# Patient Record
Sex: MALE | Race: WHITE | NOT HISPANIC OR LATINO | Employment: OTHER | URBAN - METROPOLITAN AREA
[De-identification: names, ages, dates, MRNs, and addresses within clinical notes are randomized per-mention and may not be internally consistent; named-entity substitution may affect disease eponyms.]

---

## 2018-05-06 ENCOUNTER — APPOINTMENT (OUTPATIENT)
Dept: RADIOLOGY | Facility: CLINIC | Age: 58
End: 2018-05-06
Payer: OTHER MISCELLANEOUS

## 2018-05-06 ENCOUNTER — OFFICE VISIT (OUTPATIENT)
Dept: URGENT CARE | Facility: CLINIC | Age: 58
End: 2018-05-06
Payer: OTHER MISCELLANEOUS

## 2018-05-06 VITALS
DIASTOLIC BLOOD PRESSURE: 78 MMHG | OXYGEN SATURATION: 97 % | RESPIRATION RATE: 20 BRPM | HEART RATE: 74 BPM | SYSTOLIC BLOOD PRESSURE: 144 MMHG | TEMPERATURE: 99.4 F | WEIGHT: 315 LBS | BODY MASS INDEX: 36.45 KG/M2 | HEIGHT: 78 IN

## 2018-05-06 DIAGNOSIS — S09.90XA INJURY OF HEAD, INITIAL ENCOUNTER: ICD-10-CM

## 2018-05-06 DIAGNOSIS — S09.90XA INJURY OF HEAD, INITIAL ENCOUNTER: Primary | ICD-10-CM

## 2018-05-06 PROCEDURE — 72100 X-RAY EXAM L-S SPINE 2/3 VWS: CPT

## 2018-05-06 PROCEDURE — 72040 X-RAY EXAM NECK SPINE 2-3 VW: CPT

## 2021-03-16 ENCOUNTER — HOSPITAL ENCOUNTER (EMERGENCY)
Facility: HOSPITAL | Age: 61
Discharge: HOME | End: 2021-03-16
Attending: EMERGENCY MEDICINE
Payer: COMMERCIAL

## 2021-03-16 ENCOUNTER — APPOINTMENT (EMERGENCY)
Dept: CT IMAGING | Facility: HOSPITAL | Age: 61
End: 2021-03-16
Payer: COMMERCIAL

## 2021-03-16 VITALS
DIASTOLIC BLOOD PRESSURE: 87 MMHG | WEIGHT: 315 LBS | SYSTOLIC BLOOD PRESSURE: 148 MMHG | TEMPERATURE: 97.9 F | BODY MASS INDEX: 38.36 KG/M2 | RESPIRATION RATE: 18 BRPM | HEART RATE: 83 BPM | OXYGEN SATURATION: 98 % | HEIGHT: 76 IN

## 2021-03-16 DIAGNOSIS — S39.012A STRAIN OF LUMBAR REGION, INITIAL ENCOUNTER: Primary | ICD-10-CM

## 2021-03-16 LAB
ANION GAP SERPL CALCULATED.3IONS-SCNC: 8 MMOL/L (ref 4–13)
BACTERIA UR QL AUTO: NORMAL /HPF
BASOPHILS # BLD AUTO: 0.05 THOUSANDS/ΜL (ref 0–0.1)
BASOPHILS NFR BLD AUTO: 1 % (ref 0–1)
BILIRUB UR QL STRIP: NEGATIVE
BUN SERPL-MCNC: 14 MG/DL (ref 5–25)
CALCIUM SERPL-MCNC: 9 MG/DL (ref 8.3–10.1)
CHLORIDE SERPL-SCNC: 103 MMOL/L (ref 100–108)
CK SERPL-CCNC: 127 U/L (ref 39–308)
CLARITY UR: CLEAR
CO2 SERPL-SCNC: 27 MMOL/L (ref 21–32)
COLOR UR: YELLOW
CREAT SERPL-MCNC: 1.13 MG/DL (ref 0.6–1.3)
EOSINOPHIL # BLD AUTO: 0.18 THOUSAND/ΜL (ref 0–0.61)
EOSINOPHIL NFR BLD AUTO: 2 % (ref 0–6)
ERYTHROCYTE [DISTWIDTH] IN BLOOD BY AUTOMATED COUNT: 14.6 % (ref 11.6–15.1)
GFR SERPL CREATININE-BSD FRML MDRD: 71 ML/MIN/1.73SQ M
GLUCOSE SERPL-MCNC: 111 MG/DL (ref 65–140)
GLUCOSE UR STRIP-MCNC: ABNORMAL MG/DL
HCT VFR BLD AUTO: 41.3 % (ref 36.5–49.3)
HGB BLD-MCNC: 13.5 G/DL (ref 12–17)
HGB UR QL STRIP.AUTO: NEGATIVE
IMM GRANULOCYTES # BLD AUTO: 0.07 THOUSAND/UL (ref 0–0.2)
IMM GRANULOCYTES NFR BLD AUTO: 1 % (ref 0–2)
KETONES UR STRIP-MCNC: NEGATIVE MG/DL
LEUKOCYTE ESTERASE UR QL STRIP: ABNORMAL
LYMPHOCYTES # BLD AUTO: 2.38 THOUSANDS/ΜL (ref 0.6–4.47)
LYMPHOCYTES NFR BLD AUTO: 25 % (ref 14–44)
MCH RBC QN AUTO: 28.8 PG (ref 26.8–34.3)
MCHC RBC AUTO-ENTMCNC: 32.7 G/DL (ref 31.4–37.4)
MCV RBC AUTO: 88 FL (ref 82–98)
MONOCYTES # BLD AUTO: 0.87 THOUSAND/ΜL (ref 0.17–1.22)
MONOCYTES NFR BLD AUTO: 9 % (ref 4–12)
NEUTROPHILS # BLD AUTO: 5.97 THOUSANDS/ΜL (ref 1.85–7.62)
NEUTS SEG NFR BLD AUTO: 62 % (ref 43–75)
NITRITE UR QL STRIP: NEGATIVE
NON-SQ EPI CELLS URNS QL MICRO: NORMAL /HPF
NRBC BLD AUTO-RTO: 0 /100 WBCS
PH UR STRIP.AUTO: 6.5 [PH]
PLATELET # BLD AUTO: 201 THOUSANDS/UL (ref 149–390)
PMV BLD AUTO: 9.5 FL (ref 8.9–12.7)
POTASSIUM SERPL-SCNC: 4.2 MMOL/L (ref 3.5–5.3)
PROT UR STRIP-MCNC: NEGATIVE MG/DL
RBC # BLD AUTO: 4.68 MILLION/UL (ref 3.88–5.62)
RBC #/AREA URNS AUTO: NORMAL /HPF
SODIUM SERPL-SCNC: 138 MMOL/L (ref 136–145)
SP GR UR STRIP.AUTO: 1.01 (ref 1–1.03)
UROBILINOGEN UR QL STRIP.AUTO: 1 E.U./DL
WBC # BLD AUTO: 9.52 THOUSAND/UL (ref 4.31–10.16)
WBC #/AREA URNS AUTO: NORMAL /HPF

## 2021-03-16 PROCEDURE — 81001 URINALYSIS AUTO W/SCOPE: CPT | Performed by: EMERGENCY MEDICINE

## 2021-03-16 PROCEDURE — 82550 ASSAY OF CK (CPK): CPT | Performed by: EMERGENCY MEDICINE

## 2021-03-16 PROCEDURE — 99284 EMERGENCY DEPT VISIT MOD MDM: CPT | Performed by: EMERGENCY MEDICINE

## 2021-03-16 PROCEDURE — 74176 CT ABD & PELVIS W/O CONTRAST: CPT

## 2021-03-16 PROCEDURE — 36415 COLL VENOUS BLD VENIPUNCTURE: CPT | Performed by: EMERGENCY MEDICINE

## 2021-03-16 PROCEDURE — 96374 THER/PROPH/DIAG INJ IV PUSH: CPT

## 2021-03-16 PROCEDURE — 99284 EMERGENCY DEPT VISIT MOD MDM: CPT

## 2021-03-16 PROCEDURE — G1004 CDSM NDSC: HCPCS

## 2021-03-16 PROCEDURE — 85025 COMPLETE CBC W/AUTO DIFF WBC: CPT | Performed by: EMERGENCY MEDICINE

## 2021-03-16 PROCEDURE — 80048 BASIC METABOLIC PNL TOTAL CA: CPT | Performed by: EMERGENCY MEDICINE

## 2021-03-16 RX ORDER — KETOROLAC TROMETHAMINE 30 MG/ML
15 INJECTION, SOLUTION INTRAMUSCULAR; INTRAVENOUS ONCE
Status: COMPLETED | OUTPATIENT
Start: 2021-03-16 | End: 2021-03-16

## 2021-03-16 RX ORDER — CYCLOBENZAPRINE HCL 10 MG
10 TABLET ORAL ONCE
Status: COMPLETED | OUTPATIENT
Start: 2021-03-16 | End: 2021-03-16

## 2021-03-16 RX ORDER — METHOCARBAMOL 500 MG/1
500 TABLET, FILM COATED ORAL 2 TIMES DAILY
Qty: 20 TABLET | Refills: 0 | Status: SHIPPED | OUTPATIENT
Start: 2021-03-16 | End: 2021-03-29

## 2021-03-16 RX ADMIN — CYCLOBENZAPRINE HYDROCHLORIDE 10 MG: 10 TABLET, FILM COATED ORAL at 21:05

## 2021-03-16 RX ADMIN — KETOROLAC TROMETHAMINE 15 MG: 30 INJECTION, SOLUTION INTRAMUSCULAR; INTRAVENOUS at 20:33

## 2021-03-16 NOTE — ED PROVIDER NOTES
History  Chief Complaint   Patient presents with    Back Pain     pt states last month had a knee replacement, states since he started therapy has been developing back pain that is getting worse  denies changes in baseline in sensation of legs  Patient just had knee replacement done  Started physical therapy 1/2 weeks ago  Since then developed diffuse back and flank pain  No dysuria frequency  Has nausea but no vomiting  No dysuria  No loss of bladder or bowel functions  Worse with prolonged sitting  Percocet with some relief  No chronic steroids  No IV drug abuse  Is diabetic  History provided by:  Patient   used: No    Back Pain  Location:  Lumbar spine  Quality:  Aching  Pain severity:  Mild  Pain is:  Same all the time  Onset quality:  Gradual  Duration:  10 days  Timing:  Constant  Progression:  Unchanged  Chronicity:  New  Context: physical stress    Context: not falling and not MVA    Relieved by:  Nothing  Exacerbated by: prolonged sitting  Ineffective treatments:  None tried  Associated symptoms: no abdominal pain, no chest pain, no dysuria, no fever, no headaches, no numbness, no perianal numbness and no weakness    Risk factors: obesity        None       Past Medical History:   Diagnosis Date    Diabetes mellitus ([facility] [address] )     Hypertension     Obesity        Past Surgical History:   Procedure Laterality Date    JOINT REPLACEMENT         History reviewed  No pertinent family history  I have reviewed and agree with the history as documented  E-Cigarette/Vaping    E-Cigarette Use Never User      E-Cigarette/Vaping Substances     Social History     Tobacco Use    Smoking status: Never Smoker    Smokeless tobacco: Never Used   Substance Use Topics    Alcohol use: No    Drug use: No       Review of Systems   Constitutional: Negative for chills and fever  HENT: Negative for ear pain, hearing loss, sore throat, trouble swallowing and voice change      Eyes: Negative for pain and discharge  Respiratory: Negative for cough, shortness of breath and wheezing  Cardiovascular: Negative for chest pain and palpitations  Gastrointestinal: Negative for abdominal pain, blood in stool, constipation, diarrhea, nausea and vomiting  Genitourinary: Negative for dysuria, flank pain, frequency and hematuria  Musculoskeletal: Positive for back pain  Negative for joint swelling, neck pain and neck stiffness  Skin: Negative for rash and wound  Neurological: Negative for dizziness, seizures, syncope, facial asymmetry, weakness, numbness and headaches  Psychiatric/Behavioral: Negative for hallucinations, self-injury and suicidal ideas  All other systems reviewed and are negative  Physical Exam  Physical Exam  Vitals signs and nursing note reviewed  Constitutional:       General: He is not in acute distress  Appearance: He is well-developed  HENT:      Head: Normocephalic and atraumatic  Right Ear: External ear normal       Left Ear: External ear normal    Eyes:      Conjunctiva/sclera: Conjunctivae normal       Pupils: Pupils are equal, round, and reactive to light  Neck:      Musculoskeletal: Normal range of motion and neck supple  Cardiovascular:      Rate and Rhythm: Normal rate and regular rhythm  Heart sounds: Normal heart sounds  No murmur  Pulmonary:      Effort: Pulmonary effort is normal       Breath sounds: Normal breath sounds  No wheezing or rales  Abdominal:      General: Bowel sounds are normal  There is no distension  Palpations: Abdomen is soft  Tenderness: There is no abdominal tenderness  There is no guarding or rebound  Musculoskeletal: Normal range of motion  General: No deformity  Comments: Tender along the left paralumbar region  Skin:     General: Skin is warm and dry  Findings: No rash  Neurological:      General: No focal deficit present        Mental Status: He is alert and oriented to person, place, and time  Cranial Nerves: No cranial nerve deficit     Psychiatric:         Behavior: Behavior normal          Vital Signs  ED Triage Vitals [03/16/21 1937]   Temperature Pulse Respirations Blood Pressure SpO2   97 9 °F (36 6 °C) 83 18 148/87 98 %      Temp Source Heart Rate Source Patient Position - Orthostatic VS BP Location FiO2 (%)   Temporal Monitor Sitting Left arm --      Pain Score       6           Vitals:    03/16/21 1937   BP: 148/87   Pulse: 83   Patient Position - Orthostatic VS: Sitting         Visual Acuity      ED Medications  Medications   cyclobenzaprine (FLEXERIL) tablet 10 mg (has no administration in time range)   ketorolac (TORADOL) injection 15 mg (15 mg Intravenous Given 3/16/21 2033)       Diagnostic Studies  Results Reviewed     Procedure Component Value Units Date/Time    Urine Microscopic [[id number]]  (Normal) Collected: 03/16/21 2022    Lab Status: Final result Specimen: Urine, Clean Catch Updated: 03/16/21 2048     RBC, UA None Seen /hpf      WBC, UA None Seen /hpf      Epithelial Cells Occasional /hpf      Bacteria, UA None Seen /hpf     Basic metabolic panel [[id number]] Collected: 03/16/21 2009    Lab Status: Final result Specimen: Blood from Arm, Left Updated: 03/16/21 2033     Sodium 138 mmol/L      Potassium 4 2 mmol/L      Chloride 103 mmol/L      CO2 27 mmol/L      ANION GAP 8 mmol/L      BUN 14 mg/dL      Creatinine 1 13 mg/dL      Glucose 111 mg/dL      Calcium 9 0 mg/dL      eGFR 71 ml/min/1 73sq m     Narrative:      [place]ises guidelines for Chronic Kidney Disease (CKD):     Stage 1 with normal or high GFR (GFR > 90 mL/min/1 73 square meters)    Stage 2 Mild CKD (GFR = 60-89 mL/min/1 73 square meters)    Stage 3A Moderate CKD (GFR = 45-59 mL/min/1 73 square meters)    Stage 3B Moderate CKD (GFR = 30-44 mL/min/1 73 square meters)    Stage 4 Severe CKD (GFR = 15-29 mL/min/1 73 square meters)    Stage 5 End Stage CKD (GFR <15 mL/min/1 73 square meters)  Note: GFR calculation is accurate only with a steady state creatinine    CK Total with Reflex CKMB [[id number]]  (Normal) Collected: 03/16/21 2009    Lab Status: Final result Specimen: Blood from Arm, Left Updated: 03/16/21 2033     Total  U/L     UA w Reflex to Microscopic w Reflex to Culture [[id number]]  (Abnormal) Collected: 03/16/21 2022    Lab Status: Final result Specimen: Urine, Clean Catch Updated: 03/16/21 2028     Color, UA Yellow     Clarity, UA Clear     Specific Gravity, UA 1 015     pH, UA 6 5     Leukocytes, UA Elevated glucose may cause decreased leukocyte values  See urine microscopic for [facility] result/     Nitrite, UA Negative     Protein, UA Negative mg/dl      Glucose, UA >=1000 (1%) mg/dl      Ketones, UA Negative mg/dl      Urobilinogen, UA 1 0 E U /dl      Bilirubin, UA Negative     Blood, UA Negative    CBC and differential [[id number]] Collected: 03/16/21 2009    Lab Status: Final result Specimen: Blood from Arm, Left Updated: 03/16/21 2016     WBC 9 52 Thousand/uL      RBC 4 68 Million/uL      Hemoglobin 13 5 g/dL      Hematocrit 41 3 %      MCV 88 fL      MCH 28 8 pg      MCHC 32 7 g/dL      RDW 14 6 %      MPV 9 5 fL      Platelets 637 Thousands/uL      nRBC 0 /100 WBCs      Neutrophils Relative 62 %      Immat GRANS % 1 %      Lymphocytes Relative 25 %      Monocytes Relative 9 %      Eosinophils Relative 2 %      Basophils Relative 1 %      Neutrophils Absolute 5 97 Thousands/µL      Immature Grans Absolute 0 07 Thousand/uL      Lymphocytes Absolute 2 38 Thousands/µL      Monocytes Absolute 0 87 Thousand/µL      Eosinophils Absolute 0 18 Thousand/µL      Basophils Absolute 0 05 Thousands/µL                  CT recon only lumbar spine   Final Result by Dulce Maria Forbes DO (03/16 2056)      No fracture or traumatic subluxation              Workstation performed: [id number]         CT renal stone study abdomen pelvis wo contrast   Final Result by Dulce Maria Forbes DO (03/16 2053)      No evidence of hydronephrosis or urinary tract calculi             Workstation performed: [id number]                    Procedures  Procedures         ED Course                             SBIRT 20yo+      Most Recent Value   SBIRT (23 yo +)   In order to provide better care to our patients, we are screening all of our patients for alcohol and drug use  Would it be okay to ask you these screening questions? No Filed at: 03/16/2021 1939                    MDM    Disposition  Final diagnoses:   Strain of lumbar region, initial encounter     Time reflects when diagnosis was documented in both MDM as applicable and the Disposition within this note     Time User Action Codes Description Comment    3/16/2021  9:01 PM Diana Parthwilmar [place] Add [[id number]] Strain of lumbar region, initial encounter       ED Disposition     ED Disposition Condition Date/Time Comment    Discharge Stable Tue Mar 16, 2021  9:01 PM Wang Parra discharge to home/self care  Follow-up Information     Follow up With Specialties Details Why Contact Info    Christopher Blanco DO General Practice Call in 2 days  [address] [place] [address]      Wong Joaquin MD Sports Medicine Call in 2 days  [address] [place] [address]            Patient's Medications   Discharge Prescriptions    METHOCARBAMOL (ROBAXIN) 500 MG TABLET    Take 1 tablet (500 mg total) by mouth 2 (two) times a day       Start Date: 3/16/2021 End Date: --       Order Dose: 500 mg       Quantity: 20 tablet    Refills: 0     No discharge procedures on file      PDMP Review     None          ED Provider  Electronically Signed by           Adriano Knox MD  03/16/21 2102

## 2021-03-29 ENCOUNTER — CONSULT (OUTPATIENT)
Dept: PAIN MEDICINE | Facility: CLINIC | Age: 61
End: 2021-03-29
Payer: COMMERCIAL

## 2021-03-29 VITALS
TEMPERATURE: 97.9 F | DIASTOLIC BLOOD PRESSURE: 78 MMHG | HEIGHT: 76 IN | WEIGHT: 315 LBS | BODY MASS INDEX: 38.36 KG/M2 | SYSTOLIC BLOOD PRESSURE: 128 MMHG | HEART RATE: 70 BPM | RESPIRATION RATE: 20 BRPM

## 2021-03-29 DIAGNOSIS — M51.24 HERNIATION OF INTERVERTEBRAL DISC OF THORACIC REGION: ICD-10-CM

## 2021-03-29 DIAGNOSIS — E11.42 DIABETIC PERIPHERAL NEUROPATHY (HCC): Primary | ICD-10-CM

## 2021-03-29 PROCEDURE — 80305 DRUG TEST PRSMV DIR OPT OBS: CPT | Performed by: ANESTHESIOLOGY

## 2021-03-29 PROCEDURE — 99204 OFFICE O/P NEW MOD 45 MIN: CPT | Performed by: ANESTHESIOLOGY

## 2021-03-29 RX ORDER — LIDOCAINE HYDROCHLORIDE 10 MG/ML
5 INJECTION, SOLUTION EPIDURAL; INFILTRATION; INTRACAUDAL; PERINEURAL ONCE
Status: CANCELLED | OUTPATIENT
Start: 2021-03-29 | End: 2021-03-29

## 2021-03-29 RX ORDER — SEMAGLUTIDE 1.34 MG/ML
INJECTION, SOLUTION SUBCUTANEOUS
COMMUNITY
End: 2021-07-14

## 2021-03-29 RX ORDER — OXYCODONE HYDROCHLORIDE AND ACETAMINOPHEN 5; 325 MG/1; MG/1
1 TABLET ORAL EVERY 6 HOURS PRN
COMMUNITY
End: 2021-07-14

## 2021-03-29 RX ORDER — DILTIAZEM HYDROCHLORIDE 180 MG/1
180 CAPSULE, COATED, EXTENDED RELEASE ORAL
COMMUNITY
End: 2021-04-01

## 2021-03-29 RX ORDER — METHYLPREDNISOLONE ACETATE 80 MG/ML
80 INJECTION, SUSPENSION INTRA-ARTICULAR; INTRALESIONAL; INTRAMUSCULAR; PARENTERAL; SOFT TISSUE ONCE
Status: CANCELLED | OUTPATIENT
Start: 2021-03-29 | End: 2021-03-29

## 2021-03-29 RX ORDER — CLOMIPRAMINE HYDROCHLORIDE 50 MG/1
50 CAPSULE ORAL
COMMUNITY
End: 2021-04-01

## 2021-03-29 RX ORDER — VALSARTAN 80 MG/1
80 TABLET ORAL DAILY
COMMUNITY
Start: 2021-01-14

## 2021-03-29 RX ORDER — 0.9 % SODIUM CHLORIDE 0.9 %
3 VIAL (ML) INJECTION ONCE
Status: CANCELLED | OUTPATIENT
Start: 2021-03-29 | End: 2021-03-29

## 2021-03-29 RX ORDER — PAROXETINE HYDROCHLORIDE 40 MG/1
TABLET, FILM COATED ORAL
COMMUNITY
End: 2021-04-01 | Stop reason: SINTOL

## 2021-03-29 RX ORDER — SEMAGLUTIDE 1.34 MG/ML
INJECTION, SOLUTION SUBCUTANEOUS WEEKLY
COMMUNITY
End: 2021-07-14

## 2021-03-29 RX ORDER — GABAPENTIN 100 MG/1
CAPSULE ORAL
COMMUNITY
Start: 2020-11-16 | End: 2021-03-29

## 2021-03-29 RX ORDER — LORAZEPAM 1 MG/1
1 TABLET ORAL EVERY 6 HOURS PRN
COMMUNITY
End: 2021-10-05

## 2021-03-29 RX ORDER — HYDROXYZINE 50 MG/1
50 TABLET, FILM COATED ORAL
COMMUNITY
Start: 2020-11-16 | End: 2021-07-14

## 2021-03-29 RX ORDER — NADOLOL 80 MG/1
80 TABLET ORAL
COMMUNITY
End: 2021-04-01

## 2021-03-29 RX ORDER — PREGABALIN 75 MG/1
75 CAPSULE ORAL 3 TIMES DAILY
Qty: 90 CAPSULE | Refills: 0 | Status: SHIPPED | OUTPATIENT
Start: 2021-03-29 | End: 2021-04-19

## 2021-03-29 RX ORDER — EMPAGLIFLOZIN 25 MG/1
25 TABLET, FILM COATED ORAL DAILY
COMMUNITY
Start: 2021-01-14

## 2021-03-29 RX ORDER — SEMAGLUTIDE 1.34 MG/ML
0.5 INJECTION, SOLUTION SUBCUTANEOUS WEEKLY
COMMUNITY
Start: 2021-01-14

## 2021-03-29 NOTE — PATIENT INSTRUCTIONS
Pregabalin (By mouth)   Pregabalin (pre-GA-ba-hero)  Treats nerve and muscle pain, including fibromyalgia  Also treats partial-onset seizures  Brand Name(s): Lyrica, Lyrica CR   There may be other brand names for this medicine  When This Medicine Should Not Be Used: This medicine is not right for everyone  Do not use it if you had an allergic reaction to pregabalin  How to Use This Medicine:   Capsule, Liquid, Long Acting Tablet  · Take your medicine as directed  Your dose may need to be changed several times to find what works best for you  · Extended-release tablet: Swallow the extended-release tablet whole  Do not crush, break, or chew it  Take it after an evening meal   · Oral liquid: Measure the oral liquid medicine with a marked measuring spoon, oral syringe, or medicine cup  · This medicine should come with a Medication Guide  Ask your pharmacist for a copy if you do not have one  · Missed dose: Take a dose as soon as you remember  If it is almost time for your next dose, wait until then and take a regular dose  Do not take extra medicine to make up for a missed dose  If you miss a dose of the extended-release tablet after your evening meal, take it before bedtime after a snack  If you miss the dose before bedtime, take it after your morning meal  If you do not take the dose the following morning, then take the next dose at your regular time after your evening meal  Do not take 2 doses at the same time  · Store the medicine in a closed container at room temperature, away from heat, moisture, and direct light  Drugs and Foods to Avoid:   Ask your doctor or pharmacist before using any other medicine, including over-the-counter medicines, vitamins, and herbal products  · Some medicines can affect how pregabalin works  Tell your doctor if you are using any of the following:   ? ACE inhibitor (including benazepril, enalapril, lisinopril, quinapril, ramipril)  ?  Oral diabetes medicine (including metformin, pioglitazone, rosiglitazone)  · Do not drink alcohol while you are using this medicine  · Tell your doctor if you use anything else that makes you sleepy  Some examples are allergy medicine, narcotic pain medicine, and alcohol  Tell your doctor if you are also using oxycodone, lorazepam, or zolpidem  Warnings While Using This Medicine:   · Tell your doctor if you are pregnant or breastfeeding, or if you have kidney disease, heart failure, heart rhythm problems, lung or breathing problems, a bleeding disorder, diabetes, sores or skin problems, or a low blood platelet count  Tell your doctor if you have a history of angioedema (severe swelling), alcohol or drug abuse, depression, or other mood problems  · This medicine may cause the following problems:   ? Angioedema (severe swelling), which may be life-threatening  ? Changes in mood or behavior, including suicidal thoughts or behavior  ? Respiratory depression (serious breathing problem that can be life-threatening), when used with narcotic pain medicines  ? Peripheral edema (swelling of your hands, ankles, feet, or lower legs)  ? Increased risk for cancer and bleeding  ? Serious muscle problems  ? Heart rhythm changes  · This medicine may make you dizzy or drowsy  It may also cause blurry or double vision  Do not drive or do anything else that could be dangerous until you know how this medicine affects you  · Do not stop using this medicine suddenly  Your doctor will need to slowly decrease your dose before you stop it completely  · Your doctor will do lab tests at regular visits to check on the effects of this medicine  Keep all appointments  · Keep all medicine out of the reach of children  Never share your medicine with anyone    Possible Side Effects While Using This Medicine:   Call your doctor right away if you notice any of these side effects:  · Allergic reaction: Itching or hives, swelling in your face or hands, swelling or tingling in your mouth or throat, chest tightness, trouble breathing  · Blistering, peeling, red skin rash  · Blue lips, fingernails, or skin, trouble breathing, chest pain  · Blurry or double vision  · Fever, chills, cough, sore throat, body aches  · Muscle pain, tenderness, or weakness, general feeling of illness  · Rapid weight gain, swelling in your hands, ankles, or feet  · Severe dizziness or drowsiness  · Sudden mood changes, unusual moods or behavior, including extreme happiness or depression, thoughts or attempts of killing oneself  · Swelling in your throat, head, or neck  · Uneven heartbeat  · Unusual bleeding, bruising, or weakness  If you notice these less serious side effects, talk with your doctor:   · Confusion, trouble concentrating  · Constipation  · Dry mouth  If you notice other side effects that you think are caused by this medicine, tell your doctor  Call your doctor for medical advice about side effects  You may report side effects to FDA at 9-462-FDA-8542  © Copyright [address] Information is for End User's use only and may not be sold, redistributed or otherwise used for commercial purposes  The above information is an  only  It is not intended as medical advice for individual conditions or treatments  Talk to your doctor, nurse or pharmacist before following any medical regimen to see if it is safe and effective for you

## 2021-03-29 NOTE — PROGRESS NOTES
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  -     pregabalin (LYRICA) 75 mg capsule; Take 1 capsule (75 mg total) by mouth 3 (three) times a day  -     Case request operating room: BLOCK / INJECTION EPIDURAL STEROID thoracic T10-T11; Standing  -     Case request operating room: BLOCK / INJECTION EPIDURAL STEROID thoracic T10-T11  -     MRI thoracic spine without contrast; Future; Expected date: 03/29/2021    pain consistent with thoracic radicular symptoms secondary to T10-T11 disc herniation which from 2018 MRI thoracic spine does not contribute to significant cord compression  Radiating and radicular pain in T10 and T11 dermatomal distribution  Pain is along lower thoracic T10 and T11 dermatomes, particularly with palpation of lower thoracic ribs and posterolateral distribution  Interestingly his significant and advanced lumbar facet arthropathy which is not a  primary source of his pain at this time  He previously had epidural steroid injections in the past with Dr Yasmeen Estevez with noted relief  For radicular pain  Reasonable to reobtain imaging, and pursue multimodal pain therapy plan as noted below  Plan  -  T10-T11 LESI  -Lyrica 75 mg t i d  Ordered for patient; counseled regarding sedative effects of taking this medication and provided up titration calendar  Counseled not to take medication while driving or operating heavy machinery/using stairs  -discontinue  Robaxin and gabapentin   -continue other analgesics as prescribed previously well provider's  Counseled extensively as noted below regarding the risks of opioid medications in combination with Lyrica  -physical therapy for right-sided lumbar radiculopathy      There are risks associated with opioid medications, including dependence, addiction and tolerance   The patient understands and agrees to use these medications only as prescribed  Potential side effects of the medications include, but are not limited to, constipation, drowsiness, addiction, impaired judgment and risk of fatal overdose if not taken as prescribed  The patient was warned against driving while taking sedation medications  Sharing medications is a felony  At this point in time, the patient is showing no signs of addiction, abuse, diversion or suicidal ideation  A urine drug screen was collected at today's office visit as part of our medication management protocol  The point of care testing results were appropriate for what was being prescribed  The specimen will be sent for confirmatory testing  The drug screen is medically necessary because the patient is either dependent on opioid medication or is being considered for opioid medication therapy and the results could impact ongoing or future treatment  The drug screen is to evaluate for the presences or absence of prescribed, non-prescribed, and/or illicit drugs/substances  [place] Prescription Drug Monitoring Program report was reviewed and was appropriate     Complete risks and benefits including bleeding, infection, tissue reaction, nerve injury and allergic reaction were discussed  The approach was demonstrated using models and literature was provided  Verbal and written consent was obtained  My impressions and treatment recommendations were discussed in detail with the patient who verbalized understanding and had no further questions  Discharge instructions were provided  I personally saw and examined the patient and I agree with the above discussed plan of care      New Medications Ordered This Visit   Medications    diltiazem (dilTIAZem CD) 180 mg 24 hr capsule     Sig: Take 180 mg by mouth    ERGOCALCIFEROL PO     Sig: Take 50,000 Units by mouth    clomiPRAMINE (ANAFRANIL) 50 mg capsule     Sig: Take 50 mg by mouth    Empagliflozin (Jardiance) 25 MG TABS     Sig: Take 25 mg by mouth daily    hydrOXYzine HCL (ATARAX) 50 mg tablet     Sig: Take by mouth    LORazepam (ATIVAN) 1 mg tablet     Sig: Take 1 mg by mouth    nadolol (CORGARD) 80 MG tablet     Sig: Take 80 mg by mouth    oxyCODONE-acetaminophen (PERCOCET) 5-325 mg per tablet     Sig: Take 1 tablet by mouth every 6 (six) hours as needed    PARoxetine (PAXIL) 40 MG tablet     Sig: Take by mouth    Semaglutide,0 25 or 0 5MG/DOS, (Ozempic, 0 25 or 0 5 MG/DOSE,) 2 MG/1 5ML SOPN     Sig: Inject 0 5 mg under the skin    valsartan (DIOVAN) 80 mg tablet     Sig: Take by mouth    Semaglutide,0 25 or 0 5MG/DOS, (Ozempic, 0 25 or 0 5 MG/DOSE,) 2 MG/1 5ML SOPN     Sig: Inject under the skin    Semaglutide,0 25 or 0 5MG/DOS, (Ozempic, 0 25 or 0 5 MG/DOSE,) 2 MG/1 5ML SOPN     Sig: Inject under the skin once a week on    pregabalin (LYRICA) 75 mg capsule     Sig: Take 1 capsule (75 mg total) by mouth 3 (three) times a day     Dispense:  90 capsule     Refill:  0       History of Present Illness    Nael Simmons is a 62 y o  male with a past medical history of  Non-insulin controlled type 2 diabetes, obesity, chronic low back pain described primarily as arthritic in nature  He describes 8/10 low back pain that is worse in the mornings and worse at the end of the day  The pain is characterized by achy, nagging, indolent, crampy, stabbing pain in his axial low back  The patient describes that the pain is worse with standing for long periods of time on hard surfaces as well as with walking  The patient is a very active individual and feels as though this pain compromises his participation with independent activities of daily living  He ambulates with a walker  The pain can be debilitating at times and contribute to significant disability, compromising overall activity and independent activities of daily living  He has tried physical therapy with limited relief of symptoms    Medications  he is currently on include   Celebrex 200 mg per day, Percocet 5-325 mg Q 6 hours p r n  pain  He takes 200 mg of gabapentin per day and was recently started on Robaxin 500 mg t i d  for back spasms  He additionally has pain radiating across his ribs bilaterally in the T10 and T11 dermatomal distribution  He demonstrates good strength and denies any weakness numbness or paresthesias  The patient denies any bowel or bladder dysfunction as well  I have personally reviewed and/or updated the patient's past medical history, past surgical history, family history, social history, current medications, allergies, and vital signs today  Review of Systems   Constitutional: Positive for activity change  HENT: Negative  Eyes: Negative  Respiratory: Negative  Cardiovascular: Negative  Gastrointestinal: Negative  Endocrine: Negative  Genitourinary: Negative  Musculoskeletal: Positive for arthralgias, back pain, gait problem and myalgias  Skin: Negative  Allergic/Immunologic: Negative  Neurological: Positive for numbness  Negative for weakness  Hematological: Negative  Psychiatric/Behavioral: Negative  All other systems reviewed and are negative        Patient Active Problem List   Diagnosis    Herniation of intervertebral disc of thoracic region    Diabetic peripheral neuropathy (HCC)       Past Medical History:   Diagnosis Date    Diabetes mellitus ([facility] [place] [address] )     Hypertension     Obesity        Past Surgical History:   Procedure Laterality Date    JOINT REPLACEMENT         Family History   Problem Relation Age of Onset    Arthritis Mother     Hypertension Father        Social History     Occupational History    Not on file   Tobacco Use    Smoking status: Never Smoker    Smokeless tobacco: Never Used   Substance and Sexual Activity    Alcohol use: No    Drug use: No    Sexual activity: Not on file       Current Outpatient Medications on File Prior to Visit   Medication Sig    Empagliflozin (Jardiance) 25 MG TABS Take 25 mg by mouth daily    ERGOCALCIFEROL PO Take 50,000 Units by mouth    hydrOXYzine HCL (ATARAX) 50 mg tablet Take by mouth    LORazepam (ATIVAN) 1 mg tablet Take 1 mg by mouth    oxyCODONE-acetaminophen (PERCOCET) 5-325 mg per tablet Take 1 tablet by mouth every 6 (six) hours as needed    Semaglutide,0 25 or 0 5MG/DOS, (Ozempic, 0 25 or 0 5 MG/DOSE,) 2 MG/1 5ML SOPN Inject 0 5 mg under the skin    Semaglutide,0 25 or 0 5MG/DOS, (Ozempic, 0 25 or 0 5 MG/DOSE,) 2 MG/1 5ML SOPN Inject under the skin    Semaglutide,0 25 or 0 5MG/DOS, (Ozempic, 0 25 or 0 5 MG/DOSE,) 2 MG/1 5ML SOPN Inject under the skin once a week on    valsartan (DIOVAN) 80 mg tablet Take by mouth    [DISCONTINUED] gabapentin (NEURONTIN) 100 mg capsule     clomiPRAMINE (ANAFRANIL) 50 mg capsule Take 50 mg by mouth    diltiazem (dilTIAZem CD) 180 mg 24 hr capsule Take 180 mg by mouth    nadolol (CORGARD) 80 MG tablet Take 80 mg by mouth    PARoxetine (PAXIL) 40 MG tablet Take by mouth    [DISCONTINUED] methocarbamol (ROBAXIN) 500 mg tablet Take 1 tablet (500 mg total) by mouth 2 (two) times a day     No current facility-administered medications on file prior to visit  No Known Allergies      Physical Exam    /78   Pulse 70   Temp 97 9 °F (36 6 °C)   Resp 20   Ht 6' 4" (1 93 m)   Wt (!) 146 kg (322 lb)   BMI 39 20 kg/m²     Constitutional: normal, well developed, well nourished, alert, in no distress and non-toxic and no overt pain behavior  and obese  Eyes: anicteric  HEENT: grossly intact  Neck: supple, symmetric, trachea midline and no masses   Pulmonary:even and unlabored  Cardiovascular:No edema or pitting edema present  Skin:Normal without rashes or lesions and well hydrated  Psychiatric:Mood and affect appropriate  Neurologic:Cranial Nerves II-XII grossly intact Sensation grossly intact; no clonus negative diallo's  Reflexes 2+ and brisk   SLR negative bilaterally  Musculoskeletal: hunched gait  Unable to perform normal heel toe and tip toe walking  5/5 strength with active range of motion movements in all extremities bilaterally  mild pain with lumbar facet loading bilaterally and with lateral spine rotation  mild ttp over lumbar paraspinal muscles  Negative jahaira's test, negative gaenslen's negative SIJ loading bilaterally  Tenderness to palpation over inferior rib borders of T10-T11 posterolateral ribs bilaterally,  Eliciting radicular pain in aformentioned dermatomal distributions  Imaging    Impression: Hypertrophic facet arthrosis with fluid in bilateral facet joints at  L3-L4  Consider active facet inflammation or from degeneration or infection,  infection must be excluded clinically  There is no focal fluid collection  A  component of epidural lipomatosis in the lower lumbar spine  Disc osteophyte  complex resulting in at least moderate left foraminal narrowing at L5-S1 abuts  the exiting left L5 nerve root, correlate clinically for left L5 distribution  symptoms  [id number]   Result Narrative   History: Back pain, evaluate for cauda equina syndrome  Technique: MRI of the lumbar spine was performed with sagittal T1, sagittal T2,  sagittal STIR, axial T1 and T2-weighted images  Following contrast  administration, sagittal and axial T1-weighted images were obtained  18 5 cc  Gadavist injected intravenously  Comparison: None  Findings: For the purposes of this dictation, the lumbar vertebral bodies are  labeled from caudal to cranial direction  The first vertebra with lumbar  morphology is labeled as L5  There is poor signal-to-noise ratio on the study secondary to patient's body  habitus  There is abundant fat within the spinal canal which causes mild circumferential  impression on the thecal sac and its contents  The lumbar vertebrae are normal  in alignment  The vertebral body heights are maintained   There is no acute  compression deformity or spondylolisthesis  There is mild congestion of the epidural venous plexus on the contrast-enhanced  study  There is enhancement in the facet joints bilaterally at L3-L4  This is  indicative of facet inflammation either from degenerative disc disease or  infection  Infection must excluded clinically  There is no abnormal focal fluid  collection  There is asymmetric fatty atrophy of the left psoas group of  muscles  At L1-L2, there is facet arthrosis and a disc bulge without significant spinal  stenosis or foraminal narrowing  At L2-L3, there is bilateral facet arthrosis and disc bulge causing mild spinal  stenosis and mild right foraminal narrowing  At L3-L4, there is hypertrophic facet arthrosis with fluid in bilateral facet  joints  There is pathologic enhancement or demonstration of contrast  There is  mild enhancement in the soft tissues on the right facet joint  There is no focal  fluid collection  There is mild spinal stenosis and mild to moderate right  foraminal narrowing, mild left foraminal narrowing  At L4-L5, there is a disc bulge, facet arthrosis causing mild spinal stenosis  and mild to moderate left foraminal narrowing  At L5-S1, there is a disc bulge with a left subarticular and foraminal disc  osteophyte complex, hypertrophic facet arthrosis causing at least moderate right  foraminal narrowing  The disc osteophyte complex abuts the exiting left L5 nerve  root, correlate clinically for left L5 distribution symptoms  Impression:  1  Technically suboptimal study  2  Marked spondylosis  3  At T10-T11 there is spinal stenosis however there is no marlyn spinal cord  compression  Workstation:[id number]   Result Narrative   Exam - thoracic spine mri scan     History: Bilateral leg weakness  Technique: Using a surface coil sagittal and axial T1-weighted and T2-weighted  scans were obtained of the thoracic spine      Findings: Image detail is suboptimal secondary to patient's obesity  Thoracic spinal cord is normal in size and configuration and MRI signal  intensity  There are no intramedullary lesions  Vertebral bodies are in anatomic alignment  There are degenerative changes in endplates and facet joints throughout the  thoracic spine  There is central spinal stenosis at T10-T11  There is no marlyn spinal cord  compression however  Other Result Information   Interface, Rad Results In - 08/07/2018  7:34 PM EDT  Formatting of this note might be different from the original   Exam - thoracic spine mri scan     History: Bilateral leg weakness  Technique: Using a surface coil sagittal and axial T1-weighted and T2-weighted  scans were obtained of the thoracic spine  Findings: Image detail is suboptimal secondary to patient's obesity  Thoracic spinal cord is normal in size and configuration and MRI signal  intensity  There are no intramedullary lesions  Vertebral bodies are in anatomic alignment  There are degenerative changes in endplates and facet joints throughout the  thoracic spine  There is central spinal stenosis at T10-T11  There is no marlyn spinal cord  compression however  IMPRESSION:  Impression:  1  Technically suboptimal study  2  Marked spondylosis  3  At T10-T11 there is spinal stenosis however there is no marlyn spinal cord  compression

## 2021-03-29 NOTE — H&P (VIEW-ONLY)
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  -     pregabalin (LYRICA) 75 mg capsule; Take 1 capsule (75 mg total) by mouth 3 (three) times a day  -     Case request operating room: BLOCK / INJECTION EPIDURAL STEROID thoracic T10-T11; Standing  -     Case request operating room: BLOCK / INJECTION EPIDURAL STEROID thoracic T10-T11  -     MRI thoracic spine without contrast; Future; Expected date: 03/29/2021    pain consistent with thoracic radicular symptoms secondary to T10-T11 disc herniation which from 2018 MRI thoracic spine does not contribute to significant cord compression  Radiating and radicular pain in T10 and T11 dermatomal distribution  Pain is along lower thoracic T10 and T11 dermatomes, particularly with palpation of lower thoracic ribs and posterolateral distribution  Interestingly his significant and advanced lumbar facet arthropathy which is not a  primary source of his pain at this time  He previously had epidural steroid injections in the past with Dr Austin Stein with noted relief  For radicular pain  Reasonable to reobtain imaging, and pursue multimodal pain therapy plan as noted below  Plan  -  T10-T11 LESI  -Lyrica 75 mg t i d  Ordered for patient; counseled regarding sedative effects of taking this medication and provided up titration calendar  Counseled not to take medication while driving or operating heavy machinery/using stairs  -discontinue  Robaxin and gabapentin   -continue other analgesics as prescribed previously well provider's  Counseled extensively as noted below regarding the risks of opioid medications in combination with Lyrica  -physical therapy for right-sided lumbar radiculopathy      There are risks associated with opioid medications, including dependence, addiction and tolerance   The patient understands and agrees to use these medications only as prescribed  Potential side effects of the medications include, but are not limited to, constipation, drowsiness, addiction, impaired judgment and risk of fatal overdose if not taken as prescribed  The patient was warned against driving while taking sedation medications  Sharing medications is a felony  At this point in time, the patient is showing no signs of addiction, abuse, diversion or suicidal ideation  A urine drug screen was collected at today's office visit as part of our medication management protocol  The point of care testing results were appropriate for what was being prescribed  The specimen will be sent for confirmatory testing  The drug screen is medically necessary because the patient is either dependent on opioid medication or is being considered for opioid medication therapy and the results could impact ongoing or future treatment  The drug screen is to evaluate for the presences or absence of prescribed, non-prescribed, and/or illicit drugs/substances  [place] Prescription Drug Monitoring Program report was reviewed and was appropriate     Complete risks and benefits including bleeding, infection, tissue reaction, nerve injury and allergic reaction were discussed  The approach was demonstrated using models and literature was provided  Verbal and written consent was obtained  My impressions and treatment recommendations were discussed in detail with the patient who verbalized understanding and had no further questions  Discharge instructions were provided  I personally saw and examined the patient and I agree with the above discussed plan of care      New Medications Ordered This Visit   Medications    diltiazem (dilTIAZem CD) 180 mg 24 hr capsule     Sig: Take 180 mg by mouth    ERGOCALCIFEROL PO     Sig: Take 50,000 Units by mouth    clomiPRAMINE (ANAFRANIL) 50 mg capsule     Sig: Take 50 mg by mouth    Empagliflozin (Jardiance) 25 MG TABS     Sig: Take 25 mg by mouth daily    hydrOXYzine HCL (ATARAX) 50 mg tablet     Sig: Take by mouth    LORazepam (ATIVAN) 1 mg tablet     Sig: Take 1 mg by mouth    nadolol (CORGARD) 80 MG tablet     Sig: Take 80 mg by mouth    oxyCODONE-acetaminophen (PERCOCET) 5-325 mg per tablet     Sig: Take 1 tablet by mouth every 6 (six) hours as needed    PARoxetine (PAXIL) 40 MG tablet     Sig: Take by mouth    Semaglutide,0 25 or 0 5MG/DOS, (Ozempic, 0 25 or 0 5 MG/DOSE,) 2 MG/1 5ML SOPN     Sig: Inject 0 5 mg under the skin    valsartan (DIOVAN) 80 mg tablet     Sig: Take by mouth    Semaglutide,0 25 or 0 5MG/DOS, (Ozempic, 0 25 or 0 5 MG/DOSE,) 2 MG/1 5ML SOPN     Sig: Inject under the skin    Semaglutide,0 25 or 0 5MG/DOS, (Ozempic, 0 25 or 0 5 MG/DOSE,) 2 MG/1 5ML SOPN     Sig: Inject under the skin once a week on    pregabalin (LYRICA) 75 mg capsule     Sig: Take 1 capsule (75 mg total) by mouth 3 (three) times a day     Dispense:  90 capsule     Refill:  0       History of Present Illness    Jerilyn Dancer is a 62 y o  male with a past medical history of  Non-insulin controlled type 2 diabetes, obesity, chronic low back pain described primarily as arthritic in nature  He describes 8/10 low back pain that is worse in the mornings and worse at the end of the day  The pain is characterized by achy, nagging, indolent, crampy, stabbing pain in his axial low back  The patient describes that the pain is worse with standing for long periods of time on hard surfaces as well as with walking  The patient is a very active individual and feels as though this pain compromises his participation with independent activities of daily living  He ambulates with a walker  The pain can be debilitating at times and contribute to significant disability, compromising overall activity and independent activities of daily living  He has tried physical therapy with limited relief of symptoms    Medications  he is currently on include   Celebrex 200 mg per day, Percocet 5-325 mg Q 6 hours p r n  pain  He takes 200 mg of gabapentin per day and was recently started on Robaxin 500 mg t i d  for back spasms  He additionally has pain radiating across his ribs bilaterally in the T10 and T11 dermatomal distribution  He demonstrates good strength and denies any weakness numbness or paresthesias  The patient denies any bowel or bladder dysfunction as well  I have personally reviewed and/or updated the patient's past medical history, past surgical history, family history, social history, current medications, allergies, and vital signs today  Review of Systems   Constitutional: Positive for activity change  HENT: Negative  Eyes: Negative  Respiratory: Negative  Cardiovascular: Negative  Gastrointestinal: Negative  Endocrine: Negative  Genitourinary: Negative  Musculoskeletal: Positive for arthralgias, back pain, gait problem and myalgias  Skin: Negative  Allergic/Immunologic: Negative  Neurological: Positive for numbness  Negative for weakness  Hematological: Negative  Psychiatric/Behavioral: Negative  All other systems reviewed and are negative        Patient Active Problem List   Diagnosis    Herniation of intervertebral disc of thoracic region    Diabetic peripheral neuropathy (HCC)       Past Medical History:   Diagnosis Date    [facility] mellitus ([facility] [address] )     Hypertension     Obesity        Past Surgical History:   Procedure Laterality Date    JOINT REPLACEMENT         Family History   Problem Relation Age of Onset    Arthritis Mother     Hypertension Father        Social History     Occupational History    Not on file   Tobacco Use    Smoking status: Never Smoker    Smokeless tobacco: Never Used   Substance and Sexual Activity    Alcohol use: No    Drug use: No    Sexual activity: Not on file       Current Outpatient Medications on File Prior to Visit   Medication Sig    Empagliflozin (Jardiance) 25 MG TABS Take 25 mg by mouth daily    ERGOCALCIFEROL PO Take 50,000 Units by mouth    hydrOXYzine HCL (ATARAX) 50 mg tablet Take by mouth    LORazepam (ATIVAN) 1 mg tablet Take 1 mg by mouth    oxyCODONE-acetaminophen (PERCOCET) 5-325 mg per tablet Take 1 tablet by mouth every 6 (six) hours as needed    Semaglutide,0 25 or 0 5MG/DOS, (Ozempic, 0 25 or 0 5 MG/DOSE,) 2 MG/1 5ML SOPN Inject 0 5 mg under the skin    Semaglutide,0 25 or 0 5MG/DOS, (Ozempic, 0 25 or 0 5 MG/DOSE,) 2 MG/1 5ML SOPN Inject under the skin    Semaglutide,0 25 or 0 5MG/DOS, (Ozempic, 0 25 or 0 5 MG/DOSE,) 2 MG/1 5ML SOPN Inject under the skin once a week on    valsartan (DIOVAN) 80 mg tablet Take by mouth    [DISCONTINUED] gabapentin (NEURONTIN) 100 mg capsule     clomiPRAMINE (ANAFRANIL) 50 mg capsule Take 50 mg by mouth    diltiazem (dilTIAZem CD) 180 mg 24 hr capsule Take 180 mg by mouth    nadolol (CORGARD) 80 MG tablet Take 80 mg by mouth    PARoxetine (PAXIL) 40 MG tablet Take by mouth    [DISCONTINUED] methocarbamol (ROBAXIN) 500 mg tablet Take 1 tablet (500 mg total) by mouth 2 (two) times a day     No current facility-administered medications on file prior to visit  No Known Allergies      Physical Exam    /78   Pulse 70   Temp 97 9 °F (36 6 °C)   Resp 20   Ht 6' 4" (1 93 m)   Wt (!) 146 kg (322 lb)   BMI 39 20 kg/m²     Constitutional: normal, well developed, well nourished, alert, in no distress and non-toxic and no overt pain behavior  and obese  Eyes: anicteric  HEENT: grossly intact  Neck: supple, symmetric, trachea midline and no masses   Pulmonary:even and unlabored  Cardiovascular:No edema or pitting edema present  Skin:Normal without rashes or lesions and well hydrated  Psychiatric:Mood and affect appropriate  Neurologic:Cranial Nerves II-XII grossly intact Sensation grossly intact; no clonus negative diallo's  Reflexes 2+ and brisk   SLR negative bilaterally  Musculoskeletal: hunched gait  Unable to perform normal heel toe and tip toe walking  5/5 strength with active range of motion movements in all extremities bilaterally  mild pain with lumbar facet loading bilaterally and with lateral spine rotation  mild ttp over lumbar paraspinal muscles  Negative jahaira's test, negative gaenslen's negative SIJ loading bilaterally  Tenderness to palpation over inferior rib borders of T10-T11 posterolateral ribs bilaterally,  Eliciting radicular pain in aformentioned dermatomal distributions  Imaging    Impression: Hypertrophic facet arthrosis with fluid in bilateral facet joints at  L3-L4  Consider active facet inflammation or from degeneration or infection,  infection must be excluded clinically  There is no focal fluid collection  A  component of epidural lipomatosis in the lower lumbar spine  Disc osteophyte  complex resulting in at least moderate left foraminal narrowing at L5-S1 abuts  the exiting left L5 nerve root, correlate clinically for left L5 distribution  symptoms  [id number]   Result Narrative   History: Back pain, evaluate for cauda equina syndrome  Technique: MRI of the lumbar spine was performed with sagittal T1, sagittal T2,  sagittal STIR, axial T1 and T2-weighted images  Following contrast  administration, sagittal and axial T1-weighted images were obtained  18 5 cc  Gadavist injected intravenously  Comparison: None  Findings: For the purposes of this dictation, the lumbar vertebral bodies are  labeled from caudal to cranial direction  The first vertebra with lumbar  morphology is labeled as L5  There is poor signal-to-noise ratio on the study secondary to patient's body  habitus  There is abundant fat within the spinal canal which causes mild circumferential  impression on the thecal sac and its contents  The lumbar vertebrae are normal  in alignment  The vertebral body heights are maintained   There is no acute  compression deformity or spondylolisthesis  There is mild congestion of the epidural venous plexus on the contrast-enhanced  study  There is enhancement in the facet joints bilaterally at L3-L4  This is  indicative of facet inflammation either from degenerative disc disease or  infection  Infection must excluded clinically  There is no abnormal focal fluid  collection  There is asymmetric fatty atrophy of the left psoas group of  muscles  At L1-L2, there is facet arthrosis and a disc bulge without significant spinal  stenosis or foraminal narrowing  At L2-L3, there is bilateral facet arthrosis and disc bulge causing mild spinal  stenosis and mild right foraminal narrowing  At L3-L4, there is hypertrophic facet arthrosis with fluid in bilateral facet  joints  There is pathologic enhancement or demonstration of contrast  There is  mild enhancement in the soft tissues on the right facet joint  There is no focal  fluid collection  There is mild spinal stenosis and mild to moderate right  foraminal narrowing, mild left foraminal narrowing  At L4-L5, there is a disc bulge, facet arthrosis causing mild spinal stenosis  and mild to moderate left foraminal narrowing  At L5-S1, there is a disc bulge with a left subarticular and foraminal disc  osteophyte complex, hypertrophic facet arthrosis causing at least moderate right  foraminal narrowing  The disc osteophyte complex abuts the exiting left L5 nerve  root, correlate clinically for left L5 distribution symptoms  Impression:  1  Technically suboptimal study  2  Marked spondylosis  3  At T10-T11 there is spinal stenosis however there is no marlyn spinal cord  compression  Workstation:[id number]   Result Narrative   Exam - thoracic spine mri scan     History: Bilateral leg weakness  Technique: Using a surface coil sagittal and axial T1-weighted and T2-weighted  scans were obtained of the thoracic spine      Findings: Image detail is suboptimal secondary to patient's obesity  Thoracic spinal cord is normal in size and configuration and MRI signal  intensity  There are no intramedullary lesions  Vertebral bodies are in anatomic alignment  There are degenerative changes in endplates and facet joints throughout the  thoracic spine  There is central spinal stenosis at T10-T11  There is no marlyn spinal cord  compression however  Other Result Information   Interface, Rad Results In - 08/07/2018  7:34 PM EDT  Formatting of this note might be different from the original   Exam - thoracic spine mri scan     History: Bilateral leg weakness  Technique: Using a surface coil sagittal and axial T1-weighted and T2-weighted  scans were obtained of the thoracic spine  Findings: Image detail is suboptimal secondary to patient's obesity  Thoracic spinal cord is normal in size and configuration and MRI signal  intensity  There are no intramedullary lesions  Vertebral bodies are in anatomic alignment  There are degenerative changes in endplates and facet joints throughout the  thoracic spine  There is central spinal stenosis at T10-T11  There is no marlyn spinal cord  compression however  IMPRESSION:  Impression:  1  Technically suboptimal study  2  Marked spondylosis  3  At T10-T11 there is spinal stenosis however there is no marlyn spinal cord  compression

## 2021-03-31 ENCOUNTER — TELEPHONE (OUTPATIENT)
Dept: PAIN MEDICINE | Facility: CLINIC | Age: 61
End: 2021-03-31

## 2021-03-31 NOTE — TELEPHONE ENCOUNTER
Patient is scheduled for T10 T11 TESI on 4/6/21  Patient asia blood thinners  Instructions given verbally:  Nothing to eat or drink one hour prior to procedure  Wear comfortable clothing  Will require transportation  If patient becomes ill or is placed on antibiotics will call to inform  No vaccines 2 weeks pre and post op  Patient agrees to above

## 2021-04-01 ENCOUNTER — TELEPHONE (OUTPATIENT)
Dept: OBGYN CLINIC | Facility: CLINIC | Age: 61
End: 2021-04-01

## 2021-04-01 RX ORDER — ACETAMINOPHEN 500 MG
1000 TABLET ORAL EVERY 6 HOURS PRN
COMMUNITY

## 2021-04-01 RX ORDER — CELECOXIB 200 MG/1
200 CAPSULE ORAL DAILY
COMMUNITY
End: 2021-10-05

## 2021-04-01 RX ORDER — ATORVASTATIN CALCIUM 20 MG/1
20 TABLET, FILM COATED ORAL DAILY
COMMUNITY

## 2021-04-01 NOTE — TELEPHONE ENCOUNTER
----- Message from [facility] sent at 3/31/2021  9:46 AM EDT -----  Regarding: FW: Inj approval  Contact: [phone]  See message below  ----- Message -----  From: Roxanne Rosendo  Sent: 3/31/2021   9:29 AM EDT  To: Cristian Persaud MD, [facility]  Subject: Inj approval                                     Patient called and stated that his pharmacy called him and said that they still need info from us in order to approve his injection  He asked for someone to call him back once we rectify   Please advise

## 2021-04-01 NOTE — PRE-PROCEDURE INSTRUCTIONS
Pre-Surgery Instructions:   Medication Instructions    acetaminophen (TYLENOL) 500 mg tablet Patient was instructed by Physician and understands   atorvastatin (LIPITOR) 20 mg tablet Patient was instructed by Physician and understands   celecoxib (CeleBREX) 200 mg capsule Patient was instructed by Physician and understands   Empagliflozin (Jardiance) 25 MG TABS Patient was instructed by Physician and understands   ERGOCALCIFEROL PO Patient was instructed by Physician and understands   hydrOXYzine HCL (ATARAX) 50 mg tablet Patient was instructed by Physician and understands   LORazepam (ATIVAN) 1 mg tablet Patient was instructed by Physician and understands   oxyCODONE-acetaminophen (PERCOCET) 5-325 mg per tablet Patient was instructed by Physician and understands   Semaglutide,0 25 or 0 5MG/DOS, (Ozempic, 0 25 or 0 5 MG/DOSE,) 2 MG/1 5ML SOPN Patient was instructed by Physician and understands   valsartan (DIOVAN) 80 mg tablet Patient was instructed by Physician and understands

## 2021-04-02 ENCOUNTER — HOSPITAL ENCOUNTER (OUTPATIENT)
Dept: MRI IMAGING | Facility: HOSPITAL | Age: 61
Discharge: HOME | End: 2021-04-02
Attending: ANESTHESIOLOGY
Payer: COMMERCIAL

## 2021-04-02 ENCOUNTER — TELEPHONE (OUTPATIENT)
Dept: PAIN MEDICINE | Facility: CLINIC | Age: 61
End: 2021-04-02

## 2021-04-02 DIAGNOSIS — M51.24 HERNIATION OF INTERVERTEBRAL DISC OF THORACIC REGION: ICD-10-CM

## 2021-04-02 PROCEDURE — 72146 MRI CHEST SPINE W/O DYE: CPT

## 2021-04-02 PROCEDURE — G1004 CDSM NDSC: HCPCS

## 2021-04-02 NOTE — TELEPHONE ENCOUNTER
Nichole Womack   [phone]  DKD:[phone]    Prior Sharon Burnett is needed for pregabalin (LYRICA) 75 mg capsule   [facility] needs some questions answered before they can fill the script      Nichole Womack said that he will fax a form with all the questions on it to [phone]

## 2021-04-02 NOTE — TELEPHONE ENCOUNTER
+  Submitted form to  Cover my meds- they are informing me to call the back of patients insurance card     Awaiting  form from [employer]  Will fill out and fax back to [employer]

## 2021-04-05 NOTE — TELEPHONE ENCOUNTER
Please advise regarding titration schedule for Lyrica 75mg TID as prescribed at [facility] on 3/29  Thank you

## 2021-04-05 NOTE — TELEPHONE ENCOUNTER
Vu Cervantes  to Vikki Oates MD          4/2/21 10:53 PM  Timo Steele here  Dr Jessica Ricci gave me a dosage chart for pregabalin  I cant locate it  Can you send or tell me if there is any change other than 3x a day   Thanks

## 2021-04-06 ENCOUNTER — HOSPITAL ENCOUNTER (OUTPATIENT)
Facility: HOSPITAL | Age: 61
Setting detail: OUTPATIENT SURGERY
Discharge: HOME | End: 2021-04-06
Attending: ANESTHESIOLOGY | Admitting: ANESTHESIOLOGY
Payer: COMMERCIAL

## 2021-04-06 ENCOUNTER — APPOINTMENT (OUTPATIENT)
Dept: RADIOLOGY | Facility: HOSPITAL | Age: 61
End: 2021-04-06
Payer: COMMERCIAL

## 2021-04-06 VITALS
HEART RATE: 74 BPM | SYSTOLIC BLOOD PRESSURE: 141 MMHG | TEMPERATURE: 99.1 F | HEIGHT: 76 IN | OXYGEN SATURATION: 99 % | WEIGHT: 315 LBS | RESPIRATION RATE: 20 BRPM | BODY MASS INDEX: 38.36 KG/M2 | DIASTOLIC BLOOD PRESSURE: 81 MMHG

## 2021-04-06 LAB — GLUCOSE SERPL-MCNC: 107 MG/DL (ref 65–140)

## 2021-04-06 PROCEDURE — 62321 NJX INTERLAMINAR CRV/THRC: CPT | Performed by: ANESTHESIOLOGY

## 2021-04-06 PROCEDURE — 82948 REAGENT STRIP/BLOOD GLUCOSE: CPT

## 2021-04-06 RX ORDER — METHYLPREDNISOLONE ACETATE 80 MG/ML
INJECTION, SUSPENSION INTRA-ARTICULAR; INTRALESIONAL; INTRAMUSCULAR; SOFT TISSUE AS NEEDED
Status: DISCONTINUED | OUTPATIENT
Start: 2021-04-06 | End: 2021-04-06 | Stop reason: HOSPADM

## 2021-04-06 RX ORDER — ALPRAZOLAM 0.5 MG/1
0.5 TABLET ORAL ONCE
Status: COMPLETED | OUTPATIENT
Start: 2021-04-06 | End: 2021-04-06

## 2021-04-06 RX ORDER — SODIUM CHLORIDE 9 MG/ML
INJECTION INTRAVENOUS AS NEEDED
Status: DISCONTINUED | OUTPATIENT
Start: 2021-04-06 | End: 2021-04-06 | Stop reason: HOSPADM

## 2021-04-06 RX ORDER — LIDOCAINE HYDROCHLORIDE 10 MG/ML
INJECTION, SOLUTION EPIDURAL; INFILTRATION; INTRACAUDAL; PERINEURAL AS NEEDED
Status: DISCONTINUED | OUTPATIENT
Start: 2021-04-06 | End: 2021-04-06 | Stop reason: HOSPADM

## 2021-04-06 RX ADMIN — ALPRAZOLAM 0.5 MG: 0.5 TABLET ORAL at 07:17

## 2021-04-06 NOTE — INTERVAL H&P NOTE
H&P reviewed  After examining the patient I find no changes in the patients condition since the H&P had been written      Vitals:    04/06/21 0701   BP: 147/78   Pulse: 78   Resp: 18   Temp: 98 9 °F (37 2 °C)   SpO2: 98%

## 2021-04-06 NOTE — DISCHARGE INSTRUCTIONS
PLEASE SCHEDULE 2 WEEK FOLLOW UP BY CALLING THE [facility] [place]: [phone]      Epidural Steroid Injection   AMBULATORY CARE:   What you need to know about an epidural steroid injection (RIGO):  An RIGO is a procedure to inject steroid medicine into the epidural space  The epidural space is between your spinal cord and vertebrae  Steroids reduce inflammation and fluid buildup in your spine that may be causing pain  You may be given pain medicine along with the steroids  How to prepare for an RIGO:  Your healthcare provider will talk to you about how to prepare for your procedure  He or she will tell you what medicines to take or not take on the day of your procedure  You may need to stop taking blood thinners or other medicines several days before your procedure  You may need to adjust any diabetes medicine you take on the day of your procedure  Steroid medicine can increase your blood sugar level  Arrange for someone to drive you home when you are discharged  What will happen during an RIGO:   · You will be given medicine to numb the procedure area  You will be awake for the procedure, but you will not feel pain  You may also be given medicine to help you relax  Contrast liquid will be used to help your healthcare provider see the area better  Tell the healthcare provider if you have ever had an allergic reaction to contrast liquid  · Your healthcare provider may place the needle into your neck area, middle of your back, or tailbone area  He may inject the medicine next to the nerves that are causing your pain  He may instead inject the medicine into a larger area of the epidural space  This helps the medicine spread to more nerves  Your healthcare provider will use a fluoroscope to help guide the needle to the right place  A fluoroscope is a type of x-ray   After the procedure, a bandage will be placed over the injection site to prevent infection  What will happen after an RIGO:  You will have a bandage over the injection site to prevent infection  Your healthcare provider will tell you when you can bathe and any activity guidelines  You will be able to go home  Risks of an RIGO:  You may have temporary or permanent nerve damage or paralysis  You may have bleeding or develop a serious infection, such as meningitis (swelling of the brain coverings)  An abscess may also develop  An abscess is a pus-filled area under the skin  You may need surgery to fix the abscess  You may have a seizure, anxiety, or trouble sleeping  If you are a man, you may have temporary erectile dysfunction (not able to have an erection)  Call your local emergency number (911 in the [address] [facility]) if:   · You have a seizure  · You have trouble moving your legs  Seek care immediately if:   · Blood soaks through your bandage  · You have a fever or chills, severe back pain, and the procedure area is sensitive to the touch  · You cannot control when you urinate or have a bowel movement  Call your doctor if:   · You have weakness or numbness in your legs  · Your wound is red, swollen, or draining pus  · You have nausea or are vomiting  · Your face or neck is red and you feel warm  · You have more pain than you had before the procedure  · You have swelling in your hands or feet  · You have questions or concerns about your condition or care  Care for your wound as directed: You may remove the bandage before you go to bed the day of your procedure  You may take a shower, but do not take a bath for at least 24 hours  Self-care:   · Do not drive,  use machines, or do strenuous activity for 24 hours after your procedure or as directed  · Continue other treatments  as directed  Steroid injections alone will not control your pain  The injections are meant to be used with other treatments, such as physical therapy      Follow up with your doctor as directed:  Write down your questions so you remember to ask them during your visits  © Copyright [address] Information is for End User's use only and may not be sold, redistributed or otherwise used for commercial purposes  All illustrations and images included in CareNotes® are the copyrighted property of [employer]  or [employer]  The above information is an  only  It is not intended as medical advice for individual conditions or treatments  Talk to your doctor, nurse or pharmacist before following any medical regimen to see if it is safe and effective for you

## 2021-04-06 NOTE — PROCEDURES
Pre-procedure Diagnosis:       Lumbar Radiculopathy  Post-procedure Diagnosis:     Lumbar Radiculopathy  Procedure Title(s):                  1  [T11-T12] interlaminar epidural steroid injection                                                  2  Intraoperative fluoroscopy  Attending Surgeon:                 Rich Johnson MD  Anesthesia:                             Local      Indications: The patient is a  62y o  year-old male  with a diagnosis of Lumbar Radiculopathy  The patient's history and physical exam were reviewed  The risks, benefits and alternatives to the procedure were discussed, and all questions were answered to the patient's satisfaction  The patient agreed to proceed, and written informed consent was obtained  Procedure in Detail: The patient was brought into the procedure room and placed in the prone position on the fluoroscopy table  The area of the lumbar spine was prepped with chlorhexidine gluconate solution times one and draped in a sterile manner  The [T11-T12] interspace was identified and marked under AP fluoroscopy  The skin and subcutaneous tissues in the area were anesthetized with 1% lidocaine  A 18-gauge Tuohy epidural needle was directed toward the interspace under fluoroscopic guidance until the ligamentum flavum was engaged  From this point, a loss of resistance technique with saline was used to identify entrance of the needle into the epidural space  Once an appropriate loss was obtained, negative aspiration was confirmed, and 3ml Omnipaque 240 contrast solution was injected  An appropriate epidurogram was noted  Then, after negative aspiration, a solution consisting of 1-mL depo-medrol (80mg/mL) and 3-mL preservative-free saline was easily injected  The needle was removed with a 1% lidocaine flush  The patient's back was cleaned and a bandage was placed over the site of needle insertion       Disposition: The patient tolerated the procedure well, and there were no apparent complications  The patient was taken to the recovery area where written discharge instructions for the procedure were given        Estimated Blood Loss: None  Specimens Obtained: N/A

## 2021-04-06 NOTE — OP NOTE
OPERATIVE REPORT  PATIENT NAME: Dylan Mendez    :  1960  MRN: [id number]  Pt Location:  GI ROOM 01    SURGERY DATE: 2021    Surgeon(s) and Role:      Christopher Grossman MD - Primary    Preop Diagnosis:  Herniation of intervertebral disc of thoracic region [M51 24]    Post-Op Diagnosis Codes:     * Herniation of intervertebral disc of thoracic region [M51 24]    Procedure(s) (LRB):  T-11-T-12 INTERLAMINAR THORACIC EPIDURAL STEROID INJECTION (N/A)    Specimen(s):  * No specimens in log *    Estimated Blood Loss:   Minimal    Drains:  * No LDAs found *    Anesthesia Type:   Local    Operative Indications:  Herniation of intervertebral disc of thoracic region [M51 24]    Operative Findings:  T11-T12 epidurogram    Complications:   None    Procedure and Technique:    Please see detailed procedure note    I was present for the entire procedure    Patient Disposition:  PACU     SIGNATURE: Tab Lovell MD  DATE: 2021  TIME: 7:53 AM

## 2021-04-13 ENCOUNTER — TELEPHONE (OUTPATIENT)
Dept: PAIN MEDICINE | Facility: CLINIC | Age: 61
End: 2021-04-13

## 2021-04-19 ENCOUNTER — OFFICE VISIT (OUTPATIENT)
Dept: PAIN MEDICINE | Facility: CLINIC | Age: 61
End: 2021-04-19
Payer: COMMERCIAL

## 2021-04-19 VITALS
DIASTOLIC BLOOD PRESSURE: 84 MMHG | SYSTOLIC BLOOD PRESSURE: 142 MMHG | WEIGHT: 315 LBS | BODY MASS INDEX: 38.36 KG/M2 | HEART RATE: 89 BPM | RESPIRATION RATE: 20 BRPM | HEIGHT: 76 IN | TEMPERATURE: 98.1 F

## 2021-04-19 DIAGNOSIS — M79.89 PAIN AND SWELLING OF LEFT LOWER EXTREMITY: Primary | ICD-10-CM

## 2021-04-19 DIAGNOSIS — M79.605 PAIN AND SWELLING OF LEFT LOWER EXTREMITY: Primary | ICD-10-CM

## 2021-04-19 DIAGNOSIS — E11.42 DIABETIC PERIPHERAL NEUROPATHY (HCC): ICD-10-CM

## 2021-04-19 PROCEDURE — 99214 OFFICE O/P EST MOD 30 MIN: CPT | Performed by: ANESTHESIOLOGY

## 2021-04-19 PROCEDURE — 80305 DRUG TEST PRSMV DIR OPT OBS: CPT | Performed by: ANESTHESIOLOGY

## 2021-04-19 RX ORDER — HYDROCODONE BITARTRATE AND ACETAMINOPHEN 5; 325 MG/1; MG/1
1-2 TABLET ORAL EVERY 6 HOURS PRN
COMMUNITY
Start: 2021-02-17 | End: 2021-07-14

## 2021-04-19 RX ORDER — PAROXETINE HYDROCHLORIDE 20 MG/1
20 TABLET, FILM COATED ORAL DAILY
COMMUNITY
Start: 2021-03-07 | End: 2021-07-14

## 2021-04-19 RX ORDER — TRAMADOL HYDROCHLORIDE 50 MG/1
TABLET ORAL
COMMUNITY
Start: 2021-04-01 | End: 2021-07-14

## 2021-04-19 RX ORDER — TOPIRAMATE 50 MG/1
50 TABLET, FILM COATED ORAL 2 TIMES DAILY
Qty: 60 TABLET | Refills: 0 | Status: SHIPPED | OUTPATIENT
Start: 2021-04-19 | End: 2021-05-11

## 2021-04-19 RX ORDER — AMOXICILLIN 500 MG/1
CAPSULE ORAL
COMMUNITY
Start: 2021-03-30

## 2021-04-19 RX ORDER — CEFADROXIL 500 MG/1
CAPSULE ORAL
COMMUNITY
Start: 2021-02-17 | End: 2021-07-14

## 2021-04-19 NOTE — PATIENT INSTRUCTIONS
Topiramate (By mouth)   Topiramate (toe-PIR-a-mate)  Treats and prevents seizures  Also prevents migraine headaches  Brand Name(s): Qudexy XR, Topamax, Trokendi XR   There may be other brand names for this medicine  When This Medicine Should Not Be Used: This medicine is not right for everyone  Do not use it if you had an allergic reaction to topiramate, or if you are pregnant  How to Use This Medicine:   Capsule, Long Acting Capsule, Tablet  · Take your medicine as directed  Your dose may need to be changed several times to find what works best for you  · Capsule or extended-release capsule: Do not crush or chew the capsule  Swallow whole or open the capsule and sprinkle the contents into a small amount (1 teaspoon) of soft food (including applesauce)  Swallow the mixture right away without chewing  Do not save for later use  · Tablet: Swallow whole  Do not break, crush, or chew it  It has a very bitter taste  · Drink extra fluids so you will urinate more often and help prevent kidney problems  · This medicine should come with a Medication Guide  Ask your pharmacist for a copy if you do not have one  · Missed dose: Take a dose as soon as you remember  If it is almost time for your next dose, wait until then and take a regular dose  Do not take extra medicine to make up for a missed dose  If you miss a dose of Topamax® and it is within 6 hours until your next regular dose, skip the missed dose and take your next dose at the regular time  If you miss more than 1 dose of Topamax®, call your doctor for instructions  · Store the medicine in a closed container at room temperature, away from heat, moisture, and direct light  Drugs and Foods to Avoid:   Ask your doctor or pharmacist before using any other medicine, including over-the-counter medicines, vitamins, and herbal products  · Some medicines can affect how topiramate works   Tell your doctor if you are using any of the following:  ? Acetazolamide, amitriptyline, dichloralphenazone, dichlorphenamide, digoxin, lithium, zonisamide  ? Birth control pills  ? Oral diabetes medicine (including metformin, pioglitazone)  ? Other medicines to treat seizures (including carbamazepine, phenytoin, valproic acid)  · Do not drink alcohol with Qudexy XR or Topamax®  Do not drink alcohol for 6 hours before and 6 hours after you take the Trokendi XR capsule  · Tell your doctor if you use anything else that makes you sleepy  Some examples are allergy medicine, narcotic pain medicine, and alcohol  Warnings While Using This Medicine:   · It is not safe to take this medicine during pregnancy  It could harm an unborn baby  Tell your doctor right away if you become pregnant  · Tell your doctor if you are breastfeeding, or if you have kidney disease, liver disease, glaucoma, lung or breathing problems, osteoporosis, or a history of depression or mood disorders  Tell your doctor if you are on a ketogenic diet (high in fat and low in carbohydrates)  · This medicine may cause the following problems:  ? Eye pain or vision changes, including glaucoma  ? Changes in body temperature  ? Metabolic acidosis (too much acid in the blood)  ? Changes in mood or behavior, including thoughts of suicide  ? Serious skin reactions  ? Kidney stones  · This medicine may make you dizzy, drowsy, or tired  Do not drive or do anything else that could be dangerous until you know how this medicine affects you  · Do not stop using this medicine suddenly  Your doctor will need to slowly decrease your dose before you stop it completely  · Your doctor will do lab tests at regular visits to check on the effects of this medicine  Keep all appointments  · Keep all medicine out of the reach of children  Never share your medicine with anyone    Possible Side Effects While Using This Medicine:   Call your doctor right away if you notice any of these side effects:  · Allergic reaction: Itching or hives, swelling in your face or hands, swelling or tingling in your mouth or throat, chest tightness, trouble breathing  · Blistering, peeling, or red skin rash  · Bloody or cloudy urine, painful urination, sudden lower back or stomach pain  · Changes in vision, eye pain  · Confusion, problems with walking, clumsiness, dizziness, trouble talking, concentrating, or remembering  · Feeling agitated, depressed, nervous, or irritable, thoughts of hurting yourself or others, unusual mood or behavior  · Fever, decreased sweating  · Numbness, tingling, or burning pain in your hands, arms, legs, or feet  · Rapid, deep breathing, fast or uneven heartbeat  · Unusual drowsiness, tiredness, or weakness  If you notice these less serious side effects, talk with your doctor:   · Change in taste  · Diarrhea, nausea, vomiting  · Stuffy or runny nose  · Loss of appetite, weight loss  If you notice other side effects that you think are caused by this medicine, tell your doctor  Call your doctor for medical advice about side effects  You may report side effects to FDA at 2-247-FDA-4931  © Copyright [address] Information is for End User's use only and may not be sold, redistributed or otherwise used for commercial purposes  The above information is an  only  It is not intended as medical advice for individual conditions or treatments  Talk to your doctor, nurse or pharmacist before following any medical regimen to see if it is safe and effective for you

## 2021-04-19 NOTE — PROGRESS NOTES
Assessment  1  Pain and swelling of left lower extremity  -     VAS reflux lower limb venous duplex study with reflux assesment, unilateral; Future; Expected date: 04/19/2021    2  Diabetic peripheral neuropathy (HCC)  -     topiramate (TOPAMAX) 50 MG tablet; Take 1 tablet (50 mg total) by mouth 2 (two) times a day      60 70% relief of radicular pain with improved ability to participate with independent activities of daily living  New coin sized redness on mid anterior tibia accompanied by episodic pain  Previously reported the following symptomatology:    pain consistent with thoracic radicular symptoms secondary to T10-T11 disc herniation which from 2018 MRI thoracic spine does not contribute to significant cord compression  Radiating and radicular pain in T10 and T11 dermatomal distribution  Pain is along lower thoracic T10 and T11 dermatomes, particularly with palpation of lower thoracic ribs and posterolateral distribution  Interestingly his significant and advanced lumbar facet arthropathy which is not a  primary source of his pain at this time  He previously had epidural steroid injections in the past with Dr Hunter Lawton with noted relief  For radicular pain  Reasonable to reobtain imaging, and pursue multimodal pain therapy plan as noted below  Plan  - follow-up in 4 weeks  -Lyrica 75 mg t i d   Discontinued secondary to side effects  Prescribed Topamax 50 mg b i d ; counseled regarding sedative effects of taking this medication and provided up titration calendar  Counseled not to take medication while driving or operating heavy machinery/using stairs  - Left lower extremity Doppler to rule out DVT  -continue other analgesics as prescribed previously well provider's  Counseled extensively as noted below regarding the risks of opioid medications in combination with Lyrica    -physical therapy for  Lumbar facet arthropathy    There are risks associated with opioid medications, including dependence, addiction and tolerance  The patient understands and agrees to use these medications only as prescribed  Potential side effects of the medications include, but are not limited to, constipation, drowsiness, addiction, impaired judgment and risk of fatal overdose if not taken as prescribed  The patient was warned against driving while taking sedation medications  Sharing medications is a felony  At this point in time, the patient is showing no signs of addiction, abuse, diversion or suicidal ideation  A urine drug screen was collected at today's office visit as part of our medication management protocol  The point of care testing results were appropriate for what was being prescribed  The specimen will be sent for confirmatory testing  The drug screen is medically necessary because the patient is either dependent on opioid medication or is being considered for opioid medication therapy and the results could impact ongoing or future treatment  The drug screen is to evaluate for the presences or absence of prescribed, non-prescribed, and/or illicit drugs/substances  [facility] Prescription Drug Monitoring Program report was reviewed and was appropriate     Complete risks and benefits including bleeding, infection, tissue reaction, nerve injury and allergic reaction were discussed  The approach was demonstrated using models and literature was provided  Verbal and written consent was obtained  My impressions and treatment recommendations were discussed in detail with the patient who verbalized understanding and had no further questions  Discharge instructions were provided  I personally saw and examined the patient and I agree with the above discussed plan of care      New Medications Ordered This Visit   Medications    topiramate (TOPAMAX) 50 MG tablet     Sig: Take 1 tablet (50 mg total) by mouth 2 (two) times a day     Dispense:  60 tablet     Refill:  0       History of Present Illness    60 70% relief of radicular pain with improved ability to participate with independent activities of daily living  New coin sized redness on mid anterior tibia accompanied by episodic pain  Previously reported the following symptomatology:    Dorothy Figueroa is a 62 y o  male with a past medical history of  Non-insulin controlled type 2 diabetes, obesity, chronic low back pain described primarily as arthritic in nature  He describes 8/10 low back pain that is worse in the mornings and worse at the end of the day  The pain is characterized by achy, nagging, indolent, crampy, stabbing pain in his axial low back  The patient describes that the pain is worse with standing for long periods of time on hard surfaces as well as with walking  The patient is a very active individual and feels as though this pain compromises his participation with independent activities of daily living  He ambulates with a walker  The pain can be debilitating at times and contribute to significant disability, compromising overall activity and independent activities of daily living  He has tried physical therapy with limited relief of symptoms  Medications  he is currently on include   Celebrex 200 mg per day, Percocet 5-325 mg Q 6 hours p r n  pain  He takes 200 mg of gabapentin per day and was recently started on Robaxin 500 mg t i d  for back spasms  He additionally has pain radiating across his ribs bilaterally in the T10 and T11 dermatomal distribution  He demonstrates good strength and denies any weakness numbness or paresthesias  The patient denies any bowel or bladder dysfunction as well  I have personally reviewed and/or updated the patient's past medical history, past surgical history, family history, social history, current medications, allergies, and vital signs today  Review of Systems   Constitutional: Positive for activity change  HENT: Negative  Eyes: Negative  Respiratory: Negative  Cardiovascular: Negative  Gastrointestinal: Negative  Endocrine: Negative  Genitourinary: Negative  Musculoskeletal: Positive for arthralgias, back pain, gait problem and myalgias  Skin: Negative  Allergic/Immunologic: Negative  Neurological: Positive for numbness  Negative for weakness  Hematological: Negative  Psychiatric/Behavioral: Negative  All other systems reviewed and are negative  Patient Active Problem List   Diagnosis    Herniation of intervertebral disc of thoracic region    Diabetic peripheral neuropathy (HCC)       Past Medical History:   Diagnosis Date    Anxiety     Arthritis     Chronic pain disorder     mid back region    Colon polyp     CPAP (continuous positive airway pressure) dependence     Pt uses nightly    Diabetes mellitus ([address] )     Diverticulosis     Hyperlipidemia     Hypertension     Obesity     Sleep apnea     Spinal stenosis        Past Surgical History:   Procedure Laterality Date    ACHILLES TENDON REPAIR      Pt had a rupture in right and left 2 years apart    COLONOSCOPY      EPIDURAL BLOCK INJECTION      Pt had in lumbar region   EPIDURAL BLOCK INJECTION N/A 4/6/2021    Procedure: T-11-T-12 INTERLAMINAR THORACIC EPIDURAL STEROID INJECTION;  Surgeon: Chuyita Hewitt MD;  Location:  [facility];  Service: Pain Management     FOOT SURGERY Left     Left great toe- had a hammertoe   JOINT REPLACEMENT Left     Total hip    JOINT REPLACEMENT Right     Total hip      KNEE ARTHROSCOPY Bilateral     TOTAL KNEE ARTHROPLASTY Left        Family History   Problem Relation Age of Onset    Arthritis Mother     Hypertension Father        Social History     Occupational History    Not on file   Tobacco Use    Smoking status: Never Smoker    Smokeless tobacco: Current User     Types: Snuff   Substance and Sexual Activity    Alcohol use: Yes     Frequency: 2-4 times a month     Drinks per session: 1 or 2    Drug use: No    Sexual activity: Not on file     Comment: did not ask        Current Outpatient Medications on File Prior to Visit   Medication Sig    acetaminophen (TYLENOL) 500 mg tablet Take 1,000 mg by mouth every 6 (six) hours as needed for mild pain    atorvastatin (LIPITOR) 20 mg tablet Take 20 mg by mouth daily    celecoxib (CeleBREX) 200 mg capsule Take 200 mg by mouth daily    Empagliflozin (Jardiance) 25 MG TABS Take 25 mg by mouth daily    ERGOCALCIFEROL PO Take 50,000 Units by mouth 2 (two) times a week     hydrOXYzine HCL (ATARAX) 50 mg tablet Take 50 mg by mouth daily at bedtime Pt states he takes 100 mg at hs    LORazepam (ATIVAN) 1 mg tablet Take 1 mg by mouth every 6 (six) hours as needed     oxyCODONE-acetaminophen (PERCOCET) 5-325 mg per tablet Take 1 tablet by mouth every 6 (six) hours as needed    Semaglutide,0 25 or 0 5MG/DOS, (Ozempic, 0 25 or 0 5 MG/DOSE,) 2 MG/1 5ML SOPN Inject 0 5 mg under the skin once a week     Semaglutide,0 25 or 0 5MG/DOS, (Ozempic, 0 25 or 0 5 MG/DOSE,) 2 MG/1 5ML SOPN Inject under the skin    Semaglutide,0 25 or 0 5MG/DOS, (Ozempic, 0 25 or 0 5 MG/DOSE,) 2 MG/1 5ML SOPN Inject under the skin once a week on    valsartan (DIOVAN) 80 mg tablet Take 80 mg by mouth daily     [DISCONTINUED] pregabalin (LYRICA) 75 mg capsule Take 1 capsule (75 mg total) by mouth 3 (three) times a day (Patient taking differently: Take 75 mg by mouth 3 (three) times a day )     No current facility-administered medications on file prior to visit  Allergies   Allergen Reactions    Paxil [Paroxetine] GI Intolerance         Physical Exam    /84   Pulse 89   Temp 98 1 °F (36 7 °C)   Resp 20   Ht 6' 4" (1 93 m)   Wt (!) 146 kg (322 lb)   BMI 39 20 kg/m²     Constitutional: normal, well developed, well nourished, alert, in no distress and non-toxic and no overt pain behavior   and obese  Eyes: anicteric  HEENT: grossly intact  Neck: supple, symmetric, trachea midline and no masses   Pulmonary:even and unlabored  Cardiovascular:No edema or pitting edema present  Skin:Normal without rashes or lesions and well hydrated  Psychiatric:Mood and affect appropriate  Neurologic:Cranial Nerves II-XII grossly intact Sensation grossly intact; no clonus negative diallo's  Reflexes 2+ and brisk  SLR negative bilaterally  Musculoskeletal: hunched gait  Unable to perform normal heel toe and tip toe walking  5/5 strength with active range of motion movements in all extremities bilaterally  mild pain with lumbar facet loading bilaterally and with lateral spine rotation  mild ttp over lumbar paraspinal muscles  Negative jahaira's test, negative gaenslen's negative SIJ loading bilaterally  Tenderness to palpation over inferior rib borders of T10-T11 posterolateral ribs bilaterally,  Eliciting radicular pain in aformentioned dermatomal distributions  Imaging    Impression: Hypertrophic facet arthrosis with fluid in bilateral facet joints at  L3-L4  Consider active facet inflammation or from degeneration or infection,  infection must be excluded clinically  There is no focal fluid collection  A  component of epidural lipomatosis in the lower lumbar spine  Disc osteophyte  complex resulting in at least moderate left foraminal narrowing at L5-S1 abuts  the exiting left L5 nerve root, correlate clinically for left L5 distribution  symptoms  [facility]:[id number]   Result Narrative   History: Back pain, evaluate for cauda equina syndrome  Technique: MRI of the lumbar spine was performed with sagittal T1, sagittal T2,  sagittal STIR, axial T1 and T2-weighted images  Following contrast  administration, sagittal and axial T1-weighted images were obtained  18 5 cc  Gadavist injected intravenously  Comparison: None  Findings: For the purposes of this dictation, the lumbar vertebral bodies are  labeled from caudal to cranial direction  The first vertebra with lumbar  morphology is labeled as L5      There is poor signal-to-noise ratio on the study secondary to patient's body  habitus  There is abundant fat within the spinal canal which causes mild circumferential  impression on the thecal sac and its contents  The lumbar vertebrae are normal  in alignment  The vertebral body heights are maintained  There is no acute  compression deformity or spondylolisthesis  There is mild congestion of the epidural venous plexus on the contrast-enhanced  study  There is enhancement in the facet joints bilaterally at L3-L4  This is  indicative of facet inflammation either from degenerative disc disease or  infection  Infection must excluded clinically  There is no abnormal focal fluid  collection  There is asymmetric fatty atrophy of the left psoas group of  muscles  At L1-L2, there is facet arthrosis and a disc bulge without significant spinal  stenosis or foraminal narrowing  At L2-L3, there is bilateral facet arthrosis and disc bulge causing mild spinal  stenosis and mild right foraminal narrowing  At L3-L4, there is hypertrophic facet arthrosis with fluid in bilateral facet  joints  There is pathologic enhancement or demonstration of contrast  There is  mild enhancement in the soft tissues on the right facet joint  There is no focal  fluid collection  There is mild spinal stenosis and mild to moderate right  foraminal narrowing, mild left foraminal narrowing  At L4-L5, there is a disc bulge, facet arthrosis causing mild spinal stenosis  and mild to moderate left foraminal narrowing  At L5-S1, there is a disc bulge with a left subarticular and foraminal disc  osteophyte complex, hypertrophic facet arthrosis causing at least moderate right  foraminal narrowing  The disc osteophyte complex abuts the exiting left L5 nerve  root, correlate clinically for left L5 distribution symptoms  Impression:  1  Technically suboptimal study  2  Marked spondylosis    3  At T10-T11 there is spinal stenosis however there is no marlyn spinal cord  compression  Workstation:[id number]   Result Narrative   Exam - thoracic spine mri scan     History: Bilateral leg weakness  Technique: Using a surface coil sagittal and axial T1-weighted and T2-weighted  scans were obtained of the thoracic spine  Findings: Image detail is suboptimal secondary to patient's obesity  Thoracic spinal cord is normal in size and configuration and MRI signal  intensity  There are no intramedullary lesions  Vertebral bodies are in anatomic alignment  There are degenerative changes in endplates and facet joints throughout the  thoracic spine  There is central spinal stenosis at T10-T11  There is no marlyn spinal cord  compression however  Other Result Information   Interface, Rad Results In - 08/07/2018  7:34 PM EDT  Formatting of this note might be different from the original   Exam - thoracic spine mri scan     History: Bilateral leg weakness  Technique: Using a surface coil sagittal and axial T1-weighted and T2-weighted  scans were obtained of the thoracic spine  Findings: Image detail is suboptimal secondary to patient's obesity  Thoracic spinal cord is normal in size and configuration and MRI signal  intensity  There are no intramedullary lesions  Vertebral bodies are in anatomic alignment  There are degenerative changes in endplates and facet joints throughout the  thoracic spine  There is central spinal stenosis at T10-T11  There is no marlyn spinal cord  compression however  IMPRESSION:  Impression:  1  Technically suboptimal study  2  Marked spondylosis  3  At T10-T11 there is spinal stenosis however there is no marlyn spinal cord  compression

## 2021-04-23 ENCOUNTER — TELEPHONE (OUTPATIENT)
Dept: PAIN MEDICINE | Facility: CLINIC | Age: 61
End: 2021-04-23

## 2021-04-23 ENCOUNTER — HOSPITAL ENCOUNTER (OUTPATIENT)
Dept: NON INVASIVE DIAGNOSTICS | Facility: HOSPITAL | Age: 61
Discharge: HOME | End: 2021-04-23
Attending: ANESTHESIOLOGY
Payer: COMMERCIAL

## 2021-04-23 DIAGNOSIS — M79.89 PAIN AND SWELLING OF LEFT LOWER EXTREMITY: ICD-10-CM

## 2021-04-23 DIAGNOSIS — M79.605 PAIN AND SWELLING OF LEFT LOWER EXTREMITY: ICD-10-CM

## 2021-04-23 PROCEDURE — 93971 EXTREMITY STUDY: CPT

## 2021-04-23 PROCEDURE — 93971 EXTREMITY STUDY: CPT | Performed by: SURGERY

## 2021-04-23 NOTE — TELEPHONE ENCOUNTER
Spoke to patient informed him of results  Patient stated that he did his exercises  And stated that he is ok right now/ no pain

## 2021-04-26 ENCOUNTER — HOSPITAL ENCOUNTER (EMERGENCY)
Facility: HOSPITAL | Age: 61
Discharge: HOME | End: 2021-04-26
Attending: EMERGENCY MEDICINE | Admitting: EMERGENCY MEDICINE
Payer: COMMERCIAL

## 2021-04-26 ENCOUNTER — APPOINTMENT (EMERGENCY)
Dept: RADIOLOGY | Facility: HOSPITAL | Age: 61
End: 2021-04-26
Payer: COMMERCIAL

## 2021-04-26 VITALS
BODY MASS INDEX: 39.17 KG/M2 | HEART RATE: 73 BPM | HEIGHT: 75 IN | SYSTOLIC BLOOD PRESSURE: 157 MMHG | RESPIRATION RATE: 18 BRPM | TEMPERATURE: 96.9 F | DIASTOLIC BLOOD PRESSURE: 80 MMHG | OXYGEN SATURATION: 97 % | WEIGHT: 315 LBS

## 2021-04-26 DIAGNOSIS — M79.9 LESION OF SOFT TISSUE OF LOWER LEG AND ANKLE: Primary | ICD-10-CM

## 2021-04-26 DIAGNOSIS — M89.9 LESION OF BONE OF RIGHT LOWER LEG: ICD-10-CM

## 2021-04-26 PROCEDURE — 73590 X-RAY EXAM OF LOWER LEG: CPT

## 2021-04-26 PROCEDURE — 99284 EMERGENCY DEPT VISIT MOD MDM: CPT | Performed by: EMERGENCY MEDICINE

## 2021-04-26 PROCEDURE — 99283 EMERGENCY DEPT VISIT LOW MDM: CPT

## 2021-04-26 NOTE — ED PROVIDER NOTES
History  Chief Complaint   Patient presents with    Leg Swelling     Patient has a hard lump on right lower leg  Patient denies any pain or recent injury  Patient is a 62-year-old male presents the emergency department for evaluation of a lump that he noticed tonight on his anterior right shin while he was doing exercises no trauma or injury occurred to the shin he does however state that he did once fall on a ladder striking his anterior shins and breaking the rungs of the ladder did not sustain a fracture to the legs at the time this was years ago  Patient noticed the lump there and was concerned that it might be cancer and got himself very worked up and came to the ED  History provided by:  Patient and spouse      Prior to Admission Medications   Prescriptions Last Dose Informant Patient Reported? Taking?    ERGOCALCIFEROL PO   Yes No   Sig: Take 50,000 Units by mouth 2 (two) times a week    Empagliflozin (Jardiance) 25 MG TABS   Yes No   Sig: Take 25 mg by mouth daily   HYDROcodone-acetaminophen (NORCO) 5-325 mg per tablet   Yes No   Sig: Take 1-2 tablets by mouth every 6 (six) hours as needed   LORazepam (ATIVAN) 1 mg tablet   Yes No   Sig: Take 1 mg by mouth every 6 (six) hours as needed    PARoxetine (PAXIL) 20 mg tablet   Yes No   Sig: Take 20 mg by mouth daily   Semaglutide,0 25 or 0 5MG/DOS, (Ozempic, 0 25 or 0 5 MG/DOSE,) 2 MG/1 5ML SOPN   Yes No   Sig: Inject 0 5 mg under the skin once a week    Semaglutide,0 25 or 0 5MG/DOS, (Ozempic, 0 25 or 0 5 MG/DOSE,) 2 MG/1 5ML SOPN   Yes No   Sig: Inject under the skin   Semaglutide,0 25 or 0 5MG/DOS, (Ozempic, 0 25 or 0 5 MG/DOSE,) 2 MG/1 5ML SOPN   Yes No   Sig: Inject under the skin once a week on   acetaminophen (TYLENOL) 500 mg tablet   Yes No   Sig: Take 1,000 mg by mouth every 6 (six) hours as needed for mild pain   amoxicillin (AMOXIL) 500 mg capsule   Yes No   Sig: TAKE 4 CAPSULES BY MOUTH 1 HOUR PRIOR TO DENTAL PROCEDURES   atorvastatin (LIPITOR) 20 mg tablet   Yes No   Sig: Take 20 mg by mouth daily   cefadroxil (DURICEF) 500 mg capsule   Yes No   Sig: TAKE 1 CAPSULE BY MOUTH TWICE A DAY FOR 7 DAYS   celecoxib (CeleBREX) 200 mg capsule   Yes No   Sig: Take 200 mg by mouth daily   hydrOXYzine HCL (ATARAX) 50 mg tablet   Yes No   Sig: Take 50 mg by mouth daily at bedtime Pt states he takes 100 mg at hs   oxyCODONE-acetaminophen (PERCOCET) 5-325 mg per tablet   Yes No   Sig: Take 1 tablet by mouth every 6 (six) hours as needed   topiramate (TOPAMAX) 50 MG tablet   No No   Sig: Take 1 tablet (50 mg total) by mouth 2 (two) times a day   traMADol (ULTRAM) 50 mg tablet   Yes No   Sig: TAKE 1 TO 2 TABLETS EVERY 6 HOURS AS NEEDED   valsartan (DIOVAN) 80 mg tablet   Yes No   Sig: Take 80 mg by mouth daily       Facility-Administered Medications: None       Past Medical History:   Diagnosis Date    Anxiety     Arthritis     Chronic pain disorder     mid back region    Colon polyp     CPAP (continuous positive airway pressure) dependence     Pt uses nightly    Diabetes mellitus (HCC)     Diverticulosis     Hyperlipidemia     Hypertension     Obesity     Sleep apnea     Spinal stenosis        Past Surgical History:   Procedure Laterality Date    ACHILLES TENDON REPAIR      Pt had a rupture in right and left 2 years apart    COLONOSCOPY      EPIDURAL BLOCK INJECTION      Pt had in lumbar region   EPIDURAL BLOCK INJECTION N/A 4/6/2021    Procedure: T-11-T-12 INTERLAMINAR THORACIC EPIDURAL STEROID INJECTION;  Surgeon: Allyson Pickard MD;  Location[facility];  Service: Pain Management     FOOT SURGERY Left     Left great toe- had a hammertoe   JOINT REPLACEMENT Left     Total hip    JOINT REPLACEMENT Right     Total hip      KNEE ARTHROSCOPY Bilateral     TOTAL KNEE ARTHROPLASTY Left        Family History   Problem Relation Age of Onset    Arthritis Mother     Hypertension Father      I have reviewed and agree with the history as documented  E-Cigarette/Vaping    E-Cigarette Use Never User      E-Cigarette/Vaping Substances     Social History     Tobacco Use    Smoking status: Never Smoker    Smokeless tobacco: Current User     Types: Snuff   Substance Use Topics    Alcohol use: Yes     Frequency: 2-4 times a month     Drinks per session: 1 or 2    Drug use: No       Review of Systems   Constitutional: Negative for activity change, appetite change, chills, fatigue and fever  HENT: Negative for congestion, ear pain, rhinorrhea and sore throat  Eyes: Negative for discharge, redness and visual disturbance  Respiratory: Negative for cough, chest tightness, shortness of breath and wheezing  Cardiovascular: Negative for chest pain and palpitations  Gastrointestinal: Negative for abdominal pain, constipation, diarrhea, nausea and vomiting  Endocrine: Negative for polydipsia and polyuria  Genitourinary: Negative for difficulty urinating, dysuria, frequency, hematuria and urgency  Musculoskeletal: Negative for arthralgias and myalgias  Hard lump on anterior right shin   Skin: Negative for color change, pallor and rash  Neurological: Negative for dizziness, weakness, light-headedness, numbness and headaches  Hematological: Negative for adenopathy  Does not bruise/bleed easily  All other systems reviewed and are negative  Physical Exam  Physical Exam  Vitals signs and nursing note reviewed  Constitutional:       Appearance: He is well-developed  HENT:      Head: Normocephalic and atraumatic  Right Ear: External ear normal       Left Ear: External ear normal       Nose: Nose normal    Eyes:      Conjunctiva/sclera: Conjunctivae normal       Pupils: Pupils are equal, round, and reactive to light  Neck:      Musculoskeletal: Normal range of motion and neck supple  Cardiovascular:      Rate and Rhythm: Normal rate and regular rhythm  Heart sounds: Normal heart sounds     Pulmonary:      Effort: Pulmonary effort is normal  No respiratory distress  Breath sounds: Normal breath sounds  No wheezing or rales  Chest:      Chest wall: No tenderness  Abdominal:      General: Bowel sounds are normal  There is no distension  Palpations: Abdomen is soft  Tenderness: There is no abdominal tenderness  There is no guarding  Musculoskeletal: Normal range of motion  Legs:    Skin:     General: Skin is warm and dry  Neurological:      Mental Status: He is alert and oriented to person, place, and time  Cranial Nerves: No cranial nerve deficit  Sensory: No sensory deficit  Vital Signs  ED Triage Vitals [04/26/21 0033]   Temperature Pulse Respirations Blood Pressure SpO2   (!) 96 9 °F (36 1 °C) 75 20 (!) 178/86 97 %      Temp Source Heart Rate Source Patient Position - Orthostatic VS BP Location FiO2 (%)   Temporal Monitor Sitting Right arm --      Pain Score       --           Vitals:    04/26/21 0033 04/26/21 0053   BP: (!) 178/86 157/80   Pulse: 75 73   Patient Position - Orthostatic VS: Sitting Lying         Visual Acuity      ED Medications  Medications - No data to display    Diagnostic Studies  Results Reviewed     None                 XR tibia fibula 2 views RIGHT   ED Interpretation by Kierra Templeton DO (04/26 0056)   No acute fracture dislocation small linear calcific density in mid to distal tibia does correlate with the location of the patient's lump                 Procedures  Procedures         ED Course                             SBIRT 22yo+      Most Recent Value   SBIRT (22 yo +)   In order to provide better care to our patients, we are screening all of our patients for alcohol and drug use  Would it be okay to ask you these screening questions? Yes Filed at: 04/26/2021 0452   Initial Alcohol Screen: US AUDIT-C    1  How often do you have a drink containing alcohol? 1 Filed at: 04/26/2021 0052   2   How many drinks containing alcohol do you have on a typical day you are drinking? 1 Filed at: 04/26/2021 0052   3a  Male UNDER 65: How often do you have five or more drinks on one occasion? 0 Filed at: 04/26/2021 0052   3b  FEMALE Any Age, or MALE 65+: How often do you have 4 or more drinks on one occassion? 0 Filed at: 04/26/2021 0052   Audit-C Score  2 Filed at: 04/26/2021 6569   AGATHA: How many times in the past year have you    Used an illegal drug or used a prescription medication for non-medical reasons? Never Filed at: 04/26/2021 2848                    MDM  Number of Diagnoses or Management Options  Lesion of bone of right lower leg:   Lesion of soft tissue of lower leg and ankle:   Diagnosis management comments: Patient is clinically hemodynamically stable in the emergency department neurovascularly intact in the distal right lower extremity his primarily worked up and concerned that the lesion on his anterior right shin may be cancerous I advised the patient I am unable to rule out cancer or provide a definitive diagnosis of this lesion on an emergency basis  Obtained x-ray to evaluate for a calcium deposit on the bone given history of prior trauma and area  Advised follow-up with PCP and Orthopedics for further evaluation treatment return precautions and anticipatory guidance discussed           Amount and/or Complexity of Data Reviewed  Tests in the radiology section of CPT®: ordered and reviewed  Decide to obtain previous medical records or to obtain history from someone other than the patient: yes  Review and summarize past medical records: yes  Independent visualization of images, tracings, or specimens: yes    Risk of Complications, Morbidity, and/or Mortality  Presenting problems: low  Diagnostic procedures: low  Management options: low    Patient Progress  Patient progress: stable      Disposition  Final diagnoses:   Lesion of soft tissue of lower leg and ankle - Right   Lesion of bone of right lower leg     Time reflects when diagnosis was documented in both MDM as applicable and the Disposition within this note     Time User Action Codes Description Comment    4/26/2021 12:34 AM [place] Perone Add [M79 89] Lesion of soft tissue of lower leg and ankle     4/26/2021 12:34 AM [place] Perone Modify [M79 89] Lesion of soft tissue of lower leg and ankle Right    4/26/2021 12:37 AM [place] Perone Add [M89 9] Lesion of bone of right lower leg       ED Disposition     ED Disposition Condition Date/Time Comment    Discharge Stable Mon Apr 26, 2021 12:32 AM Todd Bone discharge to home/self care  Follow-up Information     Follow up With Specialties Details Why Contact Info    Ramona Levy DO General Practice Schedule an appointment as soon as possible for a visit in 3 days  [address] [facility] [address] [facility]      Alex Jewell MD  Schedule an appointment as soon as possible for a visit in 3 days  [address] [facility] [phone]            Patient's Medications   Discharge Prescriptions    No medications on file     No discharge procedures on file      PDMP Review     None          ED Provider  Electronically Signed by           Lois Haskins DO  04/26/21 9368

## 2021-05-11 DIAGNOSIS — E11.42 DIABETIC PERIPHERAL NEUROPATHY (HCC): ICD-10-CM

## 2021-05-11 RX ORDER — TOPIRAMATE 50 MG/1
TABLET, FILM COATED ORAL
Qty: 60 TABLET | Refills: 0 | Status: SHIPPED | OUTPATIENT
Start: 2021-05-11 | End: 2021-07-14

## 2021-07-02 ENCOUNTER — TELEPHONE (OUTPATIENT)
Dept: PAIN MEDICINE | Facility: CLINIC | Age: 61
End: 2021-07-02

## 2021-07-02 NOTE — TELEPHONE ENCOUNTER
Patients wife called, Bruno Ferrari is having increased  lower back pain  Is interested in an injection  Please advise     #[phone]

## 2021-07-07 ENCOUNTER — DOCTOR'S OFFICE (OUTPATIENT)
Dept: URBAN - NONMETROPOLITAN AREA CLINIC 1 | Facility: CLINIC | Age: 61
Setting detail: OPHTHALMOLOGY
End: 2021-07-07
Payer: COMMERCIAL

## 2021-07-07 DIAGNOSIS — H52.4: ICD-10-CM

## 2021-07-07 PROBLEM — H04.123 DRY EYE; BOTH EYES: Status: ACTIVE | Noted: 2021-07-07

## 2021-07-07 PROCEDURE — 92004 COMPRE OPH EXAM NEW PT 1/>: CPT | Performed by: OPTOMETRIST

## 2021-07-07 ASSESSMENT — AXIALLENGTH_DERIVED
OD_AL: 24.04
OS_AL: 23.1657
OD_AL: 24.09
OS_AL: 23.2127

## 2021-07-07 ASSESSMENT — TEAR BREAK UP TIME (TBUT): OD_TBUT: 5-6 SEC

## 2021-07-07 ASSESSMENT — REFRACTION_CURRENTRX
OD_SPHERE: -0.50
OD_CYLINDER: -0.75
OD_VPRISM_DIRECTION: BF
OD_OVR_VA: 20/
OS_SPHERE: -0.50
OD_AXIS: 160
OS_AXIS: 150
OS_VPRISM_DIRECTION: BF
OS_ADD: +1.25
OD_ADD: +1.25
OS_OVR_VA: 20/
OS_CYLINDER: -0.50

## 2021-07-07 ASSESSMENT — REFRACTION_MANIFEST
OD_VA1: 20/20-2
OD_SPHERE: -0.75
OS_VA2: 20/20-2
OS_SPHERE: -0.75
OD_VA2: 20/20-2
OD_ADD: +2.25
OS_CYLINDER: -0.75
OD_CYLINDER: -0.75
OS_ADD: +2.25
OD_AXIS: 005
OS_VA1: 20/20-2
OS_AXIS: 150

## 2021-07-07 ASSESSMENT — SUPERFICIAL PUNCTATE KERATITIS (SPK)
OS_SPK: 2+
OD_SPK: 2+

## 2021-07-07 ASSESSMENT — KERATOMETRY
OD_AXISANGLE_DEGREES: 175
OS_K2POWER_DIOPTERS: 46.75
OD_K2POWER_DIOPTERS: 43.87
OS_K1POWER_DIOPTERS: 45.00
OD_K1POWER_DIOPTERS: 43.12
OS_AXISANGLE_DEGREES: 165

## 2021-07-07 ASSESSMENT — REFRACTION_AUTOREFRACTION
OD_AXIS: 005
OS_SPHERE: -0.75
OD_CYLINDER: -0.50
OD_SPHERE: -1.00
OS_CYLINDER: -1.00
OS_AXIS: 149

## 2021-07-07 ASSESSMENT — VISUAL ACUITY
OD_BCVA: 20/30
OS_BCVA: 20/40-2

## 2021-07-07 ASSESSMENT — CONFRONTATIONAL VISUAL FIELD TEST (CVF)
OS_FINDINGS: FULL
OD_FINDINGS: FULL

## 2021-07-07 ASSESSMENT — TONOMETRY
OD_IOP_MMHG: 16
OS_IOP_MMHG: 16

## 2021-07-07 ASSESSMENT — SPHEQUIV_DERIVED
OS_SPHEQUIV: -1.125
OD_SPHEQUIV: -1.25
OS_SPHEQUIV: -1.25
OD_SPHEQUIV: -1.125

## 2021-07-14 ENCOUNTER — OFFICE VISIT (OUTPATIENT)
Dept: PAIN MEDICINE | Facility: CLINIC | Age: 61
End: 2021-07-14
Payer: COMMERCIAL

## 2021-07-14 VITALS
DIASTOLIC BLOOD PRESSURE: 76 MMHG | RESPIRATION RATE: 20 BRPM | TEMPERATURE: 98 F | BODY MASS INDEX: 39.17 KG/M2 | HEART RATE: 82 BPM | HEIGHT: 75 IN | WEIGHT: 315 LBS | SYSTOLIC BLOOD PRESSURE: 150 MMHG

## 2021-07-14 DIAGNOSIS — M54.16 LUMBAR RADICULOPATHY: Primary | ICD-10-CM

## 2021-07-14 DIAGNOSIS — E66.01 MORBID OBESITY (HCC): ICD-10-CM

## 2021-07-14 PROCEDURE — 80305 DRUG TEST PRSMV DIR OPT OBS: CPT | Performed by: ANESTHESIOLOGY

## 2021-07-14 PROCEDURE — 99214 OFFICE O/P EST MOD 30 MIN: CPT | Performed by: ANESTHESIOLOGY

## 2021-07-14 RX ORDER — 0.9 % SODIUM CHLORIDE 0.9 %
1 VIAL (ML) INJECTION ONCE
Status: CANCELLED | OUTPATIENT
Start: 2021-07-14 | End: 2021-07-14

## 2021-07-14 RX ORDER — CEPHALEXIN 500 MG/1
500 CAPSULE ORAL 3 TIMES DAILY
Status: ON HOLD | COMMUNITY
Start: 2021-07-08 | End: 2021-09-30 | Stop reason: SDUPTHER

## 2021-07-14 RX ORDER — TRAZODONE HYDROCHLORIDE 100 MG/1
50 TABLET ORAL
COMMUNITY
Start: 2021-06-24

## 2021-07-14 RX ORDER — LORAZEPAM 0.5 MG/1
0.5 TABLET ORAL DAILY PRN
COMMUNITY
Start: 2021-07-02

## 2021-07-14 RX ORDER — LIDOCAINE HYDROCHLORIDE 10 MG/ML
10 INJECTION, SOLUTION EPIDURAL; INFILTRATION; INTRACAUDAL; PERINEURAL ONCE
Status: CANCELLED | OUTPATIENT
Start: 2021-07-14 | End: 2021-07-14

## 2021-07-14 NOTE — H&P (VIEW-ONLY)
Assessment  1  Lumbar radiculopathy  -     Case request operating room: BLOCK / INJECTION EPIDURAL STEROID right S1 TFESI; Standing  -     Case request operating room: BLOCK / INJECTION EPIDURAL STEROID right S1 TFESI  -     MRI lumbar spine wo contrast; Future; Expected date: 07/14/2021    2  Morbid obesity ([address] )      Right-sided lumbar radicular pain in L5 and S1 dermatomal distribution  Moderate posterolateral disc herniation at L5-S1 in addition to spondyloarthropathy contributing to moderate transforaminal stenosis here  Reasonable to repeat MRI imaging as last 1 was done in 2018 to evaluate her new more S1 radicular symptoms  Interlaminar epidural steroid injection considerably help with symptomatology noted below  In the interim patient had right knee total arthroplasty which is considerably help with his right sided knee pain:    pain consistent with thoracic radicular symptoms secondary to T10-T11 disc herniation which from 2018 MRI thoracic spine does not contribute to significant cord compression  Radiating and radicular pain in T10 and T11 dermatomal distribution  Pain is along lower thoracic T10 and T11 dermatomes, particularly with palpation of lower thoracic ribs and posterolateral distribution  Interestingly his significant and advanced lumbar facet arthropathy which is not a  primary source of his pain at this time  He previously had epidural steroid injections in the past with Dr Alona Guaman with noted relief  For radicular pain  Reasonable to reobtain imaging, and pursue multimodal pain therapy plan as noted below  Plan  - MRI lumbar spine noncontrast  - Right L5 and S1 transforaminal epidural steroid injection; follow-up in 2 weeks after  -Lyrica 75 mg t i d   Discontinued secondary to side effects  Prescribed Topamax 50 mg b i d ; counseled regarding sedative effects of taking this medication and provided up titration calendar    Counseled not to take medication while driving or operating heavy machinery/using stairs  -continue other analgesics as prescribed previously well provider's  Counseled extensively as noted below regarding the risks of opioid medications in combination with Lyrica  -physical therapy for  Lumbar facet arthropathy, lumbar radiculopathy    There are risks associated with opioid medications, including dependence, addiction and tolerance  The patient understands and agrees to use these medications only as prescribed  Potential side effects of the medications include, but are not limited to, constipation, drowsiness, addiction, impaired judgment and risk of fatal overdose if not taken as prescribed  The patient was warned against driving while taking sedation medications  Sharing medications is a felony  At this point in time, the patient is showing no signs of addiction, abuse, diversion or suicidal ideation  A urine drug screen was collected at today's office visit as part of our medication management protocol  The point of care testing results were appropriate for what was being prescribed  The specimen will be sent for confirmatory testing  The drug screen is medically necessary because the patient is either dependent on opioid medication or is being considered for opioid medication therapy and the results could impact ongoing or future treatment  The drug screen is to evaluate for the presences or absence of prescribed, non-prescribed, and/or illicit drugs/substances  [place] Prescription Drug Monitoring Program report was reviewed and was appropriate     Complete risks and benefits including bleeding, infection, tissue reaction, nerve injury and allergic reaction were discussed  The approach was demonstrated using models and literature was provided  Verbal and written consent was obtained  My impressions and treatment recommendations were discussed in detail with the patient who verbalized understanding and had no further questions    Discharge instructions were provided  I personally saw and examined the patient and I agree with the above discussed plan of care  New Medications Ordered This Visit   Medications    cephalexin (KEFLEX) 500 mg capsule     Sig: Take 500 mg by mouth 3 (three) times a day    LORazepam (ATIVAN) 0 5 mg tablet     Sig: Take 0 5 mg by mouth daily as needed    traZODone (DESYREL) 100 mg tablet     Sig: TAKE 1 TABLET BY MOUTH EVERY DAY EVERY NIGHT AT BEDTIME       History of Present Illness    Right-sided lumbar radicular pain in L5 and S1 dermatomal distribution  Moderate posterolateral disc herniation at L5-S1 in addition to spondyloarthropathy contributing to moderate transforaminal stenosis here  Reasonable to repeat MRI imaging as last 1 was done in 2018 to evaluate her new more S1 radicular symptoms  Interlaminar epidural steroid injection considerably help with symptomatology noted belowIn the interim patient had right knee total arthroplasty which is considerably help with his right sided knee pain:    Pam Veliz is a 62 y o  male with a past medical history of  Non-insulin controlled type 2 diabetes, obesity, chronic low back pain described primarily as arthritic in nature  He describes 8/10 low back pain that is worse in the mornings and worse at the end of the day  The pain is characterized by achy, nagging, indolent, crampy, stabbing pain in his axial low back  The patient describes that the pain is worse with standing for long periods of time on hard surfaces as well as with walking  The patient is a very active individual and feels as though this pain compromises his participation with independent activities of daily living  He ambulates with a walker  The pain can be debilitating at times and contribute to significant disability, compromising overall activity and independent activities of daily living  He has tried physical therapy with limited relief of symptoms    Medications  he is currently on include   Celebrex 200 mg per day, Percocet 5-325 mg Q 6 hours p r n  pain  He takes 200 mg of gabapentin per day and was recently started on Robaxin 500 mg t i d  for back spasms  He additionally has pain radiating across his ribs bilaterally in the T10 and T11 dermatomal distribution  He demonstrates good strength and denies any weakness numbness or paresthesias  The patient denies any bowel or bladder dysfunction as well  I have personally reviewed and/or updated the patient's past medical history, past surgical history, family history, social history, current medications, allergies, and vital signs today  Review of Systems   Constitutional: Positive for activity change  HENT: Negative  Eyes: Negative  Respiratory: Negative  Cardiovascular: Negative  Gastrointestinal: Negative  Endocrine: Negative  Genitourinary: Negative  Musculoskeletal: Positive for arthralgias, back pain, gait problem and myalgias  Skin: Negative  Allergic/Immunologic: Negative  Neurological: Positive for numbness  Negative for weakness  Hematological: Negative  Psychiatric/Behavioral: Negative  All other systems reviewed and are negative  Patient Active Problem List   Diagnosis    Herniation of intervertebral disc of thoracic region    Diabetic peripheral neuropathy (HCC)    Morbid obesity ([address] )       Past Medical History:   Diagnosis Date    Anxiety     Arthritis     Chronic pain disorder     mid back region    Colon polyp     CPAP (continuous positive airway pressure) dependence     Pt uses nightly    Diabetes mellitus ([address] )     Diverticulosis     Hyperlipidemia     Hypertension     Obesity     Sleep apnea     Spinal stenosis        Past Surgical History:   Procedure Laterality Date    ACHILLES TENDON REPAIR      Pt had a rupture in right and left 2 years apart    COLONOSCOPY      EPIDURAL BLOCK INJECTION      Pt had in lumbar region      EPIDURAL BLOCK INJECTION N/A 4/6/2021    Procedure: T-11-T-12 INTERLAMINAR THORACIC EPIDURAL STEROID INJECTION;  Surgeon: Mary Ann Serrano MD;  Location: [facility];  Service: Pain Management     FOOT SURGERY Left     Left great toe- had a hammertoe   JOINT REPLACEMENT Left     Total hip    JOINT REPLACEMENT Right     Total hip   KNEE ARTHROSCOPY Bilateral     TOTAL KNEE ARTHROPLASTY Left        Family History   Problem Relation Age of Onset    Arthritis Mother     Hypertension Father        Social History     Occupational History    Not on file   Tobacco Use    Smoking status: Never Smoker    Smokeless tobacco: Current User     Types: Snuff   Vaping Use    Vaping Use: Never used   Substance and Sexual Activity    Alcohol use:  Yes    Drug use: No    Sexual activity: Not on file     Comment: did not ask        Current Outpatient Medications on File Prior to Visit   Medication Sig    acetaminophen (TYLENOL) 500 mg tablet Take 1,000 mg by mouth every 6 (six) hours as needed for mild pain    amoxicillin (AMOXIL) 500 mg capsule TAKE 4 CAPSULES BY MOUTH 1 HOUR PRIOR TO DENTAL PROCEDURES    atorvastatin (LIPITOR) 20 mg tablet Take 20 mg by mouth daily    celecoxib (CeleBREX) 200 mg capsule Take 200 mg by mouth daily    Empagliflozin (Jardiance) 25 MG TABS Take 25 mg by mouth daily    ERGOCALCIFEROL PO Take 50,000 Units by mouth 2 (two) times a week     famotidine (PEPCID) 20 mg tablet Take by mouth    LORazepam (ATIVAN) 1 mg tablet Take 1 mg by mouth every 6 (six) hours as needed     mupirocin (BACTROBAN) 2 % ointment     Semaglutide,0 25 or 0 5MG/DOS, (Ozempic, 0 25 or 0 5 MG/DOSE,) 2 MG/1 5ML SOPN Inject 0 5 mg under the skin once a week     sertraline (ZOLOFT) 50 mg tablet Take by mouth    valsartan (DIOVAN) 80 mg tablet Take 80 mg by mouth daily     [DISCONTINUED] aspirin (ECOTRIN LOW STRENGTH) 81 mg EC tablet Take 81 mg by mouth daily    [DISCONTINUED] cefadroxil (DURICEF) 500 mg capsule TAKE 1 CAPSULE BY MOUTH TWICE A DAY FOR 7 DAYS    [DISCONTINUED] clonazePAM (KlonoPIN) 0 5 mg tablet Take 0 5 mg by mouth 2 (two) times a day as needed    [DISCONTINUED] HYDROcodone-acetaminophen (NORCO) 5-325 mg per tablet Take 1-2 tablets by mouth every 6 (six) hours as needed    cephalexin (KEFLEX) 500 mg capsule Take 500 mg by mouth 3 (three) times a day    LORazepam (ATIVAN) 0 5 mg tablet Take 0 5 mg by mouth daily as needed    traZODone (DESYREL) 100 mg tablet TAKE 1 TABLET BY MOUTH EVERY DAY EVERY NIGHT AT BEDTIME    [DISCONTINUED] diltiazem (TIAZAC) 180 MG 24 hr capsule Take 180 mg by mouth    [DISCONTINUED] hydrOXYzine HCL (ATARAX) 50 mg tablet Take 50 mg by mouth daily at bedtime Pt states he takes 100 mg at hs    [DISCONTINUED] oxyCODONE-acetaminophen (PERCOCET) 5-325 mg per tablet Take 1 tablet by mouth every 6 (six) hours as needed    [DISCONTINUED] PARoxetine (PAXIL) 20 mg tablet Take 20 mg by mouth daily    [DISCONTINUED] Semaglutide,0 25 or 0 5MG/DOS, (Ozempic, 0 25 or 0 5 MG/DOSE,) 2 MG/1 5ML SOPN Inject under the skin    [DISCONTINUED] Semaglutide,0 25 or 0 5MG/DOS, (Ozempic, 0 25 or 0 5 MG/DOSE,) 2 MG/1 5ML SOPN Inject under the skin once a week on    [DISCONTINUED] topiramate (TOPAMAX) 50 MG tablet TAKE 1 TABLET BY MOUTH TWICE A DAY    [DISCONTINUED] traMADol (ULTRAM) 50 mg tablet TAKE 1 TO 2 TABLETS EVERY 6 HOURS AS NEEDED     No current facility-administered medications on file prior to visit  Allergies   Allergen Reactions    Paxil [Paroxetine] GI Intolerance         Physical Exam    /76   Pulse 82   Temp 98 °F (36 7 °C)   Resp 20   Ht 6' 3" (1 905 m)   Wt (!) 155 kg (341 lb 3 2 oz)   BMI 42 65 kg/m²     Constitutional: normal, well developed, well nourished, alert, in no distress and non-toxic and no overt pain behavior   and obese  Eyes: anicteric  HEENT: grossly intact  Neck: supple, symmetric, trachea midline and no masses   Pulmonary:even and unlabored  Cardiovascular:No edema or pitting edema present  Skin:Normal without rashes or lesions and well hydrated  Psychiatric:Mood and affect appropriate  Neurologic:Cranial Nerves II-XII grossly intact Sensation grossly intact; no clonus negative diallo's  Reflexes 2+ and brisk  SLR  Weakly positive right-sided we  Musculoskeletal: hunched gait  Unable to perform normal heel toe and tip toe walking  Slight weakness with right lower extremity strong plantar flexion; 5/5 strength with active range of motion movements in all other extremities bilaterally  mild pain with lumbar facet loading bilaterally and with lateral spine rotation  mild ttp over lumbar paraspinal muscles  Negative jahaira's test, negative gaenslen's negative SIJ loading bilaterally  Tenderness to palpation over inferior rib borders of T10-T11 posterolateral ribs bilaterally,  Eliciting radicular pain in aformentioned dermatomal distributions  Imaging    Impression: Hypertrophic facet arthrosis with fluid in bilateral facet joints at  L3-L4  Consider active facet inflammation or from degeneration or infection,  infection must be excluded clinically  There is no focal fluid collection  A  component of epidural lipomatosis in the lower lumbar spine  Disc osteophyte  complex resulting in at least moderate left foraminal narrowing at L5-S1 abuts  the exiting left L5 nerve root, correlate clinically for left L5 distribution  symptoms  [id number]   Result Narrative   History: Back pain, evaluate for cauda equina syndrome  Technique: MRI of the lumbar spine was performed with sagittal T1, sagittal T2,  sagittal STIR, axial T1 and T2-weighted images  Following contrast  administration, sagittal and axial T1-weighted images were obtained  18 5 cc  Gadavist injected intravenously  Comparison: None  Findings: For the purposes of this dictation, the lumbar vertebral bodies are  labeled from caudal to cranial direction   The first vertebra with lumbar  morphology is labeled as L5  There is poor signal-to-noise ratio on the study secondary to patient's body  habitus  There is abundant fat within the spinal canal which causes mild circumferential  impression on the thecal sac and its contents  The lumbar vertebrae are normal  in alignment  The vertebral body heights are maintained  There is no acute  compression deformity or spondylolisthesis  There is mild congestion of the epidural venous plexus on the contrast-enhanced  study  There is enhancement in the facet joints bilaterally at L3-L4  This is  indicative of facet inflammation either from degenerative disc disease or  infection  Infection must excluded clinically  There is no abnormal focal fluid  collection  There is asymmetric fatty atrophy of the left psoas group of  muscles  At L1-L2, there is facet arthrosis and a disc bulge without significant spinal  stenosis or foraminal narrowing  At L2-L3, there is bilateral facet arthrosis and disc bulge causing mild spinal  stenosis and mild right foraminal narrowing  At L3-L4, there is hypertrophic facet arthrosis with fluid in bilateral facet  joints  There is pathologic enhancement or demonstration of contrast  There is  mild enhancement in the soft tissues on the right facet joint  There is no focal  fluid collection  There is mild spinal stenosis and mild to moderate right  foraminal narrowing, mild left foraminal narrowing  At L4-L5, there is a disc bulge, facet arthrosis causing mild spinal stenosis  and mild to moderate left foraminal narrowing  At L5-S1, there is a disc bulge with a left subarticular and foraminal disc  osteophyte complex, hypertrophic facet arthrosis causing at least moderate right  foraminal narrowing  The disc osteophyte complex abuts the exiting left L5 nerve  root, correlate clinically for left L5 distribution symptoms  Impression:  1  Technically suboptimal study  2  Marked spondylosis    3  At T10-T11 there is spinal stenosis however there is no marlyn spinal cord  compression  Workstation:[id number]   Result Narrative   Exam - thoracic spine mri scan     History: Bilateral leg weakness  Technique: Using a surface coil sagittal and axial T1-weighted and T2-weighted  scans were obtained of the thoracic spine  Findings: Image detail is suboptimal secondary to patient's obesity  Thoracic spinal cord is normal in size and configuration and MRI signal  intensity  There are no intramedullary lesions  Vertebral bodies are in anatomic alignment  There are degenerative changes in endplates and facet joints throughout the  thoracic spine  There is central spinal stenosis at T10-T11  There is no marlyn spinal cord  compression however  Other Result Information   Interface, Rad Results In - 08/07/2018  7:34 PM EDT  Formatting of this note might be different from the original   Exam - thoracic spine mri scan     History: Bilateral leg weakness  Technique: Using a surface coil sagittal and axial T1-weighted and T2-weighted  scans were obtained of the thoracic spine  Findings: Image detail is suboptimal secondary to patient's obesity  Thoracic spinal cord is normal in size and configuration and MRI signal  intensity  There are no intramedullary lesions  Vertebral bodies are in anatomic alignment  There are degenerative changes in endplates and facet joints throughout the  thoracic spine  There is central spinal stenosis at T10-T11  There is no marlyn spinal cord  compression however  IMPRESSION:  Impression:  1  Technically suboptimal study  2  Marked spondylosis  3  At T10-T11 there is spinal stenosis however there is no marlyn spinal cord  compression

## 2021-07-14 NOTE — PATIENT INSTRUCTIONS
Epidural Steroid Injection   WHAT YOU NEED TO KNOW:   What do I need to know about an epidural steroid injection (RIGO)? An RIGO is a procedure to inject steroid medicine into the epidural space  The epidural space is between your spinal cord and vertebrae  Steroids reduce inflammation and fluid buildup in your spine that may be causing pain  You may be given pain medicine along with the steroids  How do I prepare for an RIGO? Your healthcare provider will talk to you about how to prepare for your procedure  He or she will tell you what medicines to take or not take on the day of your procedure  You may need to stop taking blood thinners or other medicines several days before your procedure  You may need to adjust any diabetes medicine you take on the day of your procedure  Steroid medicine can increase your blood sugar level  Arrange for someone to drive you home when you are discharged  What will happen during an RIGO? · You will be given medicine to numb the procedure area  You will be awake for the procedure, but you will not feel pain  You may also be given medicine to help you relax  Contrast liquid will be used to help your healthcare provider see the area better  Tell the healthcare provider if you have ever had an allergic reaction to contrast liquid  · Your healthcare provider may place the needle into your neck area, middle of your back, or tailbone area  He may inject the medicine next to the nerves that are causing your pain  He may instead inject the medicine into a larger area of the epidural space  This helps the medicine spread to more nerves  Your healthcare provider will use a fluoroscope to help guide the needle to the right place  A fluoroscope is a type of x-ray  After the procedure, a bandage will be placed over the injection site to prevent infection  What will happen after an RIGO? You will have a bandage over the injection site to prevent infection   Your healthcare provider will tell you when you can bathe and any activity guidelines  You will be able to go home  What are the risks of an RIGO? You may have temporary or permanent nerve damage or paralysis  You may have bleeding or develop a serious infection, such as meningitis (swelling of the brain coverings)  An abscess may also develop  An abscess is a pus-filled area under the skin  You may need surgery to fix the abscess  You may have a seizure, anxiety, or trouble sleeping  If you are a man, you may have temporary erectile dysfunction (not able to have an erection)  CARE AGREEMENT:   You have the right to help plan your care  Learn about your health condition and how it may be treated  Discuss treatment options with your healthcare providers to decide what care you want to receive  You always have the right to refuse treatment  The above information is an  only  It is not intended as medical advice for individual conditions or treatments  Talk to your doctor, nurse or pharmacist before following any medical regimen to see if it is safe and effective for you  © Copyright [address] Information is for End User's use only and may not be sold, redistributed or otherwise used for commercial purposes  All illustrations and images included in CareNotes® are the copyrighted property of [employer]  or [address] [place] [address]  Core Strengthening Exercises   WHAT YOU NEED TO KNOW:   Your core includes the muscles of your lower back, hip, pelvis, and abdomen  Core strengthening exercises help heal and strengthen these muscles  This helps prevent another injury, and keeps your pelvis, spine, and hips in the correct position  DISCHARGE INSTRUCTIONS:   Contact your healthcare provider if:   · You have sharp or worsening pain during exercise or at rest     · You have questions or concerns about your shoulder exercises      Safety tips:  Talk to your healthcare provider before you start an exercise program  A physical therapist can teach you how to do core strengthening exercises safely  · Do the exercises on a mat or firm surface  A firm surface will support your spine and prevent low back pain  Do not do these exercises on a bed  · Move slowly and smoothly  Avoid fast or jerky motions  · Stop if you feel pain  Core exercises should not be painful  Stop if you feel pain  · Breathe normally during core exercises  Do not hold your breath  This may cause an increase in blood pressure and prevent muscle strengthening  Your healthcare provider will tell you when to inhale and exhale during the exercise  · Begin all of your exercises with abdominal bracing  Abdominal bracing helps warm up your core muscles  You can also practice abdominal bracing throughout the day  Lie on your back with your knees bent and feet flat on the floor  Place your arms in a relaxed position beside your body  Tighten your abdominal muscles  Pull your belly button in and up toward your spine  Hold for 5 seconds  Relax your muscles  Repeat 10 times  Core strengthening exercises: Your healthcare provider will tell you how often to do these exercises  The provider will also tell you how many repetitions of each exercise you should do  Hold each exercise for 5 seconds or as directed  As you get stronger, increase your hold to 10 to 15 seconds  You can do some of these exercises on a stability ball, or with a weight  Ask your healthcare provider how to use a stability ball or weight for these exercises:  · Bridging:  Lie on your back with your knees bent and feet flat on the floor  Rest your arms at your side  Tighten your buttocks, and then lift your hips 1 inch off the floor  Hold for 5 seconds  When you can do this exercise without pain for 10 seconds, increase the distance you lift your hips  A good goal is to be able to lift your hips so that your shoulders, hips, and knees are in a straight line           · Dead bug:  Lie on your back with your knees bent and feet flat on the floor  Place your arms in a relaxed position beside your body  Begin with abdominal bracing  Next, raise one leg, keeping your knee bent  Hold for 5 seconds  Repeat with the other leg  When you can do this exercise without pain for 10 to 15 seconds, you may raise one straight leg and hold  Repeat with the other leg  · Quadruped:  Place your hands and knees on the floor  Keep your wrists directly below your shoulders and your knees directly below your hips  Pull your belly button in toward your spine  Do not flatten or arch your back  Tighten your abdominal muscles below your belly button  Hold for 5 seconds  When you can do this exercise without pain for 10 to 15 seconds, you may extend one arm and hold  Repeat on the other side  · Side bridge exercises:      ? Standing side bridge:  Stand next to a wall and extend one arm toward the wall  Place your palm flat on the wall with your fingers pointing upward  Begin with abdominal bracing  Next, without moving your feet, slowly bend your arm to 90 degrees  Hold for 5 seconds  Repeat on the other side  When you can do this exercise without pain for 10 to 15 seconds, you may do the bent leg side bridge on the floor  ? Bent leg side bridge:  Lie on one side with your legs, hips, and shoulders in a straight line  Prop yourself up onto your forearm so your elbow is directly below your shoulder  Bend your knees back to 90 degrees  Begin with abdominal bracing  Next, lift your hips and balance yourself on your forearm and knees  Hold for 5 seconds  Repeat on the other side  When you can do this exercise without pain for 10 to 15 seconds, you may do the straight leg side bridge on the floor  ? Straight leg side bridge:  Lie on one side with your legs, hips, and shoulders in a straight line  Prop yourself up onto your forearm so your elbow is directly below your shoulder  Begin with abdominal bracing  Lift your hips off the floor and balance yourself on your forearm and the outside of your flexed foot  Do not let your ankle bend sideways  Hold for 5 seconds  Repeat on the other side  When you can do this exercise without pain for 10 to 15 seconds, ask your healthcare provider for more advanced exercises  · Superman:  Lie on your stomach  Extend your arms forward on the floor  Tighten your abdominal muscles and lift your right hand and left leg off the floor  Hold this position  Slowly return to the starting position  Tighten your abdominal muscles and lift your left hand and right leg off the floor  Hold this position  Slowly return to the starting position  · Clam:  Lie on your side with your knees bent  Put your bottom arm under your head to keep your neck in line  Put your top hand on your hip to keep your pelvis from moving  Put your heels together, and keep them together during this exercise  Slowly raise your top knee toward the ceiling  Then lower your leg so your knees are together  Repeat this exercise 10 times  Then switch sides and do the exercise 10 times with the other leg  · Curl up:  Lie on your back with your knees bent and feet flat on the floor  Place your hands, palms down, underneath your lower back  Next, with your elbows on the floor, lift your shoulders and chest 2 to 3 inches off the floor  Keep your head in line with your shoulders  Hold this position  Slowly return to the starting position  · Straight leg raises:  Lie on your back with one leg straight  Bend the other knee and place your foot flat on the floor  Tighten your abdominal muscles  Keep your leg straight and slowly lift it straight up 6 to 12 inches off the floor  Hold this position  Lower your leg slowly  Do as many repetitions as directed on this side  Repeat with the other leg  · Plank:  Lie on your stomach  Bend your elbows and place your forearms flat on the floor   Lift your chest, stomach, and knees off the floor  Make sure your elbows are below your shoulders  Your body should be in a straight line  Do not let your hips or lower back sink to the ground  Squeeze your abdominal muscles together and hold for 15 seconds  To make this exercise harder, hold for 30 seconds or lift 1 leg at a time  · Bicycles:  Lie on your back  Bend both knees and bring them toward your chest  Your calves should be parallel to the floor  Place the palms of your hands on the back of your head  Straighten your right leg and keep it lifted 2 inches off the floor  Raise your head and shoulders off the floor and twist towards your left  Keep your head and shoulders lifted  Bend your right knee while you straighten your left leg  Keep your left leg 2 inches off the floor  Twist your head and chest towards the left leg  Continue to straighten 1 leg at a time and twist        Follow up with your healthcare provider as directed:  Write down your questions so you remember to ask them during your visits  © Copyright [address] Information is for End User's use only and may not be sold, redistributed or otherwise used for commercial purposes  All illustrations and images included in CareNotes® are the copyrighted property of [employer] , [employer]  or [facility] Flaquita Darby   The above information is an  only  It is not intended as medical advice for individual conditions or treatments  Talk to your doctor, nurse or pharmacist before following any medical regimen to see if it is safe and effective for you

## 2021-07-20 ENCOUNTER — HOSPITAL ENCOUNTER (OUTPATIENT)
Facility: HOSPITAL | Age: 61
Setting detail: OUTPATIENT SURGERY
Discharge: HOME | End: 2021-07-20
Attending: ANESTHESIOLOGY | Admitting: ANESTHESIOLOGY
Payer: COMMERCIAL

## 2021-07-20 ENCOUNTER — APPOINTMENT (OUTPATIENT)
Dept: RADIOLOGY | Facility: HOSPITAL | Age: 61
End: 2021-07-20
Payer: COMMERCIAL

## 2021-07-20 VITALS
WEIGHT: 315 LBS | DIASTOLIC BLOOD PRESSURE: 70 MMHG | OXYGEN SATURATION: 99 % | TEMPERATURE: 98.3 F | SYSTOLIC BLOOD PRESSURE: 128 MMHG | HEART RATE: 70 BPM | RESPIRATION RATE: 20 BRPM | HEIGHT: 76 IN | BODY MASS INDEX: 38.36 KG/M2

## 2021-07-20 LAB — GLUCOSE SERPL-MCNC: 85 MG/DL (ref 65–140)

## 2021-07-20 PROCEDURE — 64484 NJX AA&/STRD TFRM EPI L/S EA: CPT | Performed by: ANESTHESIOLOGY

## 2021-07-20 PROCEDURE — 64483 NJX AA&/STRD TFRM EPI L/S 1: CPT | Performed by: ANESTHESIOLOGY

## 2021-07-20 PROCEDURE — 76000 FLUOROSCOPY <1 HR PHYS/QHP: CPT

## 2021-07-20 PROCEDURE — 82948 REAGENT STRIP/BLOOD GLUCOSE: CPT

## 2021-07-20 RX ORDER — LIDOCAINE HYDROCHLORIDE 10 MG/ML
10 INJECTION, SOLUTION EPIDURAL; INFILTRATION; INTRACAUDAL; PERINEURAL ONCE
Status: COMPLETED | OUTPATIENT
Start: 2021-07-20 | End: 2021-07-20

## 2021-07-20 RX ORDER — ALPRAZOLAM 0.5 MG/1
0.5 TABLET ORAL ONCE
Status: COMPLETED | OUTPATIENT
Start: 2021-07-20 | End: 2021-07-20

## 2021-07-20 RX ORDER — 0.9 % SODIUM CHLORIDE 0.9 %
1 VIAL (ML) INJECTION ONCE
Status: COMPLETED | OUTPATIENT
Start: 2021-07-20 | End: 2021-07-20

## 2021-07-20 RX ADMIN — ALPRAZOLAM 0.5 MG: 0.5 TABLET ORAL at 09:11

## 2021-07-20 NOTE — INTERVAL H&P NOTE
H&P reviewed  After examining the patient I find no changes in the patients condition since the H&P had been written      Vitals:    07/20/21 0915   BP: 147/83   Pulse: 68   Resp: 18   Temp: 97 8 °F (36 6 °C)   SpO2: 99%

## 2021-07-20 NOTE — DISCHARGE INSTRUCTIONS
PLEASE SCHEDULE 2 WEEK FOLLOW UP BY CALLING THE [facility] AT [place]: [phone]      Epidural Steroid Injection   AMBULATORY CARE:   What you need to know about an epidural steroid injection (RIGO):  An RIGO is a procedure to inject steroid medicine into the epidural space  The epidural space is between your spinal cord and vertebrae  Steroids reduce inflammation and fluid buildup in your spine that may be causing pain  You may be given pain medicine along with the steroids  How to prepare for an RIGO:  Your healthcare provider will talk to you about how to prepare for your procedure  He or she will tell you what medicines to take or not take on the day of your procedure  You may need to stop taking blood thinners or other medicines several days before your procedure  You may need to adjust any diabetes medicine you take on the day of your procedure  Steroid medicine can increase your blood sugar level  Arrange for someone to drive you home when you are discharged  What will happen during an RIGO:   · You will be given medicine to numb the procedure area  You will be awake for the procedure, but you will not feel pain  You may also be given medicine to help you relax  Contrast liquid will be used to help your healthcare provider see the area better  Tell the healthcare provider if you have ever had an allergic reaction to contrast liquid  · Your healthcare provider may place the needle into your neck area, middle of your back, or tailbone area  He may inject the medicine next to the nerves that are causing your pain  He may instead inject the medicine into a larger area of the epidural space  This helps the medicine spread to more nerves  Your healthcare provider will use a fluoroscope to help guide the needle to the right place  A fluoroscope is a type of x-ray   After the procedure, a bandage will be placed over the injection site to prevent infection  What will happen after an RIGO:  You will have a bandage over the injection site to prevent infection  Your healthcare provider will tell you when you can bathe and any activity guidelines  You will be able to go home  Risks of an RIGO:  You may have temporary or permanent nerve damage or paralysis  You may have bleeding or develop a serious infection, such as meningitis (swelling of the brain coverings)  An abscess may also develop  An abscess is a pus-filled area under the skin  You may need surgery to fix the abscess  You may have a seizure, anxiety, or trouble sleeping  If you are a man, you may have temporary erectile dysfunction (not able to have an erection)  Call your local emergency number (911 in the [address] [facility] [address]) if:   · You have a seizure  · You have trouble moving your legs  Seek care immediately if:   · Blood soaks through your bandage  · You have a fever or chills, severe back pain, and the procedure area is sensitive to the touch  · You cannot control when you urinate or have a bowel movement  Call your doctor if:   · You have weakness or numbness in your legs  · Your wound is red, swollen, or draining pus  · You have nausea or are vomiting  · Your face or neck is red and you feel warm  · You have more pain than you had before the procedure  · You have swelling in your hands or feet  · You have questions or concerns about your condition or care  Care for your wound as directed: You may remove the bandage before you go to bed the day of your procedure  You may take a shower, but do not take a bath for at least 24 hours  Self-care:   · Do not drive,  use machines, or do strenuous activity for 24 hours after your procedure or as directed  · Continue other treatments  as directed  Steroid injections alone will not control your pain  The injections are meant to be used with other treatments, such as physical therapy      Follow up with your doctor as directed:  Write down your questions so you remember to ask them during your visits  © Copyright [employer] 2021 Information is for End User's use only and may not be sold, redistributed or otherwise used for commercial purposes  All illustrations and images included in CareNotes® are the copyrighted property of [employer]  or [employer]  The above information is an  only  It is not intended as medical advice for individual conditions or treatments  Talk to your doctor, nurse or pharmacist before following any medical regimen to see if it is safe and effective for you

## 2021-07-20 NOTE — OP NOTE
OPERATIVE REPORT  PATIENT NAME: Sherman Lam    :  1960  MRN: [id number]  Pt Location:  GI ROOM 01    SURGERY DATE: 2021    Surgeon(s) and Role:      Ata Buckley MD - Primary    Preop Diagnosis:  Lumbar radiculopathy [M54 16]    Post-Op Diagnosis Codes:     * Lumbar radiculopathy [M54 16]    Procedure(s) (LRB):  L5 and S1 TRANSFORAMINAL EPIDURAL STEROID INJECTION (Right)    Specimen(s):  * No specimens in log *    Estimated Blood Loss:   Minimal    Drains:  * No LDAs found *    Anesthesia Type:   Local    Operative Indications:  Lumbar radiculopathy [M54 16]    Operative Findings:  Right L5 and S1 nerve roots identified under fluoroscopic guidance with contrast    Complications:   None    Procedure and Technique:  Please see detailed procedure note     I was present for the entire procedure    Patient Disposition:  PACU     SIGNATURE: Radha Hameed MD  DATE: 2021  TIME: 9:45 AM

## 2021-07-26 ENCOUNTER — HOSPITAL ENCOUNTER (OUTPATIENT)
Dept: MRI IMAGING | Facility: HOSPITAL | Age: 61
Discharge: HOME | End: 2021-07-26
Attending: ANESTHESIOLOGY
Payer: COMMERCIAL

## 2021-07-26 ENCOUNTER — APPOINTMENT (OUTPATIENT)
Dept: MRI IMAGING | Facility: HOSPITAL | Age: 61
End: 2021-07-26
Attending: ANESTHESIOLOGY
Payer: COMMERCIAL

## 2021-07-26 DIAGNOSIS — M54.16 LUMBAR RADICULOPATHY: ICD-10-CM

## 2021-07-26 PROCEDURE — 72148 MRI LUMBAR SPINE W/O DYE: CPT

## 2021-07-26 PROCEDURE — G1004 CDSM NDSC: HCPCS

## 2021-07-27 ENCOUNTER — TELEPHONE (OUTPATIENT)
Dept: PAIN MEDICINE | Facility: CLINIC | Age: 61
End: 2021-07-27

## 2021-07-30 ENCOUNTER — HOSPITAL ENCOUNTER (EMERGENCY)
Facility: HOSPITAL | Age: 61
Discharge: HOME | End: 2021-07-30
Attending: EMERGENCY MEDICINE | Admitting: EMERGENCY MEDICINE
Payer: COMMERCIAL

## 2021-07-30 ENCOUNTER — APPOINTMENT (EMERGENCY)
Dept: CT IMAGING | Facility: HOSPITAL | Age: 61
End: 2021-07-30
Payer: COMMERCIAL

## 2021-07-30 VITALS
HEART RATE: 67 BPM | WEIGHT: 315 LBS | RESPIRATION RATE: 21 BRPM | BODY MASS INDEX: 39.15 KG/M2 | DIASTOLIC BLOOD PRESSURE: 64 MMHG | SYSTOLIC BLOOD PRESSURE: 131 MMHG | OXYGEN SATURATION: 96 % | TEMPERATURE: 97.5 F

## 2021-07-30 DIAGNOSIS — R10.30 LOWER ABDOMINAL PAIN: Primary | ICD-10-CM

## 2021-07-30 LAB
ALBUMIN SERPL BCP-MCNC: 3.8 G/DL (ref 3.5–5)
ALP SERPL-CCNC: 142 U/L (ref 46–116)
ALT SERPL W P-5'-P-CCNC: 41 U/L (ref 12–78)
ANION GAP SERPL CALCULATED.3IONS-SCNC: 10 MMOL/L (ref 4–13)
AST SERPL W P-5'-P-CCNC: 15 U/L (ref 5–45)
ATRIAL RATE: 64 BPM
BACTERIA UR QL AUTO: NORMAL /HPF
BASOPHILS # BLD AUTO: 0.05 THOUSANDS/ΜL (ref 0–0.1)
BASOPHILS NFR BLD AUTO: 0 % (ref 0–1)
BILIRUB SERPL-MCNC: 0.72 MG/DL (ref 0.2–1)
BILIRUB UR QL STRIP: NEGATIVE
BUN SERPL-MCNC: 18 MG/DL (ref 5–25)
CALCIUM SERPL-MCNC: 8.9 MG/DL (ref 8.3–10.1)
CHLORIDE SERPL-SCNC: 106 MMOL/L (ref 100–108)
CLARITY UR: CLEAR
CO2 SERPL-SCNC: 22 MMOL/L (ref 21–32)
COLOR UR: YELLOW
CREAT SERPL-MCNC: 1.08 MG/DL (ref 0.6–1.3)
EOSINOPHIL # BLD AUTO: 0.07 THOUSAND/ΜL (ref 0–0.61)
EOSINOPHIL NFR BLD AUTO: 1 % (ref 0–6)
ERYTHROCYTE [DISTWIDTH] IN BLOOD BY AUTOMATED COUNT: 15.3 % (ref 11.6–15.1)
GFR SERPL CREATININE-BSD FRML MDRD: 75 ML/MIN/1.73SQ M
GLUCOSE SERPL-MCNC: 104 MG/DL (ref 65–140)
GLUCOSE UR STRIP-MCNC: ABNORMAL MG/DL
HCT VFR BLD AUTO: 45.4 % (ref 36.5–49.3)
HGB BLD-MCNC: 15.2 G/DL (ref 12–17)
HGB UR QL STRIP.AUTO: NEGATIVE
IMM GRANULOCYTES # BLD AUTO: 0.12 THOUSAND/UL (ref 0–0.2)
IMM GRANULOCYTES NFR BLD AUTO: 1 % (ref 0–2)
KETONES UR STRIP-MCNC: NEGATIVE MG/DL
LACTATE SERPL-SCNC: 1.2 MMOL/L (ref 0.5–2)
LEUKOCYTE ESTERASE UR QL STRIP: ABNORMAL
LIPASE SERPL-CCNC: 171 U/L (ref 73–393)
LYMPHOCYTES # BLD AUTO: 2.06 THOUSANDS/ΜL (ref 0.6–4.47)
LYMPHOCYTES NFR BLD AUTO: 18 % (ref 14–44)
MCH RBC QN AUTO: 29.9 PG (ref 26.8–34.3)
MCHC RBC AUTO-ENTMCNC: 33.5 G/DL (ref 31.4–37.4)
MCV RBC AUTO: 89 FL (ref 82–98)
MONOCYTES # BLD AUTO: 0.87 THOUSAND/ΜL (ref 0.17–1.22)
MONOCYTES NFR BLD AUTO: 8 % (ref 4–12)
NEUTROPHILS # BLD AUTO: 8.2 THOUSANDS/ΜL (ref 1.85–7.62)
NEUTS SEG NFR BLD AUTO: 72 % (ref 43–75)
NITRITE UR QL STRIP: NEGATIVE
NON-SQ EPI CELLS URNS QL MICRO: NORMAL /HPF
NRBC BLD AUTO-RTO: 0 /100 WBCS
P AXIS: 36 DEGREES
PH UR STRIP.AUTO: 5.5 [PH]
PLATELET # BLD AUTO: 173 THOUSANDS/UL (ref 149–390)
PMV BLD AUTO: 10.2 FL (ref 8.9–12.7)
POTASSIUM SERPL-SCNC: 4.1 MMOL/L (ref 3.5–5.3)
PR INTERVAL: 164 MS
PROT SERPL-MCNC: 7.9 G/DL (ref 6.4–8.2)
PROT UR STRIP-MCNC: NEGATIVE MG/DL
QRS AXIS: -59 DEGREES
QRSD INTERVAL: 122 MS
QT INTERVAL: 418 MS
QTC INTERVAL: 431 MS
RBC # BLD AUTO: 5.09 MILLION/UL (ref 3.88–5.62)
RBC #/AREA URNS AUTO: NORMAL /HPF
SODIUM SERPL-SCNC: 138 MMOL/L (ref 136–145)
SP GR UR STRIP.AUTO: 1.02 (ref 1–1.03)
T WAVE AXIS: 36 DEGREES
TROPONIN I SERPL-MCNC: <0.02 NG/ML
UROBILINOGEN UR QL STRIP.AUTO: 0.2 E.U./DL
VENTRICULAR RATE: 64 BPM
WBC # BLD AUTO: 11.37 THOUSAND/UL (ref 4.31–10.16)
WBC #/AREA URNS AUTO: NORMAL /HPF

## 2021-07-30 PROCEDURE — 81001 URINALYSIS AUTO W/SCOPE: CPT | Performed by: EMERGENCY MEDICINE

## 2021-07-30 PROCEDURE — 85025 COMPLETE CBC W/AUTO DIFF WBC: CPT | Performed by: EMERGENCY MEDICINE

## 2021-07-30 PROCEDURE — 84484 ASSAY OF TROPONIN QUANT: CPT | Performed by: EMERGENCY MEDICINE

## 2021-07-30 PROCEDURE — 74177 CT ABD & PELVIS W/CONTRAST: CPT

## 2021-07-30 PROCEDURE — 99284 EMERGENCY DEPT VISIT MOD MDM: CPT

## 2021-07-30 PROCEDURE — 96360 HYDRATION IV INFUSION INIT: CPT

## 2021-07-30 PROCEDURE — 93005 ELECTROCARDIOGRAM TRACING: CPT

## 2021-07-30 PROCEDURE — 83605 ASSAY OF LACTIC ACID: CPT | Performed by: EMERGENCY MEDICINE

## 2021-07-30 PROCEDURE — 36415 COLL VENOUS BLD VENIPUNCTURE: CPT | Performed by: EMERGENCY MEDICINE

## 2021-07-30 PROCEDURE — 99285 EMERGENCY DEPT VISIT HI MDM: CPT | Performed by: EMERGENCY MEDICINE

## 2021-07-30 PROCEDURE — 83690 ASSAY OF LIPASE: CPT | Performed by: EMERGENCY MEDICINE

## 2021-07-30 PROCEDURE — 80053 COMPREHEN METABOLIC PANEL: CPT | Performed by: EMERGENCY MEDICINE

## 2021-07-30 RX ORDER — DICYCLOMINE HCL 20 MG
20 TABLET ORAL ONCE
Status: COMPLETED | OUTPATIENT
Start: 2021-07-30 | End: 2021-07-30

## 2021-07-30 RX ORDER — DICYCLOMINE HCL 20 MG
20 TABLET ORAL 2 TIMES DAILY
Qty: 20 TABLET | Refills: 0 | Status: SHIPPED | OUTPATIENT
Start: 2021-07-30 | End: 2021-10-05

## 2021-07-30 RX ADMIN — IOHEXOL 100 ML: 350 INJECTION, SOLUTION INTRAVENOUS at 10:38

## 2021-07-30 RX ADMIN — DICYCLOMINE HYDROCHLORIDE 20 MG: 20 TABLET ORAL at 10:08

## 2021-07-30 RX ADMIN — SODIUM CHLORIDE 1000 ML: 0.9 INJECTION, SOLUTION INTRAVENOUS at 10:06

## 2021-07-30 NOTE — ED PROVIDER NOTES
History  Chief Complaint   Patient presents with    Abdominal Pain     pt states abdominal cramping with loose yellow stools  pt states recently had change in zoloft dosage and worried about side effects  Zoloft was increased approximately 9 days ago  Right after that he had 3 loose stools  For the past 6 days he has been having loose stools  It is formed but not diarrhea  But later in color  Now yellow  Complains of lower abdominal pain  Has a slight cough  No fevers or chills  Appetite has been normal   No blood in the stools  Is lactose intolerant and has had some dairy recently  History provided by:  Patient   used: No    Abdominal Pain  Pain location: Lower abdominal and right upper quadrant  Pain quality: aching and cramping    Pain radiates to:  Does not radiate  Pain severity:  Mild  Onset quality:  Gradual  Duration:  2 days  Timing:  Intermittent  Progression:  Waxing and waning  Chronicity:  New  Context: not awakening from sleep, not previous surgeries, not recent illness, not sick contacts and not suspicious food intake    Relieved by:  Nothing  Worsened by:  Nothing  Ineffective treatments:  None tried  Associated symptoms: cough    Associated symptoms: no anorexia, no belching, no chest pain, no chills, no constipation, no diarrhea, no dysuria, no fatigue, no fever, no hematuria, no nausea, no shortness of breath, no sore throat and no vomiting        Prior to Admission Medications   Prescriptions Last Dose Informant Patient Reported? Taking?    ERGOCALCIFEROL PO   Yes Yes   Sig: Take 50,000 Units by mouth 2 (two) times a week    Empagliflozin (Jardiance) 25 MG TABS   Yes Yes   Sig: Take 25 mg by mouth daily   LORazepam (ATIVAN) 0 5 mg tablet   Yes Yes   Sig: Take 0 5 mg by mouth daily as needed   LORazepam (ATIVAN) 1 mg tablet   Yes Yes   Sig: Take 1 mg by mouth every 6 (six) hours as needed    Semaglutide,0 25 or 0 5MG/DOS, (Ozempic, 0 25 or 0 5 MG/DOSE,) 2 MG/1 5ML SOPN   Yes Yes   Sig: Inject 0 5 mg under the skin once a week    acetaminophen (TYLENOL) 500 mg tablet   Yes Yes   Sig: Take 1,000 mg by mouth every 6 (six) hours as needed for mild pain   amoxicillin (AMOXIL) 500 mg capsule   Yes Yes   Sig: TAKE 4 CAPSULES BY MOUTH 1 HOUR PRIOR TO DENTAL PROCEDURES   atorvastatin (LIPITOR) 20 mg tablet   Yes Yes   Sig: Take 20 mg by mouth daily   celecoxib (CeleBREX) 200 mg capsule   Yes Yes   Sig: Take 200 mg by mouth daily   cephalexin (KEFLEX) 500 mg capsule   Yes Yes   Sig: Take 500 mg by mouth 3 (three) times a day   famotidine (PEPCID) 20 mg tablet   Yes Yes   Sig: Take by mouth   mupirocin (BACTROBAN) 2 % ointment   Yes Yes   sertraline (ZOLOFT) 50 mg tablet   Yes Yes   Sig: Take 100 mg by mouth    traZODone (DESYREL) 100 mg tablet   Yes Yes   Si mg    valsartan (DIOVAN) 80 mg tablet   Yes Yes   Sig: Take 80 mg by mouth daily       Facility-Administered Medications: None       Past Medical History:   Diagnosis Date    Anxiety     Arthritis     Chronic pain disorder     mid back region    Colon polyp     CPAP (continuous positive airway pressure) dependence     Pt uses nightly    Diabetes mellitus ([address] )     Diverticulosis     History of recent hospitalization 2021    Pt was admitted to [facility] after syncopal episode  Pt saw cardiology- all tests negative  Pt had nl EEG  pt states is was a medication interaction   Hyperlipidemia     Hypertension     Obesity     Sleep apnea     Spinal stenosis        Past Surgical History:   Procedure Laterality Date    ACHILLES TENDON REPAIR      Pt had a rupture in right and left 2 years apart    COLONOSCOPY      EPIDURAL BLOCK INJECTION      Pt had in lumbar region      EPIDURAL BLOCK INJECTION N/A 2021    Procedure: T-11-T-12 INTERLAMINAR THORACIC EPIDURAL STEROID INJECTION;  Surgeon: Jesi Bello MD;  Location: [facility];  Service: Pain Management     EPIDURAL BLOCK INJECTION Right 2021 Procedure: L5 and S1 TRANSFORAMINAL EPIDURAL STEROID INJECTION;  Surgeon: Van Barnett MD;  Location: [facility];  Service: Pain Management     FOOT SURGERY Left     Left great toe- had a hammertoe   JOINT REPLACEMENT Left     Total hip    JOINT REPLACEMENT Right     Total hip   KNEE ARTHROSCOPY Bilateral     TOTAL KNEE ARTHROPLASTY Left        Family History   Problem Relation Age of Onset    Arthritis Mother     Hypertension Father      I have reviewed and agree with the history as documented  E-Cigarette/Vaping    E-Cigarette Use Never User      E-Cigarette/Vaping Substances     Social History     Tobacco Use    Smoking status: Never Smoker    Smokeless tobacco: Former User     Types: Snuff   Vaping Use    Vaping Use: Never used   Substance Use Topics    Alcohol use: Yes    Drug use: No       Review of Systems   Constitutional: Negative for chills, fatigue and fever  HENT: Negative for ear pain, hearing loss, sore throat, trouble swallowing and voice change  Eyes: Negative for pain and discharge  Respiratory: Positive for cough  Negative for shortness of breath and wheezing  Cardiovascular: Negative for chest pain and palpitations  Gastrointestinal: Positive for abdominal pain  Negative for anorexia, blood in stool, constipation, diarrhea, nausea and vomiting  Genitourinary: Negative for dysuria, flank pain, frequency and hematuria  Musculoskeletal: Negative for joint swelling, neck pain and neck stiffness  Skin: Negative for rash and wound  Neurological: Negative for dizziness, seizures, syncope, facial asymmetry and headaches  Psychiatric/Behavioral: Negative for hallucinations, self-injury and suicidal ideas  All other systems reviewed and are negative  Physical Exam  Physical Exam  Vitals and nursing note reviewed  Constitutional:       General: He is not in acute distress  Appearance: He is well-developed     HENT:      Head: Normocephalic and atraumatic  Right Ear: External ear normal       Left Ear: External ear normal    Eyes:      General: No scleral icterus  Right eye: No discharge  Left eye: No discharge  Extraocular Movements: Extraocular movements intact  Conjunctiva/sclera: Conjunctivae normal    Cardiovascular:      Rate and Rhythm: Normal rate and regular rhythm  Heart sounds: Normal heart sounds  No murmur heard  Pulmonary:      Effort: Pulmonary effort is normal       Breath sounds: Normal breath sounds  No wheezing or rales  Abdominal:      General: Bowel sounds are normal  There is no distension  Palpations: Abdomen is soft  Tenderness: There is no abdominal tenderness  There is no guarding or rebound  Musculoskeletal:         General: No deformity  Normal range of motion  Cervical back: Normal range of motion and neck supple  Skin:     General: Skin is warm and dry  Findings: No rash  Neurological:      General: No focal deficit present  Mental Status: He is alert and oriented to person, place, and time  Cranial Nerves: No cranial nerve deficit  Psychiatric:         Mood and Affect: Mood normal          Behavior: Behavior normal          Thought Content:  Thought content normal          Judgment: Judgment normal          Vital Signs  ED Triage Vitals [07/30/21 0952]   Temperature Pulse Respirations Blood Pressure SpO2   97 5 °F (36 4 °C) 71 20 165/81 98 %      Temp Source Heart Rate Source Patient Position - Orthostatic VS BP Location FiO2 (%)   Temporal Monitor Lying Right arm --      Pain Score       3           Vitals:    07/30/21 0952 07/30/21 1000 07/30/21 1030 07/30/21 1100   BP: 165/81 134/67 132/61 131/64   Pulse: 71 64 76 67   Patient Position - Orthostatic VS: Lying            Visual Acuity      ED Medications  Medications   sodium chloride 0 9 % bolus 1,000 mL (0 mL Intravenous Stopped 7/30/21 1106)   dicyclomine (BENTYL) tablet 20 mg (20 mg Oral Given 7/30/21 1008)   iohexol (OMNIPAQUE) 350 MG/ML injection (SINGLE-DOSE) 100 mL (100 mL Intravenous Given 7/30/21 1038)       Diagnostic Studies  Results Reviewed     Procedure Component Value Units Date/Time    Urine Microscopic [[id number]]  (Normal) Collected: 07/30/21 1022    Lab Status: Final result Specimen: Urine, Clean Catch Updated: 07/30/21 1041     RBC, UA 0-1 /hpf      WBC, UA 0-1 /hpf      Epithelial Cells Occasional /hpf      Bacteria, UA None Seen /hpf     Troponin I [[id number]]  (Normal) Collected: 07/30/21 0959    Lab Status: Final result Specimen: Blood from Arm, Left Updated: 07/30/21 1038     Troponin I <0 02 ng/mL     Lactic acid [[id number]]  (Normal) Collected: 07/30/21 0959    Lab Status: Final result Specimen: Blood from Arm, Left Updated: 07/30/21 1032     LACTIC ACID 1 2 mmol/L     Narrative:      Result may be elevated if tourniquet was used during collection  UA w Reflex to Microscopic w Reflex to Culture [[id number]]  (Abnormal) Collected: 07/30/21 1022    Lab Status: Final result Specimen: Urine, Clean Catch Updated: 07/30/21 1027     Color, UA Yellow     Clarity, UA Clear     Specific Gravity, UA 1 025     pH, UA 5 5     Leukocytes, UA Elevated glucose may cause decreased leukocyte values   See urine microscopic for [facility] result/     Nitrite, UA Negative     Protein, UA Negative mg/dl      Glucose, UA >=1000 (1%) mg/dl      Ketones, UA Negative mg/dl      Urobilinogen, UA 0 2 E U /dl      Bilirubin, UA Negative     Blood, UA Negative    Lipase [[id number]]  (Normal) Collected: 07/30/21 0959    Lab Status: Final result Specimen: Blood from Arm, Left Updated: 07/30/21 1026     Lipase 171 u/L     Comprehensive metabolic panel [[id number]]  (Abnormal) Collected: 07/30/21 0959    Lab Status: Final result Specimen: Blood from Arm, Left Updated: 07/30/21 1026     Sodium 138 mmol/L      Potassium 4 1 mmol/L      Chloride 106 mmol/L      CO2 22 mmol/L      ANION GAP 10 mmol/L      BUN 18 mg/dL Creatinine 1 08 mg/dL      Glucose 104 mg/dL      Calcium 8 9 mg/dL      AST 15 U/L      ALT 41 U/L      Alkaline Phosphatase 142 U/L      Total Protein 7 9 g/dL      Albumin 3 8 g/dL      Total Bilirubin 0 72 mg/dL      eGFR 75 ml/min/1 73sq m     Narrative:      National Kid[employer] Disease Foundation guidelines for Chronic Kidney Disease (CKD):     Stage 1 with normal or high GFR (GFR > 90 mL/min/1 73 square meters)    Stage 2 Mild CKD (GFR = 60-89 mL/min/1 73 square meters)    Stage 3A Moderate CKD (GFR = 45-59 mL/min/1 73 square meters)    Stage 3B Moderate CKD (GFR = 30-44 mL/min/1 73 square meters)    Stage 4 Severe CKD (GFR = 15-29 mL/min/1 73 square meters)    Stage 5 End Stage CKD (GFR <15 mL/min/1 73 square meters)  Note: GFR calculation is accurate only with a steady state creatinine    CBC and differential [[id number]]  (Abnormal) Collected: 07/30/21 0959    Lab Status: Final result Specimen: Blood from Arm, Left Updated: 07/30/21 1005     WBC 11 37 Thousand/uL      RBC 5 09 Million/uL      Hemoglobin 15 2 g/dL      Hematocrit 45 4 %      MCV 89 fL      MCH 29 9 pg      MCHC 33 5 g/dL      RDW 15 3 %      MPV 10 2 fL      Platelets 776 Thousands/uL      nRBC 0 /100 WBCs      Neutrophils Relative 72 %      Immat GRANS % 1 %      Lymphocytes Relative 18 %      Monocytes Relative 8 %      Eosinophils Relative 1 %      Basophils Relative 0 %      Neutrophils Absolute 8 20 Thousands/µL      Immature Grans Absolute 0 12 Thousand/uL      Lymphocytes Absolute 2 06 Thousands/µL      Monocytes Absolute 0 87 Thousand/µL      Eosinophils Absolute 0 07 Thousand/µL      Basophils Absolute 0 05 Thousands/µL                  CT abdomen pelvis with contrast   Final Result by Libra Mirza MD (07/30 1055)      No acute findings in the abdomen or pelvis              Workstation performed: [id number]                    Procedures  ECG 12 Lead Documentation Only    Date/Time: 7/30/2021 9:56 AM  Performed by: Liu Collins Donell Ace MD  Authorized by: Altagracia Mcknight MD     ECG reviewed by me, the ED Provider: yes    Patient location:  ED  Previous ECG:     Previous ECG:  Unavailable  Rate:     ECG rate:  70  Rhythm:     Rhythm: sinus rhythm    Ectopy:     Ectopy: none    QRS:     QRS axis:  Normal             ED Course                             SBIRT 20yo+      Most Recent Value   SBIRT (22 yo +)   In order to provide better care to our patients, we are screening all of our patients for alcohol and drug use  Would it be okay to ask you these screening questions? No Filed at: 07/30/2021 1016                    [facility]  Number of Diagnoses or Management Options  Lower abdominal pain  Diagnosis management comments: Lab work is unremarkable  CT scan is unremarkable  Symptoms could be secondary to the Zoloft increased or could be viral in origin  Patient got relief with Bentyl  Will prescribe Bentyl as an outpatient  Patient is to follow-up with his family doctor  Amount and/or Complexity of Data Reviewed  Clinical lab tests: reviewed  Review and summarize past medical records: yes        Disposition  Final diagnoses:   Lower abdominal pain     Time reflects when diagnosis was documented in both MDM as applicable and the Disposition within this note     Time User Action Codes Description Comment    7/30/2021 11:01 AM Freeda Brain Add [K52 9] Colitis     7/30/2021 11:02 AM Freeda Brain Add [E86 0] Dehydration     7/30/2021 11:06 AM Freeda Brain Modify [E86 0] Dehydration     7/30/2021 11:06 AM Visperas, Evelia Essex Remove [K52 9] Colitis     7/30/2021 11:06 AM Visperas, Evelia Essex Remove [E86 0] Dehydration     7/30/2021 11:06 AM Visperas, Evelia Essex Add [R10 30] Lower abdominal pain       ED Disposition     ED Disposition Condition Date/Time Comment    Discharge Stable Fri Jul 30, 2021 11:01 AM Sherman Done discharge to home/self care              Follow-up Information     Follow up With Specialties Details Why Contact 100 Javon Ramirez MD Family Medicine   [address]  [address] [facility] [id number]  [phone]            Patient's Medications   Discharge Prescriptions    DICYCLOMINE (BENTYL) 20 MG TABLET    Take 1 tablet (20 mg total) by mouth 2 (two) times a day       Start Date: 7/30/2021 End Date: --       Order Dose: 20 mg       Quantity: 20 tablet    Refills: 0     No discharge procedures on file      PDMP Review     None          ED Provider  Electronically Signed by           Tali Ramirez MD  07/30/21 Psychiatric Marino Rivera MD  07/30/21 2833

## 2021-08-06 ENCOUNTER — OFFICE VISIT (OUTPATIENT)
Dept: PAIN MEDICINE | Facility: CLINIC | Age: 61
End: 2021-08-06
Payer: COMMERCIAL

## 2021-08-06 VITALS
HEART RATE: 75 BPM | BODY MASS INDEX: 38.36 KG/M2 | TEMPERATURE: 98 F | SYSTOLIC BLOOD PRESSURE: 120 MMHG | DIASTOLIC BLOOD PRESSURE: 80 MMHG | WEIGHT: 315 LBS | HEIGHT: 76 IN

## 2021-08-06 DIAGNOSIS — E11.42 DIABETIC PERIPHERAL NEUROPATHY (HCC): ICD-10-CM

## 2021-08-06 DIAGNOSIS — M54.16 LUMBAR RADICULOPATHY: ICD-10-CM

## 2021-08-06 DIAGNOSIS — Z96.89 SPINAL CORD STIMULATOR STATUS: Primary | ICD-10-CM

## 2021-08-06 DIAGNOSIS — M51.24 HERNIATION OF INTERVERTEBRAL DISC OF THORACIC REGION: ICD-10-CM

## 2021-08-06 DIAGNOSIS — F32.A MILD DEPRESSION: ICD-10-CM

## 2021-08-06 PROBLEM — G95.20 CERVICAL SPINAL CORD COMPRESSION (HCC): Status: ACTIVE | Noted: 2021-08-06

## 2021-08-06 PROCEDURE — 99214 OFFICE O/P EST MOD 30 MIN: CPT | Performed by: ANESTHESIOLOGY

## 2021-08-06 NOTE — PROGRESS NOTES
Assessment  1  Spinal cord stimulator status  -     Ambulatory referral to Psychology; Future    2  Mild depression ([address] )    3  Herniation of intervertebral disc of thoracic region    4  Lumbar radiculopathy    5  Diabetic peripheral neuropathy (HCC)      Right-sided lumbar radicular pain in L5 and S1 dermatomal distribution  80% relief after recent right L5 and S1 transforaminal epidural steroid injection  Moderate posterolateral disc herniation at L5-S1 in addition to spondyloarthropathy contributing to moderate transforaminal stenosis here  Otherwise recent MRI shows no significant central canal stenosis or transforaminal nerve root compression  The patient had right knee total arthroplasty which has considerably help with his right sided knee pain; however lumbar radiculopathy in addition to debilitating diabetic peripheral neuropathy  contributes to significant difficulties with daily function and overall quality of life  Risks, benefits and alternatives to spinal cord stimulator trial thoroughly discussed with patient  Handouts provided and all questions answered patient's satisfaction  MRI previously ordered which shows mild central canal stenosis at T10-T11 in addition to moderate facet arthropathy this level  Otherwise MRI thoracic spine within normal limits  pain consistent with thoracic radicular symptoms secondary to T10-T11 disc herniation which from 2018 MRI thoracic spine does not contribute to significant cord compression  Radiating and radicular pain in T10 and T11 dermatomal distribution  Pain is along lower thoracic T10 and T11 dermatomes, particularly with palpation of lower thoracic ribs and posterolateral distribution  Interestingly his significant and advanced lumbar facet arthropathy which is not a  primary source of his pain at this time  He previously had epidural steroid injections in the past with Dr Elisa York with noted relief  For radicular pain  Reasonable to reobtain imaging, and pursue multimodal pain therapy plan as noted below  Plan  - [facility]  - ambulatory referral to Psychology for SCS trial candidacy  -oswestry disability index, promis inventory is as well as informed consent for spinal cord stimulator trial completed today   -Lyrica 75 mg t i d   Discontinued secondary to side effects  Prescribed Topamax 50 mg b i d ; counseled regarding sedative effects of taking this medication and provided up titration calendar  Counseled not to take medication while driving or operating heavy machinery/using stairs  -continue other analgesics as prescribed previously well provider's  Counseled extensively as noted below regarding the risks of opioid medications in combination with Lyrica  -physical therapy for  Lumbar facet arthropathy, lumbar radiculopathy    There are risks associated with opioid medications, including dependence, addiction and tolerance  The patient understands and agrees to use these medications only as prescribed  Potential side effects of the medications include, but are not limited to, constipation, drowsiness, addiction, impaired judgment and risk of fatal overdose if not taken as prescribed  The patient was warned against driving while taking sedation medications  Sharing medications is a felony  At this point in time, the patient is showing no signs of addiction, abuse, diversion or suicidal ideation  A urine drug screen was collected at today's office visit as part of our medication management protocol  The point of care testing results were appropriate for what was being prescribed  The specimen will be sent for confirmatory testing  The drug screen is medically necessary because the patient is either dependent on opioid medication or is being considered for opioid medication therapy and the results could impact ongoing or future treatment   The drug screen is to evaluate for the presences or absence of prescribed, non-prescribed, and/or illicit drugs/substances  [place] Prescription Drug Monitoring Program report was reviewed and was appropriate     Complete risks and benefits including bleeding, infection, tissue reaction, nerve injury and allergic reaction were discussed  The approach was demonstrated using models and literature was provided  Verbal and written consent was obtained  My impressions and treatment recommendations were discussed in detail with the patient who verbalized understanding and had no further questions  Discharge instructions were provided  I personally saw and examined the patient and I agree with the above discussed plan of care  No orders of the defined types were placed in this encounter  History of Present Illness    Right-sided lumbar radicular pain in L5 and S1 dermatomal distribution  80% relief after recent right L5 and S1 transforaminal epidural steroid injection  Moderate posterolateral disc herniation at L5-S1 in addition to spondyloarthropathy contributing to moderate transforaminal stenosis here  Otherwise recent MRI shows no significant central canal stenosis or transforaminal nerve root compression  The patient had right knee total arthroplasty which has considerably help with his right sided knee pain; however lumbar radiculopathy in addition to debilitating diabetic peripheral neuropathy  contributes to significant difficulties with daily function and overall quality of life  Leighann Marshall is a 62 y o  male with a past medical history of  Non-insulin controlled type 2 diabetes, obesity, chronic low back pain described primarily as arthritic in nature  He describes 8/10 low back pain that is worse in the mornings and worse at the end of the day  The pain is characterized by achy, nagging, indolent, crampy, stabbing pain in his axial low back    The patient describes that the pain is worse with standing for long periods of time on hard surfaces as well as with walking  The patient is a very active individual and feels as though this pain compromises his participation with independent activities of daily living  He ambulates with a walker  The pain can be debilitating at times and contribute to significant disability, compromising overall activity and independent activities of daily living  He has tried physical therapy with limited relief of symptoms  Medications  he is currently on include   Celebrex 200 mg per day, Percocet 5-325 mg Q 6 hours p r n  pain  He takes 200 mg of gabapentin per day and was recently started on Robaxin 500 mg t i d  for back spasms  He additionally has pain radiating across his ribs bilaterally in the T10 and T11 dermatomal distribution  He demonstrates good strength and denies any weakness numbness or paresthesias  The patient denies any bowel or bladder dysfunction as well  I have personally reviewed and/or updated the patient's past medical history, past surgical history, family history, social history, current medications, allergies, and vital signs today  Review of Systems   Constitutional: Positive for activity change  HENT: Negative  Eyes: Negative  Respiratory: Negative  Cardiovascular: Negative  Gastrointestinal: Negative  Endocrine: Negative  Genitourinary: Negative  Musculoskeletal: Positive for arthralgias, back pain, gait problem and myalgias  Skin: Negative  Allergic/Immunologic: Negative  Neurological: Positive for numbness  Negative for weakness  Hematological: Negative  Psychiatric/Behavioral: Negative  All other systems reviewed and are negative        Patient Active Problem List   Diagnosis    Herniation of intervertebral disc of thoracic region    Diabetic peripheral neuropathy (HCC)    Morbid obesity ([address] )    Cervical spinal cord compression (HCC)    Mild depression ([facility] )       Past Medical History:   Diagnosis Date    Anxiety     Arthritis     Chronic pain disorder     mid back region    Colon polyp     CPAP (continuous positive airway pressure) dependence     Pt uses nightly    Diabetes mellitus ([address] )     Diverticulosis     History of recent hospitalization 06/16/2021    Pt was [facility] after syncopal episode  Pt saw cardiology- all tests negative  Pt had nl EEG  pt states is was a medication interaction   Hyperlipidemia     Hypertension     Obesity     Sleep apnea     Spinal stenosis        Past Surgical History:   Procedure Laterality Date    ACHILLES TENDON REPAIR      Pt had a rupture in right and left 2 years apart    COLONOSCOPY      EPIDURAL BLOCK INJECTION      Pt had in lumbar region   EPIDURAL BLOCK INJECTION N/A 4/6/2021    Procedure: T-11-T-12 INTERLAMINAR THORACIC EPIDURAL STEROID INJECTION;  Surgeon: Victorina Blue MD;  [facility]: [facility];  Service: Pain Management     EPIDURAL BLOCK INJECTION Right 7/20/2021    Procedure: L5 and S1 TRANSFORAMINAL EPIDURAL STEROID INJECTION;  Surgeon: Victorina Blue MD;  [facility]: [facility];  Service: Pain Management     FOOT SURGERY Left     Left great toe- had a hammertoe   JOINT REPLACEMENT Left     Total hip    JOINT REPLACEMENT Right     Total hip   KNEE ARTHROSCOPY Bilateral     TOTAL KNEE ARTHROPLASTY Left        Family History   Problem Relation Age of Onset    Arthritis Mother     Hypertension Father        Social History     Occupational History    Not on file   Tobacco Use    Smoking status: Never Smoker    Smokeless tobacco: Former User     Types: Snuff   Vaping Use    Vaping Use: Never used   Substance and Sexual Activity    Alcohol use:  Yes    Drug use: No    Sexual activity: Not on file     Comment: did not ask        Current Outpatient Medications on File Prior to Visit   Medication Sig    acetaminophen (TYLENOL) 500 mg tablet Take 1,000 mg by mouth every 6 (six) hours as needed for mild pain    amoxicillin (AMOXIL) 500 mg capsule TAKE 4 CAPSULES BY MOUTH 1 HOUR PRIOR TO DENTAL PROCEDURES    atorvastatin (LIPITOR) 20 mg tablet Take 20 mg by mouth daily    celecoxib (CeleBREX) 200 mg capsule Take 200 mg by mouth daily    cephalexin (KEFLEX) 500 mg capsule Take 500 mg by mouth 3 (three) times a day    dicyclomine (BENTYL) 20 mg tablet Take 1 tablet (20 mg total) by mouth 2 (two) times a day    Empagliflozin (Jardiance) 25 MG TABS Take 25 mg by mouth daily    ERGOCALCIFEROL PO Take 50,000 Units by mouth 2 (two) times a week     famotidine (PEPCID) 20 mg tablet Take by mouth    LORazepam (ATIVAN) 0 5 mg tablet Take 0 5 mg by mouth daily as needed    LORazepam (ATIVAN) 1 mg tablet Take 1 mg by mouth every 6 (six) hours as needed     mupirocin (BACTROBAN) 2 % ointment     Semaglutide,0 25 or 0 5MG/DOS, (Ozempic, 0 25 or 0 5 MG/DOSE,) 2 MG/1 5ML SOPN Inject 0 5 mg under the skin once a week     sertraline (ZOLOFT) 50 mg tablet Take 100 mg by mouth     traZODone (DESYREL) 100 mg tablet 25 mg     valsartan (DIOVAN) 80 mg tablet Take 80 mg by mouth daily      No current facility-administered medications on file prior to visit  Allergies   Allergen Reactions    Paxil [Paroxetine] GI Intolerance         Physical Exam    /80   Pulse 75   Temp 98 °F (36 7 °C) (Temporal)   Ht 6' 4" (1 93 m)   Wt (!) 150 kg (330 lb)   BMI 40 17 kg/m²     Constitutional: normal, well developed, well nourished, alert, in no distress and non-toxic and no overt pain behavior  and obese  Eyes: anicteric  HEENT: grossly intact  Neck: supple, symmetric, trachea midline and no masses   Pulmonary:even and unlabored  Cardiovascular:No edema or pitting edema present  Skin:Normal without rashes or lesions and well hydrated  Psychiatric:Mood and affect appropriate  Neurologic:Cranial Nerves II-XII grossly intact Sensation grossly intact; no clonus negative diallo's  Reflexes 2+ and brisk  SLR  Weakly positive right-sided we  Musculoskeletal: hunched gait  Unable to perform normal heel toe and tip toe walking  Slight weakness with right lower extremity strong plantar flexion; 5/5 strength with active range of motion movements in all other extremities bilaterally  mild pain with lumbar facet loading bilaterally and with lateral spine rotation  mild ttp over lumbar paraspinal muscles  Negative jahaira's test, negative gaenslen's negative SIJ loading bilaterally  Tenderness to palpation over inferior rib borders of T10-T11 posterolateral ribs bilaterally,  Eliciting radicular pain in aformentioned dermatomal distributions  Imaging    Result Narrative   Exam - thoracic spine mri scan     History: Bilateral leg weakness  Technique: Using a surface coil sagittal and axial T1-weighted and T2-weighted  scans were obtained of the thoracic spine  Findings: Image detail is suboptimal secondary to patient's obesity  Thoracic spinal cord is normal in size and configuration and MRI signal  intensity  There are no intramedullary lesions  Vertebral bodies are in anatomic alignment  There are degenerative changes in endplates and facet joints throughout the  thoracic spine  There is central spinal stenosis at T10-T11  There is no marlyn spinal cord  compression however  Other Result Information   Interface, Rad Results In - 08/07/2018  7:34 PM EDT  Formatting of this note might be different from the original   Exam - thoracic spine mri scan     History: Bilateral leg weakness  Technique: Using a surface coil sagittal and axial T1-weighted and T2-weighted  scans were obtained of the thoracic spine  Findings: Image detail is suboptimal secondary to patient's obesity  Thoracic spinal cord is normal in size and configuration and MRI signal  intensity  There are no intramedullary lesions  Vertebral bodies are in anatomic alignment  There are degenerative changes in endplates and facet joints throughout the  thoracic spine      There is central spinal stenosis at T10-T11  There is no marlyn spinal cord  compression however  IMPRESSION:  Impression:  1  Technically suboptimal study  2  Marked spondylosis  3  At T10-T11 there is spinal stenosis however there is no marlyn spinal cord  compression  MRI LUMBAR SPINE WITHOUT CONTRAST     INDICATION: M54 16: Radiculopathy, lumbar region      COMPARISON:  5/6/2018; 3/16/2021     TECHNIQUE:  Sagittal T1, sagittal T2, sagittal inversion recovery, axial T1 and axial T2, coronal T2     IMAGE QUALITY:  Diagnostic     FINDINGS:     VERTEBRAL BODIES:  There are 5 lumbar type vertebral bodies  Mild to moderate levoscoliosis mid lumbar spine  Trace grade 1 anterolisthesis of L3 on L4  Minimal grade 1 retrolisthesis of L4 on L5  Scattered degenerative endplate changes  No focally   suspicious marrow lesions  No bone marrow edema or compression abnormality      SACRUM:  Scattered degenerative endplate changes  No focally suspicious marrow lesions  No bone marrow edema or compression abnormality      DISTAL CORD AND CONUS:  Normal size and signal within the distal cord and conus  The conus medullaris terminates at the L2 level      PARASPINAL SOFT TISSUES:  Fatty atrophy of the psoas muscles noted, similar to the prior CT possibly from disuse  Marked atrophy of the left iliac is muscle as well      LOWER THORACIC DISC SPACES:  T11-T12: There is a small central disc herniation, protrusion type  Minimal central canal narrowing  Neural foramina patent bilaterally      T12-L1: There is no focal disk herniation, central canal or neural foraminal narrowing      LUMBAR DISC SPACES:     L1-L2:  There is bilateral facet hypertrophy  There is a left neural foraminal disc protrusion  Mild left neural foraminal narrowing  Central canal patent  Minimal right neural foraminal narrowing      L2-L3:  There is bilateral facet hypertrophy  There is a right neural foraminal disc protrusion    Moderate to severe right neural foraminal narrowing  Mild central canal and left neural foraminal narrowing      L3-L4:  There is bilateral facet hypertrophy  There is a right neural foraminal disc protrusion  Moderate right neural foraminal narrowing  Mild central canal and left neural foraminal narrowing      L4-L5:  There is loss of disc height and signal   There is a disc osteophyte complex with a superimposed left neural foraminal disc protrusion  There is moderate to severe left neural foraminal narrowing  Mild central canal narrowing  Moderate right   neural foraminal narrowing     L5-S1:  There is bilateral facet hypertrophy  There is a left neural foraminal disc protrusion  Severe left neural foraminal narrowing  Mild central canal and right neural foraminal narrowing      IMPRESSION:        1  Advanced multilevel spondylosis    2   Moderate to severe fatty atrophy of the bilateral psoas muscles and iliac is muscles left greater than right, possibly from disuse or denervation atrophy

## 2021-08-06 NOTE — PATIENT INSTRUCTIONS
Spinal Cord Stimulator Placement   WHAT YOU NEED TO KNOW:   What do I need to know about spinal cord stimulator placement? A spinal cord stimulator (SCS) is a device used to control pain after other treatments have not worked  The SCS delivers a small amount of electrical current to your spinal cord to block pain  SCS placement surgery is done in 2 stages  In the first stage, a temporary SCS is placed and left in for about a week  In the second stage, a permanent SCS is placed if the temporary device reduced your pain  You will get a remote control to turn the pulse generator on and off and adjust the pulses  How do I prepare for surgery? · Your surgeon will tell you how to prepare  He or she may tell you not to eat or drink anything after midnight on the day of surgery  Arrange to have someone drive you home when you are discharged from the hospital     · Tell your surgeon about all medicines you currently take  Be sure to include any medicine that may cause you to bleed more, such as aspirin or blood thinners  Your surgeon will tell you if you need to stop any of your medicine for the surgery, and when to stop  He or she will tell you which medicines to take or not take on the day of surgery  · Tell your surgeon if you have a blood disorder or had a bleeding problem  · You may need blood or urine tests to check for infection and make sure your body is okay for surgery  You may also need an MRI to check your spine before your healthcare provider places the SCS  Ask your surgeon for more information about these and other tests you may need  What will happen during surgery? · Temporary lead placement:      ? You may get general anesthesia to keep you asleep during surgery  You may get local anesthesia to numb the surgery area  Local anesthesia allows you to stay awake during the surgery so you can tell your surgeon when your pain decreases   This helps him or her make sure the SCS is in the best spot     ? Your surgeon will place electrical leads through a small incision or a needle inserted into your back  He or she may need to remove a small piece of bone to insert the leads along your spine  He or she will use an x-ray to make sure the leads are in the correct place  ? The incision will be covered with bandages  The leads will be connected to wires and attached to a pulse generator placed outside your body  · Permanent lead placement:      ? Your surgeon will remove the temporary lead and replace it with a new, permanent lead through an incision or needle in your back  ? He or she will make another incision in your abdomen or buttocks  Then he or she will make a small pocket under your skin and place the SCS  Wires will be attached to the SCS  The wires will be tunneled under your skin  Your surgeon will connect the wires to the leads placed in your spine  ? The incisions will be closed with stitches and covered with a bandage  What should I expect after surgery? You will be taken to a room to rest until you are fully awake  Healthcare providers will monitor you closely for any problems  Do not get out of bed until your healthcare provider says it is okay  · SCS settings  on the remote control can be changed to help relieve your pain  Your healthcare provider will show you how to adjust the settings  · Medicines  may be given for surgery pain or to prevent a bacterial infection  · Ice packs  may be used to decrease the pain from the incisions  What are the risks of spinal cord stimulator placement? The spinal cord stimulator may not work correctly and you may need to have it replaced  You may develop a headache, or your pain may get worse  Surgery may cause you to bleed or to leak spinal fluid  After surgery, you may get an infection at the incision site  You may also get a serious infection near where the leads are placed   This may cause paralysis or become life-threatening  CARE AGREEMENT:   You have the right to help plan your care  Learn about your health condition and how it may be treated  Discuss treatment options with your healthcare providers to decide what care you want to receive  You always have the right to refuse treatment  The above information is an  only  It is not intended as medical advice for individual conditions or treatments  Talk to your doctor, nurse or pharmacist before following any medical regimen to see if it is safe and effective for you  © Copyright [employer] 2021 Information is for End User's use only and may not be sold, redistributed or otherwise used for commercial purposes   All illustrations and images included in CareNotes® are the copyrighted property of [employer]  or [address] [place] [address]

## 2021-08-10 ENCOUNTER — TELEPHONE (OUTPATIENT)
Dept: RADIOLOGY | Facility: CLINIC | Age: 61
End: 2021-08-10

## 2021-08-10 NOTE — TELEPHONE ENCOUNTER
Pt to be [facility] trial w Dr José Manuel Islas  Spoke to pt, his MRI is completed and he is calling psych doctors today for the eval, pt aware to call me back if he has difficulty  Emailed [employer] to contact pt for education

## 2021-08-17 ENCOUNTER — PATIENT MESSAGE (OUTPATIENT)
Dept: PAIN MEDICINE | Facility: CLINIC | Age: 61
End: 2021-08-17

## 2021-08-20 ENCOUNTER — TELEPHONE (OUTPATIENT)
Dept: PAIN MEDICINE | Facility: CLINIC | Age: 61
End: 2021-08-20

## 2021-08-20 NOTE — TELEPHONE ENCOUNTER
Regarding: FW: Non-Urgent Medical Question  Contact: [phone]  ANNA-  ----- Message -----  From: Frank Taylor  Sent: 8/17/2021   1:41 PM EDT  To: [facility]  Subject: RE: Non-Urgent Medical Question                  Will get them to fax results   Thanks

## 2021-08-25 ENCOUNTER — PATIENT MESSAGE (OUTPATIENT)
Dept: PAIN MEDICINE | Facility: CLINIC | Age: 61
End: 2021-08-25

## 2021-09-07 ENCOUNTER — PATIENT MESSAGE (OUTPATIENT)
Dept: PAIN MEDICINE | Facility: CLINIC | Age: 61
End: 2021-09-07

## 2021-09-08 NOTE — TELEPHONE ENCOUNTER
Called [facility] for them to please fax report to us, when report is received, will place on John acuna

## 2021-09-14 ENCOUNTER — TELEPHONE (OUTPATIENT)
Dept: PAIN MEDICINE | Facility: CLINIC | Age: 61
End: 2021-09-14

## 2021-09-14 NOTE — TELEPHONE ENCOUNTER
Patient   [phone]  Dr Jamee Harvey    Please reach back out to patient, he would like to know if the Dr Klein Hartman his psych eval?

## 2021-09-16 ENCOUNTER — TRANSCRIBE ORDERS (OUTPATIENT)
Dept: PAIN MEDICINE | Facility: CLINIC | Age: 61
End: 2021-09-16

## 2021-09-16 NOTE — TELEPHONE ENCOUNTER
Psych eval received, scanned into chart- please review and advise if OK to move forward w SCS trial

## 2021-09-16 NOTE — TELEPHONE ENCOUNTER
Pt sent a message yesterday via [employer] with the below info:  "Dr Kody Hendrix office-   [address] [address]  Phone  [phone]  I believe her name was Sherri Fisher"    Called and [facility] for their office to request the report be sent directly to my fax

## 2021-09-22 NOTE — TELEPHONE ENCOUNTER
Patient is requesting to speak with Stim Coordinator  Please reach out to him at # [phone]      Call dropped

## 2021-09-23 ENCOUNTER — TELEPHONE (OUTPATIENT)
Dept: PAIN MEDICINE | Facility: CLINIC | Age: 61
End: 2021-09-23

## 2021-09-23 NOTE — TELEPHONE ENCOUNTER
----- Message from [address] sent at 9/23/2021  9:46 AM EDT -----  Regarding: Non-Urgent Medical Question  Contact: [phone]  No one has called about date for spinal stimulator trial yet  Is there a problem?

## 2021-09-23 NOTE — TELEPHONE ENCOUNTER
Spoke to pt, advised that we are waiting on the approval and once I receive it I'll call him to schedule/ send him for lab work and covid test

## 2021-09-24 ENCOUNTER — PREP FOR PROCEDURE (OUTPATIENT)
Dept: PAIN MEDICINE | Facility: CLINIC | Age: 61
End: 2021-09-24

## 2021-09-24 DIAGNOSIS — M51.24 DISPLACEMENT OF THORACIC INTERVERTEBRAL DISC WITHOUT MYELOPATHY: ICD-10-CM

## 2021-09-24 DIAGNOSIS — M54.16 LUMBAR RADICULOPATHY: ICD-10-CM

## 2021-09-24 DIAGNOSIS — E13.42 DIABETIC POLYNEUROPATHY ASSOCIATED WITH OTHER SPECIFIED DIABETES MELLITUS (HCC): ICD-10-CM

## 2021-09-24 DIAGNOSIS — Z79.899 ENCOUNTER FOR LONG-TERM (CURRENT) USE OF OTHER MEDICATIONS: ICD-10-CM

## 2021-09-24 DIAGNOSIS — Z96.89 SPINAL CORD STIMULATOR STATUS: Primary | ICD-10-CM

## 2021-09-24 DIAGNOSIS — Z01.818 PRE-OP TESTING: Primary | ICD-10-CM

## 2021-09-24 DIAGNOSIS — Z79.01 CHRONIC ANTICOAGULATION: ICD-10-CM

## 2021-09-24 DIAGNOSIS — Z96.89 SPINAL CORD STIMULATOR STATUS: ICD-10-CM

## 2021-09-24 NOTE — TELEPHONE ENCOUNTER
Procedure was approved  Trial: Thurs 09/30/21 at [address], pt aware the OR will call the day before with arrival time  Lead removal: Wed 10/06/21 at 10:45am with Dr See [place]     Pt was instructed to go for lab work and covid test to [facility] or [address] [place] [address] in [facility] today or tomorrow to ensure we receive the results in time for the procedure  Orders placed  Pt will hold ibuprofen starting on Wed 09/29/21 and for duration of trial     Went over pre procedure instructions, NPO 1 hr prior, if sick or on abx needs to call to rs, wear loose, comf clothing- no buttons/zippers, needs   Pt verbalized understanding  Pt should practice more strict social distancing, masking, handwashing for 2-3 weeks following procedure  Reviewed check in process with pt- will enter building no earlier than arrival time given, agreed to wear mask for duration of appt,  will wait in car  Email sent to [phone] [facility] and Dr See sheppard appt info

## 2021-09-27 ENCOUNTER — APPOINTMENT (OUTPATIENT)
Dept: PREADMISSION TESTING | Facility: HOSPITAL | Age: 61
End: 2021-09-27
Payer: COMMERCIAL

## 2021-09-27 ENCOUNTER — APPOINTMENT (OUTPATIENT)
Dept: LAB | Facility: HOSPITAL | Age: 61
End: 2021-09-27
Attending: ANESTHESIOLOGY
Payer: COMMERCIAL

## 2021-09-27 DIAGNOSIS — Z01.818 PRE-OP TESTING: ICD-10-CM

## 2021-09-27 DIAGNOSIS — E13.42 DIABETIC POLYNEUROPATHY ASSOCIATED WITH OTHER SPECIFIED DIABETES MELLITUS (HCC): ICD-10-CM

## 2021-09-27 DIAGNOSIS — Z96.89 SPINAL CORD STIMULATOR STATUS: ICD-10-CM

## 2021-09-27 DIAGNOSIS — M54.16 LUMBAR RADICULOPATHY: ICD-10-CM

## 2021-09-27 DIAGNOSIS — Z79.01 CHRONIC ANTICOAGULATION: ICD-10-CM

## 2021-09-27 DIAGNOSIS — Z79.899 ENCOUNTER FOR LONG-TERM (CURRENT) USE OF OTHER MEDICATIONS: ICD-10-CM

## 2021-09-27 DIAGNOSIS — M51.24 DISPLACEMENT OF THORACIC INTERVERTEBRAL DISC WITHOUT MYELOPATHY: ICD-10-CM

## 2021-09-27 LAB
ALBUMIN SERPL BCP-MCNC: 3.5 G/DL (ref 3.5–5)
ALP SERPL-CCNC: 118 U/L (ref 46–116)
ALT SERPL W P-5'-P-CCNC: 30 U/L (ref 12–78)
ANION GAP SERPL CALCULATED.3IONS-SCNC: 10 MMOL/L (ref 4–13)
APTT PPP: 29 SECONDS (ref 23–37)
AST SERPL W P-5'-P-CCNC: 20 U/L (ref 5–45)
BILIRUB SERPL-MCNC: 0.42 MG/DL (ref 0.2–1)
BUN SERPL-MCNC: 14 MG/DL (ref 5–25)
CALCIUM SERPL-MCNC: 8.9 MG/DL (ref 8.3–10.1)
CHLORIDE SERPL-SCNC: 105 MMOL/L (ref 100–108)
CO2 SERPL-SCNC: 26 MMOL/L (ref 21–32)
CREAT SERPL-MCNC: 1 MG/DL (ref 0.6–1.3)
ERYTHROCYTE [DISTWIDTH] IN BLOOD BY AUTOMATED COUNT: 15.4 % (ref 11.6–15.1)
EST. AVERAGE GLUCOSE BLD GHB EST-MCNC: 108 MG/DL
GFR SERPL CREATININE-BSD FRML MDRD: 82 ML/MIN/1.73SQ M
GLUCOSE P FAST SERPL-MCNC: 98 MG/DL (ref 65–99)
HBA1C MFR BLD: 5.4 %
HCT VFR BLD AUTO: 44.3 % (ref 36.5–49.3)
HGB BLD-MCNC: 14.6 G/DL (ref 12–17)
INR PPP: 1 (ref 0.84–1.19)
MCH RBC QN AUTO: 29.6 PG (ref 26.8–34.3)
MCHC RBC AUTO-ENTMCNC: 33 G/DL (ref 31.4–37.4)
MCV RBC AUTO: 90 FL (ref 82–98)
PLATELET # BLD AUTO: 159 THOUSANDS/UL (ref 149–390)
PMV BLD AUTO: 9.7 FL (ref 8.9–12.7)
POTASSIUM SERPL-SCNC: 3.9 MMOL/L (ref 3.5–5.3)
PROT SERPL-MCNC: 7.2 G/DL (ref 6.4–8.2)
PROTHROMBIN TIME: 13.1 SECONDS (ref 11.6–14.5)
RBC # BLD AUTO: 4.93 MILLION/UL (ref 3.88–5.62)
SARS-COV-2 RNA RESP QL NAA+PROBE: NEGATIVE
SODIUM SERPL-SCNC: 141 MMOL/L (ref 136–145)
WBC # BLD AUTO: 8.43 THOUSAND/UL (ref 4.31–10.16)

## 2021-09-27 PROCEDURE — U0003 INFECTIOUS AGENT DETECTION BY NUCLEIC ACID (DNA OR RNA); SEVERE ACUTE RESPIRATORY SYNDROME CORONAVIRUS 2 (SARS-COV-2) (CORONAVIRUS DISEASE [COVID-19]), AMPLIFIED PROBE TECHNIQUE, MAKING USE OF HIGH THROUGHPUT TECHNOLOGIES AS DESCRIBED BY CMS-2020-01-R: HCPCS

## 2021-09-27 PROCEDURE — 36415 COLL VENOUS BLD VENIPUNCTURE: CPT

## 2021-09-27 PROCEDURE — 83036 HEMOGLOBIN GLYCOSYLATED A1C: CPT

## 2021-09-27 PROCEDURE — 85027 COMPLETE CBC AUTOMATED: CPT

## 2021-09-27 PROCEDURE — 80053 COMPREHEN METABOLIC PANEL: CPT

## 2021-09-27 PROCEDURE — 85610 PROTHROMBIN TIME: CPT

## 2021-09-27 PROCEDURE — U0005 INFEC AGEN DETEC AMPLI PROBE: HCPCS

## 2021-09-27 PROCEDURE — 85730 THROMBOPLASTIN TIME PARTIAL: CPT

## 2021-09-28 NOTE — TELEPHONE ENCOUNTER
Pt aware and appreciative of call  Pt also asked me to remove his home # and only list his cell phone for contact, updated demographics

## 2021-09-29 ENCOUNTER — ANESTHESIA EVENT (OUTPATIENT)
Dept: GASTROENTEROLOGY | Facility: HOSPITAL | Age: 61
End: 2021-09-29
Payer: COMMERCIAL

## 2021-09-30 ENCOUNTER — HOSPITAL ENCOUNTER (OUTPATIENT)
Facility: HOSPITAL | Age: 61
Setting detail: OUTPATIENT SURGERY
Discharge: HOME | End: 2021-09-30
Attending: ANESTHESIOLOGY | Admitting: ANESTHESIOLOGY
Payer: COMMERCIAL

## 2021-09-30 ENCOUNTER — ANESTHESIA (OUTPATIENT)
Dept: GASTROENTEROLOGY | Facility: HOSPITAL | Age: 61
End: 2021-09-30
Payer: COMMERCIAL

## 2021-09-30 ENCOUNTER — APPOINTMENT (OUTPATIENT)
Dept: RADIOLOGY | Facility: HOSPITAL | Age: 61
End: 2021-09-30
Payer: COMMERCIAL

## 2021-09-30 ENCOUNTER — TELEPHONE (OUTPATIENT)
Dept: RADIOLOGY | Facility: CLINIC | Age: 61
End: 2021-09-30

## 2021-09-30 VITALS
SYSTOLIC BLOOD PRESSURE: 118 MMHG | DIASTOLIC BLOOD PRESSURE: 69 MMHG | HEART RATE: 60 BPM | RESPIRATION RATE: 18 BRPM | TEMPERATURE: 97.9 F | OXYGEN SATURATION: 97 %

## 2021-09-30 DIAGNOSIS — Z96.89 SPINAL CORD STIMULATOR STATUS: Primary | ICD-10-CM

## 2021-09-30 PROCEDURE — 82948 REAGENT STRIP/BLOOD GLUCOSE: CPT

## 2021-09-30 PROCEDURE — 63650 IMPLANT NEUROELECTRODES: CPT | Performed by: ANESTHESIOLOGY

## 2021-09-30 PROCEDURE — 95972 ALYS CPLX SP/PN NPGT W/PRGRM: CPT | Performed by: ANESTHESIOLOGY

## 2021-09-30 PROCEDURE — C1897 LEAD, NEUROSTIM TEST KIT: HCPCS | Performed by: ANESTHESIOLOGY

## 2021-09-30 DEVICE — TRIAL LEAD KIT, 50CM WITH 5MM SPACING
Type: IMPLANTABLE DEVICE | Site: EPIDURAL SPACE | Status: FUNCTIONAL
Brand: NEVRO®

## 2021-09-30 RX ORDER — BUPIVACAINE HYDROCHLORIDE 2.5 MG/ML
INJECTION, SOLUTION EPIDURAL; INFILTRATION; INTRACAUDAL AS NEEDED
Status: DISCONTINUED | OUTPATIENT
Start: 2021-09-30 | End: 2021-09-30 | Stop reason: HOSPADM

## 2021-09-30 RX ORDER — ONDANSETRON 2 MG/ML
4 INJECTION INTRAMUSCULAR; INTRAVENOUS ONCE AS NEEDED
Status: DISCONTINUED | OUTPATIENT
Start: 2021-09-30 | End: 2021-09-30 | Stop reason: HOSPADM

## 2021-09-30 RX ORDER — LIDOCAINE HYDROCHLORIDE 10 MG/ML
0.5 INJECTION, SOLUTION EPIDURAL; INFILTRATION; INTRACAUDAL; PERINEURAL ONCE AS NEEDED
Status: DISCONTINUED | OUTPATIENT
Start: 2021-09-30 | End: 2021-09-30 | Stop reason: HOSPADM

## 2021-09-30 RX ORDER — LABETALOL 20 MG/4 ML (5 MG/ML) INTRAVENOUS SYRINGE
5
Status: DISCONTINUED | OUTPATIENT
Start: 2021-09-30 | End: 2021-09-30 | Stop reason: HOSPADM

## 2021-09-30 RX ORDER — FENTANYL CITRATE/PF 50 MCG/ML
25 SYRINGE (ML) INJECTION
Status: DISCONTINUED | OUTPATIENT
Start: 2021-09-30 | End: 2021-09-30 | Stop reason: HOSPADM

## 2021-09-30 RX ORDER — MEPERIDINE HYDROCHLORIDE 25 MG/ML
12.5 INJECTION INTRAMUSCULAR; INTRAVENOUS; SUBCUTANEOUS ONCE
Status: DISCONTINUED | OUTPATIENT
Start: 2021-09-30 | End: 2021-09-30 | Stop reason: HOSPADM

## 2021-09-30 RX ORDER — SODIUM CHLORIDE, SODIUM LACTATE, POTASSIUM CHLORIDE, CALCIUM CHLORIDE 600; 310; 30; 20 MG/100ML; MG/100ML; MG/100ML; MG/100ML
125 INJECTION, SOLUTION INTRAVENOUS CONTINUOUS
Status: DISCONTINUED | OUTPATIENT
Start: 2021-09-30 | End: 2021-09-30 | Stop reason: HOSPADM

## 2021-09-30 RX ORDER — FENTANYL CITRATE 50 UG/ML
INJECTION, SOLUTION INTRAMUSCULAR; INTRAVENOUS AS NEEDED
Status: DISCONTINUED | OUTPATIENT
Start: 2021-09-30 | End: 2021-09-30

## 2021-09-30 RX ORDER — HYDROMORPHONE HCL/PF 1 MG/ML
0.5 SYRINGE (ML) INJECTION
Status: DISCONTINUED | OUTPATIENT
Start: 2021-09-30 | End: 2021-09-30 | Stop reason: HOSPADM

## 2021-09-30 RX ORDER — PROPOFOL 10 MG/ML
INJECTION, EMULSION INTRAVENOUS AS NEEDED
Status: DISCONTINUED | OUTPATIENT
Start: 2021-09-30 | End: 2021-09-30

## 2021-09-30 RX ORDER — SODIUM CHLORIDE 9 MG/ML
INJECTION INTRAVENOUS AS NEEDED
Status: DISCONTINUED | OUTPATIENT
Start: 2021-09-30 | End: 2021-09-30 | Stop reason: HOSPADM

## 2021-09-30 RX ORDER — SODIUM CHLORIDE, SODIUM LACTATE, POTASSIUM CHLORIDE, CALCIUM CHLORIDE 600; 310; 30; 20 MG/100ML; MG/100ML; MG/100ML; MG/100ML
100 INJECTION, SOLUTION INTRAVENOUS CONTINUOUS
Status: DISCONTINUED | OUTPATIENT
Start: 2021-09-30 | End: 2021-09-30 | Stop reason: HOSPADM

## 2021-09-30 RX ORDER — LIDOCAINE HYDROCHLORIDE 10 MG/ML
INJECTION, SOLUTION EPIDURAL; INFILTRATION; INTRACAUDAL; PERINEURAL AS NEEDED
Status: DISCONTINUED | OUTPATIENT
Start: 2021-09-30 | End: 2021-09-30 | Stop reason: HOSPADM

## 2021-09-30 RX ORDER — MIDAZOLAM HYDROCHLORIDE 2 MG/2ML
INJECTION, SOLUTION INTRAMUSCULAR; INTRAVENOUS AS NEEDED
Status: DISCONTINUED | OUTPATIENT
Start: 2021-09-30 | End: 2021-09-30

## 2021-09-30 RX ORDER — DEXMEDETOMIDINE HYDROCHLORIDE 100 UG/ML
INJECTION, SOLUTION INTRAVENOUS AS NEEDED
Status: DISCONTINUED | OUTPATIENT
Start: 2021-09-30 | End: 2021-09-30

## 2021-09-30 RX ORDER — ALBUTEROL SULFATE 2.5 MG/3ML
2.5 SOLUTION RESPIRATORY (INHALATION) ONCE AS NEEDED
Status: DISCONTINUED | OUTPATIENT
Start: 2021-09-30 | End: 2021-09-30 | Stop reason: HOSPADM

## 2021-09-30 RX ORDER — PROMETHAZINE HYDROCHLORIDE 25 MG/ML
12.5 INJECTION, SOLUTION INTRAMUSCULAR; INTRAVENOUS ONCE AS NEEDED
Status: DISCONTINUED | OUTPATIENT
Start: 2021-09-30 | End: 2021-09-30 | Stop reason: HOSPADM

## 2021-09-30 RX ORDER — CEPHALEXIN 500 MG/1
500 CAPSULE ORAL EVERY 6 HOURS SCHEDULED
Qty: 20 CAPSULE | Refills: 0 | Status: SHIPPED | OUTPATIENT
Start: 2021-09-30 | End: 2021-10-05

## 2021-09-30 RX ORDER — CEFAZOLIN SODIUM 2 G/50ML
2000 SOLUTION INTRAVENOUS ONCE
Status: COMPLETED | OUTPATIENT
Start: 2021-09-30 | End: 2021-09-30

## 2021-09-30 RX ORDER — CEPHALEXIN 500 MG/1
500 CAPSULE ORAL 4 TIMES DAILY
Qty: 20 CAPSULE | Refills: 0 | Status: SHIPPED | OUTPATIENT
Start: 2021-09-30 | End: 2021-10-05

## 2021-09-30 RX ADMIN — CEFAZOLIN SODIUM 2000 MG: 2 SOLUTION INTRAVENOUS at 09:54

## 2021-09-30 RX ADMIN — FENTANYL CITRATE 25 MCG: 0.05 INJECTION, SOLUTION INTRAMUSCULAR; INTRAVENOUS at 09:52

## 2021-09-30 RX ADMIN — DEXMEDETOMIDINE HCL 12 MCG: 100 INJECTION INTRAVENOUS at 09:59

## 2021-09-30 RX ADMIN — PROPOFOL 20 MG: 10 INJECTION, EMULSION INTRAVENOUS at 10:01

## 2021-09-30 RX ADMIN — FENTANYL CITRATE 25 MCG: 0.05 INJECTION, SOLUTION INTRAMUSCULAR; INTRAVENOUS at 10:03

## 2021-09-30 RX ADMIN — FENTANYL CITRATE 25 MCG: 0.05 INJECTION, SOLUTION INTRAMUSCULAR; INTRAVENOUS at 09:59

## 2021-09-30 RX ADMIN — DEXMEDETOMIDINE HCL 12 MCG: 100 INJECTION INTRAVENOUS at 09:57

## 2021-09-30 RX ADMIN — PROPOFOL 10 MG: 10 INJECTION, EMULSION INTRAVENOUS at 10:03

## 2021-09-30 RX ADMIN — MIDAZOLAM HYDROCHLORIDE 2 MG: 1 INJECTION, SOLUTION INTRAMUSCULAR; INTRAVENOUS at 09:48

## 2021-09-30 RX ADMIN — SODIUM CHLORIDE, SODIUM LACTATE, POTASSIUM CHLORIDE, AND CALCIUM CHLORIDE: .6; .31; .03; .02 INJECTION, SOLUTION INTRAVENOUS at 09:48

## 2021-09-30 RX ADMIN — FENTANYL CITRATE 25 MCG: 0.05 INJECTION, SOLUTION INTRAMUSCULAR; INTRAVENOUS at 09:55

## 2021-09-30 RX ADMIN — DEXMEDETOMIDINE HCL 16 MCG: 100 INJECTION INTRAVENOUS at 09:52

## 2021-09-30 NOTE — ANESTHESIA PREPROCEDURE EVALUATION
Procedure:  NEVRO SCS TRIAL (Bilateral Spine Thoracic)    Relevant Problems   NEURO/PSYCH   (+) Cervical spinal cord compression (HCC)   (+) Diabetic peripheral neuropathy (HCC)   (+) Mild depression (HCC)        Physical Exam    Airway    Mallampati score: II  TM Distance: >3 FB  Neck ROM: full     Dental   No notable dental hx     Cardiovascular      Pulmonary      Other Findings        Anesthesia Plan  ASA Score- 2     Anesthesia Type- IV sedation with anesthesia with ASA Monitors  Additional Monitors:   Airway Plan:     Comment: I have seen the patient and reviewed the history  Patient to receive IV sedation with full ASA monitors  Risks discussed with the patient, consent signed          Plan Factors-Exercise tolerance (METS): >4 METS  Chart reviewed  Existing labs reviewed  Patient summary reviewed  Induction- intravenous  Postoperative Plan-     Informed Consent- Anesthetic plan and risks discussed with patient  I personally reviewed this patient with the CRNA  Discussed and agreed on the Anesthesia Plan with the CRNA  Timmy Garduno

## 2021-10-01 LAB — GLUCOSE SERPL-MCNC: 86 MG/DL (ref 65–140)

## 2021-10-01 NOTE — TELEPHONE ENCOUNTER
FYI-    S/w pt  Pt states " I feel a lot better today, I was having some pain at the site last night but Tylenol helped " Per pt his dressing is intact and has no further drainage since he spoke with [facility] yesterday  Pt is taking his temp daily, is afebrile and is taking his abx  Pt is expecting a call from his rep shortly

## 2021-10-06 ENCOUNTER — OFFICE VISIT (OUTPATIENT)
Dept: PAIN MEDICINE | Facility: CLINIC | Age: 61
End: 2021-10-06
Payer: COMMERCIAL

## 2021-10-06 ENCOUNTER — HOSPITAL ENCOUNTER (EMERGENCY)
Facility: HOSPITAL | Age: 61
Discharge: HOME | End: 2021-10-06
Attending: EMERGENCY MEDICINE | Admitting: EMERGENCY MEDICINE
Payer: COMMERCIAL

## 2021-10-06 VITALS
DIASTOLIC BLOOD PRESSURE: 62 MMHG | OXYGEN SATURATION: 96 % | SYSTOLIC BLOOD PRESSURE: 112 MMHG | HEART RATE: 65 BPM | BODY MASS INDEX: 38.36 KG/M2 | HEIGHT: 76 IN | RESPIRATION RATE: 21 BRPM | WEIGHT: 315 LBS

## 2021-10-06 VITALS — TEMPERATURE: 97.8 F | BODY MASS INDEX: 41.99 KG/M2 | WEIGHT: 315 LBS

## 2021-10-06 DIAGNOSIS — Z96.89 SPINAL CORD STIMULATOR STATUS: Primary | ICD-10-CM

## 2021-10-06 DIAGNOSIS — R55 VASOVAGAL SYNCOPE: Primary | ICD-10-CM

## 2021-10-06 LAB
ALBUMIN SERPL BCP-MCNC: 3.6 G/DL (ref 3.5–5)
ALP SERPL-CCNC: 119 U/L (ref 46–116)
ALT SERPL W P-5'-P-CCNC: 26 U/L (ref 12–78)
ANION GAP SERPL CALCULATED.3IONS-SCNC: 9 MMOL/L (ref 4–13)
AST SERPL W P-5'-P-CCNC: 24 U/L (ref 5–45)
ATRIAL RATE: 63 BPM
BASOPHILS # BLD AUTO: 0.05 THOUSANDS/ΜL (ref 0–0.1)
BASOPHILS NFR BLD AUTO: 1 % (ref 0–1)
BILIRUB SERPL-MCNC: 0.53 MG/DL (ref 0.2–1)
BUN SERPL-MCNC: 14 MG/DL (ref 5–25)
CALCIUM SERPL-MCNC: 8.6 MG/DL (ref 8.3–10.1)
CHLORIDE SERPL-SCNC: 103 MMOL/L (ref 100–108)
CO2 SERPL-SCNC: 24 MMOL/L (ref 21–32)
CREAT SERPL-MCNC: 0.96 MG/DL (ref 0.6–1.3)
EOSINOPHIL # BLD AUTO: 0.12 THOUSAND/ΜL (ref 0–0.61)
EOSINOPHIL NFR BLD AUTO: 1 % (ref 0–6)
ERYTHROCYTE [DISTWIDTH] IN BLOOD BY AUTOMATED COUNT: 14.9 % (ref 11.6–15.1)
GFR SERPL CREATININE-BSD FRML MDRD: 86 ML/MIN/1.73SQ M
GLUCOSE SERPL-MCNC: 127 MG/DL (ref 65–140)
HCT VFR BLD AUTO: 41.4 % (ref 36.5–49.3)
HGB BLD-MCNC: 14.2 G/DL (ref 12–17)
IMM GRANULOCYTES # BLD AUTO: 0.06 THOUSAND/UL (ref 0–0.2)
IMM GRANULOCYTES NFR BLD AUTO: 1 % (ref 0–2)
LYMPHOCYTES # BLD AUTO: 1.5 THOUSANDS/ΜL (ref 0.6–4.47)
LYMPHOCYTES NFR BLD AUTO: 17 % (ref 14–44)
MAGNESIUM SERPL-MCNC: 2.2 MG/DL (ref 1.6–2.6)
MCH RBC QN AUTO: 30.3 PG (ref 26.8–34.3)
MCHC RBC AUTO-ENTMCNC: 34.3 G/DL (ref 31.4–37.4)
MCV RBC AUTO: 88 FL (ref 82–98)
MONOCYTES # BLD AUTO: 0.79 THOUSAND/ΜL (ref 0.17–1.22)
MONOCYTES NFR BLD AUTO: 9 % (ref 4–12)
NEUTROPHILS # BLD AUTO: 6.14 THOUSANDS/ΜL (ref 1.85–7.62)
NEUTS SEG NFR BLD AUTO: 71 % (ref 43–75)
NRBC BLD AUTO-RTO: 0 /100 WBCS
P AXIS: 25 DEGREES
PLATELET # BLD AUTO: 151 THOUSANDS/UL (ref 149–390)
PMV BLD AUTO: 9.9 FL (ref 8.9–12.7)
POTASSIUM SERPL-SCNC: 3.8 MMOL/L (ref 3.5–5.3)
PR INTERVAL: 178 MS
PROT SERPL-MCNC: 7.3 G/DL (ref 6.4–8.2)
QRS AXIS: -46 DEGREES
QRSD INTERVAL: 120 MS
QT INTERVAL: 438 MS
QTC INTERVAL: 448 MS
RBC # BLD AUTO: 4.69 MILLION/UL (ref 3.88–5.62)
SODIUM SERPL-SCNC: 136 MMOL/L (ref 136–145)
T WAVE AXIS: 39 DEGREES
TROPONIN I SERPL-MCNC: <0.02 NG/ML
VENTRICULAR RATE: 63 BPM
WBC # BLD AUTO: 8.66 THOUSAND/UL (ref 4.31–10.16)

## 2021-10-06 PROCEDURE — 99285 EMERGENCY DEPT VISIT HI MDM: CPT | Performed by: PHYSICIAN ASSISTANT

## 2021-10-06 PROCEDURE — 99284 EMERGENCY DEPT VISIT MOD MDM: CPT

## 2021-10-06 PROCEDURE — 36415 COLL VENOUS BLD VENIPUNCTURE: CPT | Performed by: PHYSICIAN ASSISTANT

## 2021-10-06 PROCEDURE — 84484 ASSAY OF TROPONIN QUANT: CPT | Performed by: PHYSICIAN ASSISTANT

## 2021-10-06 PROCEDURE — 80053 COMPREHEN METABOLIC PANEL: CPT | Performed by: PHYSICIAN ASSISTANT

## 2021-10-06 PROCEDURE — 83735 ASSAY OF MAGNESIUM: CPT | Performed by: PHYSICIAN ASSISTANT

## 2021-10-06 PROCEDURE — 80305 DRUG TEST PRSMV DIR OPT OBS: CPT | Performed by: ANESTHESIOLOGY

## 2021-10-06 PROCEDURE — 93005 ELECTROCARDIOGRAM TRACING: CPT

## 2021-10-06 PROCEDURE — 85025 COMPLETE CBC W/AUTO DIFF WBC: CPT | Performed by: PHYSICIAN ASSISTANT

## 2021-10-06 PROCEDURE — 99214 OFFICE O/P EST MOD 30 MIN: CPT | Performed by: ANESTHESIOLOGY

## 2021-10-12 ENCOUNTER — CONSULT (OUTPATIENT)
Dept: NEUROSURGERY | Facility: CLINIC | Age: 61
End: 2021-10-12
Payer: COMMERCIAL

## 2021-10-12 VITALS
BODY MASS INDEX: 38.36 KG/M2 | WEIGHT: 315 LBS | HEIGHT: 76 IN | DIASTOLIC BLOOD PRESSURE: 85 MMHG | SYSTOLIC BLOOD PRESSURE: 142 MMHG

## 2021-10-12 DIAGNOSIS — Z96.89 SPINAL CORD STIMULATOR STATUS: ICD-10-CM

## 2021-10-12 DIAGNOSIS — M54.50 LOWER BACK PAIN: Primary | ICD-10-CM

## 2021-10-12 DIAGNOSIS — G95.20 CERVICAL SPINAL CORD COMPRESSION (HCC): ICD-10-CM

## 2021-10-12 PROCEDURE — 99203 OFFICE O/P NEW LOW 30 MIN: CPT | Performed by: STUDENT IN AN ORGANIZED HEALTH CARE EDUCATION/TRAINING PROGRAM

## 2021-10-12 RX ORDER — SODIUM CHLORIDE 9 MG/ML
125 INJECTION, SOLUTION INTRAVENOUS CONTINUOUS
Status: CANCELLED | OUTPATIENT
Start: 2021-10-26

## 2021-10-19 LAB
APPEARANCE UR: CLEAR
BACTERIA UR QL AUTO: ABNORMAL /HPF
BILIRUB UR QL STRIP: NEGATIVE
COLOR UR: YELLOW
GLUCOSE UR QL STRIP: ABNORMAL
HGB UR QL STRIP: NEGATIVE
HYALINE CASTS #/AREA URNS LPF: ABNORMAL /LPF
KETONES UR QL STRIP: NEGATIVE
LEUKOCYTE ESTERASE UR QL STRIP: NEGATIVE
NITRITE UR QL STRIP: NEGATIVE
PH UR STRIP: ABNORMAL [PH] (ref 5–8)
PROT UR QL STRIP: NEGATIVE
RBC #/AREA URNS HPF: ABNORMAL /HPF
SP GR UR STRIP: 1.03 (ref 1–1.03)
SQUAMOUS #/AREA URNS HPF: ABNORMAL /HPF
WBC #/AREA URNS HPF: ABNORMAL /HPF

## 2021-10-25 ENCOUNTER — DOCUMENTATION (OUTPATIENT)
Dept: NEUROSURGERY | Facility: CLINIC | Age: 61
End: 2021-10-25

## 2021-10-25 ENCOUNTER — ANESTHESIA EVENT (OUTPATIENT)
Dept: PERIOP | Facility: HOSPITAL | Age: 61
End: 2021-10-25
Payer: COMMERCIAL

## 2021-10-26 ENCOUNTER — ANESTHESIA (OUTPATIENT)
Dept: PERIOP | Facility: HOSPITAL | Age: 61
End: 2021-10-26
Payer: COMMERCIAL

## 2021-10-26 ENCOUNTER — HOSPITAL ENCOUNTER (OUTPATIENT)
Facility: HOSPITAL | Age: 61
Setting detail: OUTPATIENT SURGERY
Discharge: HOME | End: 2021-10-26
Attending: STUDENT IN AN ORGANIZED HEALTH CARE EDUCATION/TRAINING PROGRAM | Admitting: STUDENT IN AN ORGANIZED HEALTH CARE EDUCATION/TRAINING PROGRAM
Payer: COMMERCIAL

## 2021-10-26 ENCOUNTER — APPOINTMENT (OUTPATIENT)
Dept: RADIOLOGY | Facility: HOSPITAL | Age: 61
End: 2021-10-26
Payer: COMMERCIAL

## 2021-10-26 VITALS
SYSTOLIC BLOOD PRESSURE: 127 MMHG | TEMPERATURE: 97.7 F | BODY MASS INDEX: 38.36 KG/M2 | DIASTOLIC BLOOD PRESSURE: 72 MMHG | OXYGEN SATURATION: 94 % | HEART RATE: 68 BPM | HEIGHT: 76 IN | WEIGHT: 315 LBS | RESPIRATION RATE: 16 BRPM

## 2021-10-26 DIAGNOSIS — G95.20 CERVICAL SPINAL CORD COMPRESSION (HCC): ICD-10-CM

## 2021-10-26 DIAGNOSIS — M51.24 HERNIATION OF INTERVERTEBRAL DISC OF THORACIC REGION: Primary | ICD-10-CM

## 2021-10-26 LAB
GLUCOSE SERPL-MCNC: 112 MG/DL (ref 65–140)
GLUCOSE SERPL-MCNC: 97 MG/DL (ref 65–140)

## 2021-10-26 PROCEDURE — 82948 REAGENT STRIP/BLOOD GLUCOSE: CPT

## 2021-10-26 PROCEDURE — 72070 X-RAY EXAM THORAC SPINE 2VWS: CPT

## 2021-10-26 PROCEDURE — 63655 IMPLANT NEUROELECTRODES: CPT | Performed by: PHYSICIAN ASSISTANT

## 2021-10-26 PROCEDURE — C1713 ANCHOR/SCREW BN/BN,TIS/BN: HCPCS | Performed by: STUDENT IN AN ORGANIZED HEALTH CARE EDUCATION/TRAINING PROGRAM

## 2021-10-26 PROCEDURE — 99024 POSTOP FOLLOW-UP VISIT: CPT | Performed by: STUDENT IN AN ORGANIZED HEALTH CARE EDUCATION/TRAINING PROGRAM

## 2021-10-26 PROCEDURE — C1778 LEAD, NEUROSTIMULATOR: HCPCS | Performed by: STUDENT IN AN ORGANIZED HEALTH CARE EDUCATION/TRAINING PROGRAM

## 2021-10-26 PROCEDURE — C1822 GEN, NEURO, HF, RECHG BAT: HCPCS | Performed by: STUDENT IN AN ORGANIZED HEALTH CARE EDUCATION/TRAINING PROGRAM

## 2021-10-26 PROCEDURE — 63685 INS/RPLC SPI NPG/RCVR POCKET: CPT | Performed by: STUDENT IN AN ORGANIZED HEALTH CARE EDUCATION/TRAINING PROGRAM

## 2021-10-26 PROCEDURE — 95972 ALYS CPLX SP/PN NPGT W/PRGRM: CPT | Performed by: PHYSICIAN ASSISTANT

## 2021-10-26 PROCEDURE — 63685 INS/RPLC SPI NPG/RCVR POCKET: CPT | Performed by: PHYSICIAN ASSISTANT

## 2021-10-26 PROCEDURE — 95972 ALYS CPLX SP/PN NPGT W/PRGRM: CPT | Performed by: STUDENT IN AN ORGANIZED HEALTH CARE EDUCATION/TRAINING PROGRAM

## 2021-10-26 PROCEDURE — 63655 IMPLANT NEUROELECTRODES: CPT | Performed by: STUDENT IN AN ORGANIZED HEALTH CARE EDUCATION/TRAINING PROGRAM

## 2021-10-26 PROCEDURE — C1787 PATIENT PROGR, NEUROSTIM: HCPCS | Performed by: STUDENT IN AN ORGANIZED HEALTH CARE EDUCATION/TRAINING PROGRAM

## 2021-10-26 DEVICE — IMPLANTABLE DEVICE
Type: IMPLANTABLE DEVICE | Status: FUNCTIONAL
Brand: "1.5MM" SYSTEM

## 2021-10-26 DEVICE — SURGICAL LEAD KIT, 70CM
Type: IMPLANTABLE DEVICE | Status: FUNCTIONAL
Brand: NEVRO®

## 2021-10-26 DEVICE — SENZA OMNIA IPG KIT
Type: IMPLANTABLE DEVICE | Status: FUNCTIONAL
Brand: OMNIA

## 2021-10-26 DEVICE — IMPLANTABLE DEVICE
Type: IMPLANTABLE DEVICE | Status: FUNCTIONAL
Brand: 1.5MM SYSTEM

## 2021-10-26 RX ORDER — FENTANYL CITRATE/PF 50 MCG/ML
25 SYRINGE (ML) INJECTION
Status: DISCONTINUED | OUTPATIENT
Start: 2021-10-26 | End: 2021-10-26 | Stop reason: HOSPADM

## 2021-10-26 RX ORDER — GLYCOPYRROLATE 0.2 MG/ML
INJECTION INTRAMUSCULAR; INTRAVENOUS AS NEEDED
Status: DISCONTINUED | OUTPATIENT
Start: 2021-10-26 | End: 2021-10-26

## 2021-10-26 RX ORDER — ONDANSETRON 2 MG/ML
4 INJECTION INTRAMUSCULAR; INTRAVENOUS ONCE AS NEEDED
Status: DISCONTINUED | OUTPATIENT
Start: 2021-10-26 | End: 2021-10-26 | Stop reason: HOSPADM

## 2021-10-26 RX ORDER — LIDOCAINE HYDROCHLORIDE AND EPINEPHRINE 10; 10 MG/ML; UG/ML
INJECTION, SOLUTION INFILTRATION; PERINEURAL AS NEEDED
Status: DISCONTINUED | OUTPATIENT
Start: 2021-10-26 | End: 2021-10-26 | Stop reason: HOSPADM

## 2021-10-26 RX ORDER — FENTANYL CITRATE 50 UG/ML
INJECTION, SOLUTION INTRAMUSCULAR; INTRAVENOUS AS NEEDED
Status: DISCONTINUED | OUTPATIENT
Start: 2021-10-26 | End: 2021-10-26

## 2021-10-26 RX ORDER — MAGNESIUM HYDROXIDE 1200 MG/15ML
LIQUID ORAL AS NEEDED
Status: DISCONTINUED | OUTPATIENT
Start: 2021-10-26 | End: 2021-10-26 | Stop reason: HOSPADM

## 2021-10-26 RX ORDER — CEFAZOLIN SODIUM 2 G/50ML
2000 SOLUTION INTRAVENOUS ONCE
Status: COMPLETED | OUTPATIENT
Start: 2021-10-26 | End: 2021-10-26

## 2021-10-26 RX ORDER — ROCURONIUM BROMIDE 10 MG/ML
INJECTION, SOLUTION INTRAVENOUS AS NEEDED
Status: DISCONTINUED | OUTPATIENT
Start: 2021-10-26 | End: 2021-10-26

## 2021-10-26 RX ORDER — SODIUM CHLORIDE 9 MG/ML
125 INJECTION, SOLUTION INTRAVENOUS CONTINUOUS
Status: DISCONTINUED | OUTPATIENT
Start: 2021-10-26 | End: 2021-10-26 | Stop reason: HOSPADM

## 2021-10-26 RX ORDER — ONDANSETRON 2 MG/ML
INJECTION INTRAMUSCULAR; INTRAVENOUS AS NEEDED
Status: DISCONTINUED | OUTPATIENT
Start: 2021-10-26 | End: 2021-10-26

## 2021-10-26 RX ORDER — CHLORHEXIDINE GLUCONATE 0.12 MG/ML
15 RINSE ORAL ONCE
Status: COMPLETED | OUTPATIENT
Start: 2021-10-26 | End: 2021-10-26

## 2021-10-26 RX ORDER — OXYCODONE HYDROCHLORIDE 5 MG/1
5 TABLET ORAL EVERY 6 HOURS PRN
Qty: 10 TABLET | Refills: 0 | Status: SHIPPED | OUTPATIENT
Start: 2021-10-26 | End: 2021-11-05

## 2021-10-26 RX ORDER — PROPOFOL 10 MG/ML
INJECTION, EMULSION INTRAVENOUS CONTINUOUS PRN
Status: DISCONTINUED | OUTPATIENT
Start: 2021-10-26 | End: 2021-10-26

## 2021-10-26 RX ORDER — CEFAZOLIN SODIUM 1 G/50ML
1000 SOLUTION INTRAVENOUS ONCE
Status: DISCONTINUED | OUTPATIENT
Start: 2021-10-26 | End: 2021-10-26 | Stop reason: HOSPADM

## 2021-10-26 RX ORDER — OXYCODONE HYDROCHLORIDE 5 MG/1
5 TABLET ORAL EVERY 4 HOURS PRN
Status: COMPLETED | OUTPATIENT
Start: 2021-10-26 | End: 2021-10-26

## 2021-10-26 RX ORDER — PROPOFOL 10 MG/ML
INJECTION, EMULSION INTRAVENOUS AS NEEDED
Status: DISCONTINUED | OUTPATIENT
Start: 2021-10-26 | End: 2021-10-26

## 2021-10-26 RX ORDER — MIDAZOLAM HYDROCHLORIDE 2 MG/2ML
INJECTION, SOLUTION INTRAMUSCULAR; INTRAVENOUS AS NEEDED
Status: DISCONTINUED | OUTPATIENT
Start: 2021-10-26 | End: 2021-10-26

## 2021-10-26 RX ORDER — DEXAMETHASONE SODIUM PHOSPHATE 10 MG/ML
INJECTION, SOLUTION INTRAMUSCULAR; INTRAVENOUS AS NEEDED
Status: DISCONTINUED | OUTPATIENT
Start: 2021-10-26 | End: 2021-10-26

## 2021-10-26 RX ORDER — SUCCINYLCHOLINE/SOD CL,ISO/PF 100 MG/5ML
SYRINGE (ML) INTRAVENOUS AS NEEDED
Status: DISCONTINUED | OUTPATIENT
Start: 2021-10-26 | End: 2021-10-26

## 2021-10-26 RX ORDER — BUPIVACAINE HYDROCHLORIDE 2.5 MG/ML
INJECTION, SOLUTION EPIDURAL; INFILTRATION; INTRACAUDAL AS NEEDED
Status: DISCONTINUED | OUTPATIENT
Start: 2021-10-26 | End: 2021-10-26 | Stop reason: HOSPADM

## 2021-10-26 RX ORDER — NEOSTIGMINE METHYLSULFATE 1 MG/ML
INJECTION INTRAVENOUS AS NEEDED
Status: DISCONTINUED | OUTPATIENT
Start: 2021-10-26 | End: 2021-10-26

## 2021-10-26 RX ADMIN — NEOSTIGMINE METHYLSULFATE 3 MG: 1 INJECTION INTRAVENOUS at 14:05

## 2021-10-26 RX ADMIN — PROPOFOL 130 MCG/KG/MIN: 10 INJECTION, EMULSION INTRAVENOUS at 11:32

## 2021-10-26 RX ADMIN — Medication 140 MG: at 11:26

## 2021-10-26 RX ADMIN — CEFAZOLIN SODIUM 3000 MG: 2 SOLUTION INTRAVENOUS at 11:30

## 2021-10-26 RX ADMIN — ROCURONIUM BROMIDE 10 MG: 10 INJECTION, SOLUTION INTRAVENOUS at 11:26

## 2021-10-26 RX ADMIN — FENTANYL CITRATE 50 MCG: 50 INJECTION, SOLUTION INTRAMUSCULAR; INTRAVENOUS at 12:22

## 2021-10-26 RX ADMIN — GLYCOPYRROLATE 0.4 MG: 0.2 INJECTION, SOLUTION INTRAMUSCULAR; INTRAVENOUS at 14:05

## 2021-10-26 RX ADMIN — SODIUM CHLORIDE: 0.9 INJECTION, SOLUTION INTRAVENOUS at 14:15

## 2021-10-26 RX ADMIN — OXYCODONE HYDROCHLORIDE 5 MG: 5 TABLET ORAL at 15:16

## 2021-10-26 RX ADMIN — ONDANSETRON 4 MG: 2 INJECTION INTRAMUSCULAR; INTRAVENOUS at 11:26

## 2021-10-26 RX ADMIN — PROPOFOL 300 MG: 10 INJECTION, EMULSION INTRAVENOUS at 11:26

## 2021-10-26 RX ADMIN — PROPOFOL 50 MG: 10 INJECTION, EMULSION INTRAVENOUS at 11:33

## 2021-10-26 RX ADMIN — DEXAMETHASONE SODIUM PHOSPHATE 4 MG: 10 INJECTION, SOLUTION INTRAMUSCULAR; INTRAVENOUS at 11:26

## 2021-10-26 RX ADMIN — CHLORHEXIDINE GLUCONATE 15 ML: 1.2 SOLUTION ORAL at 06:25

## 2021-10-26 RX ADMIN — PHENYLEPHRINE HYDROCHLORIDE 30 MCG/MIN: 10 INJECTION INTRAVENOUS at 11:48

## 2021-10-26 RX ADMIN — ROCURONIUM BROMIDE 20 MG: 10 INJECTION, SOLUTION INTRAVENOUS at 12:29

## 2021-10-26 RX ADMIN — SODIUM CHLORIDE: 0.9 INJECTION, SOLUTION INTRAVENOUS at 11:15

## 2021-10-26 RX ADMIN — MIDAZOLAM 2 MG: 1 INJECTION INTRAMUSCULAR; INTRAVENOUS at 11:23

## 2021-10-26 RX ADMIN — FENTANYL CITRATE 50 MCG: 50 INJECTION, SOLUTION INTRAMUSCULAR; INTRAVENOUS at 11:26

## 2021-10-27 ENCOUNTER — TELEPHONE (OUTPATIENT)
Dept: NEUROSURGERY | Facility: CLINIC | Age: 61
End: 2021-10-27

## 2021-10-30 ENCOUNTER — TELEPHONE (OUTPATIENT)
Dept: OTHER | Facility: HOSPITAL | Age: 61
End: 2021-10-30

## 2021-11-02 ENCOUNTER — TELEPHONE (OUTPATIENT)
Dept: PAIN MEDICINE | Facility: CLINIC | Age: 61
End: 2021-11-02

## 2021-11-02 DIAGNOSIS — L72.3 SEBACEOUS CYST: Primary | ICD-10-CM

## 2021-11-09 ENCOUNTER — CLINICAL SUPPORT (OUTPATIENT)
Dept: NEUROSURGERY | Facility: CLINIC | Age: 61
End: 2021-11-09

## 2021-11-09 VITALS — SYSTOLIC BLOOD PRESSURE: 160 MMHG | TEMPERATURE: 97.5 F | DIASTOLIC BLOOD PRESSURE: 70 MMHG

## 2021-11-09 DIAGNOSIS — Z98.890 POST-OPERATIVE STATE: ICD-10-CM

## 2021-11-09 DIAGNOSIS — Z96.89 S/P INSERTION OF SPINAL CORD STIMULATOR: Primary | ICD-10-CM

## 2021-11-09 PROCEDURE — 99024 POSTOP FOLLOW-UP VISIT: CPT

## 2021-12-06 ENCOUNTER — HOSPITAL ENCOUNTER (OUTPATIENT)
Dept: RADIOLOGY | Facility: HOSPITAL | Age: 61
Discharge: HOME | End: 2021-12-06
Payer: COMMERCIAL

## 2021-12-06 DIAGNOSIS — Z98.890 POST-OPERATIVE STATE: ICD-10-CM

## 2021-12-06 DIAGNOSIS — Z96.89 S/P INSERTION OF SPINAL CORD STIMULATOR: ICD-10-CM

## 2021-12-06 PROCEDURE — 72070 X-RAY EXAM THORAC SPINE 2VWS: CPT

## 2021-12-09 ENCOUNTER — OFFICE VISIT (OUTPATIENT)
Dept: NEUROSURGERY | Facility: CLINIC | Age: 61
End: 2021-12-09

## 2021-12-09 VITALS
BODY MASS INDEX: 38.36 KG/M2 | RESPIRATION RATE: 18 BRPM | HEIGHT: 76 IN | DIASTOLIC BLOOD PRESSURE: 88 MMHG | HEART RATE: 70 BPM | WEIGHT: 315 LBS | SYSTOLIC BLOOD PRESSURE: 150 MMHG | TEMPERATURE: 97.2 F

## 2021-12-09 DIAGNOSIS — Z09 FOLLOW-UP EXAMINATION, FOLLOWING OTHER SURGERY: Primary | ICD-10-CM

## 2021-12-09 PROCEDURE — 99024 POSTOP FOLLOW-UP VISIT: CPT | Performed by: STUDENT IN AN ORGANIZED HEALTH CARE EDUCATION/TRAINING PROGRAM

## 2022-06-27 ENCOUNTER — OFFICE VISIT (OUTPATIENT)
Dept: PAIN MEDICINE | Facility: CLINIC | Age: 62
End: 2022-06-27
Payer: COMMERCIAL

## 2022-06-27 VITALS
HEIGHT: 76 IN | OXYGEN SATURATION: 97 % | HEART RATE: 76 BPM | BODY MASS INDEX: 38.36 KG/M2 | DIASTOLIC BLOOD PRESSURE: 82 MMHG | WEIGHT: 315 LBS | SYSTOLIC BLOOD PRESSURE: 120 MMHG

## 2022-06-27 DIAGNOSIS — G95.20 CERVICAL SPINAL CORD COMPRESSION (HCC): ICD-10-CM

## 2022-06-27 DIAGNOSIS — E66.01 MORBID OBESITY (HCC): ICD-10-CM

## 2022-06-27 DIAGNOSIS — F32.0 MAJOR DEPRESSIVE DISORDER, SINGLE EPISODE, MILD (HCC): ICD-10-CM

## 2022-06-27 DIAGNOSIS — M47.814 THORACIC SPONDYLOSIS: ICD-10-CM

## 2022-06-27 DIAGNOSIS — M48.062 NEUROGENIC CLAUDICATION DUE TO LUMBAR SPINAL STENOSIS: Primary | ICD-10-CM

## 2022-06-27 PROCEDURE — 80305 DRUG TEST PRSMV DIR OPT OBS: CPT | Performed by: ANESTHESIOLOGY

## 2022-06-27 PROCEDURE — 99214 OFFICE O/P EST MOD 30 MIN: CPT | Performed by: ANESTHESIOLOGY

## 2022-06-27 RX ORDER — LIDOCAINE HYDROCHLORIDE 10 MG/ML
10 INJECTION, SOLUTION EPIDURAL; INFILTRATION; INTRACAUDAL; PERINEURAL ONCE
Status: CANCELLED | OUTPATIENT
Start: 2022-06-27 | End: 2022-06-27

## 2022-06-27 RX ORDER — BUPIVACAINE HCL/PF 2.5 MG/ML
10 VIAL (ML) INJECTION ONCE
Status: CANCELLED | OUTPATIENT
Start: 2022-06-27 | End: 2022-06-27

## 2022-06-27 RX ORDER — METHYLPREDNISOLONE ACETATE 80 MG/ML
80 INJECTION, SUSPENSION INTRA-ARTICULAR; INTRALESIONAL; INTRAMUSCULAR; PARENTERAL; SOFT TISSUE ONCE
Status: CANCELLED | OUTPATIENT
Start: 2022-06-27 | End: 2022-06-27

## 2022-06-27 NOTE — PATIENT INSTRUCTIONS
Core Strengthening Exercises   WHAT YOU NEED TO KNOW:   Your core includes the muscles of your lower back, hip, pelvis, and abdomen  Core strengthening exercises help heal and strengthen these muscles  This helps prevent another injury, and keeps your pelvis, spine, and hips in the correct position  DISCHARGE INSTRUCTIONS:   Contact your healthcare provider if:   You have sharp or worsening pain during exercise or at rest     You have questions or concerns about your shoulder exercises  Safety tips:  Talk to your healthcare provider before you start an exercise program  A physical therapist can teach you how to do core strengthening exercises safely  Do the exercises on a mat or firm surface  A firm surface will support your spine and prevent low back pain  Do not do these exercises on a bed  Move slowly and smoothly  Avoid fast or jerky motions  Stop if you feel pain  Core exercises should not be painful  Stop if you feel pain  Breathe normally during core exercises  Do not hold your breath  This may cause an increase in blood pressure and prevent muscle strengthening  Your healthcare provider will tell you when to inhale and exhale during the exercise  Begin all of your exercises with abdominal bracing  Abdominal bracing helps warm up your core muscles  You can also practice abdominal bracing throughout the day  Lie on your back with your knees bent and feet flat on the floor  Place your arms in a relaxed position beside your body  Tighten your abdominal muscles  Pull your belly button in and up toward your spine  Hold for 5 seconds  Relax your muscles  Repeat 10 times  Core strengthening exercises: Your healthcare provider will tell you how often to do these exercises  The provider will also tell you how many repetitions of each exercise you should do  Hold each exercise for 5 seconds or as directed  As you get stronger, increase your hold to 10 to 15 seconds   You can do some of these exercises on a stability ball, or with a weight  Ask your healthcare provider how to use a stability ball or weight for these exercises:  Bridging:  Lie on your back with your knees bent and feet flat on the floor  Rest your arms at your side  Tighten your buttocks, and then lift your hips 1 inch off the floor  Hold for 5 seconds  When you can do this exercise without pain for 10 seconds, increase the distance you lift your hips  A good goal is to be able to lift your hips so that your shoulders, hips, and knees are in a straight line  Dead bug:  Lie on your back with your knees bent and feet flat on the floor  Place your arms in a relaxed position beside your body  Begin with abdominal bracing  Next, raise one leg, keeping your knee bent  Hold for 5 seconds  Repeat with the other leg  When you can do this exercise without pain for 10 to 15 seconds, you may raise one straight leg and hold  Repeat with the other leg  Quadruped:  Place your hands and knees on the floor  Keep your wrists directly below your shoulders and your knees directly below your hips  Pull your belly button in toward your spine  Do not flatten or arch your back  Tighten your abdominal muscles below your belly button  Hold for 5 seconds  When you can do this exercise without pain for 10 to 15 seconds, you may extend one arm and hold  Repeat on the other side  Side bridge exercises:      Standing side bridge:  Stand next to a wall and extend one arm toward the wall  Place your palm flat on the wall with your fingers pointing upward  Begin with abdominal bracing  Next, without moving your feet, slowly bend your arm to 90 degrees  Hold for 5 seconds  Repeat on the other side  When you can do this exercise without pain for 10 to 15 seconds, you may do the bent leg side bridge on the floor  Bent leg side bridge:  Lie on one side with your legs, hips, and shoulders in a straight line   Prop yourself up onto your forearm so your elbow is directly below your shoulder  Bend your knees back to 90 degrees  Begin with abdominal bracing  Next, lift your hips and balance yourself on your forearm and knees  Hold for 5 seconds  Repeat on the other side  When you can do this exercise without pain for 10 to 15 seconds, you may do the straight leg side bridge on the floor  Straight leg side bridge:  Lie on one side with your legs, hips, and shoulders in a straight line  Prop yourself up onto your forearm so your elbow is directly below your shoulder  Begin with abdominal bracing  Lift your hips off the floor and balance yourself on your forearm and the outside of your flexed foot  Do not let your ankle bend sideways  Hold for 5 seconds  Repeat on the other side  When you can do this exercise without pain for 10 to 15 seconds, ask your healthcare provider for more advanced exercises  Superman:  Lie on your stomach  Extend your arms forward on the floor  Tighten your abdominal muscles and lift your right hand and left leg off the floor  Hold this position  Slowly return to the starting position  Tighten your abdominal muscles and lift your left hand and right leg off the floor  Hold this position  Slowly return to the starting position  Clam:  Lie on your side with your knees bent  Put your bottom arm under your head to keep your neck in line  Put your top hand on your hip to keep your pelvis from moving  Put your heels together, and keep them together during this exercise  Slowly raise your top knee toward the ceiling  Then lower your leg so your knees are together  Repeat this exercise 10 times  Then switch sides and do the exercise 10 times with the other leg  Curl up:  Lie on your back with your knees bent and feet flat on the floor  Place your hands, palms down, underneath your lower back  Next, with your elbows on the floor, lift your shoulders and chest 2 to 3 inches off the floor   Keep your head in line with your shoulders  Hold this position  Slowly return to the starting position  Straight leg raises:  Lie on your back with one leg straight  Bend the other knee and place your foot flat on the floor  Tighten your abdominal muscles  Keep your leg straight and slowly lift it straight up 6 to 12 inches off the floor  Hold this position  Lower your leg slowly  Do as many repetitions as directed on this side  Repeat with the other leg  Plank:  Lie on your stomach  Bend your elbows and place your forearms flat on the floor  Lift your chest, stomach, and knees off the floor  Make sure your elbows are below your shoulders  Your body should be in a straight line  Do not let your hips or lower back sink to the ground  Squeeze your abdominal muscles together and hold for 15 seconds  To make this exercise harder, hold for 30 seconds or lift 1 leg at a time  Bicycles:  Lie on your back  Bend both knees and bring them toward your chest  Your calves should be parallel to the floor  Place the palms of your hands on the back of your head  Straighten your right leg and keep it lifted 2 inches off the floor  Raise your head and shoulders off the floor and twist towards your left  Keep your head and shoulders lifted  Bend your right knee while you straighten your left leg  Keep your left leg 2 inches off the floor  Twist your head and chest towards the left leg  Continue to straighten 1 leg at a time and twist        Follow up with your doctor as directed:  Write down your questions so you remember to ask them during your visits  © Copyright [employer] 2022 Information is for End User's use only and may not be sold, redistributed or otherwise used for commercial purposes  All illustrations and images included in CareNotes® are the copyrighted property of [employer]  or [facility] [place]   The above information is an  only   It is not intended as medical advice for individual conditions or treatments  Talk to your doctor, nurse or pharmacist before following any medical regimen to see if it is safe and effective for you

## 2022-06-27 NOTE — PROGRESS NOTES
Assessment  1  Neurogenic claudication due to lumbar spinal stenosis  -     Ambulatory referral to Neurosurgery; Future    2  Major depressive disorder, single episode, mild (HCC)    3  Cervical spinal cord compression (HCC)    4  Morbid obesity ([address] )    5  Thoracic spondylosis  -     Case request operating room: BLOCK MEDIAL BRANCH T9, T10, T11 or alternate level; Standing  -     Case request operating room: BLOCK MEDIAL BRANCH T9, T10, T11 or alternate level    Greater than 75% relief of leg pain from both radiculopathy and diabetic peripheral neuropathy as well as low back pain with improved ability to participate with IADLs after [place] SCS trial (dual lead)  Currently noting right L2 and L3 dermatomal distribution of weakness likely related to  right neural foraminal disc protrusion at L2-L3 resulting in moderate to severe right neural foraminal narrowing  At L3-L4 there is bilateral facet hypertrophy  There is a right neural foraminal disc protrusion  Moderate right neural foraminal narrowing  Given weakness and plateued improvement with PT will refer to [address] [place] [address] for consideration of possible decompression  Noting mid back pain in paraspinal muscles; +ttp over thoracic paraspinal muscles  Risks, benefits and alternatives to medial branch nerve blocks discussed, handouts provided  Previously reported the following symptomatology:     Right-sided lumbar radicular pain in L5 and S1 dermatomal distribution  80% relief after recent right L5 and S1 transforaminal epidural steroid injection  Moderate posterolateral disc herniation at L5-S1 in addition to spondyloarthropathy contributing to moderate transforaminal stenosis here  Otherwise recent MRI shows no significant central canal stenosis or transforaminal nerve root compression   The patient had right knee total arthroplasty which has considerably help with his right sided knee pain; however lumbar radiculopathy in addition to debilitating diabetic peripheral neuropathy  contributes to significant difficulties with daily function and overall quality of life  Risks, benefits and alternatives to spinal cord stimulator trial thoroughly discussed with patient  Handouts provided and all questions answered patient's satisfaction  MRI previously ordered which shows mild central canal stenosis at T10-T11 in addition to moderate facet arthropathy this level  Otherwise MRI thoracic spine within normal limits  pain consistent with thoracic radicular symptoms secondary to T10-T11 disc herniation which from 2018 MRI thoracic spine does not contribute to significant cord compression  Radiating and radicular pain in T10 and T11 dermatomal distribution  Pain is along lower thoracic T10 and T11 dermatomes, particularly with palpation of lower thoracic ribs and posterolateral distribution  Interestingly his significant and advanced lumbar facet arthropathy which is not a  primary source of his pain at this time  He previously had epidural steroid injections in the past with Dr Lisandro Velazquez with noted relief  For radicular pain  Reasonable to reobtain imaging, and pursue multimodal pain therapy plan as noted below  Plan  - Will refer to [address] [place] [address] for possible decompression given right L2 and L3 radiculopathy from transforaminal stenosis  - Bilateral T9, T10, T11 medial branch blocks #1 (or alternate level)  -Lyrica 75 mg t i d   Discontinued secondary to side effects  counseled regarding sedative effects of taking this medication and provided up titration calendar  Counseled not to take medication while driving or operating heavy machinery/using stairs  -continue other analgesics as prescribed previously well provider's  Counseled extensively as noted below regarding the risks of opioid medications in combination with Lyrica    -physical therapy for  Lumbar facet arthropathy, lumbar radiculopathy, mid back pain    There are risks associated with opioid medications, including dependence, addiction and tolerance  The patient understands and agrees to use these medications only as prescribed  Potential side effects of the medications include, but are not limited to, constipation, drowsiness, addiction, impaired judgment and risk of fatal overdose if not taken as prescribed  The patient was warned against driving while taking sedation medications  Sharing medications is a felony  At this point in time, the patient is showing no signs of addiction, abuse, diversion or suicidal ideation  A urine drug screen was collected at today's office visit as part of our medication management protocol  The point of care testing results were appropriate for what was being prescribed  The specimen will be sent for confirmatory testing  The drug screen is medically necessary because the patient is either dependent on opioid medication or is being considered for opioid medication therapy and the results could impact ongoing or future treatment  The drug screen is to evaluate for the presences or absence of prescribed, non-prescribed, and/or illicit drugs/substances  [place] Prescription Drug Monitoring Program report was reviewed and was appropriate     Complete risks and benefits including bleeding, infection, tissue reaction, nerve injury and allergic reaction were discussed  The approach was demonstrated using models and literature was provided  Verbal and written consent was obtained  My impressions and treatment recommendations were discussed in detail with the patient who verbalized understanding and had no further questions  Discharge instructions were provided  I personally saw and examined the patient and I agree with the above discussed plan of care  No orders of the defined types were placed in this encounter        History of Present Illness    Greater than 75% relief of leg pain from both radiculopathy and diabetic peripheral neuropathy as well as low back pain with improved ability to participate with IADLs after Nevro SCS trial (dual lead)  Continues to describe right hip flexor weakness without any pain  Additionally notes mid back pain described by arthritic features  Previously reported the following symptomatology:     Right-sided lumbar radicular pain in L5 and S1 dermatomal distribution  80% relief after recent right L5 and S1 transforaminal epidural steroid injection  Moderate posterolateral disc herniation at L5-S1 in addition to spondyloarthropathy contributing to moderate transforaminal stenosis here  Otherwise recent MRI shows no significant central canal stenosis or transforaminal nerve root compression  The patient had right knee total arthroplasty which has considerably help with his right sided knee pain; however lumbar radiculopathy in addition to debilitating diabetic peripheral neuropathy  contributes to significant difficulties with daily function and overall quality of life  Robert Ricks is a 62 y o  male with a past medical history of  Non-insulin controlled type 2 diabetes, obesity, chronic low back pain described primarily as arthritic in nature  He describes 8/10 low back pain that is worse in the mornings and worse at the end of the day  The pain is characterized by achy, nagging, indolent, crampy, stabbing pain in his axial low back  The patient describes that the pain is worse with standing for long periods of time on hard surfaces as well as with walking  The patient is a very active individual and feels as though this pain compromises his participation with independent activities of daily living  He ambulates with a walker  The pain can be debilitating at times and contribute to significant disability, compromising overall activity and independent activities of daily living  He has tried physical therapy with limited relief of symptoms  Medications  he is currently on include   Celebrex 200 mg per day, Percocet 5-325 mg Q 6 hours p r n  pain    He takes 200 mg of gabapentin per day and was recently started on Robaxin 500 mg t i d  for back spasms  He additionally has pain radiating across his ribs bilaterally in the T10 and T11 dermatomal distribution  He demonstrates good strength and denies any weakness numbness or paresthesias  The patient denies any bowel or bladder dysfunction as well  I have personally reviewed and/or updated the patient's past medical history, past surgical history, family history, social history, current medications, allergies, and vital signs today  Review of Systems   Constitutional: Positive for activity change  HENT: Negative  Eyes: Negative  Respiratory: Negative  Cardiovascular: Negative  Gastrointestinal: Negative  Endocrine: Negative  Genitourinary: Negative  Musculoskeletal: Positive for arthralgias, back pain, gait problem and myalgias  Skin: Negative  Allergic/Immunologic: Negative  Neurological: Positive for numbness  Negative for weakness  Hematological: Negative  Psychiatric/Behavioral: Negative  All other systems reviewed and are negative  Patient Active Problem List   Diagnosis    Herniation of intervertebral disc of thoracic region    Diabetic peripheral neuropathy (HCC)    Morbid obesity ([address] )    Cervical spinal cord compression (HCC)    Mild depression       Past Medical History:   Diagnosis Date    Anxiety     Arthritis     Chronic pain disorder     mid back region    Colon polyp     COVID-19     CPAP (continuous positive airway pressure) dependence     Pt uses nightly    Diabetes mellitus ([address] )     Diverticulosis     History of recent hospitalization 06/16/2021    Pt was [facility] after syncopal episode  Pt saw cardiology- all tests negative  Pt had nl EEG  pt states is was a medication interaction      Hyperlipidemia     Hypertension     Obesity     Sleep apnea     Spinal stenosis        Past Surgical History:   Procedure Laterality Date  ACHILLES TENDON REPAIR      Pt had a rupture in right and left 2 years apart    COLONOSCOPY      EPIDURAL BLOCK INJECTION      Pt had in lumbar region   EPIDURAL BLOCK INJECTION N/A 4/6/2021    Procedure: T-11-T-12 INTERLAMINAR THORACIC EPIDURAL STEROID INJECTION;  Surgeon: Dusty Barron MD;  [facility];  Service: Pain Management     EPIDURAL BLOCK INJECTION Right 7/20/2021    Procedure: L5 and S1 TRANSFORAMINAL EPIDURAL STEROID INJECTION;  Surgeon: Dusty Barron MD;  [facility];  Service: Pain Management     FOOT SURGERY Left     Left great toe- had a hammertoe   JOINT REPLACEMENT Left     Total hip, knee    JOINT REPLACEMENT Right     Total hip, knee    KNEE ARTHROSCOPY Bilateral     CT PERCUT IMPLNT NEUROELECT,EPIDURAL Bilateral 9/30/2021    Procedure: [place] SCS TRIAL;  Surgeon: Dusty Barron MD;  Location: [facility];  Service: Pain Management     CT SURG IMPLNT Stephanie Mohans Left 10/26/2021    Procedure: Thoracic laminectomies for placement of spinal cord stimulator and internal pulse generator, use of neuromonitoring, left sided pulse generator;  Surgeon: Mitchell Steele MD;  Location:  MAIN OR;  Service: Neurosurgery    TOTAL KNEE ARTHROPLASTY Left        Family History   Problem Relation Age of Onset    Arthritis Mother     Hypertension Father        Social History     Occupational History    Not on file   Tobacco Use    Smoking status: Never Smoker    Smokeless tobacco: Current User     Types: Snuff    Tobacco comment: still using snuff   Vaping Use    Vaping Use: Never used   Substance and Sexual Activity    Alcohol use:  Yes     Alcohol/week: 2 0 standard drinks     Types: 2 Cans of beer per week     Comment: social, weekends    Drug use: No    Sexual activity: Yes       Current Outpatient Medications on File Prior to Visit   Medication Sig    acetaminophen (TYLENOL) 500 mg tablet Take 1,000 mg by mouth every 6 (six) hours as needed for mild pain    amoxicillin (AMOXIL) 500 mg capsule TAKE 4 CAPSULES BY MOUTH 1 HOUR PRIOR TO DENTAL PROCEDURES    atorvastatin (LIPITOR) 20 mg tablet Take 20 mg by mouth daily    Empagliflozin (Jardiance) 25 MG TABS Take 25 mg by mouth daily    ERGOCALCIFEROL PO Take 50,000 Units by mouth 2 (two) times a week     famotidine (PEPCID) 20 mg tablet Take 20 mg by mouth as needed     LORazepam (ATIVAN) 0 5 mg tablet Take 0 5 mg by mouth daily as needed    mupirocin (BACTROBAN) 2 % ointment      Semaglutide,0 25 or 0 5MG/DOS, (Ozempic, 0 25 or 0 5 MG/DOSE,) 2 MG/1 5ML SOPN Inject 0 5 mg under the skin once a week     sertraline (ZOLOFT) 50 mg tablet Take 75 mg by mouth     valsartan (DIOVAN) 80 mg tablet Take 80 mg by mouth daily     traZODone (DESYREL) 100 mg tablet 50 mg daily at bedtime   (Patient not taking: Reported on 6/27/2022)     No current facility-administered medications on file prior to visit  Allergies   Allergen Reactions    Paxil [Paroxetine] GI Intolerance         Physical Exam    /82 (BP Location: Left arm, Patient Position: Sitting, Cuff Size: Large)   Pulse 76   Ht 6' 4" (1 93 m)   Wt (!) 156 kg (345 lb)   SpO2 97%   BMI 41 99 kg/m²     Constitutional: normal, well developed, well nourished, alert, in no distress and non-toxic and no overt pain behavior  and obese  Eyes: anicteric  HEENT: grossly intact  Neck: supple, symmetric, trachea midline and no masses   Pulmonary:even and unlabored  Cardiovascular:No edema or pitting edema present  Skin:Normal without rashes or lesions and well hydrated  Psychiatric:Mood and affect appropriate  Neurologic:Cranial Nerves II-XII grossly intact Sensation grossly intact; no clonus negative diallo's  Reflexes 2+ and brisk  SLR  Weakly positive right-sided we  Musculoskeletal: hunched gait  Unable to perform normal heel toe and tip toe walking   Slight weakness with right lower extremity strong plantar flexion; 5/5 strength with active range of motion movements in all other extremities bilaterally  mild pain with thoracic and lumbar facet loading bilaterally and with lateral spine rotation  mild ttp over thoracic and lumbar paraspinal muscles  Negative jahaira's test, negative gaenslen's negative SIJ loading bilaterally  Tenderness to palpation over inferior rib borders of T10-T11 posterolateral ribs bilaterally,  Eliciting radicular pain in aformentioned dermatomal distributions  Imaging    Result Narrative   Exam - thoracic spine mri scan     History: Bilateral leg weakness  Technique: Using a surface coil sagittal and axial T1-weighted and T2-weighted  scans were obtained of the thoracic spine  Findings: Image detail is suboptimal secondary to patient's obesity  Thoracic spinal cord is normal in size and configuration and MRI signal  intensity  There are no intramedullary lesions  Vertebral bodies are in anatomic alignment  There are degenerative changes in endplates and facet joints throughout the  thoracic spine  There is central spinal stenosis at T10-T11  There is no marlyn spinal cord  compression however  Other Result Information   Interface, Rad Results In - 08/07/2018  7:34 PM EDT  Formatting of this note might be different from the original   Exam - thoracic spine mri scan     History: Bilateral leg weakness  Technique: Using a surface coil sagittal and axial T1-weighted and T2-weighted  scans were obtained of the thoracic spine  Findings: Image detail is suboptimal secondary to patient's obesity  Thoracic spinal cord is normal in size and configuration and MRI signal  intensity  There are no intramedullary lesions  Vertebral bodies are in anatomic alignment  There are degenerative changes in endplates and facet joints throughout the  thoracic spine  There is central spinal stenosis at T10-T11  There is no marlyn spinal cord  compression however  IMPRESSION:  Impression:  1   Technically suboptimal study   2  Marked spondylosis  3  At T10-T11 there is spinal stenosis however there is no marlyn spinal cord  compression  MRI LUMBAR SPINE WITHOUT CONTRAST     INDICATION: [id number]: Radiculopathy, lumbar region      COMPARISON:  5/6/2018; 3/16/2021     TECHNIQUE:  Sagittal T1, sagittal T2, sagittal inversion recovery, axial T1 and axial T2, coronal T2     IMAGE QUALITY:  Diagnostic     FINDINGS:     VERTEBRAL BODIES:  There are 5 lumbar type vertebral bodies  Mild to moderate levoscoliosis mid lumbar spine  Trace grade 1 anterolisthesis of L3 on L4  Minimal grade 1 retrolisthesis of L4 on L5  Scattered degenerative endplate changes  No focally   suspicious marrow lesions  No bone marrow edema or compression abnormality      SACRUM:  Scattered degenerative endplate changes  No focally suspicious marrow lesions  No bone marrow edema or compression abnormality      DISTAL CORD AND CONUS:  Normal size and signal within the distal cord and conus  The conus medullaris terminates at the L2 level      PARASPINAL SOFT TISSUES:  Fatty atrophy of the psoas muscles noted, similar to the prior CT possibly from disuse  Marked atrophy of the left iliac is muscle as well      LOWER THORACIC DISC SPACES:  T11-T12: There is a small central disc herniation, protrusion type  Minimal central canal narrowing  Neural foramina patent bilaterally      T12-L1: There is no focal disk herniation, central canal or neural foraminal narrowing      LUMBAR DISC SPACES:     L1-L2:  There is bilateral facet hypertrophy  There is a left neural foraminal disc protrusion  Mild left neural foraminal narrowing  Central canal patent  Minimal right neural foraminal narrowing      L2-L3:  There is bilateral facet hypertrophy  There is a right neural foraminal disc protrusion  Moderate to severe right neural foraminal narrowing  Mild central canal and left neural foraminal narrowing      L3-L4:  There is bilateral facet hypertrophy  There is a right neural foraminal disc protrusion  Moderate right neural foraminal narrowing  Mild central canal and left neural foraminal narrowing      L4-L5:  There is loss of disc height and signal   There is a disc osteophyte complex with a superimposed left neural foraminal disc protrusion  There is moderate to severe left neural foraminal narrowing  Mild central canal narrowing  Moderate right   neural foraminal narrowing     L5-S1:  There is bilateral facet hypertrophy  There is a left neural foraminal disc protrusion  Severe left neural foraminal narrowing  Mild central canal and right neural foraminal narrowing      IMPRESSION:        1  Advanced multilevel spondylosis    2   Moderate to severe fatty atrophy of the bilateral psoas muscles and iliac is muscles left greater than right, possibly from disuse or denervation atrophy

## 2022-06-27 NOTE — H&P (VIEW-ONLY)
Assessment  1  Neurogenic claudication due to lumbar spinal stenosis  -     Ambulatory referral to Neurosurgery; Future    2  Major depressive disorder, single episode, mild (HCC)    3  Cervical spinal cord compression (HCC)    4  Morbid obesity ([address] )    5  Thoracic spondylosis  -     Case request operating room: BLOCK MEDIAL BRANCH T9, T10, T11 or alternate level; Standing  -     Case request operating room: BLOCK MEDIAL BRANCH T9, T10, T11 or alternate level    Greater than 75% relief of leg pain from both radiculopathy and diabetic peripheral neuropathy as well as low back pain with improved ability to participate with IADLs after Nevro SCS trial (dual lead)  Currently noting right L2 and L3 dermatomal distribution of weakness likely related to  right neural foraminal disc protrusion at L2-L3 resulting in moderate to severe right neural foraminal narrowing  At L3-L4 there is bilateral facet hypertrophy  There is a right neural foraminal disc protrusion  Moderate right neural foraminal narrowing  Given weakness and plateued improvement with PT will refer to [address] [place] [address] for consideration of possible decompression  Noting mid back pain in paraspinal muscles; +ttp over thoracic paraspinal muscles  Risks, benefits and alternatives to medial branch nerve blocks discussed, handouts provided  Previously reported the following symptomatology:     Right-sided lumbar radicular pain in L5 and S1 dermatomal distribution  80% relief after recent right L5 and S1 transforaminal epidural steroid injection  Moderate posterolateral disc herniation at L5-S1 in addition to spondyloarthropathy contributing to moderate transforaminal stenosis here  Otherwise recent MRI shows no significant central canal stenosis or transforaminal nerve root compression   The patient had right knee total arthroplasty which has considerably help with his right sided knee pain; however lumbar radiculopathy in addition to debilitating diabetic peripheral neuropathy  contributes to significant difficulties with daily function and overall quality of life  Risks, benefits and alternatives to spinal cord stimulator trial thoroughly discussed with patient  Handouts provided and all questions answered patient's satisfaction  MRI previously ordered which shows mild central canal stenosis at T10-T11 in addition to moderate facet arthropathy this level  Otherwise MRI thoracic spine within normal limits  pain consistent with thoracic radicular symptoms secondary to T10-T11 disc herniation which from 2018 MRI thoracic spine does not contribute to significant cord compression  Radiating and radicular pain in T10 and T11 dermatomal distribution  Pain is along lower thoracic T10 and T11 dermatomes, particularly with palpation of lower thoracic ribs and posterolateral distribution  Interestingly his significant and advanced lumbar facet arthropathy which is not a  primary source of his pain at this time  He previously had epidural steroid injections in the past with Dr Estelle Rodriguez with noted relief  For radicular pain  Reasonable to reobtain imaging, and pursue multimodal pain therapy plan as noted below  Plan  - Will refer to [address] [place] [address] for possible decompression given right L2 and L3 radiculopathy from transforaminal stenosis  - Bilateral T9, T10, T11 medial branch blocks #1 (or alternate level)  -Lyrica 75 mg t i d   Discontinued secondary to side effects  counseled regarding sedative effects of taking this medication and provided up titration calendar  Counseled not to take medication while driving or operating heavy machinery/using stairs  -continue other analgesics as prescribed previously well provider's  Counseled extensively as noted below regarding the risks of opioid medications in combination with Lyrica    -physical therapy for  Lumbar facet arthropathy, lumbar radiculopathy, mid back pain    There are risks associated with opioid medications, including dependence, addiction and tolerance  The patient understands and agrees to use these medications only as prescribed  Potential side effects of the medications include, but are not limited to, constipation, drowsiness, addiction, impaired judgment and risk of fatal overdose if not taken as prescribed  The patient was warned against driving while taking sedation medications  Sharing medications is a felony  At this point in time, the patient is showing no signs of addiction, abuse, diversion or suicidal ideation  A urine drug screen was collected at today's office visit as part of our medication management protocol  The point of care testing results were appropriate for what was being prescribed  The specimen will be sent for confirmatory testing  The drug screen is medically necessary because the patient is either dependent on opioid medication or is being considered for opioid medication therapy and the results could impact ongoing or future treatment  The drug screen is to evaluate for the presences or absence of prescribed, non-prescribed, and/or illicit drugs/substances  [place] Prescription Drug Monitoring Program report was reviewed and was appropriate     Complete risks and benefits including bleeding, infection, tissue reaction, nerve injury and allergic reaction were discussed  The approach was demonstrated using models and literature was provided  Verbal and written consent was obtained  My impressions and treatment recommendations were discussed in detail with the patient who verbalized understanding and had no further questions  Discharge instructions were provided  I personally saw and examined the patient and I agree with the above discussed plan of care  No orders of the defined types were placed in this encounter        History of Present Illness    Greater than 75% relief of leg pain from both radiculopathy and diabetic peripheral neuropathy as well as low back pain with improved ability to participate with IADLs after Nevro SCS trial (dual lead)  Continues to describe right hip flexor weakness without any pain  Additionally notes mid back pain described by arthritic features  Previously reported the following symptomatology:     Right-sided lumbar radicular pain in L5 and S1 dermatomal distribution  80% relief after recent right L5 and S1 transforaminal epidural steroid injection  Moderate posterolateral disc herniation at L5-S1 in addition to spondyloarthropathy contributing to moderate transforaminal stenosis here  Otherwise recent MRI shows no significant central canal stenosis or transforaminal nerve root compression  The patient had right knee total arthroplasty which has considerably help with his right sided knee pain; however lumbar radiculopathy in addition to debilitating diabetic peripheral neuropathy  contributes to significant difficulties with daily function and overall quality of life  Shantel Richmond is a 62 y o  male with a past medical history of  Non-insulin controlled type 2 diabetes, obesity, chronic low back pain described primarily as arthritic in nature  He describes 8/10 low back pain that is worse in the mornings and worse at the end of the day  The pain is characterized by achy, nagging, indolent, crampy, stabbing pain in his axial low back  The patient describes that the pain is worse with standing for long periods of time on hard surfaces as well as with walking  The patient is a very active individual and feels as though this pain compromises his participation with independent activities of daily living  He ambulates with a walker  The pain can be debilitating at times and contribute to significant disability, compromising overall activity and independent activities of daily living  He has tried physical therapy with limited relief of symptoms  Medications  he is currently on include   Celebrex 200 mg per day, Percocet 5-325 mg Q 6 hours p r n  pain    He takes 200 mg of gabapentin per day and was recently started on Robaxin 500 mg t i d  for back spasms  He additionally has pain radiating across his ribs bilaterally in the T10 and T11 dermatomal distribution  He demonstrates good strength and denies any weakness numbness or paresthesias  The patient denies any bowel or bladder dysfunction as well  I have personally reviewed and/or updated the patient's past medical history, past surgical history, family history, social history, current medications, allergies, and vital signs today  Review of Systems   Constitutional: Positive for activity change  HENT: Negative  Eyes: Negative  Respiratory: Negative  Cardiovascular: Negative  Gastrointestinal: Negative  Endocrine: Negative  Genitourinary: Negative  Musculoskeletal: Positive for arthralgias, back pain, gait problem and myalgias  Skin: Negative  Allergic/Immunologic: Negative  Neurological: Positive for numbness  Negative for weakness  Hematological: Negative  Psychiatric/Behavioral: Negative  All other systems reviewed and are negative  Patient Active Problem List   Diagnosis    Herniation of intervertebral disc of thoracic region    Diabetic peripheral neuropathy (HCC)    Morbid obesity ([address] )    Cervical spinal cord compression (HCC)    Mild depression       Past Medical History:   Diagnosis Date    Anxiety     Arthritis     Chronic pain disorder     mid back region    Colon polyp     COVID-19     CPAP (continuous positive airway pressure) dependence     Pt uses nightly    Diabetes mellitus ([address] )     Diverticulosis     History of recent hospitalization 06/16/2021    Pt was admitted to [facility] after syncopal episode  Pt saw cardiology- all tests negative  Pt had nl EEG  pt states is was a medication interaction      Hyperlipidemia     Hypertension     Obesity     Sleep apnea     Spinal stenosis        Past Surgical History:   Procedure Laterality Date  ACHILLES TENDON REPAIR      Pt had a rupture in right and left 2 years apart    COLONOSCOPY      EPIDURAL BLOCK INJECTION      Pt had in lumbar region   EPIDURAL BLOCK INJECTION N/A 4/6/2021    Procedure: T-11-T-12 INTERLAMINAR THORACIC EPIDURAL STEROID INJECTION;  Surgeon: Venice Lawrence MD;  [facility];  Service: Pain Management     EPIDURAL BLOCK INJECTION Right 7/20/2021    Procedure: L5 and S1 TRANSFORAMINAL EPIDURAL STEROID INJECTION;  Surgeon: Venice Lawrence MD;  [facility];  Service: Pain Management     FOOT SURGERY Left     Left great toe- had a hammertoe   JOINT REPLACEMENT Left     Total hip, knee    JOINT REPLACEMENT Right     Total hip, knee    KNEE ARTHROSCOPY Bilateral     AR PERCUT IMPLNT NEUROELECT,EPIDURAL Bilateral 9/30/2021    Procedure: Fara Brooks SCS TRIAL;  Surgeon: Venice Lawrence MD;  [facility];  Service: Pain Management     AR SURG IMPLNT Pricilla Shear Left 10/26/2021    Procedure: Thoracic laminectomies for placement of spinal cord stimulator and internal pulse generator, use of neuromonitoring, left sided pulse generator;  Surgeon: Guero Samaniego MD;  Location:  MAIN OR;  Service: Neurosurgery    TOTAL KNEE ARTHROPLASTY Left        Family History   Problem Relation Age of Onset    Arthritis Mother     Hypertension Father        Social History     Occupational History    Not on file   Tobacco Use    Smoking status: Never Smoker    Smokeless tobacco: Current User     Types: Snuff    Tobacco comment: still using snuff   Vaping Use    Vaping Use: Never used   Substance and Sexual Activity    Alcohol use:  Yes     Alcohol/week: 2 0 standard drinks     Types: 2 Cans of beer per week     Comment: social, weekends    Drug use: No    Sexual activity: Yes       Current Outpatient Medications on File Prior to Visit   Medication Sig    acetaminophen (TYLENOL) 500 mg tablet Take 1,000 mg by mouth every 6 (six) hours as needed for mild pain    amoxicillin (AMOXIL) 500 mg capsule TAKE 4 CAPSULES BY MOUTH 1 HOUR PRIOR TO DENTAL PROCEDURES    atorvastatin (LIPITOR) 20 mg tablet Take 20 mg by mouth daily    Empagliflozin (Jardiance) 25 MG TABS Take 25 mg by mouth daily    ERGOCALCIFEROL PO Take 50,000 Units by mouth 2 (two) times a week     famotidine (PEPCID) 20 mg tablet Take 20 mg by mouth as needed     LORazepam (ATIVAN) 0 5 mg tablet Take 0 5 mg by mouth daily as needed    mupirocin (BACTROBAN) 2 % ointment      Semaglutide,0 25 or 0 5MG/DOS, (Ozempic, 0 25 or 0 5 MG/DOSE,) 2 MG/1 5ML SOPN Inject 0 5 mg under the skin once a week     sertraline (ZOLOFT) 50 mg tablet Take 75 mg by mouth     valsartan (DIOVAN) 80 mg tablet Take 80 mg by mouth daily     traZODone (DESYREL) 100 mg tablet 50 mg daily at bedtime   (Patient not taking: Reported on 6/27/2022)     No current facility-administered medications on file prior to visit  Allergies   Allergen Reactions    Paxil [Paroxetine] GI Intolerance         Physical Exam    /82 (BP Location: Left arm, Patient Position: Sitting, Cuff Size: Large)   Pulse 76   Ht 6' 4" (1 93 m)   Wt (!) 156 kg (345 lb)   SpO2 97%   BMI 41 99 kg/m²     Constitutional: normal, well developed, well nourished, alert, in no distress and non-toxic and no overt pain behavior  and obese  Eyes: anicteric  HEENT: grossly intact  Neck: supple, symmetric, trachea midline and no masses   Pulmonary:even and unlabored  Cardiovascular:No edema or pitting edema present  Skin:Normal without rashes or lesions and well hydrated  Psychiatric:Mood and affect appropriate  Neurologic:Cranial Nerves II-XII grossly intact Sensation grossly intact; no clonus negative diallo's  Reflexes 2+ and brisk  SLR  Weakly positive right-sided we  Musculoskeletal: hunched gait  Unable to perform normal heel toe and tip toe walking   Slight weakness with right lower extremity strong plantar flexion; 5/5 strength with active range of motion movements in all other extremities bilaterally  mild pain with thoracic and lumbar facet loading bilaterally and with lateral spine rotation  mild ttp over thoracic and lumbar paraspinal muscles  Negative jahaira's test, negative gaenslen's negative SIJ loading bilaterally  Tenderness to palpation over inferior rib borders of T10-T11 posterolateral ribs bilaterally,  Eliciting radicular pain in aformentioned dermatomal distributions  Imaging    Result Narrative   Exam - thoracic spine mri scan     History: Bilateral leg weakness  Technique: Using a surface coil sagittal and axial T1-weighted and T2-weighted  scans were obtained of the thoracic spine  Findings: Image detail is suboptimal secondary to patient's obesity  Thoracic spinal cord is normal in size and configuration and MRI signal  intensity  There are no intramedullary lesions  Vertebral bodies are in anatomic alignment  There are degenerative changes in endplates and facet joints throughout the  thoracic spine  There is central spinal stenosis at T10-T11  There is no marlyn spinal cord  compression however  Other Result Information   Interface, Rad Results In - 08/07/2018  7:34 PM EDT  Formatting of this note might be different from the original   Exam - thoracic spine mri scan     History: Bilateral leg weakness  Technique: Using a surface coil sagittal and axial T1-weighted and T2-weighted  scans were obtained of the thoracic spine  Findings: Image detail is suboptimal secondary to patient's obesity  Thoracic spinal cord is normal in size and configuration and MRI signal  intensity  There are no intramedullary lesions  Vertebral bodies are in anatomic alignment  There are degenerative changes in endplates and facet joints throughout the  thoracic spine  There is central spinal stenosis at T10-T11  There is no marlyn spinal cord  compression however  IMPRESSION:  Impression:  1   Technically suboptimal study   2  Marked spondylosis  3  At T10-T11 there is spinal stenosis however there is no marlyn spinal cord  compression  MRI LUMBAR SPINE WITHOUT CONTRAST     INDICATION: [id number]: Radiculopathy, lumbar region      COMPARISON:  5/6/2018; 3/16/2021     TECHNIQUE:  Sagittal T1, sagittal T2, sagittal inversion recovery, axial T1 and axial T2, coronal T2     IMAGE QUALITY:  Diagnostic     FINDINGS:     VERTEBRAL BODIES:  There are 5 lumbar type vertebral bodies  Mild to moderate levoscoliosis mid lumbar spine  Trace grade 1 anterolisthesis of L3 on L4  Minimal grade 1 retrolisthesis of L4 on L5  Scattered degenerative endplate changes  No focally   suspicious marrow lesions  No bone marrow edema or compression abnormality      SACRUM:  Scattered degenerative endplate changes  No focally suspicious marrow lesions  No bone marrow edema or compression abnormality      DISTAL CORD AND CONUS:  Normal size and signal within the distal cord and conus  The conus medullaris terminates at the L2 level      PARASPINAL SOFT TISSUES:  Fatty atrophy of the psoas muscles noted, similar to the prior CT possibly from disuse  Marked atrophy of the left iliac is muscle as well      LOWER THORACIC DISC SPACES:  T11-T12: There is a small central disc herniation, protrusion type  Minimal central canal narrowing  Neural foramina patent bilaterally      T12-L1: There is no focal disk herniation, central canal or neural foraminal narrowing      LUMBAR DISC SPACES:     L1-L2:  There is bilateral facet hypertrophy  There is a left neural foraminal disc protrusion  Mild left neural foraminal narrowing  Central canal patent  Minimal right neural foraminal narrowing      L2-L3:  There is bilateral facet hypertrophy  There is a right neural foraminal disc protrusion  Moderate to severe right neural foraminal narrowing  Mild central canal and left neural foraminal narrowing      L3-L4:  There is bilateral facet hypertrophy  There is a right neural foraminal disc protrusion  Moderate right neural foraminal narrowing  Mild central canal and left neural foraminal narrowing      L4-L5:  There is loss of disc height and signal   There is a disc osteophyte complex with a superimposed left neural foraminal disc protrusion  There is moderate to severe left neural foraminal narrowing  Mild central canal narrowing  Moderate right   neural foraminal narrowing     L5-S1:  There is bilateral facet hypertrophy  There is a left neural foraminal disc protrusion  Severe left neural foraminal narrowing  Mild central canal and right neural foraminal narrowing      IMPRESSION:        1  Advanced multilevel spondylosis    2   Moderate to severe fatty atrophy of the bilateral psoas muscles and iliac is muscles left greater than right, possibly from disuse or denervation atrophy

## 2022-07-14 ENCOUNTER — APPOINTMENT (OUTPATIENT)
Dept: RADIOLOGY | Facility: HOSPITAL | Age: 62
End: 2022-07-14
Payer: COMMERCIAL

## 2022-07-14 ENCOUNTER — HOSPITAL ENCOUNTER (OUTPATIENT)
Facility: HOSPITAL | Age: 62
Setting detail: OUTPATIENT SURGERY
Discharge: HOME | End: 2022-07-14
Attending: ANESTHESIOLOGY | Admitting: ANESTHESIOLOGY
Payer: COMMERCIAL

## 2022-07-14 VITALS
SYSTOLIC BLOOD PRESSURE: 147 MMHG | TEMPERATURE: 98.2 F | BODY MASS INDEX: 38.36 KG/M2 | OXYGEN SATURATION: 97 % | HEIGHT: 76 IN | WEIGHT: 315 LBS | HEART RATE: 67 BPM | DIASTOLIC BLOOD PRESSURE: 92 MMHG | RESPIRATION RATE: 18 BRPM

## 2022-07-14 LAB — GLUCOSE SERPL-MCNC: 95 MG/DL (ref 65–140)

## 2022-07-14 PROCEDURE — 82948 REAGENT STRIP/BLOOD GLUCOSE: CPT

## 2022-07-14 PROCEDURE — 64490 INJ PARAVERT F JNT C/T 1 LEV: CPT | Performed by: ANESTHESIOLOGY

## 2022-07-14 PROCEDURE — 64491 INJ PARAVERT F JNT C/T 2 LEV: CPT | Performed by: ANESTHESIOLOGY

## 2022-07-14 RX ORDER — BUPIVACAINE HCL/PF 2.5 MG/ML
10 VIAL (ML) INJECTION ONCE
Status: COMPLETED | OUTPATIENT
Start: 2022-07-14 | End: 2022-07-14

## 2022-07-14 RX ORDER — LIDOCAINE HYDROCHLORIDE 10 MG/ML
10 INJECTION, SOLUTION EPIDURAL; INFILTRATION; INTRACAUDAL; PERINEURAL ONCE
Status: COMPLETED | OUTPATIENT
Start: 2022-07-14 | End: 2022-07-14

## 2022-07-14 RX ORDER — ALPRAZOLAM 0.5 MG/1
0.5 TABLET ORAL ONCE
Status: COMPLETED | OUTPATIENT
Start: 2022-07-14 | End: 2022-07-14

## 2022-07-14 RX ORDER — METHYLPREDNISOLONE ACETATE 80 MG/ML
80 INJECTION, SUSPENSION INTRA-ARTICULAR; INTRALESIONAL; INTRAMUSCULAR; PARENTERAL; SOFT TISSUE ONCE
Status: COMPLETED | OUTPATIENT
Start: 2022-07-14 | End: 2022-07-14

## 2022-07-14 RX ADMIN — ALPRAZOLAM 0.5 MG: 0.5 TABLET ORAL at 09:33

## 2022-07-14 NOTE — PROCEDURES
Pre-procedure Diagnosis: Thoracic and Lumbar Spondylosis  Post-procedure Diagnosis: Thoracic and Lumbar Spondylosis  Procedure Title(s):  [BILATERAL T12, L1, L2] medial branch nerve blocks [(DIAGNOSTIC)]  Attending Surgeon:   Zena Persaud MD  Anesthesia:   Local     Indications: The patient is a 62y o  year-old male with a diagnosis of Thoracic and Lumbar Spondylosis  The patient's history and physical exam were reviewed  The risks, benefits and alternatives to the procedure were discussed, and all questions were answered to the patient's satisfaction  The patient agreed to proceed, and written informed consent was obtained  Procedure in Detail: The patient was brought into the procedure room and placed in the prone position on the fluoroscopy table  The area of the lumbar spine was prepped with chloraprep and then draped in a sterile manner  AP fluoroscopy was used to identify the [T12-L2] levels on the [LEFT] side  The C-arm was obliqued to visualize the junction of the superior articulate process and transverse process  The sacral ala was identified and marked  The skin in these identified areas was anesthetized with 1% lidocaine  A 22-gauge, 3½-inch spinal needle was advanced toward each of these points under fluoroscopic guidance  Once bone was contacted, negative aspiration was confirmed and [1-mL] of a [6mL]mixture of [5mL] [0 25% bupivicaine] and 1mL of 80mg/mL Depomedrol was injected at each level  (The same procedure was performed on the RIGHT side )    After the procedure was completed, the patient's back was cleaned and bandages were placed at the needle insertion sites  Disposition: The patient tolerated the procedure well, and there were no apparent complications  The patient was taken to the recovery area where written discharge instructions for the procedure were given  The patient was given a pain diary to determine if the patient's pain improves following the injection   Once the diary is returned we will consider next appropriate course of treatment      Postoperative pain relief [WAS] significant    Estimated Blood Loss: None  Specimens Obtained: N/A

## 2022-07-14 NOTE — INTERVAL H&P NOTE
H&P reviewed  After examining the patient discussed elevated BP and all cause mortality, risk of stroke, MI with steroid in epidural injection administration; will work with pcp to lower in coming weeks        Vitals:    07/14/22 0926   BP: (!) 178/98   Pulse: 71   Resp: 18   Temp: 98 1 °F (36 7 °C)   SpO2: 97%

## 2022-07-14 NOTE — OP NOTE
OPERATIVE REPORT  PATIENT NAME: Haresh Moss    :  1960  MRN: [id number]  Pt Location:  GI ROOM 01    SURGERY DATE: 2022    Surgeon(s) and Role: Bari Wright MD - Primary    Preop Diagnosis:  Thoracic spondylosis [K23 709]  Thoracic and Lumbar Spondylosis  Post-Op Diagnosis Codes: * Thoracic spondylosis [id number]  Thoracic and Lumbar Spondylosis  Procedure(s) (LRB):  BLOCK MEDIAL BRANCH T12, L1, L2 (Bilateral)    Specimen(s):  * No specimens in log *    Estimated Blood Loss:   Minimal    Drains:  * No LDAs found *    Anesthesia Type:   Local    Operative Indications:  Thoracic spondylosis [Q42 151]  Thoracic and Lumbar Spondylosis  Operative Findings:  Bilateral T12, L1, L2 medial branch nerve regions identified under fluoroscopic guidance         Complications:   None    Procedure and Technique:  Please see detailed procedure note     I was present for the entire procedure    Patient Disposition:  PACU       SIGNATURE: Fiona Kinsey MD  DATE: 2022  TIME: 10:03 AM

## 2022-07-14 NOTE — DISCHARGE INSTRUCTIONS
YOUR 2 WEEK FOLLOW UP HAS BEEN SCHEDULED; IF YOU WISH TO CHANGE THE FOLLOW UP, PLEASE CALL THE [facility] [place]: [phone]      MEDIAL BRANCH BLOCK DISCHARGE INSTRUCTIONS      ACTIVITY  Please do activities that will bring the normal pain that we are rating  For example, if vacuuming or walking increases the painm do that  Issac twill give the most accurate response to the diary  You may shower, but no tub baths today, or applied heat  CARE OF THE INJECTION SITE  This area may be numb for several hours after the injection  Notify the [facility] if you have any of the following: redness, drainage, swelling or fever above 100°F     SPECIAL INSTRUCTIONS  Please return the [facility] diary to our office by mail, fax, or drop it off  MEDICATIONS  Please do not take any break through or short acting pain medications for 8 hours after the block  Continue to take all routine medications  Our office may have instructed you to hold some medications  You may resume _______________________________________________  If you have any problems specifically related to your procedure, please call our office at [phone]  Problems not related to your procedure should be directed at your primary care physician

## 2022-08-08 ENCOUNTER — OFFICE VISIT (OUTPATIENT)
Dept: PAIN MEDICINE | Facility: CLINIC | Age: 62
End: 2022-08-08
Payer: COMMERCIAL

## 2022-08-08 VITALS
WEIGHT: 315 LBS | BODY MASS INDEX: 38.36 KG/M2 | SYSTOLIC BLOOD PRESSURE: 124 MMHG | HEIGHT: 76 IN | TEMPERATURE: 97.4 F | HEART RATE: 105 BPM | DIASTOLIC BLOOD PRESSURE: 78 MMHG

## 2022-08-08 DIAGNOSIS — M47.816 LUMBAR SPONDYLOSIS: ICD-10-CM

## 2022-08-08 DIAGNOSIS — M47.814 THORACIC SPONDYLOSIS: Primary | ICD-10-CM

## 2022-08-08 PROCEDURE — 99214 OFFICE O/P EST MOD 30 MIN: CPT | Performed by: ANESTHESIOLOGY

## 2022-08-08 PROCEDURE — 80305 DRUG TEST PRSMV DIR OPT OBS: CPT | Performed by: ANESTHESIOLOGY

## 2022-08-08 RX ORDER — BUPIVACAINE HCL/PF 2.5 MG/ML
10 VIAL (ML) INJECTION ONCE
Status: CANCELLED | OUTPATIENT
Start: 2022-08-08 | End: 2022-08-08

## 2022-08-08 RX ORDER — LIDOCAINE HYDROCHLORIDE 10 MG/ML
10 INJECTION, SOLUTION EPIDURAL; INFILTRATION; INTRACAUDAL; PERINEURAL ONCE
Status: CANCELLED | OUTPATIENT
Start: 2022-08-08 | End: 2022-08-08

## 2022-08-08 RX ORDER — METHYLPREDNISOLONE ACETATE 80 MG/ML
80 INJECTION, SUSPENSION INTRA-ARTICULAR; INTRALESIONAL; INTRAMUSCULAR; PARENTERAL; SOFT TISSUE ONCE
Status: CANCELLED | OUTPATIENT
Start: 2022-08-08 | End: 2022-08-08

## 2022-08-08 NOTE — PROGRESS NOTES
Assessment  1  Thoracic spondylosis - Bilateral  -     Case request operating room: BLOCK MEDIAL BRANCH T12, L1, L2 #2; Standing  -     Case request operating room: BLOCK MEDIAL BRANCH T12, L1, L2 #2    2  Lumbar spondylosis - Bilateral  -     Case request operating room: BLOCK MEDIAL BRANCH T12, L1, L2 #2; Standing  -     Case request operating room: BLOCK MEDIAL BRANCH T12, L1, L2 #2    Greater than 75% relief of leg pain from both radiculopathy and diabetic peripheral neuropathy as well as low back pain with improved ability to participate with IADLs after Nevro SCS trial (dual lead)  Currently noting right L2 and L3 dermatomal distribution of weakness likely related to  right neural foraminal disc protrusion at L2-L3 resulting in moderate to severe right neural foraminal narrowing  At L3-L4 there is bilateral facet hypertrophy  There is a right neural foraminal disc protrusion  Moderate right neural foraminal narrowing  Given weakness and plateued improvement with PT will refer to [address] [place] [address] for consideration of possible decompression  Noting mid back pain in paraspinal muscles; +ttp over thoracic paraspinal muscles  Greater than 90% relief of pain with improved ability to participate with IADLs after bilateral T12, L1, L2 medial branch blocks #1  Risks, benefits and alternatives to repeat medial branch nerve blocks/subsequent RFA discussed, handouts provided  Previously reported the following symptomatology:     Right-sided lumbar radicular pain in L5 and S1 dermatomal distribution  80% relief after recent right L5 and S1 transforaminal epidural steroid injection  Moderate posterolateral disc herniation at L5-S1 in addition to spondyloarthropathy contributing to moderate transforaminal stenosis here  Otherwise recent MRI shows no significant central canal stenosis or transforaminal nerve root compression   The patient had right knee total arthroplasty which has considerably help with his right sided knee pain; however lumbar radiculopathy in addition to debilitating diabetic peripheral neuropathy  contributes to significant difficulties with daily function and overall quality of life  Risks, benefits and alternatives to spinal cord stimulator trial thoroughly discussed with patient  Handouts provided and all questions answered patient's satisfaction  MRI previously ordered which shows mild central canal stenosis at T10-T11 in addition to moderate facet arthropathy this level  Otherwise MRI thoracic spine within normal limits  pain consistent with thoracic radicular symptoms secondary to T10-T11 disc herniation which from 2018 MRI thoracic spine does not contribute to significant cord compression  Radiating and radicular pain in T10 and T11 dermatomal distribution  Pain is along lower thoracic T10 and T11 dermatomes, particularly with palpation of lower thoracic ribs and posterolateral distribution  Interestingly his significant and advanced lumbar facet arthropathy which is not a  primary source of his pain at this time  He previously had epidural steroid injections in the past with Dr Adriana Kendall with noted relief  For radicular pain  Reasonable to reobtain imaging, and pursue multimodal pain therapy plan as noted below  Plan  - Will refer to [address] [place] [address] for possible decompression given right L2 and L3 radiculopathy from transforaminal stenosis  - Bilateral T12, L1, L2 medial branch blocks #1 (or alternate level)  -Lyrica 75 mg t i d   discontinued secondary to side effects  counseled regarding sedative effects of taking this medication and provided up titration calendar  Counseled not to take medication while driving or operating heavy machinery/using stairs  -continue other analgesics as prescribed previously well provider's  Counseled extensively as noted below regarding the risks of opioid medications in combination with Lyrica    -physical therapy for  Lumbar facet arthropathy, lumbar radiculopathy, mid back pain    There are risks associated with opioid medications, including dependence, addiction and tolerance  The patient understands and agrees to use these medications only as prescribed  Potential side effects of the medications include, but are not limited to, constipation, drowsiness, addiction, impaired judgment and risk of fatal overdose if not taken as prescribed  The patient was warned against driving while taking sedation medications  Sharing medications is a felony  At this point in time, the patient is showing no signs of addiction, abuse, diversion or suicidal ideation  A urine drug screen was collected at today's office visit as part of our medication management protocol  The point of care testing results were appropriate for what was being prescribed  The specimen will be sent for confirmatory testing  The drug screen is medically necessary because the patient is either dependent on opioid medication or is being considered for opioid medication therapy and the results could impact ongoing or future treatment  The drug screen is to evaluate for the presences or absence of prescribed, non-prescribed, and/or illicit drugs/substances  [place] Prescription Drug Monitoring Program report was reviewed and was appropriate     Complete risks and benefits including bleeding, infection, tissue reaction, nerve injury and allergic reaction were discussed  The approach was demonstrated using models and literature was provided  Verbal and written consent was obtained  My impressions and treatment recommendations were discussed in detail with the patient who verbalized understanding and had no further questions  Discharge instructions were provided  I personally saw and examined the patient and I agree with the above discussed plan of care  No orders of the defined types were placed in this encounter        History of Present Illness    Greater than 75% relief of leg pain from both radiculopathy and diabetic peripheral neuropathy as well as low back pain with improved ability to participate with IADLs after Nevro SCS trial (dual lead)  Continues to describe right hip flexor weakness without any pain  Additionally notes mid back pain described by arthritic features  Greater than 90% relief of pain with improved ability to participate with IADLs after bilateral T12, L1, L2 medial branch blocks #1  Previously reported the following symptomatology:     Right-sided lumbar radicular pain in L5 and S1 dermatomal distribution  80% relief after recent right L5 and S1 transforaminal epidural steroid injection  Moderate posterolateral disc herniation at L5-S1 in addition to spondyloarthropathy contributing to moderate transforaminal stenosis here  Otherwise recent MRI shows no significant central canal stenosis or transforaminal nerve root compression  The patient had right knee total arthroplasty which has considerably help with his right sided knee pain; however lumbar radiculopathy in addition to debilitating diabetic peripheral neuropathy  contributes to significant difficulties with daily function and overall quality of life  Todd De La O is a 62 y o  male with a past medical history of  Non-insulin controlled type 2 diabetes, obesity, chronic low back pain described primarily as arthritic in nature  He describes 8/10 low back pain that is worse in the mornings and worse at the end of the day  The pain is characterized by achy, nagging, indolent, crampy, stabbing pain in his axial low back  The patient describes that the pain is worse with standing for long periods of time on hard surfaces as well as with walking  The patient is a very active individual and feels as though this pain compromises his participation with independent activities of daily living  He ambulates with a walker   The pain can be debilitating at times and contribute to significant disability, compromising overall activity and independent activities of daily living  He has tried physical therapy with limited relief of symptoms  Medications  he is currently on include   Celebrex 200 mg per day, Percocet 5-325 mg Q 6 hours p r n  pain  He takes 200 mg of gabapentin per day and was recently started on Robaxin 500 mg t i d  for back spasms  He additionally has pain radiating across his ribs bilaterally in the T10 and T11 dermatomal distribution  He demonstrates good strength and denies any weakness numbness or paresthesias  The patient denies any bowel or bladder dysfunction as well  I have personally reviewed and/or updated the patient's past medical history, past surgical history, family history, social history, current medications, allergies, and vital signs today  Review of Systems   Constitutional: Positive for activity change  HENT: Negative  Eyes: Negative  Respiratory: Negative  Cardiovascular: Negative  Gastrointestinal: Negative  Endocrine: Negative  Genitourinary: Negative  Musculoskeletal: Positive for arthralgias, back pain, gait problem and myalgias  Skin: Negative  Allergic/Immunologic: Negative  Neurological: Positive for numbness  Negative for weakness  Hematological: Negative  Psychiatric/Behavioral: Negative  All other systems reviewed and are negative        Patient Active Problem List   Diagnosis    Herniation of intervertebral disc of thoracic region    Diabetic peripheral neuropathy (HCC)    Morbid obesity ([address] )    Cervical spinal cord compression (HCC)    Mild depression    Major depressive disorder, single episode, mild ([address] )    Thoracic spondylosis    Lumbar spondylosis       Past Medical History:   Diagnosis Date    Anxiety     Arthritis     Chronic pain disorder     mid back region    Colon polyp     COVID-19     CPAP (continuous positive airway pressure) dependence     Pt uses nightly    Diabetes mellitus ([address] )     Diverticulosis     History of recent hospitalization 06/16/2021    Pt was admitted to [facility] after syncopal episode  Pt saw cardiology- all tests negative  Pt had nl EEG  pt states is was a medication interaction   Hyperlipidemia     Hypertension     Obesity     Sleep apnea     Spinal stenosis        Past Surgical History:   Procedure Laterality Date    ACHILLES TENDON REPAIR      Pt had a rupture in right and left 2 years apart    COLONOSCOPY      EPIDURAL BLOCK INJECTION      Pt had in lumbar region   EPIDURAL BLOCK INJECTION N/A 4/6/2021    Procedure: T-11-T-12 INTERLAMINAR THORACIC EPIDURAL STEROID INJECTION;  Surgeon: Svetlana Ngo MD;  [facility];  Service: Pain Management     EPIDURAL BLOCK INJECTION Right 7/20/2021    Procedure: L5 and S1 TRANSFORAMINAL EPIDURAL STEROID INJECTION;  Surgeon: Svetlana Ngo MD;  [facility];  Service: Pain Management     FL GUIDED NEEDLE PLAC BX/ASP/INJ  7/14/2022    FOOT SURGERY Left     Left great toe- had a hammertoe      JOINT REPLACEMENT Left     Total hip, knee    JOINT REPLACEMENT Right     Total hip, knee    KNEE ARTHROSCOPY Bilateral     NERVE BLOCK Bilateral 7/14/2022    Procedure: BLOCK MEDIAL BRANCH T12, L1, L2;  Surgeon: Svetlana Ngo MD;  [facility];  Service: Pain Management     WI PERCUT IMPLNT Varun Means Bilateral 9/30/2021    Procedure: Phuong Purl SCS TRIAL;  Surgeon: Svetlana Ngo MD;  [facility]: [facility];  Service: Pain Management     WI SURG IMPLNT Varun Means Left 10/26/2021    Procedure: Thoracic laminectomies for placement of spinal cord stimulator and internal pulse generator, use of neuromonitoring, left sided pulse generator;  Surgeon: Ariana Santana MD;  Location:  MAIN OR;  Service: Neurosurgery    TOTAL KNEE ARTHROPLASTY Left        Family History   Problem Relation Age of Onset    Arthritis Mother     Hypertension Father        Social History     Occupational History    Not on file   Tobacco Use    Smoking status: Never Smoker    Smokeless tobacco: Current User     Types: Snuff    Tobacco comment: still using snuff   Vaping Use    Vaping Use: Never used   Substance and Sexual Activity    Alcohol use: Yes     Alcohol/week: 2 0 standard drinks     Types: 2 Cans of beer per week     Comment: social, weekends    Drug use: No    Sexual activity: Yes       Current Outpatient Medications on File Prior to Visit   Medication Sig    acetaminophen (TYLENOL) 500 mg tablet Take 1,000 mg by mouth every 6 (six) hours as needed for mild pain    amoxicillin (AMOXIL) 500 mg capsule TAKE 4 CAPSULES BY MOUTH 1 HOUR PRIOR TO DENTAL PROCEDURES    atorvastatin (LIPITOR) 20 mg tablet Take 20 mg by mouth daily    Empagliflozin (Jardiance) 25 MG TABS Take 25 mg by mouth daily    ERGOCALCIFEROL PO Take 50,000 Units by mouth 2 (two) times a week     LORazepam (ATIVAN) 0 5 mg tablet Take 0 5 mg by mouth daily as needed    mupirocin (BACTROBAN) 2 % ointment      Semaglutide,0 25 or 0 5MG/DOS, (Ozempic, 0 25 or 0 5 MG/DOSE,) 2 MG/1 5ML SOPN Inject 0 5 mg under the skin once a week     sertraline (ZOLOFT) 50 mg tablet Take 75 mg by mouth     valsartan (DIOVAN) 80 mg tablet Take 80 mg by mouth daily     famotidine (PEPCID) 20 mg tablet Take 20 mg by mouth as needed     traZODone (DESYREL) 100 mg tablet 50 mg daily at bedtime   (Patient not taking: Reported on 6/27/2022)     No current facility-administered medications on file prior to visit  Allergies   Allergen Reactions    Paxil [Paroxetine] GI Intolerance         Physical Exam    /78   Pulse 105   Temp (!) 97 4 °F (36 3 °C) (Temporal)   Ht 6' 4" (1 93 m)   Wt (!) 156 kg (345 lb)   BMI 41 99 kg/m²     Constitutional: normal, well developed, well nourished, alert, in no distress and non-toxic and no overt pain behavior   and obese  Eyes: anicteric  HEENT: grossly intact  Neck: supple, symmetric, trachea midline and no masses   Pulmonary:even and unlabored  Cardiovascular:No edema or pitting edema present  Skin:Normal without rashes or lesions and well hydrated  Psychiatric:Mood and affect appropriate  Neurologic:Cranial Nerves II-XII grossly intact Sensation grossly intact; no clonus negative diallo's  Reflexes 2+ and brisk  SLR  Weakly positive right-sided we  Musculoskeletal: hunched gait  Unable to perform normal heel toe and tip toe walking  Slight weakness with right lower extremity strong plantar flexion; 5/5 strength with active range of motion movements in all other extremities bilaterally  mild pain with thoracic and lumbar facet loading bilaterally and with lateral spine rotation  mild ttp over thoracic and lumbar paraspinal muscles  Negative jahaira's test, negative gaenslen's negative SIJ loading bilaterally  Tenderness to palpation over inferior rib borders of T10-T11 posterolateral ribs bilaterally,  Eliciting radicular pain in aformentioned dermatomal distributions  Imaging    Result Narrative   Exam - thoracic spine mri scan     History: Bilateral leg weakness  Technique: Using a surface coil sagittal and axial T1-weighted and T2-weighted  scans were obtained of the thoracic spine  Findings: Image detail is suboptimal secondary to patient's obesity  Thoracic spinal cord is normal in size and configuration and MRI signal  intensity  There are no intramedullary lesions  Vertebral bodies are in anatomic alignment  There are degenerative changes in endplates and facet joints throughout the  thoracic spine  There is central spinal stenosis at T10-T11  There is no marlyn spinal cord  compression however  Other Result Information   Interface, Rad Results In - 08/07/2018  7:34 PM EDT  Formatting of this note might be different from the original   Exam - thoracic spine mri scan     History: Bilateral leg weakness      Technique: Using a surface coil sagittal and axial T1-weighted and T2-weighted  scans were obtained of the thoracic spine  Findings: Image detail is suboptimal secondary to patient's obesity  Thoracic spinal cord is normal in size and configuration and MRI signal  intensity  There are no intramedullary lesions  Vertebral bodies are in anatomic alignment  There are degenerative changes in endplates and facet joints throughout the  thoracic spine  There is central spinal stenosis at T10-T11  There is no marlyn spinal cord  compression however  IMPRESSION:  Impression:  1  Technically suboptimal study  2  Marked spondylosis  3  At T10-T11 there is spinal stenosis however there is no marlyn spinal cord  compression  MRI LUMBAR SPINE WITHOUT CONTRAST     INDICATION: [id number]: Radiculopathy, lumbar region      COMPARISON:  5/6/2018; 3/16/2021     TECHNIQUE:  Sagittal T1, sagittal T2, sagittal inversion recovery, axial T1 and axial T2, coronal T2     IMAGE QUALITY:  Diagnostic     FINDINGS:     VERTEBRAL BODIES:  There are 5 lumbar type vertebral bodies  Mild to moderate levoscoliosis mid lumbar spine  Trace grade 1 anterolisthesis of L3 on L4  Minimal grade 1 retrolisthesis of L4 on L5  Scattered degenerative endplate changes  No focally   suspicious marrow lesions  No bone marrow edema or compression abnormality      SACRUM:  Scattered degenerative endplate changes  No focally suspicious marrow lesions  No bone marrow edema or compression abnormality      DISTAL CORD AND CONUS:  Normal size and signal within the distal cord and conus  The conus medullaris terminates at the L2 level      PARASPINAL SOFT TISSUES:  Fatty atrophy of the psoas muscles noted, similar to the prior CT possibly from disuse  Marked atrophy of the left iliac is muscle as well      LOWER THORACIC DISC SPACES:  T11-T12: There is a small central disc herniation, protrusion type  Minimal central canal narrowing    Neural foramina patent bilaterally      T12-L1: There is no focal disk herniation, central canal or neural foraminal narrowing      LUMBAR DISC SPACES:     L1-L2:  There is bilateral facet hypertrophy  There is a left neural foraminal disc protrusion  Mild left neural foraminal narrowing  Central canal patent  Minimal right neural foraminal narrowing      L2-L3:  There is bilateral facet hypertrophy  There is a right neural foraminal disc protrusion  Moderate to severe right neural foraminal narrowing  Mild central canal and left neural foraminal narrowing      L3-L4:  There is bilateral facet hypertrophy  There is a right neural foraminal disc protrusion  Moderate right neural foraminal narrowing  Mild central canal and left neural foraminal narrowing      L4-L5:  There is loss of disc height and signal   There is a disc osteophyte complex with a superimposed left neural foraminal disc protrusion  There is moderate to severe left neural foraminal narrowing  Mild central canal narrowing  Moderate right   neural foraminal narrowing     L5-S1:  There is bilateral facet hypertrophy  There is a left neural foraminal disc protrusion  Severe left neural foraminal narrowing  Mild central canal and right neural foraminal narrowing      IMPRESSION:        1  Advanced multilevel spondylosis    2   Moderate to severe fatty atrophy of the bilateral psoas muscles and iliac is muscles left greater than right, possibly from disuse or denervation atrophy

## 2022-08-08 NOTE — H&P (VIEW-ONLY)
Assessment  1  Thoracic spondylosis - Bilateral  -     Case request operating room: BLOCK MEDIAL BRANCH T12, L1, L2 #2; Standing  -     Case request operating room: BLOCK MEDIAL BRANCH T12, L1, L2 #2    2  Lumbar spondylosis - Bilateral  -     Case request operating room: BLOCK MEDIAL BRANCH T12, L1, L2 #2; Standing  -     Case request operating room: BLOCK MEDIAL BRANCH T12, L1, L2 #2    Greater than 75% relief of leg pain from both radiculopathy and diabetic peripheral neuropathy as well as low back pain with improved ability to participate with IADLs after Nevro SCS trial (dual lead)  Currently noting right L2 and L3 dermatomal distribution of weakness likely related to  right neural foraminal disc protrusion at L2-L3 resulting in moderate to severe right neural foraminal narrowing  At L3-L4 there is bilateral facet hypertrophy  There is a right neural foraminal disc protrusion  Moderate right neural foraminal narrowing  Given weakness and plateued improvement with PT will refer to [address] [place] [address] for consideration of possible decompression  Noting mid back pain in paraspinal muscles; +ttp over thoracic paraspinal muscles  Greater than 90% relief of pain with improved ability to participate with IADLs after bilateral T12, L1, L2 medial branch blocks #1  Risks, benefits and alternatives to repeat medial branch nerve blocks/subsequent RFA discussed, handouts provided  Previously reported the following symptomatology:     Right-sided lumbar radicular pain in L5 and S1 dermatomal distribution  80% relief after recent right L5 and S1 transforaminal epidural steroid injection  Moderate posterolateral disc herniation at L5-S1 in addition to spondyloarthropathy contributing to moderate transforaminal stenosis here  Otherwise recent MRI shows no significant central canal stenosis or transforaminal nerve root compression   The patient had right knee total arthroplasty which has considerably help with his right sided knee pain; however lumbar radiculopathy in addition to debilitating diabetic peripheral neuropathy  contributes to significant difficulties with daily function and overall quality of life  Risks, benefits and alternatives to spinal cord stimulator trial thoroughly discussed with patient  Handouts provided and all questions answered patient's satisfaction  MRI previously ordered which shows mild central canal stenosis at T10-T11 in addition to moderate facet arthropathy this level  Otherwise MRI thoracic spine within normal limits  pain consistent with thoracic radicular symptoms secondary to T10-T11 disc herniation which from 2018 MRI thoracic spine does not contribute to significant cord compression  Radiating and radicular pain in T10 and T11 dermatomal distribution  Pain is along lower thoracic T10 and T11 dermatomes, particularly with palpation of lower thoracic ribs and posterolateral distribution  Interestingly his significant and advanced lumbar facet arthropathy which is not a  primary source of his pain at this time  He previously had epidural steroid injections in the past with Dr Cheri Mackenzie with noted relief  For radicular pain  Reasonable to reobtain imaging, and pursue multimodal pain therapy plan as noted below  Plan  - Will refer to [address] [place] [address] for possible decompression given right L2 and L3 radiculopathy from transforaminal stenosis  - Bilateral T12, L1, L2 medial branch blocks #1 (or alternate level)  -Lyrica 75 mg t i d   discontinued secondary to side effects  counseled regarding sedative effects of taking this medication and provided up titration calendar  Counseled not to take medication while driving or operating heavy machinery/using stairs  -continue other analgesics as prescribed previously well provider's  Counseled extensively as noted below regarding the risks of opioid medications in combination with Lyrica    -physical therapy for  Lumbar facet arthropathy, lumbar radiculopathy, mid back pain    There are risks associated with opioid medications, including dependence, addiction and tolerance  The patient understands and agrees to use these medications only as prescribed  Potential side effects of the medications include, but are not limited to, constipation, drowsiness, addiction, impaired judgment and risk of fatal overdose if not taken as prescribed  The patient was warned against driving while taking sedation medications  Sharing medications is a felony  At this point in time, the patient is showing no signs of addiction, abuse, diversion or suicidal ideation  A urine drug screen was collected at today's office visit as part of our medication management protocol  The point of care testing results were appropriate for what was being prescribed  The specimen will be sent for confirmatory testing  The drug screen is medically necessary because the patient is either dependent on opioid medication or is being considered for opioid medication therapy and the results could impact ongoing or future treatment  The drug screen is to evaluate for the presences or absence of prescribed, non-prescribed, and/or illicit drugs/substances  [place] Prescription Drug Monitoring Program report was reviewed and was appropriate     Complete risks and benefits including bleeding, infection, tissue reaction, nerve injury and allergic reaction were discussed  The approach was demonstrated using models and literature was provided  Verbal and written consent was obtained  My impressions and treatment recommendations were discussed in detail with the patient who verbalized understanding and had no further questions  Discharge instructions were provided  I personally saw and examined the patient and I agree with the above discussed plan of care  No orders of the defined types were placed in this encounter        History of Present Illness    Greater than 75% relief of leg pain from both radiculopathy and diabetic peripheral neuropathy as well as low back pain with improved ability to participate with IADLs after Nevro SCS trial (dual lead)  Continues to describe right hip flexor weakness without any pain  Additionally notes mid back pain described by arthritic features  Greater than 90% relief of pain with improved ability to participate with IADLs after bilateral T12, L1, L2 medial branch blocks #1  Previously reported the following symptomatology:     Right-sided lumbar radicular pain in L5 and S1 dermatomal distribution  80% relief after recent right L5 and S1 transforaminal epidural steroid injection  Moderate posterolateral disc herniation at L5-S1 in addition to spondyloarthropathy contributing to moderate transforaminal stenosis here  Otherwise recent MRI shows no significant central canal stenosis or transforaminal nerve root compression  The patient had right knee total arthroplasty which has considerably help with his right sided knee pain; however lumbar radiculopathy in addition to debilitating diabetic peripheral neuropathy  contributes to significant difficulties with daily function and overall quality of life  Beverly Agudelo is a 62 y o  male with a past medical history of  Non-insulin controlled type 2 diabetes, obesity, chronic low back pain described primarily as arthritic in nature  He describes 8/10 low back pain that is worse in the mornings and worse at the end of the day  The pain is characterized by achy, nagging, indolent, crampy, stabbing pain in his axial low back  The patient describes that the pain is worse with standing for long periods of time on hard surfaces as well as with walking  The patient is a very active individual and feels as though this pain compromises his participation with independent activities of daily living  He ambulates with a walker   The pain can be debilitating at times and contribute to significant disability, compromising overall activity and independent activities of daily living  He has tried physical therapy with limited relief of symptoms  Medications  he is currently on include   Celebrex 200 mg per day, Percocet 5-325 mg Q 6 hours p r n  pain  He takes 200 mg of gabapentin per day and was recently started on Robaxin 500 mg t i d  for back spasms  He additionally has pain radiating across his ribs bilaterally in the T10 and T11 dermatomal distribution  He demonstrates good strength and denies any weakness numbness or paresthesias  The patient denies any bowel or bladder dysfunction as well  I have personally reviewed and/or updated the patient's past medical history, past surgical history, family history, social history, current medications, allergies, and vital signs today  Review of Systems   Constitutional: Positive for activity change  HENT: Negative  Eyes: Negative  Respiratory: Negative  Cardiovascular: Negative  Gastrointestinal: Negative  Endocrine: Negative  Genitourinary: Negative  Musculoskeletal: Positive for arthralgias, back pain, gait problem and myalgias  Skin: Negative  Allergic/Immunologic: Negative  Neurological: Positive for numbness  Negative for weakness  Hematological: Negative  Psychiatric/Behavioral: Negative  All other systems reviewed and are negative        Patient Active Problem List   Diagnosis    Herniation of intervertebral disc of thoracic region    Diabetic peripheral neuropathy (HCC)    Morbid obesity ([address] )    Cervical spinal cord compression (HCC)    Mild depression    Major depressive disorder, single episode, mild ([address] )    Thoracic spondylosis    Lumbar spondylosis       Past Medical History:   Diagnosis Date    Anxiety     Arthritis     Chronic pain disorder     mid back region    Colon polyp     COVID-19     CPAP (continuous positive airway pressure) dependence     Pt uses nightly    Diabetes mellitus ([address] )     Diverticulosis     History of recent hospitalization 06/16/2021    Pt was admitted to [facility] after syncopal episode  Pt saw cardiology- all tests negative  Pt had nl EEG  pt states is was a medication interaction   Hyperlipidemia     Hypertension     Obesity     Sleep apnea     Spinal stenosis        Past Surgical History:   Procedure Laterality Date    ACHILLES TENDON REPAIR      Pt had a rupture in right and left 2 years apart    COLONOSCOPY      EPIDURAL BLOCK INJECTION      Pt had in lumbar region   EPIDURAL BLOCK INJECTION N/A 4/6/2021    Procedure: T-11-T-12 INTERLAMINAR THORACIC EPIDURAL STEROID INJECTION;  Surgeon: Cristian Persaud MD;  [facility];  Service: Pain Management     EPIDURAL BLOCK INJECTION Right 7/20/2021    Procedure: L5 and S1 TRANSFORAMINAL EPIDURAL STEROID INJECTION;  Surgeon: Cristian Persaud MD;  [facility];  Service: Pain Management     FL GUIDED NEEDLE PLAC BX/ASP/INJ  7/14/2022    FOOT SURGERY Left     Left great toe- had a hammertoe      JOINT REPLACEMENT Left     Total hip, knee    JOINT REPLACEMENT Right     Total hip, knee    KNEE ARTHROSCOPY Bilateral     NERVE BLOCK Bilateral 7/14/2022    Procedure: BLOCK MEDIAL BRANCH T12, L1, L2;  Surgeon: Cristian Persaud MD;  [facility];  Service: Pain Management     MO PERCUT IMPLNT [address] Bilateral 9/30/2021    Procedure: Catalino Filler SCS TRIAL;  Surgeon: Cristian Persaud MD;  [facility];  Service: Pain Management     [place] SURG IMPLNT [address] Left 10/26/2021    Procedure: Thoracic laminectomies for placement of spinal cord stimulator and internal pulse generator, use of neuromonitoring, left sided pulse generator;  Surgeon: Lucho Cavanaugh MD;  Location:  MAIN OR;  Service: Neurosurgery    TOTAL KNEE ARTHROPLASTY Left        Family History   Problem Relation Age of Onset    Arthritis Mother     Hypertension Father        Social History     Occupational History    Not on file   Tobacco Use    Smoking status: Never Smoker    Smokeless tobacco: Current User     Types: Snuff    Tobacco comment: still using snuff   Vaping Use    Vaping Use: Never used   Substance and Sexual Activity    Alcohol use: Yes     Alcohol/week: 2 0 standard drinks     Types: 2 Cans of beer per week     Comment: social, weekends    Drug use: No    Sexual activity: Yes       Current Outpatient Medications on File Prior to Visit   Medication Sig    acetaminophen (TYLENOL) 500 mg tablet Take 1,000 mg by mouth every 6 (six) hours as needed for mild pain    amoxicillin (AMOXIL) 500 mg capsule TAKE 4 CAPSULES BY MOUTH 1 HOUR PRIOR TO DENTAL PROCEDURES    atorvastatin (LIPITOR) 20 mg tablet Take 20 mg by mouth daily    Empagliflozin (Jardiance) 25 MG TABS Take 25 mg by mouth daily    ERGOCALCIFEROL PO Take 50,000 Units by mouth 2 (two) times a week     LORazepam (ATIVAN) 0 5 mg tablet Take 0 5 mg by mouth daily as needed    mupirocin (BACTROBAN) 2 % ointment      Semaglutide,0 25 or 0 5MG/DOS, (Ozempic, 0 25 or 0 5 MG/DOSE,) 2 MG/1 5ML SOPN Inject 0 5 mg under the skin once a week     sertraline (ZOLOFT) 50 mg tablet Take 75 mg by mouth     valsartan (DIOVAN) 80 mg tablet Take 80 mg by mouth daily     famotidine (PEPCID) 20 mg tablet Take 20 mg by mouth as needed     traZODone (DESYREL) 100 mg tablet 50 mg daily at bedtime   (Patient not taking: Reported on 6/27/2022)     No current facility-administered medications on file prior to visit  Allergies   Allergen Reactions    Paxil [Paroxetine] GI Intolerance         Physical Exam    /78   Pulse 105   Temp (!) 97 4 °F (36 3 °C) (Temporal)   Ht 6' 4" (1 93 m)   Wt (!) 156 kg (345 lb)   BMI 41 99 kg/m²     Constitutional: normal, well developed, well nourished, alert, in no distress and non-toxic and no overt pain behavior   and obese  Eyes: anicteric  HEENT: grossly intact  Neck: supple, symmetric, trachea midline and no masses   Pulmonary:even and unlabored  Cardiovascular:No edema or pitting edema present  Skin:Normal without rashes or lesions and well hydrated  Psychiatric:Mood and affect appropriate  Neurologic:Cranial Nerves II-XII grossly intact Sensation grossly intact; no clonus negative diallo's  Reflexes 2+ and brisk  SLR  Weakly positive right-sided we  Musculoskeletal: hunched gait  Unable to perform normal heel toe and tip toe walking  Slight weakness with right lower extremity strong plantar flexion; 5/5 strength with active range of motion movements in all other extremities bilaterally  mild pain with thoracic and lumbar facet loading bilaterally and with lateral spine rotation  mild ttp over thoracic and lumbar paraspinal muscles  Negative jahaira's test, negative gaenslen's negative SIJ loading bilaterally  Tenderness to palpation over inferior rib borders of T10-T11 posterolateral ribs bilaterally,  Eliciting radicular pain in aformentioned dermatomal distributions  Imaging    Result Narrative   Exam - thoracic spine mri scan     History: Bilateral leg weakness  Technique: Using a surface coil sagittal and axial T1-weighted and T2-weighted  scans were obtained of the thoracic spine  Findings: Image detail is suboptimal secondary to patient's obesity  Thoracic spinal cord is normal in size and configuration and MRI signal  intensity  There are no intramedullary lesions  Vertebral bodies are in anatomic alignment  There are degenerative changes in endplates and facet joints throughout the  thoracic spine  There is central spinal stenosis at T10-T11  There is no marlyn spinal cord  compression however  Other Result Information   Interface, Rad Results In - 08/07/2018  7:34 PM EDT  Formatting of this note might be different from the original   Exam - thoracic spine mri scan     History: Bilateral leg weakness      Technique: Using a surface coil sagittal and axial T1-weighted and T2-weighted  scans were obtained of the thoracic spine  Findings: Image detail is suboptimal secondary to patient's obesity  Thoracic spinal cord is normal in size and configuration and MRI signal  intensity  There are no intramedullary lesions  Vertebral bodies are in anatomic alignment  There are degenerative changes in endplates and facet joints throughout the  thoracic spine  There is central spinal stenosis at T10-T11  There is no marlyn spinal cord  compression however  IMPRESSION:  Impression:  1  Technically suboptimal study  2  Marked spondylosis  3  At T10-T11 there is spinal stenosis however there is no marlyn spinal cord  compression  MRI LUMBAR SPINE WITHOUT CONTRAST     INDICATION: [id number]: Radiculopathy, lumbar region      COMPARISON:  5/6/2018; 3/16/2021     TECHNIQUE:  Sagittal T1, sagittal T2, sagittal inversion recovery, axial T1 and axial T2, coronal T2     IMAGE QUALITY:  Diagnostic     FINDINGS:     VERTEBRAL BODIES:  There are 5 lumbar type vertebral bodies  Mild to moderate levoscoliosis mid lumbar spine  Trace grade 1 anterolisthesis of L3 on L4  Minimal grade 1 retrolisthesis of L4 on L5  Scattered degenerative endplate changes  No focally   suspicious marrow lesions  No bone marrow edema or compression abnormality      SACRUM:  Scattered degenerative endplate changes  No focally suspicious marrow lesions  No bone marrow edema or compression abnormality      DISTAL CORD AND CONUS:  Normal size and signal within the distal cord and conus  The conus medullaris terminates at the L2 level      PARASPINAL SOFT TISSUES:  Fatty atrophy of the psoas muscles noted, similar to the prior CT possibly from disuse  Marked atrophy of the left iliac is muscle as well      LOWER THORACIC DISC SPACES:  T11-T12: There is a small central disc herniation, protrusion type  Minimal central canal narrowing    Neural foramina patent bilaterally      T12-L1: There is no focal disk herniation, central canal or neural foraminal narrowing      LUMBAR DISC SPACES:     L1-L2:  There is bilateral facet hypertrophy  There is a left neural foraminal disc protrusion  Mild left neural foraminal narrowing  Central canal patent  Minimal right neural foraminal narrowing      L2-L3:  There is bilateral facet hypertrophy  There is a right neural foraminal disc protrusion  Moderate to severe right neural foraminal narrowing  Mild central canal and left neural foraminal narrowing      L3-L4:  There is bilateral facet hypertrophy  There is a right neural foraminal disc protrusion  Moderate right neural foraminal narrowing  Mild central canal and left neural foraminal narrowing      L4-L5:  There is loss of disc height and signal   There is a disc osteophyte complex with a superimposed left neural foraminal disc protrusion  There is moderate to severe left neural foraminal narrowing  Mild central canal narrowing  Moderate right   neural foraminal narrowing     L5-S1:  There is bilateral facet hypertrophy  There is a left neural foraminal disc protrusion  Severe left neural foraminal narrowing  Mild central canal and right neural foraminal narrowing      IMPRESSION:        1  Advanced multilevel spondylosis    2   Moderate to severe fatty atrophy of the bilateral psoas muscles and iliac is muscles left greater than right, possibly from disuse or denervation atrophy

## 2022-08-08 NOTE — PATIENT INSTRUCTIONS
Core Strengthening Exercises   WHAT YOU NEED TO KNOW:   Your core includes the muscles of your lower back, hip, pelvis, and abdomen  Core strengthening exercises help heal and strengthen these muscles  This helps prevent another injury, and keeps your pelvis, spine, and hips in the correct position  DISCHARGE INSTRUCTIONS:   Contact your healthcare provider if:   You have sharp or worsening pain during exercise or at rest     You have questions or concerns about your shoulder exercises  Safety tips:  Talk to your healthcare provider before you start an exercise program  A physical therapist can teach you how to do core strengthening exercises safely  Do the exercises on a mat or firm surface  A firm surface will support your spine and prevent low back pain  Do not do these exercises on a bed  Move slowly and smoothly  Avoid fast or jerky motions  Stop if you feel pain  Core exercises should not be painful  Stop if you feel pain  Breathe normally during core exercises  Do not hold your breath  This may cause an increase in blood pressure and prevent muscle strengthening  Your healthcare provider will tell you when to inhale and exhale during the exercise  Begin all of your exercises with abdominal bracing  Abdominal bracing helps warm up your core muscles  You can also practice abdominal bracing throughout the day  Lie on your back with your knees bent and feet flat on the floor  Place your arms in a relaxed position beside your body  Tighten your abdominal muscles  Pull your belly button in and up toward your spine  Hold for 5 seconds  Relax your muscles  Repeat 10 times  Core strengthening exercises: Your healthcare provider will tell you how often to do these exercises  The provider will also tell you how many repetitions of each exercise you should do  Hold each exercise for 5 seconds or as directed  As you get stronger, increase your hold to 10 to 15 seconds   You can do some of these exercises on a stability ball, or with a weight  Ask your healthcare provider how to use a stability ball or weight for these exercises:  Bridging:  Lie on your back with your knees bent and feet flat on the floor  Rest your arms at your side  Tighten your buttocks, and then lift your hips 1 inch off the floor  Hold for 5 seconds  When you can do this exercise without pain for 10 seconds, increase the distance you lift your hips  A good goal is to be able to lift your hips so that your shoulders, hips, and knees are in a straight line  Dead bug:  Lie on your back with your knees bent and feet flat on the floor  Place your arms in a relaxed position beside your body  Begin with abdominal bracing  Next, raise one leg, keeping your knee bent  Hold for 5 seconds  Repeat with the other leg  When you can do this exercise without pain for 10 to 15 seconds, you may raise one straight leg and hold  Repeat with the other leg  Quadruped:  Place your hands and knees on the floor  Keep your wrists directly below your shoulders and your knees directly below your hips  Pull your belly button in toward your spine  Do not flatten or arch your back  Tighten your abdominal muscles below your belly button  Hold for 5 seconds  When you can do this exercise without pain for 10 to 15 seconds, you may extend one arm and hold  Repeat on the other side  Side bridge exercises:      Standing side bridge:  Stand next to a wall and extend one arm toward the wall  Place your palm flat on the wall with your fingers pointing upward  Begin with abdominal bracing  Next, without moving your feet, slowly bend your arm to 90 degrees  Hold for 5 seconds  Repeat on the other side  When you can do this exercise without pain for 10 to 15 seconds, you may do the bent leg side bridge on the floor  Bent leg side bridge:  Lie on one side with your legs, hips, and shoulders in a straight line   Prop yourself up onto your forearm so your elbow is directly below your shoulder  Bend your knees back to 90 degrees  Begin with abdominal bracing  Next, lift your hips and balance yourself on your forearm and knees  Hold for 5 seconds  Repeat on the other side  When you can do this exercise without pain for 10 to 15 seconds, you may do the straight leg side bridge on the floor  Straight leg side bridge:  Lie on one side with your legs, hips, and shoulders in a straight line  Prop yourself up onto your forearm so your elbow is directly below your shoulder  Begin with abdominal bracing  Lift your hips off the floor and balance yourself on your forearm and the outside of your flexed foot  Do not let your ankle bend sideways  Hold for 5 seconds  Repeat on the other side  When you can do this exercise without pain for 10 to 15 seconds, ask your healthcare provider for more advanced exercises  Superman:  Lie on your stomach  Extend your arms forward on the floor  Tighten your abdominal muscles and lift your right hand and left leg off the floor  Hold this position  Slowly return to the starting position  Tighten your abdominal muscles and lift your left hand and right leg off the floor  Hold this position  Slowly return to the starting position  Clam:  Lie on your side with your knees bent  Put your bottom arm under your head to keep your neck in line  Put your top hand on your hip to keep your pelvis from moving  Put your heels together, and keep them together during this exercise  Slowly raise your top knee toward the ceiling  Then lower your leg so your knees are together  Repeat this exercise 10 times  Then switch sides and do the exercise 10 times with the other leg  Curl up:  Lie on your back with your knees bent and feet flat on the floor  Place your hands, palms down, underneath your lower back  Next, with your elbows on the floor, lift your shoulders and chest 2 to 3 inches off the floor   Keep your head in line with your shoulders  Hold this position  Slowly return to the starting position  Straight leg raises:  Lie on your back with one leg straight  Bend the other knee and place your foot flat on the floor  Tighten your abdominal muscles  Keep your leg straight and slowly lift it straight up 6 to 12 inches off the floor  Hold this position  Lower your leg slowly  Do as many repetitions as directed on this side  Repeat with the other leg  Plank:  Lie on your stomach  Bend your elbows and place your forearms flat on the floor  Lift your chest, stomach, and knees off the floor  Make sure your elbows are below your shoulders  Your body should be in a straight line  Do not let your hips or lower back sink to the ground  Squeeze your abdominal muscles together and hold for 15 seconds  To make this exercise harder, hold for 30 seconds or lift 1 leg at a time  Bicycles:  Lie on your back  Bend both knees and bring them toward your chest  Your calves should be parallel to the floor  Place the palms of your hands on the back of your head  Straighten your right leg and keep it lifted 2 inches off the floor  Raise your head and shoulders off the floor and twist towards your left  Keep your head and shoulders lifted  Bend your right knee while you straighten your left leg  Keep your left leg 2 inches off the floor  Twist your head and chest towards the left leg  Continue to straighten 1 leg at a time and twist        Follow up with your doctor as directed:  Write down your questions so you remember to ask them during your visits  © Copyright [employer] 2022 Information is for End User's use only and may not be sold, redistributed or otherwise used for commercial purposes  All illustrations and images included in CareNotes® are the copyrighted property of [facility]  or [facility] Flaquita Darby   The above information is an  only   It is not intended as medical advice for individual conditions or treatments  Talk to your doctor, nurse or pharmacist before following any medical regimen to see if it is safe and effective for you

## 2022-08-15 ENCOUNTER — TELEPHONE (OUTPATIENT)
Dept: PAIN MEDICINE | Facility: MEDICAL CENTER | Age: 62
End: 2022-08-15

## 2022-08-15 NOTE — TELEPHONE ENCOUNTER
Pt called stating that he is on antibiotics to prevent cellulitis   Pt has 2 days left of it     Doxycycline 100mg  BID 7 day supply pt start 8/11

## 2022-08-15 NOTE — TELEPHONE ENCOUNTER
S/w pt  Pt states he is prone to cellulitis and is started on abx early to prevent it happening  Pt presently has a scab on his lower calf that is still pink around the edges, no swelling, no drainage  Pt reports that it has improved since abx started 8/11  Pt finishes abx 8/17 and has MBB scheduled 8/18  Pt advised will notify VS and [facility]

## 2022-08-18 ENCOUNTER — HOSPITAL ENCOUNTER (OUTPATIENT)
Facility: HOSPITAL | Age: 62
Setting detail: OUTPATIENT SURGERY
Discharge: HOME | End: 2022-08-18
Attending: ANESTHESIOLOGY | Admitting: ANESTHESIOLOGY
Payer: COMMERCIAL

## 2022-08-18 ENCOUNTER — APPOINTMENT (OUTPATIENT)
Dept: RADIOLOGY | Facility: HOSPITAL | Age: 62
End: 2022-08-18
Payer: COMMERCIAL

## 2022-08-18 VITALS
HEIGHT: 76 IN | RESPIRATION RATE: 18 BRPM | TEMPERATURE: 98.4 F | WEIGHT: 315 LBS | DIASTOLIC BLOOD PRESSURE: 92 MMHG | OXYGEN SATURATION: 94 % | HEART RATE: 68 BPM | SYSTOLIC BLOOD PRESSURE: 149 MMHG | BODY MASS INDEX: 38.36 KG/M2

## 2022-08-18 LAB — GLUCOSE SERPL-MCNC: 90 MG/DL (ref 65–140)

## 2022-08-18 PROCEDURE — 64493 INJ PARAVERT F JNT L/S 1 LEV: CPT | Performed by: ANESTHESIOLOGY

## 2022-08-18 PROCEDURE — 64490 INJ PARAVERT F JNT C/T 1 LEV: CPT | Performed by: ANESTHESIOLOGY

## 2022-08-18 PROCEDURE — 82948 REAGENT STRIP/BLOOD GLUCOSE: CPT

## 2022-08-18 RX ORDER — ALPRAZOLAM 0.5 MG/1
0.5 TABLET ORAL ONCE
Status: COMPLETED | OUTPATIENT
Start: 2022-08-18 | End: 2022-08-18

## 2022-08-18 RX ORDER — LIDOCAINE HYDROCHLORIDE 10 MG/ML
INJECTION, SOLUTION EPIDURAL; INFILTRATION; INTRACAUDAL; PERINEURAL AS NEEDED
Status: DISCONTINUED | OUTPATIENT
Start: 2022-08-18 | End: 2022-08-18 | Stop reason: HOSPADM

## 2022-08-18 RX ORDER — LIDOCAINE HYDROCHLORIDE 10 MG/ML
10 INJECTION, SOLUTION EPIDURAL; INFILTRATION; INTRACAUDAL; PERINEURAL ONCE
Status: DISCONTINUED | OUTPATIENT
Start: 2022-08-18 | End: 2022-08-18 | Stop reason: HOSPADM

## 2022-08-18 RX ORDER — METHYLPREDNISOLONE ACETATE 80 MG/ML
INJECTION, SUSPENSION INTRA-ARTICULAR; INTRALESIONAL; INTRAMUSCULAR; SOFT TISSUE AS NEEDED
Status: DISCONTINUED | OUTPATIENT
Start: 2022-08-18 | End: 2022-08-18 | Stop reason: HOSPADM

## 2022-08-18 RX ORDER — METHYLPREDNISOLONE ACETATE 80 MG/ML
80 INJECTION, SUSPENSION INTRA-ARTICULAR; INTRALESIONAL; INTRAMUSCULAR; PARENTERAL; SOFT TISSUE ONCE
Status: DISCONTINUED | OUTPATIENT
Start: 2022-08-18 | End: 2022-08-18 | Stop reason: HOSPADM

## 2022-08-18 RX ORDER — BUPIVACAINE HCL/PF 2.5 MG/ML
10 VIAL (ML) INJECTION ONCE
Status: DISCONTINUED | OUTPATIENT
Start: 2022-08-18 | End: 2022-08-18 | Stop reason: HOSPADM

## 2022-08-18 RX ORDER — BUPIVACAINE HYDROCHLORIDE 2.5 MG/ML
INJECTION, SOLUTION EPIDURAL; INFILTRATION; INTRACAUDAL AS NEEDED
Status: DISCONTINUED | OUTPATIENT
Start: 2022-08-18 | End: 2022-08-18 | Stop reason: HOSPADM

## 2022-08-18 RX ADMIN — ALPRAZOLAM 0.5 MG: 0.5 TABLET ORAL at 10:31

## 2022-08-18 NOTE — INTERVAL H&P NOTE
H&P reviewed  After examining the patient I find no changes in the patients condition since the H&P had been written      Vitals:    08/18/22 1023   BP: 159/94   Pulse: 64   Resp: 18   Temp: 98 5 °F (36 9 °C)   SpO2: 97%

## 2022-08-18 NOTE — DISCHARGE INSTRUCTIONS
YOUR 2 WEEK FOLLOW UP HAS BEEN SCHEDULED; IF YOU WISH TO CHANGE THE FOLLOW UP, PLEASE CALL THE [facility] AT [place]: [phone]    MEDIAL BRANCH BLOCK DISCHARGE INSTRUCTIONS  ACTIVITY  Please do activities that will bring the normal pain that we are rating  For example, if vacuuming or walking increases the painm do that  Issac twill give the most accurate response to the diary  You may shower, but no tub baths today, or applied heat  CARE OF THE INJECTION SITE  This area may be numb for several hours after the injection  Notify the [facility] if you have any of the following: redness, drainage, swelling or fever above 100°F     SPECIAL INSTRUCTIONS  Please return the MBB diary to our office by mail, fax, or drop it off  MEDICATIONS  Please do not take any break through or short acting pain medications for 8 hours after the block  Continue to take all routine medications  Our office may have instructed you to hold some medications  You may resume _______________________________________________  If you have any problems specifically related to your procedure, please call our office at [phone]  Problems not related to your procedure should be directed at your primary care physician  Lumbar Radiofrequency Ablation   WHAT YOU NEED TO KNOW:   What do I need to know about lumbar radiofrequency ablation? Lumbar radiofrequency ablation (RFA) is a procedure used to treat facet joint pain in your lower back  Facet joints are found at the back of each vertebra  A needle electrode is used to send electrical currents to the nerves in your facet joint  The electrical currents create heat that damages the nerve so it cannot send pain signals  How do I prepare for lumbar RFA? Your healthcare provider will talk to you about how to prepare for this procedure  He may tell you not to eat or drink anything after midnight on the day of your procedure   He will tell you what medicines to take or not take on the day of your procedure  What will happen during lumbar RFA? You will lie on your stomach  You will be given local anesthesia to numb the area of your back where the needle electrode will be inserted  You may be given a sedative to help keep you relaxed  You may still feel pressure or pushing during the procedure, but you should not feel any pain  Your healthcare provider will use fluoroscopy (a type of x-ray) to guide the needle electrode to the nerves near your facet joint  Your healthcare provider may touch the affected nerve to make sure the needle electrode is in the right place  You will feel tingling or pressure when he does this  He will then apply local anesthesia to the nerve to numb it  This will prevent you from feeling pain when he applies heat to the nerve  Your healthcare provider will then apply heat to the nerve using the needle electrode  He may need to apply heat to more than one nerve  He will remove the needle electrode and apply a bandage over the area  What are the risks of lumbar RFA? You may have pain, numbness, tingling, or burning in the area where the lumbar RFA was done  These normally go away within 6 weeks  The needle electrode may injure your spinal nerves  This may cause permanent leg weakness or nerve pain  CARE AGREEMENT:   You have the right to help plan your care  Learn about your health condition and how it may be treated  Discuss treatment options with your healthcare providers to decide what care you want to receive  You always have the right to refuse treatment  The above information is an  only  It is not intended as medical advice for individual conditions or treatments  Talk to your doctor, nurse or pharmacist before following any medical regimen to see if it is safe and effective for you    © Copyright [address] 2022 Information is for End User's use only and may not be sold, redistributed or otherwise used for commercial purposes   All illustrations and images included in CareNotes® are the copyrighted property of [employer] , [employer]  or [facility] Flaquita Cameron

## 2022-09-01 RX ORDER — DOXYCYCLINE HYCLATE 100 MG
100 TABLET ORAL 2 TIMES DAILY
COMMUNITY
Start: 2022-08-11 | End: 2022-09-02

## 2022-09-01 RX ORDER — DESLORATADINE 5 MG/1
TABLET ORAL
COMMUNITY
Start: 2022-07-11

## 2022-09-02 ENCOUNTER — OFFICE VISIT (OUTPATIENT)
Dept: PAIN MEDICINE | Facility: CLINIC | Age: 62
End: 2022-09-02
Payer: COMMERCIAL

## 2022-09-02 VITALS
SYSTOLIC BLOOD PRESSURE: 134 MMHG | DIASTOLIC BLOOD PRESSURE: 72 MMHG | HEART RATE: 93 BPM | RESPIRATION RATE: 20 BRPM | WEIGHT: 315 LBS | HEIGHT: 76 IN | BODY MASS INDEX: 38.36 KG/M2 | TEMPERATURE: 98.2 F

## 2022-09-02 DIAGNOSIS — M47.816 LUMBAR SPONDYLOSIS: ICD-10-CM

## 2022-09-02 DIAGNOSIS — M47.814 THORACIC SPONDYLOSIS: Primary | ICD-10-CM

## 2022-09-02 PROCEDURE — 99214 OFFICE O/P EST MOD 30 MIN: CPT | Performed by: ANESTHESIOLOGY

## 2022-09-02 PROCEDURE — 80305 DRUG TEST PRSMV DIR OPT OBS: CPT | Performed by: ANESTHESIOLOGY

## 2022-09-02 RX ORDER — BUPIVACAINE HCL/PF 2.5 MG/ML
5 VIAL (ML) INJECTION ONCE
Status: CANCELLED | OUTPATIENT
Start: 2022-09-02 | End: 2022-09-02

## 2022-09-02 RX ORDER — LIDOCAINE HYDROCHLORIDE 10 MG/ML
10 INJECTION, SOLUTION EPIDURAL; INFILTRATION; INTRACAUDAL; PERINEURAL ONCE
Status: CANCELLED | OUTPATIENT
Start: 2022-09-02 | End: 2022-09-02

## 2022-09-02 RX ORDER — METHYLPREDNISOLONE ACETATE 80 MG/ML
80 INJECTION, SUSPENSION INTRA-ARTICULAR; INTRALESIONAL; INTRAMUSCULAR; PARENTERAL; SOFT TISSUE ONCE
Status: CANCELLED | OUTPATIENT
Start: 2022-09-02 | End: 2022-09-02

## 2022-09-02 NOTE — H&P (VIEW-ONLY)
Assessment  1  Thoracic spondylosis    2  Lumbar spondylosis    Greater than 75% relief of leg pain from both radiculopathy and diabetic peripheral neuropathy as well as low back pain with improved ability to participate with IADLs after Nevro SCS trial (dual lead)  Currently noting right L2 and L3 dermatomal distribution of weakness likely related to  right neural foraminal disc protrusion at L2-L3 resulting in moderate to severe right neural foraminal narrowing  At L3-L4 there is bilateral facet hypertrophy  There is a right neural foraminal disc protrusion  Moderate right neural foraminal narrowing  Given weakness and plateued improvement with PT will refer to [address] [place] [address] for consideration of possible decompression  Noting mid back pain in paraspinal muscles; +ttp over thoracic paraspinal muscles  Greater than 90% relief of pain with improved ability to participate with IADLs after bilateral T12, L1, L2 medial branch blocks #1 and #2  Risks, benefits and alternatives to repeat medial branch nerve blocks/subsequent RFA discussed, handouts provided  Previously reported the following symptomatology:     Right-sided lumbar radicular pain in L5 and S1 dermatomal distribution  80% relief after recent right L5 and S1 transforaminal epidural steroid injection  Moderate posterolateral disc herniation at L5-S1 in addition to spondyloarthropathy contributing to moderate transforaminal stenosis here  Otherwise recent MRI shows no significant central canal stenosis or transforaminal nerve root compression  The patient had right knee total arthroplasty which has considerably help with his right sided knee pain; however lumbar radiculopathy in addition to debilitating diabetic peripheral neuropathy  contributes to significant difficulties with daily function and overall quality of life  Risks, benefits and alternatives to spinal cord stimulator trial thoroughly discussed with patient    Handouts provided and all questions answered patient's satisfaction  MRI previously ordered which shows mild central canal stenosis at T10-T11 in addition to moderate facet arthropathy this level  Otherwise MRI thoracic spine within normal limits  pain consistent with thoracic radicular symptoms secondary to T10-T11 disc herniation which from 2018 MRI thoracic spine does not contribute to significant cord compression  Radiating and radicular pain in T10 and T11 dermatomal distribution  Pain is along lower thoracic T10 and T11 dermatomes, particularly with palpation of lower thoracic ribs and posterolateral distribution  Interestingly his significant and advanced lumbar facet arthropathy which is not a  primary source of his pain at this time  He previously had epidural steroid injections in the past with Dr Laurence Lee with noted relief  For radicular pain  Reasonable to reobtain imaging, and pursue multimodal pain therapy plan as noted below  Plan  - Will refer to [address] [place] [address] for possible decompression given right L2 and L3 radiculopathy from transforaminal stenosis  - Bilateral T12, L1, L2 medial branch nerve radiofrequency ablation  -Lyrica 75 mg t i d   discontinued secondary to side effects  counseled regarding sedative effects of taking this medication and provided up titration calendar  Counseled not to take medication while driving or operating heavy machinery/using stairs  -continue other analgesics as prescribed previously well provider's  Counseled extensively as noted below regarding the risks of opioid medications in combination with Lyrica  -physical therapy for  Lumbar facet arthropathy, lumbar radiculopathy, mid back pain    There are risks associated with opioid medications, including dependence, addiction and tolerance  The patient understands and agrees to use these medications only as prescribed   Potential side effects of the medications include, but are not limited to, constipation, drowsiness, addiction, impaired judgment and risk of fatal overdose if not taken as prescribed  The patient was warned against driving while taking sedation medications  Sharing medications is a felony  At this point in time, the patient is showing no signs of addiction, abuse, diversion or suicidal ideation  A urine drug screen was collected at today's office visit as part of our medication management protocol  The point of care testing results were appropriate for what was being prescribed  The specimen will be sent for confirmatory testing  The drug screen is medically necessary because the patient is either dependent on opioid medication or is being considered for opioid medication therapy and the results could impact ongoing or future treatment  The drug screen is to evaluate for the presences or absence of prescribed, non-prescribed, and/or illicit drugs/substances  [place] Prescription Drug Monitoring Program report was reviewed and was appropriate     Complete risks and benefits including bleeding, infection, tissue reaction, nerve injury and allergic reaction were discussed  The approach was demonstrated using models and literature was provided  Verbal and written consent was obtained  My impressions and treatment recommendations were discussed in detail with the patient who verbalized understanding and had no further questions  Discharge instructions were provided  I personally saw and examined the patient and I agree with the above discussed plan of care  New Medications Ordered This Visit   Medications    desloratadine (CLARINEX) 5 MG tablet     Sig: TAKE 1 TABLET BY MOUTH EVERY DAY FOR ALLERGY SYMPTOMS       History of Present Illness    Greater than 75% relief of leg pain from both radiculopathy and diabetic peripheral neuropathy as well as low back pain with improved ability to participate with IADLs after Nevro SCS trial (dual lead)  Continues to describe right hip flexor weakness without any pain   Additionally notes mid back pain described by arthritic features  Greater than 90% relief of pain with improved ability to participate with IADLs after bilateral T12, L1, L2 medial branch blocks #1 and #2  Previously reported the following symptomatology:     Right-sided lumbar radicular pain in L5 and S1 dermatomal distribution  80% relief after recent right L5 and S1 transforaminal epidural steroid injection  Moderate posterolateral disc herniation at L5-S1 in addition to spondyloarthropathy contributing to moderate transforaminal stenosis here  Otherwise recent MRI shows no significant central canal stenosis or transforaminal nerve root compression  The patient had right knee total arthroplasty which has considerably help with his right sided knee pain; however lumbar radiculopathy in addition to debilitating diabetic peripheral neuropathy  contributes to significant difficulties with daily function and overall quality of life  Haresh Moss is a 62 y o  male with a past medical history of  Non-insulin controlled type 2 diabetes, obesity, chronic low back pain described primarily as arthritic in nature  He describes 8/10 low back pain that is worse in the mornings and worse at the end of the day  The pain is characterized by achy, nagging, indolent, crampy, stabbing pain in his axial low back  The patient describes that the pain is worse with standing for long periods of time on hard surfaces as well as with walking  The patient is a very active individual and feels as though this pain compromises his participation with independent activities of daily living  He ambulates with a walker  The pain can be debilitating at times and contribute to significant disability, compromising overall activity and independent activities of daily living  He has tried physical therapy with limited relief of symptoms  Medications  he is currently on include   Celebrex 200 mg per day, Percocet 5-325 mg Q 6 hours p r n  pain    He takes 200 mg of gabapentin per day and was recently started on Robaxin 500 mg t i d  for back spasms  He additionally has pain radiating across his ribs bilaterally in the T10 and T11 dermatomal distribution  He demonstrates good strength and denies any weakness numbness or paresthesias  The patient denies any bowel or bladder dysfunction as well  I have personally reviewed and/or updated the patient's past medical history, past surgical history, family history, social history, current medications, allergies, and vital signs today  Review of Systems   Constitutional: Positive for activity change  HENT: Negative  Eyes: Negative  Respiratory: Negative  Cardiovascular: Negative  Gastrointestinal: Negative  Endocrine: Negative  Genitourinary: Negative  Musculoskeletal: Positive for arthralgias, back pain, gait problem and myalgias  Skin: Negative  Allergic/Immunologic: Negative  Neurological: Positive for numbness  Negative for weakness  Hematological: Negative  Psychiatric/Behavioral: Negative  All other systems reviewed and are negative  Patient Active Problem List   Diagnosis    Herniation of intervertebral disc of thoracic region    Diabetic peripheral neuropathy (HCC)    Morbid obesity ([address] )    Cervical spinal cord compression (HCC)    Mild depression    Major depressive disorder, single episode, mild ([address] )    Thoracic spondylosis    Lumbar spondylosis       Past Medical History:   Diagnosis Date    Anxiety     Arthritis     Chronic pain disorder     mid back region    Colon polyp     COVID-19     CPAP (continuous positive airway pressure) dependence     Pt uses nightly    Diabetes mellitus ([address] )     Diverticulosis     History of recent hospitalization 06/16/2021    Pt was [facility] after syncopal episode  Pt saw cardiology- all tests negative  Pt had nl EEG  pt states is was a medication interaction      Hyperlipidemia     Hypertension     Obesity     Sleep apnea     Spinal stenosis        Past Surgical History:   Procedure Laterality Date    ACHILLES TENDON REPAIR      Pt had a rupture in right and left 2 years apart    COLONOSCOPY      EPIDURAL BLOCK INJECTION      Pt had in lumbar region   EPIDURAL BLOCK INJECTION N/A 4/6/2021    Procedure: T-11-T-12 INTERLAMINAR THORACIC EPIDURAL STEROID INJECTION;  Surgeon: Antonieta Oneal MD;  [facility];  Service: Pain Management     EPIDURAL BLOCK INJECTION Right 7/20/2021    Procedure: L5 and S1 TRANSFORAMINAL EPIDURAL STEROID INJECTION;  Surgeon: Antonieta Oneal MD;  [facility];  Service: Pain Management     FL GUIDED NEEDLE PLAC BX/ASP/INJ  7/14/2022    FL GUIDED NEEDLE PLAC BX/ASP/INJ  8/18/2022    FOOT SURGERY Left     Left great toe- had a hammertoe      JOINT REPLACEMENT Left     Total hip, knee    JOINT REPLACEMENT Right     Total hip, knee    KNEE ARTHROSCOPY Bilateral     NERVE BLOCK Bilateral 7/14/2022    Procedure: BLOCK MEDIAL BRANCH T12, L1, L2;  Surgeon: Antonieta Oneal MD;  [facility];  Service: Pain Management     NERVE BLOCK Bilateral 8/18/2022    Procedure: BLOCK MEDIAL BRANCH T12, L1, L2 #2;  Surgeon: Antonieta Oneal MD;  [facility];  Service: Pain Management     MT PERCUT IMPLNT [address] Bilateral 9/30/2021    Procedure: Rod Barrera SCS TRIAL;  Surgeon: Antonieta Oneal MD;  [facility];  Service: Pain Management     MT SURG IMPLNT [address] Left 10/26/2021    Procedure: Thoracic laminectomies for placement of spinal cord stimulator and internal pulse generator, use of neuromonitoring, left sided pulse generator;  Surgeon: Federico Gage MD;  Location:  MAIN OR;  Service: Neurosurgery    TOTAL KNEE ARTHROPLASTY Left        Family History   Problem Relation Age of Onset    Arthritis Mother     Hypertension Father        Social History     Occupational History    Not on file   Tobacco Use    Smoking status: Never Smoker    Smokeless tobacco: Current User     Types: Snuff    Tobacco comment: still using snuff   Vaping Use    Vaping Use: Never used   Substance and Sexual Activity    Alcohol use: Yes     Alcohol/week: 2 0 standard drinks     Types: 2 Cans of beer per week     Comment: social, weekends    Drug use: No    Sexual activity: Yes       Current Outpatient Medications on File Prior to Visit   Medication Sig    acetaminophen (TYLENOL) 500 mg tablet Take 1,000 mg by mouth every 6 (six) hours as needed for mild pain    atorvastatin (LIPITOR) 20 mg tablet Take 20 mg by mouth daily    desloratadine (CLARINEX) 5 MG tablet TAKE 1 TABLET BY MOUTH EVERY DAY FOR ALLERGY SYMPTOMS    Empagliflozin (Jardiance) 25 MG TABS Take 25 mg by mouth daily    ERGOCALCIFEROL PO Take 50,000 Units by mouth 2 (two) times a week     famotidine (PEPCID) 20 mg tablet Take 20 mg by mouth as needed     LORazepam (ATIVAN) 0 5 mg tablet Take 0 5 mg by mouth daily as needed    mupirocin (BACTROBAN) 2 % ointment      Semaglutide,0 25 or 0 5MG/DOS, (Ozempic, 0 25 or 0 5 MG/DOSE,) 2 MG/1 5ML SOPN Inject 0 5 mg under the skin once a week     sertraline (ZOLOFT) 50 mg tablet Take 75 mg by mouth     traZODone (DESYREL) 100 mg tablet 50 mg daily at bedtime   (Patient not taking: Reported on 6/27/2022)    valsartan (DIOVAN) 80 mg tablet Take 80 mg by mouth daily     [DISCONTINUED] amoxicillin (AMOXIL) 500 mg capsule TAKE 4 CAPSULES BY MOUTH 1 HOUR PRIOR TO DENTAL PROCEDURES    [DISCONTINUED] doxycycline hyclate (VIBRA-TABS) 100 mg tablet Take 100 mg by mouth 2 (two) times a day     No current facility-administered medications on file prior to visit         Allergies   Allergen Reactions    Paxil [Paroxetine] GI Intolerance         Physical Exam    /72   Pulse 93   Temp 98 2 °F (36 8 °C)   Resp 20   Ht 6' 4" (1 93 m)   Wt (!) 161 kg (355 lb 6 4 oz)   BMI 43 26 kg/m²     Constitutional: normal, well developed, well nourished, alert, in no distress and non-toxic and no overt pain behavior  and obese  Eyes: anicteric  HEENT: grossly intact  Neck: supple, symmetric, trachea midline and no masses   Pulmonary:even and unlabored  Cardiovascular:No edema or pitting edema present  Skin:Normal without rashes or lesions and well hydrated  Psychiatric:Mood and affect appropriate  Neurologic:Cranial Nerves II-XII grossly intact Sensation grossly intact; no clonus negative diallo's  Reflexes 2+ and brisk  SLR  Weakly positive right-sided we  Musculoskeletal: hunched gait  Unable to perform normal heel toe and tip toe walking  Slight weakness with right lower extremity strong plantar flexion; 5/5 strength with active range of motion movements in all other extremities bilaterally  mild pain with thoracic and lumbar facet loading bilaterally and with lateral spine rotation  mild ttp over thoracic and lumbar paraspinal muscles  Negative jahaira's test, negative gaenslen's negative SIJ loading bilaterally  Tenderness to palpation over inferior rib borders of T10-T11 posterolateral ribs bilaterally,  Eliciting radicular pain in aformentioned dermatomal distributions  Imaging    Result Narrative   Exam - thoracic spine mri scan     History: Bilateral leg weakness  Technique: Using a surface coil sagittal and axial T1-weighted and T2-weighted  scans were obtained of the thoracic spine  Findings: Image detail is suboptimal secondary to patient's obesity  Thoracic spinal cord is normal in size and configuration and MRI signal  intensity  There are no intramedullary lesions  Vertebral bodies are in anatomic alignment  There are degenerative changes in endplates and facet joints throughout the  thoracic spine  There is central spinal stenosis at T10-T11  There is no marlyn spinal cord  compression however     Other Result Information   Interface, Rad Results In - 08/07/2018  7:34 PM EDT  Formatting of this note might be different from the original   Exam - thoracic spine mri scan     History: Bilateral leg weakness  Technique: Using a surface coil sagittal and axial T1-weighted and T2-weighted  scans were obtained of the thoracic spine  Findings: Image detail is suboptimal secondary to patient's obesity  Thoracic spinal cord is normal in size and configuration and MRI signal  intensity  There are no intramedullary lesions  Vertebral bodies are in anatomic alignment  There are degenerative changes in endplates and facet joints throughout the  thoracic spine  There is central spinal stenosis at T10-T11  There is no marlyn spinal cord  compression however  IMPRESSION:  Impression:  1  Technically suboptimal study  2  Marked spondylosis  3  At T10-T11 there is spinal stenosis however there is no marlyn spinal cord  compression  MRI LUMBAR SPINE WITHOUT CONTRAST     INDICATION: [id number]: Radiculopathy, lumbar region      COMPARISON:  5/6/2018; 3/16/2021     TECHNIQUE:  Sagittal T1, sagittal T2, sagittal inversion recovery, axial T1 and axial T2, coronal T2     IMAGE QUALITY:  Diagnostic     FINDINGS:     VERTEBRAL BODIES:  There are 5 lumbar type vertebral bodies  Mild to moderate levoscoliosis mid lumbar spine  Trace grade 1 anterolisthesis of L3 on L4  Minimal grade 1 retrolisthesis of L4 on L5  Scattered degenerative endplate changes  No focally   suspicious marrow lesions  No bone marrow edema or compression abnormality      SACRUM:  Scattered degenerative endplate changes  No focally suspicious marrow lesions  No bone marrow edema or compression abnormality      DISTAL CORD AND CONUS:  Normal size and signal within the distal cord and conus  The conus medullaris terminates at the L2 level      PARASPINAL SOFT TISSUES:  Fatty atrophy of the psoas muscles noted, similar to the prior CT possibly from disuse    Marked atrophy of the left iliac is muscle as well      LOWER THORACIC DISC SPACES:  T11-T12: There is a small central disc herniation, protrusion type  Minimal central canal narrowing  Neural foramina patent bilaterally      T12-L1: There is no focal disk herniation, central canal or neural foraminal narrowing      LUMBAR DISC SPACES:     L1-L2:  There is bilateral facet hypertrophy  There is a left neural foraminal disc protrusion  Mild left neural foraminal narrowing  Central canal patent  Minimal right neural foraminal narrowing      L2-L3:  There is bilateral facet hypertrophy  There is a right neural foraminal disc protrusion  Moderate to severe right neural foraminal narrowing  Mild central canal and left neural foraminal narrowing      L3-L4:  There is bilateral facet hypertrophy  There is a right neural foraminal disc protrusion  Moderate right neural foraminal narrowing  Mild central canal and left neural foraminal narrowing      L4-L5:  There is loss of disc height and signal   There is a disc osteophyte complex with a superimposed left neural foraminal disc protrusion  There is moderate to severe left neural foraminal narrowing  Mild central canal narrowing  Moderate right   neural foraminal narrowing     L5-S1:  There is bilateral facet hypertrophy  There is a left neural foraminal disc protrusion  Severe left neural foraminal narrowing  Mild central canal and right neural foraminal narrowing      IMPRESSION:        1  Advanced multilevel spondylosis    2   Moderate to severe fatty atrophy of the bilateral psoas muscles and iliac is muscles left greater than right, possibly from disuse or denervation atrophy

## 2022-09-02 NOTE — PATIENT INSTRUCTIONS
Core Strengthening Exercises   WHAT YOU NEED TO KNOW:   Your core includes the muscles of your lower back, hip, pelvis, and abdomen  Core strengthening exercises help heal and strengthen these muscles  This helps prevent another injury, and keeps your pelvis, spine, and hips in the correct position  DISCHARGE INSTRUCTIONS:   Contact your healthcare provider if:   You have sharp or worsening pain during exercise or at rest     You have questions or concerns about your shoulder exercises  Safety tips:  Talk to your healthcare provider before you start an exercise program  A physical therapist can teach you how to do core strengthening exercises safely  Do the exercises on a mat or firm surface  A firm surface will support your spine and prevent low back pain  Do not do these exercises on a bed  Move slowly and smoothly  Avoid fast or jerky motions  Stop if you feel pain  Core exercises should not be painful  Stop if you feel pain  Breathe normally during core exercises  Do not hold your breath  This may cause an increase in blood pressure and prevent muscle strengthening  Your healthcare provider will tell you when to inhale and exhale during the exercise  Begin all of your exercises with abdominal bracing  Abdominal bracing helps warm up your core muscles  You can also practice abdominal bracing throughout the day  Lie on your back with your knees bent and feet flat on the floor  Place your arms in a relaxed position beside your body  Tighten your abdominal muscles  Pull your belly button in and up toward your spine  Hold for 5 seconds  Relax your muscles  Repeat 10 times  Core strengthening exercises: Your healthcare provider will tell you how often to do these exercises  The provider will also tell you how many repetitions of each exercise you should do  Hold each exercise for 5 seconds or as directed  As you get stronger, increase your hold to 10 to 15 seconds   You can do some of these exercises on a stability ball, or with a weight  Ask your healthcare provider how to use a stability ball or weight for these exercises:  Bridging:  Lie on your back with your knees bent and feet flat on the floor  Rest your arms at your side  Tighten your buttocks, and then lift your hips 1 inch off the floor  Hold for 5 seconds  When you can do this exercise without pain for 10 seconds, increase the distance you lift your hips  A good goal is to be able to lift your hips so that your shoulders, hips, and knees are in a straight line  Dead bug:  Lie on your back with your knees bent and feet flat on the floor  Place your arms in a relaxed position beside your body  Begin with abdominal bracing  Next, raise one leg, keeping your knee bent  Hold for 5 seconds  Repeat with the other leg  When you can do this exercise without pain for 10 to 15 seconds, you may raise one straight leg and hold  Repeat with the other leg  Quadruped:  Place your hands and knees on the floor  Keep your wrists directly below your shoulders and your knees directly below your hips  Pull your belly button in toward your spine  Do not flatten or arch your back  Tighten your abdominal muscles below your belly button  Hold for 5 seconds  When you can do this exercise without pain for 10 to 15 seconds, you may extend one arm and hold  Repeat on the other side  Side bridge exercises:      Standing side bridge:  Stand next to a wall and extend one arm toward the wall  Place your palm flat on the wall with your fingers pointing upward  Begin with abdominal bracing  Next, without moving your feet, slowly bend your arm to 90 degrees  Hold for 5 seconds  Repeat on the other side  When you can do this exercise without pain for 10 to 15 seconds, you may do the bent leg side bridge on the floor  Bent leg side bridge:  Lie on one side with your legs, hips, and shoulders in a straight line   Prop yourself up onto your forearm so your elbow is directly below your shoulder  Bend your knees back to 90 degrees  Begin with abdominal bracing  Next, lift your hips and balance yourself on your forearm and knees  Hold for 5 seconds  Repeat on the other side  When you can do this exercise without pain for 10 to 15 seconds, you may do the straight leg side bridge on the floor  Straight leg side bridge:  Lie on one side with your legs, hips, and shoulders in a straight line  Prop yourself up onto your forearm so your elbow is directly below your shoulder  Begin with abdominal bracing  Lift your hips off the floor and balance yourself on your forearm and the outside of your flexed foot  Do not let your ankle bend sideways  Hold for 5 seconds  Repeat on the other side  When you can do this exercise without pain for 10 to 15 seconds, ask your healthcare provider for more advanced exercises  Superman:  Lie on your stomach  Extend your arms forward on the floor  Tighten your abdominal muscles and lift your right hand and left leg off the floor  Hold this position  Slowly return to the starting position  Tighten your abdominal muscles and lift your left hand and right leg off the floor  Hold this position  Slowly return to the starting position  Clam:  Lie on your side with your knees bent  Put your bottom arm under your head to keep your neck in line  Put your top hand on your hip to keep your pelvis from moving  Put your heels together, and keep them together during this exercise  Slowly raise your top knee toward the ceiling  Then lower your leg so your knees are together  Repeat this exercise 10 times  Then switch sides and do the exercise 10 times with the other leg  Curl up:  Lie on your back with your knees bent and feet flat on the floor  Place your hands, palms down, underneath your lower back  Next, with your elbows on the floor, lift your shoulders and chest 2 to 3 inches off the floor   Keep your head in line with your shoulders  Hold this position  Slowly return to the starting position  Straight leg raises:  Lie on your back with one leg straight  Bend the other knee and place your foot flat on the floor  Tighten your abdominal muscles  Keep your leg straight and slowly lift it straight up 6 to 12 inches off the floor  Hold this position  Lower your leg slowly  Do as many repetitions as directed on this side  Repeat with the other leg  Plank:  Lie on your stomach  Bend your elbows and place your forearms flat on the floor  Lift your chest, stomach, and knees off the floor  Make sure your elbows are below your shoulders  Your body should be in a straight line  Do not let your hips or lower back sink to the ground  Squeeze your abdominal muscles together and hold for 15 seconds  To make this exercise harder, hold for 30 seconds or lift 1 leg at a time  Bicycles:  Lie on your back  Bend both knees and bring them toward your chest  Your calves should be parallel to the floor  Place the palms of your hands on the back of your head  Straighten your right leg and keep it lifted 2 inches off the floor  Raise your head and shoulders off the floor and twist towards your left  Keep your head and shoulders lifted  Bend your right knee while you straighten your left leg  Keep your left leg 2 inches off the floor  Twist your head and chest towards the left leg  Continue to straighten 1 leg at a time and twist        Follow up with your doctor as directed:  Write down your questions so you remember to ask them during your visits  © Copyright [employer] 2022 Information is for End User's use only and may not be sold, redistributed or otherwise used for commercial purposes  All illustrations and images included in CareNotes® are the copyrighted property of [employer]  or [facility] [place] Flaquita Darby   The above information is an  only   It is not intended as medical advice for individual conditions or treatments  Talk to your doctor, nurse or pharmacist before following any medical regimen to see if it is safe and effective for you

## 2022-09-02 NOTE — PROGRESS NOTES
Assessment  1  Thoracic spondylosis    2  Lumbar spondylosis    Greater than 75% relief of leg pain from both radiculopathy and diabetic peripheral neuropathy as well as low back pain with improved ability to participate with IADLs after Nevro SCS trial (dual lead)  Currently noting right L2 and L3 dermatomal distribution of weakness likely related to  right neural foraminal disc protrusion at L2-L3 resulting in moderate to severe right neural foraminal narrowing  At L3-L4 there is bilateral facet hypertrophy  There is a right neural foraminal disc protrusion  Moderate right neural foraminal narrowing  Given weakness and plateued improvement with PT will refer to [address] [place] [address] for consideration of possible decompression  Noting mid back pain in paraspinal muscles; +ttp over thoracic paraspinal muscles  Greater than 90% relief of pain with improved ability to participate with IADLs after bilateral T12, L1, L2 medial branch blocks #1 and #2  Risks, benefits and alternatives to repeat medial branch nerve blocks/subsequent RFA discussed, handouts provided  Previously reported the following symptomatology:     Right-sided lumbar radicular pain in L5 and S1 dermatomal distribution  80% relief after recent right L5 and S1 transforaminal epidural steroid injection  Moderate posterolateral disc herniation at L5-S1 in addition to spondyloarthropathy contributing to moderate transforaminal stenosis here  Otherwise recent MRI shows no significant central canal stenosis or transforaminal nerve root compression  The patient had right knee total arthroplasty which has considerably help with his right sided knee pain; however lumbar radiculopathy in addition to debilitating diabetic peripheral neuropathy  contributes to significant difficulties with daily function and overall quality of life  Risks, benefits and alternatives to spinal cord stimulator trial thoroughly discussed with patient    Handouts provided and all questions answered patient's satisfaction  MRI previously ordered which shows mild central canal stenosis at T10-T11 in addition to moderate facet arthropathy this level  Otherwise MRI thoracic spine within normal limits  pain consistent with thoracic radicular symptoms secondary to T10-T11 disc herniation which from 2018 MRI thoracic spine does not contribute to significant cord compression  Radiating and radicular pain in T10 and T11 dermatomal distribution  Pain is along lower thoracic T10 and T11 dermatomes, particularly with palpation of lower thoracic ribs and posterolateral distribution  Interestingly his significant and advanced lumbar facet arthropathy which is not a  primary source of his pain at this time  He previously had epidural steroid injections in the past with Dr Constance Álvarez with noted relief  For radicular pain  Reasonable to reobtain imaging, and pursue multimodal pain therapy plan as noted below  Plan  - Will refer to [address] [place] [address] for possible decompression given right L2 and L3 radiculopathy from transforaminal stenosis  - Bilateral T12, L1, L2 medial branch nerve radiofrequency ablation  -Lyrica 75 mg t i d   discontinued secondary to side effects  counseled regarding sedative effects of taking this medication and provided up titration calendar  Counseled not to take medication while driving or operating heavy machinery/using stairs  -continue other analgesics as prescribed previously well provider's  Counseled extensively as noted below regarding the risks of opioid medications in combination with Lyrica  -physical therapy for  Lumbar facet arthropathy, lumbar radiculopathy, mid back pain    There are risks associated with opioid medications, including dependence, addiction and tolerance  The patient understands and agrees to use these medications only as prescribed   Potential side effects of the medications include, but are not limited to, constipation, drowsiness, addiction, impaired judgment and risk of fatal overdose if not taken as prescribed  The patient was warned against driving while taking sedation medications  Sharing medications is a felony  At this point in time, the patient is showing no signs of addiction, abuse, diversion or suicidal ideation  A urine drug screen was collected at today's office visit as part of our medication management protocol  The point of care testing results were appropriate for what was being prescribed  The specimen will be sent for confirmatory testing  The drug screen is medically necessary because the patient is either dependent on opioid medication or is being considered for opioid medication therapy and the results could impact ongoing or future treatment  The drug screen is to evaluate for the presences or absence of prescribed, non-prescribed, and/or illicit drugs/substances  [place] Prescription Drug Monitoring Program report was reviewed and was appropriate     Complete risks and benefits including bleeding, infection, tissue reaction, nerve injury and allergic reaction were discussed  The approach was demonstrated using models and literature was provided  Verbal and written consent was obtained  My impressions and treatment recommendations were discussed in detail with the patient who verbalized understanding and had no further questions  Discharge instructions were provided  I personally saw and examined the patient and I agree with the above discussed plan of care  New Medications Ordered This Visit   Medications    desloratadine (CLARINEX) 5 MG tablet     Sig: TAKE 1 TABLET BY MOUTH EVERY DAY FOR ALLERGY SYMPTOMS       History of Present Illness    Greater than 75% relief of leg pain from both radiculopathy and diabetic peripheral neuropathy as well as low back pain with improved ability to participate with IADLs after Nevro SCS trial (dual lead)  Continues to describe right hip flexor weakness without any pain   Additionally notes mid back pain described by arthritic features  Greater than 90% relief of pain with improved ability to participate with IADLs after bilateral T12, L1, L2 medial branch blocks #1 and #2  Previously reported the following symptomatology:     Right-sided lumbar radicular pain in L5 and S1 dermatomal distribution  80% relief after recent right L5 and S1 transforaminal epidural steroid injection  Moderate posterolateral disc herniation at L5-S1 in addition to spondyloarthropathy contributing to moderate transforaminal stenosis here  Otherwise recent MRI shows no significant central canal stenosis or transforaminal nerve root compression  The patient had right knee total arthroplasty which has considerably help with his right sided knee pain; however lumbar radiculopathy in addition to debilitating diabetic peripheral neuropathy  contributes to significant difficulties with daily function and overall quality of life  Dexter Boateng is a 62 y o  male with a past medical history of  Non-insulin controlled type 2 diabetes, obesity, chronic low back pain described primarily as arthritic in nature  He describes 8/10 low back pain that is worse in the mornings and worse at the end of the day  The pain is characterized by achy, nagging, indolent, crampy, stabbing pain in his axial low back  The patient describes that the pain is worse with standing for long periods of time on hard surfaces as well as with walking  The patient is a very active individual and feels as though this pain compromises his participation with independent activities of daily living  He ambulates with a walker  The pain can be debilitating at times and contribute to significant disability, compromising overall activity and independent activities of daily living  He has tried physical therapy with limited relief of symptoms  Medications  he is currently on include   Celebrex 200 mg per day, Percocet 5-325 mg Q 6 hours p r n  pain    He takes 200 mg of gabapentin per day and was recently started on Robaxin 500 mg t i d  for back spasms  He additionally has pain radiating across his ribs bilaterally in the T10 and T11 dermatomal distribution  He demonstrates good strength and denies any weakness numbness or paresthesias  The patient denies any bowel or bladder dysfunction as well  I have personally reviewed and/or updated the patient's past medical history, past surgical history, family history, social history, current medications, allergies, and vital signs today  Review of Systems   Constitutional: Positive for activity change  HENT: Negative  Eyes: Negative  Respiratory: Negative  Cardiovascular: Negative  Gastrointestinal: Negative  Endocrine: Negative  Genitourinary: Negative  Musculoskeletal: Positive for arthralgias, back pain, gait problem and myalgias  Skin: Negative  Allergic/Immunologic: Negative  Neurological: Positive for numbness  Negative for weakness  Hematological: Negative  Psychiatric/Behavioral: Negative  All other systems reviewed and are negative  Patient Active Problem List   Diagnosis    Herniation of intervertebral disc of thoracic region    Diabetic peripheral neuropathy (HCC)    Morbid obesity ([address] )    Cervical spinal cord compression (HCC)    Mild depression    Major depressive disorder, single episode, mild ([address] )    Thoracic spondylosis    Lumbar spondylosis       Past Medical History:   Diagnosis Date    Anxiety     Arthritis     Chronic pain disorder     mid back region    Colon polyp     COVID-19     CPAP (continuous positive airway pressure) dependence     Pt uses nightly    Diabetes mellitus ([address] )     Diverticulosis     History of recent hospitalization 06/16/2021    Pt [facility] after syncopal episode  Pt saw cardiology- all tests negative  Pt had nl EEG  pt states is was a medication interaction      Hyperlipidemia     Hypertension     Obesity     Sleep apnea     Spinal stenosis        Past Surgical History:   Procedure Laterality Date    ACHILLES TENDON REPAIR      Pt had a rupture in right and left 2 years apart    COLONOSCOPY      EPIDURAL BLOCK INJECTION      Pt had in lumbar region   EPIDURAL BLOCK INJECTION N/A 4/6/2021    Procedure: T-11-T-12 INTERLAMINAR THORACIC EPIDURAL STEROID INJECTION;  Surgeon: Elbert Rowell MD;  [facility];  Service: Pain Management     EPIDURAL BLOCK INJECTION Right 7/20/2021    Procedure: L5 and S1 TRANSFORAMINAL EPIDURAL STEROID INJECTION;  Surgeon: Elbert Rowell MD;  [facility];  Service: Pain Management     FL GUIDED NEEDLE PLAC BX/ASP/INJ  7/14/2022    FL GUIDED NEEDLE PLAC BX/ASP/INJ  8/18/2022    FOOT SURGERY Left     Left great toe- had a hammertoe      JOINT REPLACEMENT Left     Total hip, knee    JOINT REPLACEMENT Right     Total hip, knee    KNEE ARTHROSCOPY Bilateral     NERVE BLOCK Bilateral 7/14/2022    Procedure: BLOCK MEDIAL BRANCH T12, L1, L2;  Surgeon: Elbert Rowell MD;  [facility];  Service: Pain Management     NERVE BLOCK Bilateral 8/18/2022    Procedure: BLOCK MEDIAL BRANCH T12, L1, L2 #2;  Surgeon: Elbert Rowell MD;  [facility];  Service: Pain Management     AK PERCUT IMPLNT Sandy Curb Bilateral 9/30/2021    Procedure: Joao Jarrett SCS TRIAL;  Surgeon: Elbert Rowell MD;  [facility];  Service: Pain Management     AK SURG IMPLNT Sandy Curb Left 10/26/2021    Procedure: Thoracic laminectomies for placement of spinal cord stimulator and internal pulse generator, use of neuromonitoring, left sided pulse generator;  Surgeon: Nazario Peterson MD;  Location:  MAIN OR;  Service: Neurosurgery    TOTAL KNEE ARTHROPLASTY Left        Family History   Problem Relation Age of Onset    Arthritis Mother     Hypertension Father        Social History     Occupational History    Not on file   Tobacco Use    Smoking status: Never Smoker    Smokeless tobacco: Current User     Types: Snuff    Tobacco comment: still using snuff   Vaping Use    Vaping Use: Never used   Substance and Sexual Activity    Alcohol use: Yes     Alcohol/week: 2 0 standard drinks     Types: 2 Cans of beer per week     Comment: social, weekends    Drug use: No    Sexual activity: Yes       Current Outpatient Medications on File Prior to Visit   Medication Sig    acetaminophen (TYLENOL) 500 mg tablet Take 1,000 mg by mouth every 6 (six) hours as needed for mild pain    atorvastatin (LIPITOR) 20 mg tablet Take 20 mg by mouth daily    desloratadine (CLARINEX) 5 MG tablet TAKE 1 TABLET BY MOUTH EVERY DAY FOR ALLERGY SYMPTOMS    Empagliflozin (Jardiance) 25 MG TABS Take 25 mg by mouth daily    ERGOCALCIFEROL PO Take 50,000 Units by mouth 2 (two) times a week     famotidine (PEPCID) 20 mg tablet Take 20 mg by mouth as needed     LORazepam (ATIVAN) 0 5 mg tablet Take 0 5 mg by mouth daily as needed    mupirocin (BACTROBAN) 2 % ointment      Semaglutide,0 25 or 0 5MG/DOS, (Ozempic, 0 25 or 0 5 MG/DOSE,) 2 MG/1 5ML SOPN Inject 0 5 mg under the skin once a week     sertraline (ZOLOFT) 50 mg tablet Take 75 mg by mouth     traZODone (DESYREL) 100 mg tablet 50 mg daily at bedtime   (Patient not taking: Reported on 6/27/2022)    valsartan (DIOVAN) 80 mg tablet Take 80 mg by mouth daily     [DISCONTINUED] amoxicillin (AMOXIL) 500 mg capsule TAKE 4 CAPSULES BY MOUTH 1 HOUR PRIOR TO DENTAL PROCEDURES    [DISCONTINUED] doxycycline hyclate (VIBRA-TABS) 100 mg tablet Take 100 mg by mouth 2 (two) times a day     No current facility-administered medications on file prior to visit         Allergies   Allergen Reactions    Paxil [Paroxetine] GI Intolerance         Physical Exam    /72   Pulse 93   Temp 98 2 °F (36 8 °C)   Resp 20   Ht 6' 4" (1 93 m)   Wt (!) 161 kg (355 lb 6 4 oz)   BMI 43 26 kg/m²     Constitutional: normal, well developed, well nourished, alert, in no distress and non-toxic and no overt pain behavior  and obese  Eyes: anicteric  HEENT: grossly intact  Neck: supple, symmetric, trachea midline and no masses   Pulmonary:even and unlabored  Cardiovascular:No edema or pitting edema present  Skin:Normal without rashes or lesions and well hydrated  Psychiatric:Mood and affect appropriate  Neurologic:Cranial Nerves II-XII grossly intact Sensation grossly intact; no clonus negative diallo's  Reflexes 2+ and brisk  SLR  Weakly positive right-sided we  Musculoskeletal: hunched gait  Unable to perform normal heel toe and tip toe walking  Slight weakness with right lower extremity strong plantar flexion; 5/5 strength with active range of motion movements in all other extremities bilaterally  mild pain with thoracic and lumbar facet loading bilaterally and with lateral spine rotation  mild ttp over thoracic and lumbar paraspinal muscles  Negative jahaira's test, negative gaenslen's negative SIJ loading bilaterally  Tenderness to palpation over inferior rib borders of T10-T11 posterolateral ribs bilaterally,  Eliciting radicular pain in aformentioned dermatomal distributions  Imaging    Result Narrative   Exam - thoracic spine mri scan     History: Bilateral leg weakness  Technique: Using a surface coil sagittal and axial T1-weighted and T2-weighted  scans were obtained of the thoracic spine  Findings: Image detail is suboptimal secondary to patient's obesity  Thoracic spinal cord is normal in size and configuration and MRI signal  intensity  There are no intramedullary lesions  Vertebral bodies are in anatomic alignment  There are degenerative changes in endplates and facet joints throughout the  thoracic spine  There is central spinal stenosis at T10-T11  There is no marlyn spinal cord  compression however     Other Result Information   Interface, Rad Results In - 08/07/2018  7:34 PM EDT  Formatting of this note might be different from the original   Exam - thoracic spine mri scan     History: Bilateral leg weakness  Technique: Using a surface coil sagittal and axial T1-weighted and T2-weighted  scans were obtained of the thoracic spine  Findings: Image detail is suboptimal secondary to patient's obesity  Thoracic spinal cord is normal in size and configuration and MRI signal  intensity  There are no intramedullary lesions  Vertebral bodies are in anatomic alignment  There are degenerative changes in endplates and facet joints throughout the  thoracic spine  There is central spinal stenosis at T10-T11  There is no marlyn spinal cord  compression however  IMPRESSION:  Impression:  1  Technically suboptimal study  2  Marked spondylosis  3  At T10-T11 there is spinal stenosis however there is no marlyn spinal cord  compression  MRI LUMBAR SPINE WITHOUT CONTRAST     INDICATION: [id number]: Radiculopathy, lumbar region      COMPARISON:  5/6/2018; 3/16/2021     TECHNIQUE:  Sagittal T1, sagittal T2, sagittal inversion recovery, axial T1 and axial T2, coronal T2     IMAGE QUALITY:  Diagnostic     FINDINGS:     VERTEBRAL BODIES:  There are 5 lumbar type vertebral bodies  Mild to moderate levoscoliosis mid lumbar spine  Trace grade 1 anterolisthesis of L3 on L4  Minimal grade 1 retrolisthesis of L4 on L5  Scattered degenerative endplate changes  No focally   suspicious marrow lesions  No bone marrow edema or compression abnormality      SACRUM:  Scattered degenerative endplate changes  No focally suspicious marrow lesions  No bone marrow edema or compression abnormality      DISTAL CORD AND CONUS:  Normal size and signal within the distal cord and conus  The conus medullaris terminates at the L2 level      PARASPINAL SOFT TISSUES:  Fatty atrophy of the psoas muscles noted, similar to the prior CT possibly from disuse    Marked atrophy of the left iliac is muscle as well      LOWER THORACIC DISC SPACES:  T11-T12: There is a small central disc herniation, protrusion type  Minimal central canal narrowing  Neural foramina patent bilaterally      T12-L1: There is no focal disk herniation, central canal or neural foraminal narrowing      LUMBAR DISC SPACES:     L1-L2:  There is bilateral facet hypertrophy  There is a left neural foraminal disc protrusion  Mild left neural foraminal narrowing  Central canal patent  Minimal right neural foraminal narrowing      L2-L3:  There is bilateral facet hypertrophy  There is a right neural foraminal disc protrusion  Moderate to severe right neural foraminal narrowing  Mild central canal and left neural foraminal narrowing      L3-L4:  There is bilateral facet hypertrophy  There is a right neural foraminal disc protrusion  Moderate right neural foraminal narrowing  Mild central canal and left neural foraminal narrowing      L4-L5:  There is loss of disc height and signal   There is a disc osteophyte complex with a superimposed left neural foraminal disc protrusion  There is moderate to severe left neural foraminal narrowing  Mild central canal narrowing  Moderate right   neural foraminal narrowing     L5-S1:  There is bilateral facet hypertrophy  There is a left neural foraminal disc protrusion  Severe left neural foraminal narrowing  Mild central canal and right neural foraminal narrowing      IMPRESSION:        1  Advanced multilevel spondylosis    2   Moderate to severe fatty atrophy of the bilateral psoas muscles and iliac is muscles left greater than right, possibly from disuse or denervation atrophy

## 2022-09-06 ENCOUNTER — TELEPHONE (OUTPATIENT)
Dept: PAIN MEDICINE | Facility: MEDICAL CENTER | Age: 62
End: 2022-09-06

## 2022-09-06 NOTE — TELEPHONE ENCOUNTER
Patient called stating he would like to schedule procedure  Please advise     Patient can be reached at [phone]   ty

## 2022-09-07 NOTE — TELEPHONE ENCOUNTER
S/w pt  Scheduled procedure 9/15/2022  Pt aware of instructions  No food/drink one hour prior  Wear comfortable clothing  A  is required  Continue medications  Call if prescribed an antibiotic or become ill  Refrain from vaccinations two weeks prior and after  Call with questions

## 2022-09-15 ENCOUNTER — ANESTHESIA (OUTPATIENT)
Dept: GASTROENTEROLOGY | Facility: HOSPITAL | Age: 62
End: 2022-09-15
Payer: COMMERCIAL

## 2022-09-15 ENCOUNTER — HOSPITAL ENCOUNTER (OUTPATIENT)
Facility: HOSPITAL | Age: 62
Setting detail: OUTPATIENT SURGERY
Discharge: HOME | End: 2022-09-15
Attending: ANESTHESIOLOGY | Admitting: ANESTHESIOLOGY
Payer: COMMERCIAL

## 2022-09-15 ENCOUNTER — TELEPHONE (OUTPATIENT)
Dept: PAIN MEDICINE | Facility: CLINIC | Age: 62
End: 2022-09-15

## 2022-09-15 ENCOUNTER — APPOINTMENT (OUTPATIENT)
Dept: RADIOLOGY | Facility: HOSPITAL | Age: 62
End: 2022-09-15
Payer: COMMERCIAL

## 2022-09-15 ENCOUNTER — ANESTHESIA EVENT (OUTPATIENT)
Dept: GASTROENTEROLOGY | Facility: HOSPITAL | Age: 62
End: 2022-09-15
Payer: COMMERCIAL

## 2022-09-15 VITALS
RESPIRATION RATE: 18 BRPM | HEART RATE: 63 BPM | TEMPERATURE: 98.3 F | WEIGHT: 315 LBS | HEIGHT: 76 IN | OXYGEN SATURATION: 94 % | DIASTOLIC BLOOD PRESSURE: 74 MMHG | SYSTOLIC BLOOD PRESSURE: 119 MMHG | BODY MASS INDEX: 38.36 KG/M2

## 2022-09-15 LAB — GLUCOSE SERPL-MCNC: 100 MG/DL (ref 65–140)

## 2022-09-15 PROCEDURE — 64633 DESTROY CERV/THOR FACET JNT: CPT | Performed by: ANESTHESIOLOGY

## 2022-09-15 PROCEDURE — 82948 REAGENT STRIP/BLOOD GLUCOSE: CPT

## 2022-09-15 PROCEDURE — 64635 DESTROY LUMB/SAC FACET JNT: CPT | Performed by: ANESTHESIOLOGY

## 2022-09-15 PROCEDURE — 77002 NEEDLE LOCALIZATION BY XRAY: CPT

## 2022-09-15 RX ORDER — LIDOCAINE HYDROCHLORIDE 10 MG/ML
10 INJECTION, SOLUTION EPIDURAL; INFILTRATION; INTRACAUDAL; PERINEURAL ONCE
Status: COMPLETED | OUTPATIENT
Start: 2022-09-15 | End: 2022-09-15

## 2022-09-15 RX ORDER — PROPOFOL 10 MG/ML
INJECTION, EMULSION INTRAVENOUS CONTINUOUS PRN
Status: DISCONTINUED | OUTPATIENT
Start: 2022-09-15 | End: 2022-09-15

## 2022-09-15 RX ORDER — DEXMEDETOMIDINE HYDROCHLORIDE 100 UG/ML
INJECTION, SOLUTION INTRAVENOUS AS NEEDED
Status: DISCONTINUED | OUTPATIENT
Start: 2022-09-15 | End: 2022-09-15

## 2022-09-15 RX ORDER — ALPRAZOLAM 0.5 MG/1
0.5 TABLET ORAL ONCE
Status: DISCONTINUED | OUTPATIENT
Start: 2022-09-15 | End: 2022-09-15 | Stop reason: HOSPADM

## 2022-09-15 RX ORDER — PROPOFOL 10 MG/ML
INJECTION, EMULSION INTRAVENOUS AS NEEDED
Status: DISCONTINUED | OUTPATIENT
Start: 2022-09-15 | End: 2022-09-15

## 2022-09-15 RX ORDER — SODIUM CHLORIDE, SODIUM LACTATE, POTASSIUM CHLORIDE, CALCIUM CHLORIDE 600; 310; 30; 20 MG/100ML; MG/100ML; MG/100ML; MG/100ML
INJECTION, SOLUTION INTRAVENOUS CONTINUOUS PRN
Status: DISCONTINUED | OUTPATIENT
Start: 2022-09-15 | End: 2022-09-15

## 2022-09-15 RX ORDER — BUPIVACAINE HCL/PF 2.5 MG/ML
5 VIAL (ML) INJECTION ONCE
Status: COMPLETED | OUTPATIENT
Start: 2022-09-15 | End: 2022-09-15

## 2022-09-15 RX ORDER — MIDAZOLAM HYDROCHLORIDE 2 MG/2ML
INJECTION, SOLUTION INTRAMUSCULAR; INTRAVENOUS AS NEEDED
Status: DISCONTINUED | OUTPATIENT
Start: 2022-09-15 | End: 2022-09-15

## 2022-09-15 RX ORDER — METHYLPREDNISOLONE ACETATE 80 MG/ML
80 INJECTION, SUSPENSION INTRA-ARTICULAR; INTRALESIONAL; INTRAMUSCULAR; PARENTERAL; SOFT TISSUE ONCE
Status: COMPLETED | OUTPATIENT
Start: 2022-09-15 | End: 2022-09-15

## 2022-09-15 RX ORDER — OXYCODONE HYDROCHLORIDE AND ACETAMINOPHEN 5; 325 MG/1; MG/1
1 TABLET ORAL ONCE
Status: DISCONTINUED | OUTPATIENT
Start: 2022-09-15 | End: 2022-09-15 | Stop reason: HOSPADM

## 2022-09-15 RX ORDER — FENTANYL CITRATE 50 UG/ML
INJECTION, SOLUTION INTRAMUSCULAR; INTRAVENOUS AS NEEDED
Status: DISCONTINUED | OUTPATIENT
Start: 2022-09-15 | End: 2022-09-15

## 2022-09-15 RX ADMIN — DEXMEDETOMIDINE HCL 12 MCG: 100 INJECTION INTRAVENOUS at 11:03

## 2022-09-15 RX ADMIN — PROPOFOL 100 MCG/KG/MIN: 10 INJECTION, EMULSION INTRAVENOUS at 11:15

## 2022-09-15 RX ADMIN — PROPOFOL 50 MG: 10 INJECTION, EMULSION INTRAVENOUS at 11:14

## 2022-09-15 RX ADMIN — PROPOFOL 50 MG: 10 INJECTION, EMULSION INTRAVENOUS at 11:12

## 2022-09-15 RX ADMIN — SODIUM CHLORIDE, SODIUM LACTATE, POTASSIUM CHLORIDE, AND CALCIUM CHLORIDE: .6; .31; .03; .02 INJECTION, SOLUTION INTRAVENOUS at 11:04

## 2022-09-15 RX ADMIN — DEXMEDETOMIDINE HCL 8 MCG: 100 INJECTION INTRAVENOUS at 11:15

## 2022-09-15 RX ADMIN — MIDAZOLAM 2 MG: 1 INJECTION INTRAMUSCULAR; INTRAVENOUS at 11:00

## 2022-09-15 RX ADMIN — FENTANYL CITRATE 50 MCG: 50 INJECTION, SOLUTION INTRAMUSCULAR; INTRAVENOUS at 11:00

## 2022-09-15 NOTE — TELEPHONE ENCOUNTER
**To be called on 9/16/22    Pt is s/p b/l T12-L2 RFA on 9/15/22 by VS  Patient is scheduled for OV on 10/7/22

## 2022-09-15 NOTE — DISCHARGE INSTRUCTIONS
YOUR 2 WEEK FOLLOW UP HAS BEEN SCHEDULED; IF YOU WISH TO CHANGE THE FOLLOW UP, PLEASE CALL THE [facility] AT [facility]: [phone]    Lumbar Radiofrequency Ablation   WHAT YOU NEED TO KNOW:   Lumbar radiofrequency ablation (RFA) is a procedure used to treat facet joint pain in your lower back  Facet joints are found at the back of each vertebra  A needle electrode is used to send electrical currents to the nerves in your facet joint  The electrical currents create heat that damages the nerve so it cannot send pain signals  DISCHARGE INSTRUCTIONS:   Seek care immediately if:   You cannot move your leg  You cannot control your urine or bowel movements  You have severe pain in your lower back  Contact your healthcare provider if:   You have leg weakness  You develop new symptoms  You have questions or concerns about your condition or care  Medicines:   Pain medicine  may be given  Ask how to take this medicine safely  Take your medicine as directed  Contact your healthcare provider if you think your medicine is not helping or if you have side effects  Tell him or her if you are allergic to any medicine  Keep a list of the medicines, vitamins, and herbs you take  Include the amounts, and when and why you take them  Bring the list or the pill bottles to follow-up visits  Carry your medicine list with you in case of an emergency  Follow up with your healthcare provider as directed:  Write down your questions so you remember to ask them during your visits  Activity:  Do not drive a car or operate machinery within 24 hours after your procedure  Ask your healthcare provider about any other activities you should avoid  © Copyright [employer] 2022 Information is for End User's use only and may not be sold, redistributed or otherwise used for commercial purposes   All illustrations and images included in CareNotes® are the copyrighted property of [employer]  or [address]  The above information is an  only  It is not intended as medical advice for individual conditions or treatments  Talk to your doctor, nurse or pharmacist before following any medical regimen to see if it is safe and effective for you

## 2022-09-15 NOTE — ANESTHESIA POSTPROCEDURE EVALUATION
Post-Op Assessment Note    CV Status:  Stable  Pain Score: 0    Pain management: adequate  Multimodal analgesia used between 6 hours prior to anesthesia start to PACU discharge    Mental Status:  Alert and awake   Hydration Status:  Euvolemic   PONV Controlled:  Controlled   Airway Patency:  Patent (SUPERNOVA IN PLACE)   Two or more mitigation strategies used for obstructive sleep apnea   Post Op Vitals Reviewed: Yes      Staff: CRNA         No complications documented   /  BP   108/57   Temp   97 3   Pulse   77   Resp   20   SpO2   97

## 2022-09-15 NOTE — INTERVAL H&P NOTE
H&P reviewed  After examining the patient I find no changes in the patients condition since the H&P had been written      Vitals:    09/15/22 1018   BP: 154/87   Pulse: 87   Resp: 20   Temp: 98 3 °F (36 8 °C)   SpO2: 98%

## 2022-09-15 NOTE — ANESTHESIA PREPROCEDURE EVALUATION
Procedure:  RHIZOTOMY LUMBAR T12, L1, L2 medial branch nerves (Bilateral Spine Lumbar)    Relevant Problems   MUSCULOSKELETAL   (+) Lumbar spondylosis   (+) Thoracic spondylosis      NEURO/PSYCH   (+) Cervical spinal cord compression (HCC)   (+) Diabetic peripheral neuropathy (HCC)   (+) Major depressive disorder, single episode, mild (HCC)   (+) Mild depression        Physical Exam    Airway    Mallampati score: II  TM Distance: >3 FB  Neck ROM: full     Dental   No notable dental hx     Cardiovascular  Cardiovascular exam normal    Pulmonary  Pulmonary exam normal     Other Findings      OSOS on CPAP    Syncopal episode 2021 with nl holter and echo  Spinal cord stimulator 10/2021    BMI 43  Anesthesia Plan  ASA Score- 3     Anesthesia Type- IV sedation with anesthesia with ASA Monitors  Additional Monitors:   Airway Plan:           Plan Factors-Exercise tolerance (METS): >4 METS  Chart reviewed  EKG reviewed  Imaging results reviewed  Existing labs reviewed  Patient summary reviewed  Patient is not a current smoker  Patient not instructed to abstain from smoking on day of procedure  Patient did not smoke on day of surgery  Induction-     Postoperative Plan-     Informed Consent- Anesthetic plan and risks discussed with patient  I personally reviewed this patient with the CRNA  Discussed and agreed on the Anesthesia Plan with the CRNA  Dara Soulier

## 2022-09-15 NOTE — PROCEDURES
Pre-procedure Diagnosis: Thoracic and Lumbar facet arthropathy  Post-procedure Diagnosis: Thoracic and Lumbar facet arthropathy  Operation Title(s):  1  Radiofrequency ablation of [BILATERAL] T12, L1, L2 medial branch nerves      2  Intraoperative fluoroscopy  Attending Surgeon:   Luh Naylor MD  Anesthesia:   Local    Indications: The patient is a 62y o  year-old male with a diagnosis of thoracic and lumbar facet arthropathy  The patient's history and physical exam were reviewed  The patient has previously undergone diagnostic and confirmatory thoracic and lumbar medial branch blocks  Fluoroscopy is being used for the precise placement of the needles at the lumbar medial branch nerves  The risks, benefits and alternatives to the procedure were discussed, and all questions were answered to the patient's satisfaction  The patient agreed to proceed, and written informed consent was obtained  Procedure in Detail: The patient was brought into the procedure room and placed in the prone position on the fluoroscopy table  The mid and low back were prepped with chloraprep times two and draped in a sterile manner  AP fluoroscopy was used to identify the thoracic and lumbar levels on the [LEFT] side  The fluoroscope beam was then obliqued to better visualize the junction of the superior articular process and transverse process on the [LEFT] side and then tilted caudally about 25 degrees  An 18-gauge, 100mm length, 10mm curved active tip radiofrequency probe was advanced toward the targeted points until bone was contacted  Multiple fluoroscopic views were made to ensure placement of the needle tip at the appropriate location of the medial branch nerve  0 5ml of 2% lidocaine was injected prior to lesioning, which was performed for 90 seconds at 80 degrees centigrade  The lesion was repeated once more after slight repositioning of the needles on an oblique view    Once the lesions were complete, 1ml of a solution consisting of 5mL 0 25% bupivacaine and 1 mL Depo-medrol (80mg/mL) was injected through each needle and then removed with a 1% lidocaine flush  The patient's back was cleaned, and bandages were placed at the needle insertion site  The same procedure was repeated at the thoracic and lumbar levels on the [RIGHT] side    Disposition: The patient tolerated the procedure well and there were no apparent complications  Vital signs remained stable throughout the procedure  The patient was taken to the recovery area where written discharge instructions for the procedure were given      Estimated Blood Loss: None  Specimens Obtained: N/A

## 2022-09-15 NOTE — OP NOTE
OPERATIVE REPORT  PATIENT NAME: Michela Khalil    :  1960  MRN: [id number]  Pt Location:  GI ROOM 01    SURGERY DATE: 9/15/2022    Surgeon(s) and Role: Kevin Lester MD - Primary    Preop Diagnosis:  Thoracic spondylosis [L11 048]  Lumbar spondylosis [M47 816]    Post-Op Diagnosis Codes: * Thoracic spondylosis [V33 553]     * Lumbar spondylosis [M47 816]    Procedure(s) (LRB):  RHIZOTOMY LUMBAR T12, L1, L2 medial branch nerves (Bilateral)    Specimen(s):  * No specimens in log *    Estimated Blood Loss:   Minimal    Drains:  * No LDAs found *    Anesthesia Type:   IV Sedation with Anesthesia    Operative Indications:  Thoracic spondylosis [J83 400]  Lumbar spondylosis [M47 816]    Operative Findings:  Bilateral T12, L1, L2 medial branch nerve regions identified under fluoroscopic guidance   Radiofrequency lesioning of medial branch nerve regions performed      Complications:   None    Procedure and Technique:  Please see detailed procedure note     I was present for the entire procedure    Patient Disposition:  PACU       SIGNATURE: Melania Cleary MD  DATE: September 15, 2022  TIME: 11:41 AM

## 2022-09-16 NOTE — TELEPHONE ENCOUNTER
Fyi  Pt states doing "super" today and denies complaints  Pt will cb if he needs anything prior to 10/7 ov

## 2022-09-20 ENCOUNTER — OFFICE VISIT (OUTPATIENT)
Dept: NEUROSURGERY | Facility: CLINIC | Age: 62
End: 2022-09-20
Payer: COMMERCIAL

## 2022-09-20 VITALS
TEMPERATURE: 98.2 F | HEIGHT: 76 IN | BODY MASS INDEX: 38.36 KG/M2 | WEIGHT: 315 LBS | SYSTOLIC BLOOD PRESSURE: 112 MMHG | DIASTOLIC BLOOD PRESSURE: 82 MMHG | RESPIRATION RATE: 16 BRPM

## 2022-09-20 DIAGNOSIS — M62.50 MUSCULAR ATROPHY, UNSPECIFIED SITE: Primary | ICD-10-CM

## 2022-09-20 DIAGNOSIS — M48.062 NEUROGENIC CLAUDICATION DUE TO LUMBAR SPINAL STENOSIS: ICD-10-CM

## 2022-09-20 PROCEDURE — 99213 OFFICE O/P EST LOW 20 MIN: CPT | Performed by: NEUROLOGICAL SURGERY
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
## 2022-09-26 ENCOUNTER — TELEPHONE (OUTPATIENT)
Dept: NEUROSURGERY | Facility: CLINIC | Age: 62
End: 2022-09-26

## 2022-09-26 NOTE — TELEPHONE ENCOUNTER
9/26/22 Patient is scheduled with Dr Vinnie Libman office for an EMG on 2/1/23  He requested last office notes be faxed to [phone] to [address] [place] [address] Tam at office  and advised they will be faxed over  Also let patients wife know fax was sent

## 2022-10-03 ENCOUNTER — HOSPITAL ENCOUNTER (OUTPATIENT)
Dept: MRI IMAGING | Facility: HOSPITAL | Age: 62
Discharge: HOME | End: 2022-10-03
Attending: NEUROLOGICAL SURGERY
Payer: COMMERCIAL

## 2022-10-03 ENCOUNTER — HOSPITAL ENCOUNTER (OUTPATIENT)
Dept: RADIOLOGY | Facility: HOSPITAL | Age: 62
Discharge: HOME | End: 2022-10-03
Payer: COMMERCIAL

## 2022-10-03 DIAGNOSIS — Z53.09 MRI CONTRAINDICATED DUE TO METAL IMPLANT: ICD-10-CM

## 2022-10-03 DIAGNOSIS — M48.062 NEUROGENIC CLAUDICATION DUE TO LUMBAR SPINAL STENOSIS: ICD-10-CM

## 2022-10-03 DIAGNOSIS — M62.50 MUSCULAR ATROPHY, UNSPECIFIED SITE: ICD-10-CM

## 2022-10-03 PROCEDURE — G1004 CDSM NDSC: HCPCS

## 2022-10-03 PROCEDURE — 72141 MRI NECK SPINE W/O DYE: CPT

## 2022-11-01 ENCOUNTER — OFFICE VISIT (OUTPATIENT)
Dept: NEUROSURGERY | Facility: CLINIC | Age: 62
End: 2022-11-01

## 2022-11-01 VITALS
WEIGHT: 315 LBS | DIASTOLIC BLOOD PRESSURE: 96 MMHG | OXYGEN SATURATION: 97 % | BODY MASS INDEX: 38.36 KG/M2 | TEMPERATURE: 98.3 F | HEART RATE: 76 BPM | SYSTOLIC BLOOD PRESSURE: 136 MMHG | HEIGHT: 76 IN

## 2022-11-01 DIAGNOSIS — G12.9 SPINAL MUSCULAR ATROPHY, UNSPECIFIED (HCC): Primary | ICD-10-CM

## 2022-11-01 RX ORDER — DOXYCYCLINE HYCLATE 100 MG/1
100 CAPSULE ORAL EVERY 12 HOURS
COMMUNITY
Start: 2022-10-28

## 2022-11-01 NOTE — PROGRESS NOTES
Assessment/Plan:    Spinal muscular atrophy, unspecified ([facility] [address] )  As addressed in HPI  He reports today/assessed for    · Has strength in right leg when sitting quads are strong at therapy   But when start walking right leg start to drag get get and weak   · Use single point cane-- severely antalgic gait   · Cannot lift right leg while sitting but can straight leg raise when lying on examination table  · Used right upper extremity to lift right leg off floor  Has difficultly lifting left leg when sitting  When lying supine can straight raise both legs   · Reports after right hip replacement started with weakness in right quads that has persisted  Reports right THR and Bilateral knee replacements  · He describes several episodes of Lhermitte sign during cervical flexion while atwork prior to undergoing cervical spine surgery in 2018   · He disputed the need for the EMG, remarked 'I do not think it is going to help me', I explained rational why Dr Sujatha Andre ordered  He is now in agreement to proceed with EMG    Imagining   10/3/2022 MRI Cervical spine wo contrast  : Straightening of cervical spine, multilevel cervical vertebral body fusion and anterior bridging syndesmophytes suggestive of diffuse idiopathic skeletal hyperostosis  Cervical spondylosis results in mild C3-4 to C5-C6 and moderate to severe bilateral foraminal stenosis as described above  Posterior spinal decompression at the cervicothoracic junction  Focal C7-T1 myelomalacia  PLAN  · Reviewed CT cervical spine in detail with patient and wife  · Has a f/u appointment with Dr Sujatha Andre 1/17/23 , request front deck facilitate re-scheduling EMG RLE ordered by DKO so complete dby appointment   · Patient advised if he has additional questions or concerns jeovany the office  Subjective: RTO  has completed cervical spine MRI wo contrast , pending EMG completion      Patient ID: Lissett Martins is a 65 y o  male     HPI   He Returns to office today after a consult with Dr Tracy Cabrera 9/20/22   Referred to neurosurgery from Dr Birdie Latham as per his office visit note 8/8/22 over concern for weakness and plateau  improvement in physical therapy for consideration of possible decompression  Has mid back pain in paraspinals muscles  As per a Dr Tracy Cabrera 's note patient has a history of cervical spinal cord injury with compression/ myelomalacia at C7-T1  Level  He is status post decompressive cervical laminectomy without instrumentation  [facility]uth[facility]utch His recovery involved a 1 month hospital stay between acute hospitalization and rehab , he was able to walk  He has had problems ambulating since that time  Dr Tracy Cabrera reviewed imaging that demonstrated severe psoas muscle atrophy on the right, for this reason Dr Tracy Cabrera ordered an EMG of RLE and MRI cervical spine  Imagining results tawana determine if surgical intervention is indicated  Of Note he has a NEVRO Thoracic spinal cord stimulator inserted by Dr Silvia Beth  10/26/2021 for lumbar radiculopathy , back and lower extremity pain  The patient primary difficultly is ambulation and right leg weakness     Multimodal treatments   · Therapy has helped a "TON "  , can move legs when lying supine  Physical therapy has reported his primary problem is leg swing  · PM RIGO, RF,   · Use head pad to low back  He returns today for follow-up visit and completed MRI imagining of cervical spine  I have spent 450 minutes with the patient today in which greater than 50% of this time was spent in assessment, examination, impressions, reviewing imagining and recommendations for care  All questions were answered to his/her satisfaction, and contact information provided in the event additional questions arise  Patient acknowledged an understanding and agreement with plan                REVIEW OF SYSTEMS  Review of Systems   Constitutional: Positive for activity change (significantly decreased)  HENT: Negative  Eyes: Negative  Respiratory: Negative  Cardiovascular: Positive for leg swelling (intermittent b/l lower legs)  Gastrointestinal: Negative  Endocrine: Negative  Genitourinary: Negative  Musculoskeletal: Positive for back pain (tightness in midback when walking) and gait problem (occasional pain in b/l legs R>L, ambulates with cane )  Skin: Negative  Allergic/Immunologic: Negative  Neurological: Positive for weakness (B/l legs R>L) and numbness (n/t b/l lower legs and b/l feet )  Hematological: Negative  Psychiatric/Behavioral: Negative  Meds/Allergies     Current Outpatient Medications   Medication Sig Dispense Refill   • acetaminophen (TYLENOL) 500 mg tablet Take 1,000 mg by mouth every 6 (six) hours as needed for mild pain     • atorvastatin (LIPITOR) 20 mg tablet Take 20 mg by mouth daily     • desloratadine (CLARINEX) 5 MG tablet TAKE 1 TABLET BY MOUTH EVERY DAY FOR ALLERGY SYMPTOMS     • doxycycline hyclate (VIBRAMYCIN) 100 mg capsule Take 100 mg by mouth every 12 (twelve) hours     • Empagliflozin (Jardiance) 25 MG TABS Take 25 mg by mouth daily     • ERGOCALCIFEROL PO Take 50,000 Units by mouth 2 (two) times a week      • famotidine (PEPCID) 20 mg tablet Take 20 mg by mouth as needed      • LORazepam (ATIVAN) 0 5 mg tablet Take 0 5 mg by mouth daily as needed     • Semaglutide,0 25 or 0 5MG/DOS, (Ozempic, 0 25 or 0 5 MG/DOSE,) 2 MG/1 5ML SOPN Inject 0 5 mg under the skin once a week      • sertraline (ZOLOFT) 50 mg tablet Take 75 mg by mouth      • valsartan (DIOVAN) 80 mg tablet Take 80 mg by mouth daily        No current facility-administered medications for this visit         No Known Allergies    PAST HISTORY    Past Medical History:   Diagnosis Date   • Anxiety    • Arthritis    • Chronic pain disorder     mid back region   • Colon polyp    • COVID-19    • CPAP (continuous positive airway pressure) dependence     Pt uses nightly   • [facility]    • Diverticulosis    • History of recent hospitalization 06/16/2021    Pt was admitted to [facility] after syncopal episode  Pt saw cardiology- all tests negative  Pt had nl EEG  pt states is was a medication interaction  • Hyperlipidemia    • Hypertension    • Obesity    • Sleep apnea    • Spinal stenosis        Past Surgical History:   Procedure Laterality Date   • ACHILLES TENDON REPAIR      Pt had a rupture in right and left 2 years apart   • COLONOSCOPY     • EPIDURAL BLOCK INJECTION      Pt had in lumbar region  • EPIDURAL BLOCK INJECTION N/A 4/6/2021    Procedure: T-11-T-12 INTERLAMINAR THORACIC EPIDURAL STEROID INJECTION;  Surgeon: Dhruv Fierro MD;  Location: [facility];  Service: Pain Management    • EPIDURAL BLOCK INJECTION Right 7/20/2021    Procedure: L5 and S1 TRANSFORAMINAL EPIDURAL STEROID INJECTION;  Surgeon: Dhruv Fierro MD;  Location: [facility];  Service: Pain Management    • FL GUIDED NEEDLE PLAC BX/ASP/INJ  7/14/2022   • FL GUIDED NEEDLE PLAC BX/ASP/INJ  8/18/2022   • FL GUIDED NEEDLE PLAC BX/ASP/INJ  9/15/2022   • FOOT SURGERY Left     Left great toe- had a hammertoe     • JOINT REPLACEMENT Left     Total hip, knee   • JOINT REPLACEMENT Right     Total hip, knee   • KNEE ARTHROSCOPY Bilateral    • NERVE BLOCK Bilateral 7/14/2022    Procedure: BLOCK MEDIAL BRANCH T12, L1, L2;  Surgeon: Dhruv Fierro MD;  Location: [facility];  Service: Pain Management    • NERVE BLOCK Bilateral 8/18/2022    Procedure: BLOCK MEDIAL BRANCH T12, L1, L2 #2;  Surgeon: Dhruv Fierro MD;  [facility]: [facility];  Service: Pain Management    • LA PERCUT IMPLNT Khadra Stai Bilateral 9/30/2021    Procedure: Celine VALENCIA TRIAL;  Surgeon: Dhruv Fierro MD;  [facility]: [facility];  Service: Pain Management    • LA SURG IMPLNT Khadra Stai Left 10/26/2021    Procedure: Thoracic laminectomies for placement of spinal cord stimulator and internal pulse generator, use of neuromonitoring, left sided pulse generator;  Surgeon: Jose Juares Holland Muhammad MD;  Location:  MAIN OR;  Service: Neurosurgery   • RHIZOTOMY Bilateral 9/15/2022    Procedure: RHIZOTOMY LUMBAR T12, L1, L2 medial branch nerves;  Surgeon: Ike Churchill MD;  Location: [facility];  Service: Pain Management    • TOTAL KNEE ARTHROPLASTY Left        Social History     Tobacco Use   • Smoking status: Never Smoker   • Smokeless tobacco: Current User     Types: Snuff   • Tobacco comment: still using snuff   Vaping Use   • Vaping Use: Never used   Substance Use Topics   • Alcohol use: Yes     Alcohol/week: 2 0 standard drinks     Types: 2 Cans of beer per week     Comment: social, weekends   • Drug use: No       Family History   Problem Relation Age of Onset   • Arthritis Mother    • Hypertension Father        The following portions of the patient's history were reviewed and updated as appropriate: allergies, current medications, past family history, past medical history, past social history, past surgical history and problem list       EXAM    Vitals:Blood pressure 136/96, pulse 76, temperature 98 3 °F (36 8 °C), temperature source Temporal, height 6' 4" (1 93 m), weight (!) 159 kg (350 lb), SpO2 97 %  ,Body mass index is 42 6 kg/m²  Physical Exam  Vitals and nursing note reviewed  Exam conducted with a chaperone present (wife)  Constitutional:       Appearance: Normal appearance  Comments: Morbid obesity BMI 42 60   HENT:      Head: Normocephalic and atraumatic  Pulmonary:      Effort: Pulmonary effort is normal  No respiratory distress  Musculoskeletal:      Right lower leg: Edema present  Left lower leg: No edema  Skin:     Findings: Erythema (both lower legs) present  Neurological:      Mental Status: He is alert  Sensory: No sensory deficit  Motor: Weakness present        Coordination: Coordination abnormal       Gait: Gait abnormal       Deep Tendon Reflexes:      Reflex Scores:       Patellar reflexes are 2+ on the right side and 2+ on the left side        Achilles reflexes are 1+ on the right side and 1+ on the left side  Psychiatric:         Mood and Affect: Mood normal          Behavior: Behavior normal          Neurologic Exam     Motor Exam   Right leg tone: decreased  Left leg tone: decreased    Strength   Right abdominals: 3/5  Left abdominals: 4/5  Right iliopsoas: 3/5  Left iliopsoas: 4/5  Right quadriceps: 3/5  Left quadriceps: 4/5  Right hamstring: 3/5  Left hamstrin/5  Left glutei: 4/5  Right anterior tibial: 5/5  Left anterior tibial: 5/5  Right gastroc: 5/5  Left gastroc: 5/5    Sensory Exam   Right leg light touch: normal  Left leg light touch: normal    Gait, Coordination, and Reflexes     Reflexes   Right patellar: 2+  Left patellar: 2+  Right achilles: 1+  Left achilles: 1+  Right ankle clonus: absent  Left pendular knee jerk: absent      Imaging Studies  MRI cervical spine wo contrast 10/3/22     Result Date: 10/6/2022  Narrative: MRI CERVICAL SPINE WITHOUT CONTRAST INDICATION: Neurogenic claudication to the lumbar spinal stenosis  Muscular atrophy with chronic neck pain radiating into both arms  History of cervical cord injury with myelomalacia noted at C7-T1  COMPARISON:  None  TECHNIQUE:  Sagittal T1, sagittal T2, sagittal inversion recovery, axial T2, axial  2D merge  Normal mode MR imaging was performed with adherence to the NERVO spinal stimulator 's guidelines  Imaging performed on 1 5T MRI  IMAGE QUALITY:  Diagnostic FINDINGS: ALIGNMENT:  Straightening of the cervical spine  Mild chronic anterior subluxation of C7 on T1  Slight levoscoliotic curvature to the visualized and cervicothoracic spine  Posterior spinal decompression and C7-T1  MARROW SIGNAL:  Heterogeneous marrow without suspicious focus of signal abnormality  CERVICAL AND VISUALIZED THORACIC CORD:  Focal myelomalacia involving the cervical cord gray matter at the C7-T1 level   PREVERTEBRAL AND PARASPINAL SOFT TISSUES:  Normal  VISUALIZED POSTERIOR FOSSA: Pontine T2 hyperintense signal likely represents the sequela of chronic small vessel ischemic disease  CERVICAL DISC SPACES:  Multilevel disc space narrowing with partial if not complete fusion across multiple disc spaces throughout the cervical spine  There are anterior bulky bridging syndesmophytes suggestive of diffuse idiopathic skeletal hyperostosis  C2-C3:  No spinal canal stenosis  Near complete bony fusion across the disc space  C3-C4:  Posterior disc osteophyte complex and bilateral uncovertebral hypertrophy with mild facet arthropathy resulting in mild spinal stenosis  Severe bilateral foraminal stenosis  C4-C5: Bony fusion across the disc space  Endplate osteophytic ridging with mild spinal stenosis  Uncovertebral joint disease and facet arthropathy with moderate to severe bilateral foraminal stenosis  C5-C6:  Partial bony fusion across the C5-6 disc space with endplate osteophytic ridging and mild disc bulging resulting in mild spinal stenosis  There is severe right and moderate left foraminal stenosis with right greater than left facet arthropathy and bilateral uncovertebral joint disease  C6-C7: Bony fusion across the disc space  Bilateral uncovertebral hypertrophy  No spinal canal stenosis despite endplate osteophytic ridging and uncovertebral joint disease  Patent neural foramina  C7-T1: Bony fusion across the disc space with chronic anterolisthesis  Mild endplate osteophytic ridging and uncovertebral joint disease with facet ankylosis  No spinal stenosis  Patent neural foramina  UPPER THORACIC DISC SPACES:  Mild multilevel noncompressive degenerative discogenic disease  Impression: Straightening of cervical spine, multilevel cervical vertebral body fusion and anterior bridging syndesmophytes suggestive of diffuse idiopathic skeletal hyperostosis  Cervical spondylosis results in mild C3-4 to C5-C6 and moderate to severe bilateral foraminal stenosis as described above  Posterior spinal decompression at the cervicothoracic junction  Focal C7-T1 myelomalacia  Workstation performed: [id number]     XR follow up    Result Date: 10/3/2022  Narrative: LIMITED THORACIC SPINE INDICATION: Pre-MRI assessment COMPARISON:  None VIEWS:  XR FOLLOW UP (NO CHARGE) FINDINGS: Thoracic spinal cord stimulator with intact appearing leads  No abandoned lead is noted  No acute osseous abnormality  Degenerative change of visualized thoracic and lumbar spine  Impression: Intact appearing thoracic spinal cord stimulator leads  Workstation performed: [id number]       I have personally reviewed pertinent reports     and I have personally reviewed pertinent films in PACS

## 2022-11-02 NOTE — ASSESSMENT & PLAN NOTE
As addressed in HPI  He reports today/assessed for    · Has strength in right leg when sitting quads are strong at therapy   But when start walking right leg start to drag get get and weak   · Use single point cane-- severely antalgic gait   · Cannot lift right leg while sitting but can straight leg raise when lying on examination table  · Used right upper extremity to lift right leg off floor  Has difficultly lifting left leg when sitting  When lying supine can straight raise both legs   · Reports after right hip replacement started with weakness in right quads that has persisted  Reports right THR and Bilateral knee replacements  · He describes several episodes of Lhermitte sign during cervical flexion while atwork prior to undergoing cervical spine surgery in 2018   · He disputed the need for the EMG, remarked 'I do not think it is going to help me', I explained rational why Dr Yana Menon ordered  He is now in agreement to proceed with EMG    Imagining   10/3/2022 MRI Cervical spine wo contrast  : Straightening of cervical spine, multilevel cervical vertebral body fusion and anterior bridging syndesmophytes suggestive of diffuse idiopathic skeletal hyperostosis  Cervical spondylosis results in mild C3-4 to C5-C6 and moderate to severe bilateral foraminal stenosis as described above  Posterior spinal decompression at the cervicothoracic junction  Focal C7-T1 myelomalacia  PLAN  · Reviewed CT cervical spine in detail with patient and wife  · Has a f/u appointment with Dr Yana Menon 1/17/23 , request front deck facilitate re-scheduling EMG RLE ordered by ANGELINA so complete dby appointment   · Patient advised if he has additional questions or concerns jeovany the office

## 2022-11-17 ENCOUNTER — HOSPITAL ENCOUNTER (OUTPATIENT)
Dept: NEUROLOGY | Facility: CLINIC | Age: 62
End: 2022-11-17

## 2022-11-17 DIAGNOSIS — M62.50 MUSCULAR ATROPHY, UNSPECIFIED SITE: ICD-10-CM

## 2022-11-17 DIAGNOSIS — M48.062 NEUROGENIC CLAUDICATION DUE TO LUMBAR SPINAL STENOSIS: ICD-10-CM

## 2022-12-02 ENCOUNTER — HOSPITAL ENCOUNTER (EMERGENCY)
Facility: HOSPITAL | Age: 62
Discharge: HOME | End: 2022-12-02
Attending: EMERGENCY MEDICINE

## 2022-12-02 ENCOUNTER — APPOINTMENT (EMERGENCY)
Dept: CT IMAGING | Facility: HOSPITAL | Age: 62
End: 2022-12-02

## 2022-12-02 VITALS
HEART RATE: 70 BPM | OXYGEN SATURATION: 96 % | HEIGHT: 76 IN | BODY MASS INDEX: 38.36 KG/M2 | DIASTOLIC BLOOD PRESSURE: 92 MMHG | WEIGHT: 315 LBS | SYSTOLIC BLOOD PRESSURE: 150 MMHG | RESPIRATION RATE: 16 BRPM | TEMPERATURE: 97.3 F

## 2022-12-02 DIAGNOSIS — N39.0 UTI (URINARY TRACT INFECTION): ICD-10-CM

## 2022-12-02 DIAGNOSIS — R10.32 LEFT LOWER QUADRANT ABDOMINAL PAIN: Primary | ICD-10-CM

## 2022-12-02 LAB
ALBUMIN SERPL BCP-MCNC: 3.6 G/DL (ref 3.5–5)
ALP SERPL-CCNC: 108 U/L (ref 46–116)
ALT SERPL W P-5'-P-CCNC: 25 U/L (ref 12–78)
ANION GAP SERPL CALCULATED.3IONS-SCNC: 6 MMOL/L (ref 4–13)
AST SERPL W P-5'-P-CCNC: 22 U/L (ref 5–45)
BACTERIA UR QL AUTO: NORMAL /HPF
BASOPHILS # BLD AUTO: 0.06 THOUSANDS/ÂΜL (ref 0–0.1)
BASOPHILS NFR BLD AUTO: 1 % (ref 0–1)
BILIRUB SERPL-MCNC: 0.61 MG/DL (ref 0.2–1)
BILIRUB UR QL STRIP: NEGATIVE
BUN SERPL-MCNC: 9 MG/DL (ref 5–25)
CALCIUM SERPL-MCNC: 8.7 MG/DL (ref 8.3–10.1)
CHLORIDE SERPL-SCNC: 102 MMOL/L (ref 96–108)
CLARITY UR: CLEAR
CO2 SERPL-SCNC: 27 MMOL/L (ref 21–32)
COLOR UR: YELLOW
CREAT SERPL-MCNC: 1.04 MG/DL (ref 0.6–1.3)
EOSINOPHIL # BLD AUTO: 0.08 THOUSAND/ÂΜL (ref 0–0.61)
EOSINOPHIL NFR BLD AUTO: 1 % (ref 0–6)
ERYTHROCYTE [DISTWIDTH] IN BLOOD BY AUTOMATED COUNT: 12.9 % (ref 11.6–15.1)
GFR SERPL CREATININE-BSD FRML MDRD: 77 ML/MIN/1.73SQ M
GLUCOSE SERPL-MCNC: 95 MG/DL (ref 65–140)
GLUCOSE UR STRIP-MCNC: ABNORMAL MG/DL
HCT VFR BLD AUTO: 45.7 % (ref 36.5–49.3)
HGB BLD-MCNC: 15.5 G/DL (ref 12–17)
HGB UR QL STRIP.AUTO: ABNORMAL
IMM GRANULOCYTES # BLD AUTO: 0.09 THOUSAND/UL (ref 0–0.2)
IMM GRANULOCYTES NFR BLD AUTO: 1 % (ref 0–2)
KETONES UR STRIP-MCNC: NEGATIVE MG/DL
LACTATE SERPL-SCNC: 1.5 MMOL/L (ref 0.5–2)
LEUKOCYTE ESTERASE UR QL STRIP: ABNORMAL
LIPASE SERPL-CCNC: 119 U/L (ref 73–393)
LYMPHOCYTES # BLD AUTO: 2.17 THOUSANDS/ÂΜL (ref 0.6–4.47)
LYMPHOCYTES NFR BLD AUTO: 18 % (ref 14–44)
MCH RBC QN AUTO: 30.8 PG (ref 26.8–34.3)
MCHC RBC AUTO-ENTMCNC: 33.9 G/DL (ref 31.4–37.4)
MCV RBC AUTO: 91 FL (ref 82–98)
MONOCYTES # BLD AUTO: 0.6 THOUSAND/ÂΜL (ref 0.17–1.22)
MONOCYTES NFR BLD AUTO: 5 % (ref 4–12)
NEUTROPHILS # BLD AUTO: 9.04 THOUSANDS/ÂΜL (ref 1.85–7.62)
NEUTS SEG NFR BLD AUTO: 74 % (ref 43–75)
NITRITE UR QL STRIP: NEGATIVE
NON-SQ EPI CELLS URNS QL MICRO: NORMAL /HPF
NRBC BLD AUTO-RTO: 0 /100 WBCS
PH UR STRIP.AUTO: 5.5 [PH]
PLATELET # BLD AUTO: 178 THOUSANDS/UL (ref 149–390)
PMV BLD AUTO: 9.4 FL (ref 8.9–12.7)
POTASSIUM SERPL-SCNC: 4 MMOL/L (ref 3.5–5.3)
PROT SERPL-MCNC: 7.6 G/DL (ref 6.4–8.4)
PROT UR STRIP-MCNC: NEGATIVE MG/DL
RBC # BLD AUTO: 5.04 MILLION/UL (ref 3.88–5.62)
RBC #/AREA URNS AUTO: NORMAL /HPF
SODIUM SERPL-SCNC: 135 MMOL/L (ref 135–147)
SP GR UR STRIP.AUTO: 1.01 (ref 1–1.03)
UROBILINOGEN UR QL STRIP.AUTO: 0.2 E.U./DL
WBC # BLD AUTO: 12.04 THOUSAND/UL (ref 4.31–10.16)
WBC #/AREA URNS AUTO: NORMAL /HPF

## 2022-12-02 RX ORDER — ONDANSETRON 2 MG/ML
4 INJECTION INTRAMUSCULAR; INTRAVENOUS ONCE
Status: COMPLETED | OUTPATIENT
Start: 2022-12-02 | End: 2022-12-02

## 2022-12-02 RX ORDER — CEPHALEXIN 500 MG/1
500 CAPSULE ORAL EVERY 12 HOURS SCHEDULED
Qty: 10 CAPSULE | Refills: 0 | Status: SHIPPED | OUTPATIENT
Start: 2022-12-02 | End: 2022-12-07

## 2022-12-02 RX ADMIN — SODIUM CHLORIDE 1000 ML: 0.9 INJECTION, SOLUTION INTRAVENOUS at 14:21

## 2022-12-02 RX ADMIN — IOHEXOL 100 ML: 350 INJECTION, SOLUTION INTRAVENOUS at 14:39

## 2022-12-02 RX ADMIN — ONDANSETRON 4 MG: 2 INJECTION INTRAMUSCULAR; INTRAVENOUS at 14:21

## 2022-12-02 NOTE — ED PROVIDER NOTES
History  Chief Complaint   Patient presents with   • Abdominal Pain     Pt lower left side history of divertosis      Patient is a 30-year-old male presenting to the emergency department complaining of worsening left lower quadrant abdominal pain over the past few days, he does report having a GI bug for 24 hours with nausea and diarrhea, his wife had similar symptoms, that has since resolved but he feels constipated, he is having increasing pain in his left lower quadrant, he reports feeling feverish but never had an actual elevated temperature that hears aware of, has associated nausea but no vomiting, reports a known history of diverticulosis noted on previous colonoscopy, he did recently complete a course of doxycycline for a skin infection, denies any urinary symptoms, no blood in his stools          Prior to Admission Medications   Prescriptions Last Dose Informant Patient Reported? Taking?    ERGOCALCIFEROL PO   Yes No   Sig: Take 50,000 Units by mouth 2 (two) times a week    Empagliflozin (Jardiance) 25 MG TABS   Yes No   Sig: Take 25 mg by mouth daily   LORazepam (ATIVAN) 0 5 mg tablet   Yes No   Sig: Take 0 5 mg by mouth daily as needed   Semaglutide,0 25 or 0 5MG/DOS, (Ozempic, 0 25 or 0 5 MG/DOSE,) 2 MG/1 5ML SOPN   Yes No   Sig: Inject 0 5 mg under the skin once a week    acetaminophen (TYLENOL) 500 mg tablet   Yes No   Sig: Take 1,000 mg by mouth every 6 (six) hours as needed for mild pain   atorvastatin (LIPITOR) 20 mg tablet   Yes No   Sig: Take 20 mg by mouth daily   desloratadine (CLARINEX) 5 MG tablet   Yes No   Sig: TAKE 1 TABLET BY MOUTH EVERY DAY FOR ALLERGY SYMPTOMS   famotidine (PEPCID) 20 mg tablet   Yes No   Sig: Take 20 mg by mouth as needed    sertraline (ZOLOFT) 50 mg tablet   Yes No   Sig: Take 75 mg by mouth    valsartan (DIOVAN) 80 mg tablet   Yes No   Sig: Take 80 mg by mouth daily       Facility-Administered Medications: None       Past Medical History:   Diagnosis Date   • Anxiety • Arthritis    • Chronic pain disorder     mid back region   • Colon polyp    • COVID-19    • CPAP (continuous positive airway pressure) dependence     Pt uses nightly   • Diabetes mellitus ([address] )    • Diverticulosis    • History of recent hospitalization 06/16/2021    Pt was admitted to [facility] after syncopal episode  Pt saw cardiology- all tests negative  Pt had nl EEG  pt states is was a medication interaction  • Hyperlipidemia    • Hypertension    • Obesity    • Sleep apnea    • Spinal stenosis        Past Surgical History:   Procedure Laterality Date   • ACHILLES TENDON REPAIR      Pt had a rupture in right and left 2 years apart   • COLONOSCOPY     • EPIDURAL BLOCK INJECTION      Pt had in lumbar region  • EPIDURAL BLOCK INJECTION N/A 4/6/2021    Procedure: T-11-T-12 INTERLAMINAR THORACIC EPIDURAL STEROID INJECTION;  Surgeon: Laurita Edgar MD;  Location: [facility];  Service: Pain Management    • EPIDURAL BLOCK INJECTION Right 7/20/2021    Procedure: L5 and S1 TRANSFORAMINAL EPIDURAL STEROID INJECTION;  Surgeon: Laurita Edgar MD;  Location: [facility];  Service: Pain Management    • FL GUIDED NEEDLE PLAC BX/ASP/INJ  7/14/2022   • FL GUIDED NEEDLE PLAC BX/ASP/INJ  8/18/2022   • FL GUIDED NEEDLE PLAC BX/ASP/INJ  9/15/2022   • FOOT SURGERY Left     Left great toe- had a hammertoe     • JOINT REPLACEMENT Left     Total hip, knee   • JOINT REPLACEMENT Right     Total hip, knee   • KNEE ARTHROSCOPY Bilateral    • NERVE BLOCK Bilateral 7/14/2022    Procedure: BLOCK MEDIAL BRANCH T12, L1, L2;  Surgeon: Laurita Edgar MD;  Location: [facility];  Service: Pain Management    • NERVE BLOCK Bilateral 8/18/2022    Procedure: BLOCK MEDIAL BRANCH T12, L1, L2 #2;  Surgeon: Laurita Edgar MD;  Location: [facility];  Service: Pain Management    • W[place] PERCUT IMPLNT NEUROELECT,EPIDURAL Bilateral 9/30/2021    Procedure: Lavonne Romero SCS TRIAL;  Surgeon: Laurita Edgar MD;  Location: [facility];  Service: Pain Management    • W[place] SURG IMPLNT NEUROELECT,EPIDURAL Left 10/26/2021    Procedure: Thoracic laminectomies for placement of spinal cord stimulator and internal pulse generator, use of neuromonitoring, left sided pulse generator;  Surgeon: Farooq Cota MD;  Location: [facility];  Service: Neurosurgery   • RHIZOTOMY Bilateral 9/15/2022    Procedure: RHIZOTOMY LUMBAR T12, L1, L2 medial branch nerves;  Surgeon: Aissatou Wick MD;  Location: [facility];  Service: Pain Management    • TOTAL KNEE ARTHROPLASTY Left        Family History   Problem Relation Age of Onset   • Arthritis Mother    • Hypertension Father      I have reviewed and agree with the history as documented  E-Cigarette/Vaping   • E-Cigarette Use Never User      E-Cigarette/Vaping Substances     Social History     Tobacco Use   • Smoking status: Never   • Smokeless tobacco: Current     Types: Snuff   • Tobacco comments:     still using snuff   Vaping Use   • Vaping Use: Never used   Substance Use Topics   • Alcohol use: Yes     Alcohol/week: 2 0 standard drinks     Types: 2 Cans of beer per week     Comment: social, weekends   • Drug use: No       Review of Systems   Constitutional: Positive for chills  HENT: Negative  Eyes: Negative  Respiratory: Negative  Cardiovascular: Negative  Gastrointestinal: Positive for abdominal pain and nausea  Endocrine: Negative  Genitourinary: Negative  Musculoskeletal: Negative  Skin: Negative  Allergic/Immunologic: Negative  Neurological: Negative  Hematological: Negative  Psychiatric/Behavioral: Negative  Physical Exam  Physical Exam  Constitutional:       Appearance: Normal appearance  He is well-developed and well-nourished  He is obese  HENT:      Head: Normocephalic and atraumatic  Eyes:      Extraocular Movements: EOM normal       Pupils: Pupils are equal, round, and reactive to light  Cardiovascular:      Rate and Rhythm: Normal rate and regular rhythm     Pulmonary:      Effort: Pulmonary effort is normal       Breath sounds: Normal breath sounds  Abdominal:      Palpations: Abdomen is soft  Tenderness: There is abdominal tenderness in the left lower quadrant  Musculoskeletal:         General: Normal range of motion  Cervical back: Normal range of motion and neck supple  Skin:     General: Skin is warm and dry  Neurological:      Mental Status: He is alert and oriented to person, place, and time  Psychiatric:         Mood and Affect: Mood and affect normal          Vital Signs  ED Triage Vitals   Temperature Pulse Respirations Blood Pressure SpO2   12/02/22 1222 12/02/22 1222 12/02/22 1222 12/02/22 1222 12/02/22 1222   (!) 97 3 °F (36 3 °C) 70 18 (!) 175/74 98 %      Temp src Heart Rate Source Patient Position - Orthostatic VS BP Location FiO2 (%)   -- -- 12/02/22 1425 12/02/22 1425 --     Lying Left arm       Pain Score       12/02/22 1222       4           Vitals:    12/02/22 1222 12/02/22 1425   BP: (!) 175/74 150/92   Pulse: 70    Patient Position - Orthostatic VS:  Lying               ED Medications  Medications   sodium chloride 0 9 % bolus 1,000 mL (0 mL Intravenous Stopped 12/2/22 1513)   ondansetron (ZOFRAN) injection 4 mg (4 mg Intravenous Given 12/2/22 1421)   iohexol (OMNIPAQUE) 350 MG/ML injection (SINGLE-DOSE) 100 mL (100 mL Intravenous Given 12/2/22 1439)       Diagnostic Studies  Results Reviewed     Procedure Component Value Units Date/Time    Lactic acid [[id number]]  (Normal) Collected: 12/02/22 1357    Lab Status: Final result Specimen: Blood from Arm, Right Updated: 12/02/22 1427     LACTIC ACID 1 5 mmol/L     Narrative:      Result may be elevated if tourniquet was used during collection      Urine Microscopic [[id number]]  (Normal) Collected: 12/02/22 1357    Lab Status: Final result Specimen: Urine, Clean Catch Updated: 12/02/22 1424     RBC, UA None Seen /hpf      WBC, UA None Seen /hpf      Epithelial Cells Occasional /hpf      Bacteria, UA Occasional /hpf     CMP [[id number]] Collected: 12/02/22 1357    Lab Status: Final result Specimen: Blood from Arm, Right Updated: 12/02/22 1422     Sodium 135 mmol/L      Potassium 4 0 mmol/L      Chloride 102 mmol/L      CO2 27 mmol/L      ANION GAP 6 mmol/L      BUN 9 mg/dL      Creatinine 1 04 mg/dL      Glucose 95 mg/dL      Calcium 8 7 mg/dL      AST 22 U/L      ALT 25 U/L      Alkaline Phosphatase 108 U/L      Total Protein 7 6 g/dL      Albumin 3 6 g/dL      Total Bilirubin 0 61 mg/dL      eGFR 77 ml/min/1 73sq m     Narrative:      [place] guidelines for Chronic Kidney Disease (CKD):   •  Stage 1 with normal or high GFR (GFR > 90 mL/min/1 73 square meters)  •  Stage 2 Mild CKD (GFR = 60-89 mL/min/1 73 square meters)  •  Stage 3A Moderate CKD (GFR = 45-59 mL/min/1 73 square meters)  •  Stage 3B Moderate CKD (GFR = 30-44 mL/min/1 73 square meters)  •  Stage 4 Severe CKD (GFR = 15-29 mL/min/1 73 square meters)  •  Stage 5 End Stage CKD (GFR <15 mL/min/1 73 square meters)  Note: GFR calculation is accurate only with a steady state creatinine    Lipase [[id number]]  (Normal) Collected: 12/02/22 1357    Lab Status: Final result Specimen: Blood from Arm, Right Updated: 12/02/22 1422     Lipase 119 u/L     UA w Reflex to Microscopic w Reflex to Culture [[id number]]  (Abnormal) Collected: 12/02/22 1357    Lab Status: Final result Specimen: Urine, Clean Catch Updated: 12/02/22 1410     Color, UA Yellow     Clarity, UA Clear     Specific Gravity, UA 1 015     pH, UA 5 5     Leukocytes, UA Elevated glucose may cause decreased leukocyte values   See urine microscopic for [facility] result/     Nitrite, UA Negative     Protein, UA Negative mg/dl      Glucose, UA >=1000 (1%) mg/dl      Ketones, UA Negative mg/dl      Urobilinogen, UA 0 2 E U /dl      Bilirubin, UA Negative     Occult Blood, UA Trace-Intact    CBC and differential [[id number]]  (Abnormal) Collected: 12/02/22 1357    Lab Status: Final result Specimen: Blood from Arm, Right Updated: 12/02/22 1406     WBC 12 04 Thousand/uL      RBC 5 04 Million/uL      Hemoglobin 15 5 g/dL      Hematocrit 45 7 %      MCV 91 fL      MCH 30 8 pg      MCHC 33 9 g/dL      RDW 12 9 %      MPV 9 4 fL      Platelets 002 Thousands/uL      nRBC 0 /100 WBCs      Neutrophils Relative 74 %      Immat GRANS % 1 %      Lymphocytes Relative 18 %      Monocytes Relative 5 %      Eosinophils Relative 1 %      Basophils Relative 1 %      Neutrophils Absolute 9 04 Thousands/µL      Immature Grans Absolute 0 09 Thousand/uL      Lymphocytes Absolute 2 17 Thousands/µL      Monocytes Absolute 0 60 Thousand/µL      Eosinophils Absolute 0 08 Thousand/µL      Basophils Absolute 0 06 Thousands/µL                  CT abdomen pelvis with contrast   Final Result by Leighann Arias MD (12/02 1455)      No acute CT finding in the abdomen or pelvis  Workstation performed: [id number]                    Procedures  Procedures         ED Course                               SBIRT 20yo+    Flowsheet Row Most Recent Value   SBIRT (25 yo +)    In order to provide better care to our patients, we are screening all of our patients for alcohol and drug use  Would it be okay to ask you these screening questions? Yes Filed at: 12/02/2022 1336   Initial Alcohol Screen: US AUDIT-C     1  How often do you have a drink containing alcohol? 0 Filed at: 12/02/2022 1336   2  How many drinks containing alcohol do you have on a typical day you are drinking? 0 Filed at: 12/02/2022 1336   3a  Male UNDER 65: How often do you have five or more drinks on one occasion? 0 Filed at: 12/02/2022 1336   Audit-C Score 0 Filed at: 12/02/2022 1336   AGATHA: How many times in the past year have you    Used an illegal drug or used a prescription medication for non-medical reasons?  Never Filed at: 12/02/2022 1336                    MDM  Number of Diagnoses or Management Options  Left lower quadrant abdominal pain: new and requires workup  UTI (urinary tract infection): new and requires workup  Diagnosis management comments: Patient with left lower quadrant abdominal pain worsening over the past few days, lab and imaging findings relatively unremarkable, urinalysis does have some bacteria and some blood, therefore opted to start patient on antibiotics, will await urine cultures for final results, in the meantime patient advised to take OTC stool softeners, drink plenty of fluids, follow-up with PCP as needed or return if symptoms worsen, patient acknowledges understanding and agreement with this plan       Amount and/or Complexity of Data Reviewed  Clinical lab tests: ordered and reviewed  Tests in the radiology section of CPT®: ordered and reviewed  Tests in the medicine section of CPT®: ordered and reviewed  Decide to obtain previous medical records or to obtain history from someone other than the patient: yes  Review and summarize past medical records: yes  Independent visualization of images, tracings, or specimens: yes    Risk of Complications, Morbidity, and/or Mortality  Presenting problems: moderate  Diagnostic procedures: moderate  Management options: moderate    Patient Progress  Patient progress: improved      Disposition  Final diagnoses:   Left lower quadrant abdominal pain   UTI (urinary tract infection)     Time reflects when diagnosis was documented in both MDM as applicable and the Disposition within this note     Time User Action Codes Description Comment    12/2/2022  3:08 PM Cristina Bearjosé manuel Add [R10 [id number]] Left lower quadrant abdominal pain     12/2/2022  3:08 PM Janna Pleitez Add [N39 0] UTI (urinary tract infection)       ED Disposition     ED Disposition   Discharge    Condition   Stable    Date/Time   Fri Dec 2, 2022  3:08 PM    Comment   [facility] discharge to home/self care                 Follow-up Information     Follow up With Specialties Details Why Deanne Rudolph MD Family Medicine In 1 week As needed [address]  [place] [id number]  [phone]            Discharge Medication List as of 12/2/2022  3:09 PM      START taking these medications    Details   cephalexin (KEFLEX) 500 mg capsule Take 1 capsule (500 mg total) by mouth every 12 (twelve) hours for 5 days, Starting Fri 12/2/2022, Until Wed 12/7/2022, Normal         CONTINUE these medications which have NOT CHANGED    Details   acetaminophen (TYLENOL) 500 mg tablet Take 1,000 mg by mouth every 6 (six) hours as needed for mild pain, Historical Med      atorvastatin (LIPITOR) 20 mg tablet Take 20 mg by mouth daily, Historical Med      desloratadine (CLARINEX) 5 MG tablet TAKE 1 TABLET BY MOUTH EVERY DAY FOR ALLERGY SYMPTOMS, Historical Med      Empagliflozin (Jardiance) 25 MG TABS Take 25 mg by mouth daily, Starting Thu 1/14/2021, Historical Med      ERGOCALCIFEROL PO Take 50,000 Units by mouth 2 (two) times a week , Starting Thu 3/11/2021, Historical Med      famotidine (PEPCID) 20 mg tablet Take 20 mg by mouth as needed , Historical Med      LORazepam (ATIVAN) 0 5 mg tablet Take 0 5 mg by mouth daily as needed, Starting Fri 7/2/2021, Historical Med      Semaglutide,0 25 or 0 5MG/DOS, (Ozempic, 0 25 or 0 5 MG/DOSE,) 2 MG/1 5ML SOPN Inject 0 5 mg under the skin once a week , Starting Thu 1/14/2021, Historical Med      sertraline (ZOLOFT) 50 mg tablet Take 75 mg by mouth , Historical Med      valsartan (DIOVAN) 80 mg tablet Take 80 mg by mouth daily , Starting Thu 1/14/2021, Historical Med             No discharge procedures on file      PDMP Review       Value Time User    PDMP Reviewed  Yes 10/26/2021  2:30 PM María Whitney PA-C          ED Provider  Electronically Signed by           Loco Roy DO  12/02/22 1935

## 2023-01-17 ENCOUNTER — TELEPHONE (OUTPATIENT)
Dept: PAIN MEDICINE | Facility: MEDICAL CENTER | Age: 63
End: 2023-01-17

## 2023-01-17 ENCOUNTER — OFFICE VISIT (OUTPATIENT)
Dept: NEUROSURGERY | Facility: CLINIC | Age: 63
End: 2023-01-17

## 2023-01-17 VITALS
BODY MASS INDEX: 38.36 KG/M2 | DIASTOLIC BLOOD PRESSURE: 85 MMHG | SYSTOLIC BLOOD PRESSURE: 173 MMHG | HEIGHT: 76 IN | TEMPERATURE: 97.9 F | HEART RATE: 71 BPM | RESPIRATION RATE: 16 BRPM | WEIGHT: 315 LBS

## 2023-01-17 DIAGNOSIS — M54.10 RADICULOPATHY: ICD-10-CM

## 2023-01-17 DIAGNOSIS — M47.816 LUMBAR SPONDYLOSIS: Primary | ICD-10-CM

## 2023-01-17 DIAGNOSIS — E66.01 MORBID OBESITY (HCC): ICD-10-CM

## 2023-01-17 DIAGNOSIS — G95.9 MYELOPATHY (HCC): ICD-10-CM

## 2023-01-17 DIAGNOSIS — G12.9 SPINAL MUSCULAR ATROPHY, UNSPECIFIED (HCC): ICD-10-CM

## 2023-01-17 DIAGNOSIS — G95.89 MYELOMALACIA OF CERVICAL CORD (HCC): ICD-10-CM

## 2023-01-17 DIAGNOSIS — M47.12 OTHER SPONDYLOSIS WITH MYELOPATHY, CERVICAL REGION: ICD-10-CM

## 2023-01-17 RX ORDER — LORATADINE 10 MG
TABLET ORAL
COMMUNITY

## 2023-01-17 NOTE — TELEPHONE ENCOUNTER
Caller: patient spouse    Doctor: [place]    Reason for call: schedule procedure    Call back#: [phone]
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
## 2023-01-20 ENCOUNTER — OFFICE VISIT (OUTPATIENT)
Dept: PAIN MEDICINE | Facility: CLINIC | Age: 63
End: 2023-01-20

## 2023-01-20 VITALS
DIASTOLIC BLOOD PRESSURE: 78 MMHG | BODY MASS INDEX: 38.36 KG/M2 | SYSTOLIC BLOOD PRESSURE: 140 MMHG | WEIGHT: 315 LBS | TEMPERATURE: 97.7 F | HEIGHT: 76 IN | HEART RATE: 71 BPM | RESPIRATION RATE: 20 BRPM

## 2023-01-20 DIAGNOSIS — M47.816 LUMBAR SPONDYLOSIS: ICD-10-CM

## 2023-01-20 DIAGNOSIS — M54.10 RADICULOPATHY: ICD-10-CM

## 2023-01-20 DIAGNOSIS — F32.0 MAJOR DEPRESSIVE DISORDER, SINGLE EPISODE, MILD (HCC): ICD-10-CM

## 2023-01-20 DIAGNOSIS — G12.9 SPINAL MUSCULAR ATROPHY, UNSPECIFIED (HCC): ICD-10-CM

## 2023-01-20 LAB
CCP IGG SERPL-ACNC: <16 UNITS
CRP SERPL-MCNC: 6.9 MG/L
ERYTHROCYTE [SEDIMENTATION RATE] IN BLOOD BY WESTERGREN METHOD: 19 MM/H
HLA-B27 QL FC: NEGATIVE
RHEUMATOID FACT SERPL-ACNC: <14 IU/ML

## 2023-01-20 RX ORDER — LIDOCAINE HYDROCHLORIDE 10 MG/ML
10 INJECTION, SOLUTION EPIDURAL; INFILTRATION; INTRACAUDAL; PERINEURAL ONCE
OUTPATIENT
Start: 2023-01-20 | End: 2023-01-20

## 2023-01-20 RX ORDER — METHYLPREDNISOLONE ACETATE 80 MG/ML
80 INJECTION, SUSPENSION INTRA-ARTICULAR; INTRALESIONAL; INTRAMUSCULAR; PARENTERAL; SOFT TISSUE ONCE
OUTPATIENT
Start: 2023-01-20 | End: 2023-01-20

## 2023-01-20 RX ORDER — DULOXETIN HYDROCHLORIDE 30 MG/1
30 CAPSULE, DELAYED RELEASE ORAL DAILY
Qty: 30 CAPSULE | Refills: 0 | Status: SHIPPED | OUTPATIENT
Start: 2023-01-20

## 2023-01-20 RX ORDER — BUPIVACAINE HCL/PF 2.5 MG/ML
10 VIAL (ML) INJECTION ONCE
OUTPATIENT
Start: 2023-01-20 | End: 2023-01-20

## 2023-01-20 NOTE — PROGRESS NOTES
Assessment  1  Lumbar spondylosis  -     Ambulatory referral to Pain Management  -     Case request operating room: BLOCK MEDIAL BRANCH L3, L4, L5 #1; Standing  -     Case request operating room: BLOCK MEDIAL BRANCH L3, L4, L5 #1    2  Spinal muscular atrophy, unspecified ([facility] [address] )  -     Ambulatory referral to Pain Management    3  Radiculopathy  -     Ambulatory referral to Pain Management    4  Major depressive disorder, single episode, mild (HCC)    Greater than 75% relief of leg pain from both radiculopathy and diabetic peripheral neuropathy as well as low back pain with improved ability to participate with IADLs after Nevro SCS trial (dual lead)  Axial low back pain described primarily by arthritic features  Aching, nagging, indolent, stabbing, throbbing features in axial low back without radicular components  5/5 strength bilaterally, negative SLR  Positive facet loading maneuvers in lumbar spine elicited pain, positive tenderness to palpation over lumbar paraspinal muscles  Findings correlate with prominent lumbar facet arthropathy seen throughout axial low back on NRU  Currently he is neurologically intact without gait instability, saddle anesthesia or bowel/bladder abnormality  Risks, benefits alternative to medial branch blocks and subsequent radiofrequency ablation of successful thoroughly discussed with patient  Handouts provided questions answered to patient satisfaction  Had consulted with NSGY who recommended no intervention given only mild/moderate right sided transforaminal stenosis at L4-L5    Pain consistent with thoracic radicular symptoms secondary to T10-T11 disc herniation which from 2018 MRI thoracic spine does not contribute to significant cord compression  Radiating and radicular pain in T10 and T11 dermatomal distribution  Pain is along lower thoracic T10 and T11 dermatomes, particularly with palpation of lower thoracic ribs and posterolateral distribution   Interestingly his significant and advanced lumbar facet arthropathy which is not a  primary source of his pain at this time  He previously had epidural steroid injections in the past with Dr Hazel Rose with noted relief  For radicular pain  Reasonable to reobtain imaging, and pursue multimodal pain therapy plan as noted below  Plan  - Bilateral L3, L4, L5 medial branch nerve blocks #1; f/u 2 weeks post procedure  -cymbalta 30mg/day rx; discussed sedation/drowsiness along with other side effects  Handout regarding sedative effects of taking this medication provided; additionally provided up titration calendar  Counseled not to take medication while driving or operating heavy machinery/using stairs  -continue other analgesics as prescribed previously well provider's  Counseled extensively as noted below regarding the risks of opioid medications in combination with Lyrica  -physical therapy for  Lumbar facet arthropathy, lumbar radiculopathy, mid back pain    There are risks associated with opioid medications, including dependence, addiction and tolerance  The patient understands and agrees to use these medications only as prescribed  Potential side effects of the medications include, but are not limited to, constipation, drowsiness, addiction, impaired judgment and risk of fatal overdose if not taken as prescribed  The patient was warned against driving while taking sedation medications  Sharing medications is a felony  At this point in time, the patient is showing no signs of addiction, abuse, diversion or suicidal ideation  A urine drug screen was collected at today's office visit as part of our medication management protocol  The point of care testing results were appropriate for what was being prescribed  The specimen will be sent for confirmatory testing   The drug screen is medically necessary because the patient is either dependent on opioid medication or is being considered for opioid medication therapy and the results could impact ongoing or future treatment  The drug screen is to evaluate for the presences or absence of prescribed, non-prescribed, and/or illicit drugs/substances  [place] Prescription Drug Monitoring Program report was reviewed and was appropriate     Complete risks and benefits including bleeding, infection, tissue reaction, nerve injury and allergic reaction were discussed  The approach was demonstrated using models and literature was provided  Verbal and written consent was obtained  My impressions and treatment recommendations were discussed in detail with the patient who verbalized understanding and had no further questions  Discharge instructions were provided  I personally saw and examined the patient and I agree with the above discussed plan of care  No orders of the defined types were placed in this encounter  History of Present Illness    Greater than 75% relief of leg pain from both radiculopathy and diabetic peripheral neuropathy as well as low back pain with improved ability to participate with IADLs after Nevro SCS trial (dual lead)  Continues to describe right hip flexor weakness without any pain  Additionally notes low back pain described by arthritic features  Describes achy, nagging, indolent, crampy and throbbing pain worse with standing/ambulation  Jc Kerns is a 65 y o  male with a past medical history of  Non-insulin controlled type 2 diabetes, obesity, chronic low back pain described primarily as arthritic in nature  He describes 8/10 low back pain that is worse in the mornings and worse at the end of the day  The pain is characterized by achy, nagging, indolent, crampy, stabbing pain in his axial low back  The patient describes that the pain is worse with standing for long periods of time on hard surfaces as well as with walking    The patient is a very active individual and feels as though this pain compromises his participation with independent activities of daily living  He ambulates with a walker  The pain can be debilitating at times and contribute to significant disability, compromising overall activity and independent activities of daily living  He has tried physical therapy with limited relief of symptoms  He additionally has pain radiating across his ribs bilaterally in the T10 and T11 dermatomal distribution  He demonstrates good strength and denies any weakness numbness or paresthesias  The patient denies any bowel or bladder dysfunction as well  I have personally reviewed and/or updated the patient's past medical history, past surgical history, family history, social history, current medications, allergies, and vital signs today  Review of Systems   Constitutional: Positive for activity change  HENT: Negative  Eyes: Negative  Respiratory: Negative  Cardiovascular: Negative  Gastrointestinal: Negative  Endocrine: Negative  Genitourinary: Negative  Musculoskeletal: Positive for arthralgias, back pain, gait problem and myalgias  Skin: Negative  Allergic/Immunologic: Negative  Neurological: Positive for numbness  Negative for weakness  Hematological: Negative  Psychiatric/Behavioral: Negative  All other systems reviewed and are negative        Patient Active Problem List   Diagnosis   • Herniation of intervertebral disc of thoracic region   • Diabetic peripheral neuropathy (HCC)   • Morbid obesity ([address] )   • Cervical spinal cord compression (HCC)   • Mild depression   • Major depressive disorder, single episode, mild (HCC)   • Thoracic spondylosis   • Lumbar spondylosis   • Spinal muscular atrophy, unspecified ([facility])   • Neurogenic claudication due to lumbar spinal stenosis   • Muscular atrophy   • Other spondylosis with myelopathy, cervical region   • Radiculopathy   • Myelomalacia of cervical cord [facility])       Past Medical History:   Diagnosis Date   • Anxiety    • Arthritis    • Chronic pain disorder     mid back region   • Colon polyp    • COVID-19    • CPAP (continuous positive airway pressure) dependence     Pt uses nightly   • Diabetes mellitus ([facility] [address] )    • Diverticulosis    • History of recent hospitalization 06/16/2021    Pt was admitted to [facility] after syncopal episode  Pt saw cardiology- all tests negative  Pt had nl EEG  pt states is was a medication interaction  • Hyperlipidemia    • Hypertension    • Obesity    • Sleep apnea    • Spinal stenosis        Past Surgical History:   Procedure Laterality Date   • ACHILLES TENDON REPAIR      Pt had a rupture in right and left 2 years apart   • COLONOSCOPY     • EPIDURAL BLOCK INJECTION      Pt had in lumbar region  • EPIDURAL BLOCK INJECTION N/A 4/6/2021    Procedure: T-11-T-12 INTERLAMINAR THORACIC EPIDURAL STEROID INJECTION;  Surgeon: Junior Holcomb MD;  Location: [facility];  Service: Pain Management    • EPIDURAL BLOCK INJECTION Right 7/20/2021    Procedure: L5 and S1 TRANSFORAMINAL EPIDURAL STEROID INJECTION;  Surgeon: Junior Holcomb MD;  [facility]: [facility];  Service: Pain Management    • FL GUIDED NEEDLE PLAC BX/ASP/INJ  7/14/2022   • FL GUIDED NEEDLE PLAC BX/ASP/INJ  8/18/2022   • FL GUIDED NEEDLE PLAC BX/ASP/INJ  9/15/2022   • FOOT SURGERY Left     Left great toe- had a hammertoe     • JOINT REPLACEMENT Left     Total hip, knee   • JOINT REPLACEMENT Right     Total hip, knee   • KNEE ARTHROSCOPY Bilateral    • NERVE BLOCK Bilateral 7/14/2022    Procedure: BLOCK MEDIAL BRANCH T12, L1, L2;  Surgeon: Junior Holcomb MD;  [facility]: [facility];  Service: Pain Management    • NERVE BLOCK Bilateral 8/18/2022    Procedure: BLOCK MEDIAL BRANCH T12, L1, L2 #2;  Surgeon: Junior Holcomb MD;  [facility]: [facility];  Service: Pain Management    • CA JOE IMPLTJ NSTIM ELTRDS PLATE/PADDLE EDRL Left 10/26/2021    Procedure: Thoracic laminectomies for placement of spinal cord stimulator and internal pulse generator, use of neuromonitoring, left sided pulse generator;  Surgeon: Michael Olivares MD;  Location: [facility];  Service: Neurosurgery   • MA PRQ IMPLTJ NSTIM ELECTRODE ARRAY EPIDURAL Bilateral 9/30/2021    Procedure: Jesus Swansono SCS TRIAL;  Surgeon: Penny Parks MD;  [facility]: [facility];  Service: Pain Management    • RHIZOTOMY Bilateral 9/15/2022    Procedure: RHIZOTOMY LUMBAR T12, L1, L2 medial branch nerves;  Surgeon: Penny Parks MD;  [facility]: [facility];  Service: Pain Management    • TOTAL KNEE ARTHROPLASTY Left        Family History   Problem Relation Age of Onset   • Arthritis Mother    • Hypertension Father        Social History     Occupational History   • Not on file   Tobacco Use   • Smoking status: Never   • Smokeless tobacco: Current     Types: Snuff   • Tobacco comments:     still using snuff   Vaping Use   • Vaping Use: Never used   Substance and Sexual Activity   • Alcohol use:  Yes     Alcohol/week: 2 0 standard drinks     Types: 2 Cans of beer per week     Comment: social, weekends   • Drug use: No   • Sexual activity: Yes       Current Outpatient Medications on File Prior to Visit   Medication Sig   • acetaminophen (TYLENOL) 500 mg tablet Take 1,000 mg by mouth every 6 (six) hours as needed for mild pain   • atorvastatin (LIPITOR) 20 mg tablet Take 20 mg by mouth daily   • Empagliflozin (Jardiance) 25 MG TABS Take 25 mg by mouth daily   • ERGOCALCIFEROL PO Take 50,000 Units by mouth 2 (two) times a week    • Fiber Select Gummies CHEW Chew 10grams per day   • LORazepam (ATIVAN) 0 5 mg tablet Take 0 5 mg by mouth daily as needed   • Semaglutide,0 25 or 0 5MG/DOS, (Ozempic, 0 25 or 0 5 MG/DOSE,) 2 MG/1 5ML SOPN Inject 0 5 mg under the skin once a week    • sertraline (ZOLOFT) 50 mg tablet Take 75 mg by mouth    • valsartan (DIOVAN) 80 mg tablet Take 80 mg by mouth daily    • desloratadine (CLARINEX) 5 MG tablet TAKE 1 TABLET BY MOUTH EVERY DAY FOR ALLERGY SYMPTOMS   • famotidine (PEPCID) 20 mg tablet Take 20 mg by mouth as needed      No current facility-administered medications on file prior to visit  No Known Allergies      Physical Exam    /78   Pulse 71   Temp 97 7 °F (36 5 °C)   Resp 20   Ht 6' 4" (1 93 m)   Wt (!) 160 kg (353 lb 3 2 oz)   BMI 42 99 kg/m²     Constitutional: normal, well developed, well nourished, alert, in no distress and non-toxic and no overt pain behavior  and obese  Eyes: anicteric  HEENT: grossly intact  Neck: supple, symmetric, trachea midline and no masses   Pulmonary:even and unlabored  Cardiovascular:No edema or pitting edema present  Skin:Normal without rashes or lesions and well hydrated  Psychiatric:Mood and affect appropriate  Neurologic:Cranial Nerves II-XII grossly intact Sensation grossly intact; no clonus negative diallo's  Reflexes 2+ and brisk  SLR  Weakly positive right-sided we  Musculoskeletal: hunched gait  Unable to perform normal heel toe and tip toe walking  Slight weakness with right lower extremity strong plantar flexion; 5/5 strength with active range of motion movements in all other extremities bilaterally  mild pain with thoracic and lumbar facet loading bilaterally and with lateral spine rotation  mild ttp over thoracic and lumbar paraspinal muscles  Negative jahaira's test, negative gaenslen's negative SIJ loading bilaterally  Tenderness to palpation over inferior rib borders of T10-T11 posterolateral ribs bilaterally,  Eliciting radicular pain in aformentioned dermatomal distributions  Imaging    Result Narrative   Exam - thoracic spine mri scan     History: Bilateral leg weakness  Technique: Using a surface coil sagittal and axial T1-weighted and T2-weighted  scans were obtained of the thoracic spine  Findings: Image detail is suboptimal secondary to patient's obesity  Thoracic spinal cord is normal in size and configuration and MRI signal  intensity  There are no intramedullary lesions  Vertebral bodies are in anatomic alignment      There are degenerative changes in endplates and facet joints throughout the  thoracic spine  There is central spinal stenosis at T10-T11  There is no marlyn spinal cord  compression however  Other Result Information   Interface, Rad Results In - 08/07/2018  7:34 PM EDT  Formatting of this note might be different from the original   Exam - thoracic spine mri scan     History: Bilateral leg weakness  Technique: Using a surface coil sagittal and axial T1-weighted and T2-weighted  scans were obtained of the thoracic spine  Findings: Image detail is suboptimal secondary to patient's obesity  Thoracic spinal cord is normal in size and configuration and MRI signal  intensity  There are no intramedullary lesions  Vertebral bodies are in anatomic alignment  There are degenerative changes in endplates and facet joints throughout the  thoracic spine  There is central spinal stenosis at T10-T11  There is no marlyn spinal cord  compression however  IMPRESSION:  Impression:  1  Technically suboptimal study  2  Marked spondylosis  3  At T10-T11 there is spinal stenosis however there is no marlyn spinal cord  compression  MRI LUMBAR SPINE WITHOUT CONTRAST     INDICATION: [id number]: Radiculopathy, lumbar region      COMPARISON:  5/6/2018; 3/16/2021     TECHNIQUE:  Sagittal T1, sagittal T2, sagittal inversion recovery, axial T1 and axial T2, coronal T2     IMAGE QUALITY:  Diagnostic     FINDINGS:     VERTEBRAL BODIES:  There are 5 lumbar type vertebral bodies  Mild to moderate levoscoliosis mid lumbar spine  Trace grade 1 anterolisthesis of L3 on L4  Minimal grade 1 retrolisthesis of L4 on L5  Scattered degenerative endplate changes  No focally   suspicious marrow lesions  No bone marrow edema or compression abnormality      SACRUM:  Scattered degenerative endplate changes  No focally suspicious marrow lesions    No bone marrow edema or compression abnormality      DISTAL CORD AND CONUS:  Normal size and signal within the distal cord and conus  The conus medullaris terminates at the L2 level      PARASPINAL SOFT TISSUES:  Fatty atrophy of the psoas muscles noted, similar to the prior CT possibly from disuse  Marked atrophy of the left iliac is muscle as well      LOWER THORACIC DISC SPACES:  T11-T12: There is a small central disc herniation, protrusion type  Minimal central canal narrowing  Neural foramina patent bilaterally      T12-L1: There is no focal disk herniation, central canal or neural foraminal narrowing      LUMBAR DISC SPACES:     L1-L2:  There is bilateral facet hypertrophy  There is a left neural foraminal disc protrusion  Mild left neural foraminal narrowing  Central canal patent  Minimal right neural foraminal narrowing      L2-L3:  There is bilateral facet hypertrophy  There is a right neural foraminal disc protrusion  Moderate to severe right neural foraminal narrowing  Mild central canal and left neural foraminal narrowing      L3-L4:  There is bilateral facet hypertrophy  There is a right neural foraminal disc protrusion  Moderate right neural foraminal narrowing  Mild central canal and left neural foraminal narrowing      L4-L5:  There is loss of disc height and signal   There is a disc osteophyte complex with a superimposed left neural foraminal disc protrusion  There is moderate to severe left neural foraminal narrowing  Mild central canal narrowing  Moderate right   neural foraminal narrowing     L5-S1:  There is bilateral facet hypertrophy  There is a left neural foraminal disc protrusion  Severe left neural foraminal narrowing  Mild central canal and right neural foraminal narrowing      IMPRESSION:        1  Advanced multilevel spondylosis    2   Moderate to severe fatty atrophy of the bilateral psoas muscles and iliac is muscles left greater than right, possibly from disuse or denervation atrophy

## 2023-01-20 NOTE — PATIENT INSTRUCTIONS
Core Strengthening Exercises   WHAT YOU NEED TO KNOW:   Your core includes the muscles of your lower back, hip, pelvis, and abdomen  Core strengthening exercises help heal and strengthen these muscles  This helps prevent another injury, and keeps your pelvis, spine, and hips in the correct position  DISCHARGE INSTRUCTIONS:   Contact your healthcare provider if:   You have sharp or worsening pain during exercise or at rest     You have questions or concerns about your shoulder exercises  Safety tips:  Talk to your healthcare provider before you start an exercise program  A physical therapist can teach you how to do core strengthening exercises safely  Do the exercises on a mat or firm surface  A firm surface will support your spine and prevent low back pain  Do not do these exercises on a bed  Move slowly and smoothly  Avoid fast or jerky motions  Stop if you feel pain  Core exercises should not be painful  Stop if you feel pain  Breathe normally during core exercises  Do not hold your breath  This may cause an increase in blood pressure and prevent muscle strengthening  Your healthcare provider will tell you when to inhale and exhale during the exercise  Begin all of your exercises with abdominal bracing  Abdominal bracing helps warm up your core muscles  You can also practice abdominal bracing throughout the day  Lie on your back with your knees bent and feet flat on the floor  Place your arms in a relaxed position beside your body  Tighten your abdominal muscles  Pull your belly button in and up toward your spine  Hold for 5 seconds  Relax your muscles  Repeat 10 times  Core strengthening exercises: Your healthcare provider will tell you how often to do these exercises  The provider will also tell you how many repetitions of each exercise you should do  Hold each exercise for 5 seconds or as directed  As you get stronger, increase your hold to 10 to 15 seconds   You can do some of these exercises on a stability ball, or with a weight  Ask your healthcare provider how to use a stability ball or weight for these exercises:  Bridging:  Lie on your back with your knees bent and feet flat on the floor  Rest your arms at your side  Tighten your buttocks, and then lift your hips 1 inch off the floor  Hold for 5 seconds  When you can do this exercise without pain for 10 seconds, increase the distance you lift your hips  A good goal is to be able to lift your hips so that your shoulders, hips, and knees are in a straight line  Dead bug:  Lie on your back with your knees bent and feet flat on the floor  Place your arms in a relaxed position beside your body  Begin with abdominal bracing  Next, raise one leg, keeping your knee bent  Hold for 5 seconds  Repeat with the other leg  When you can do this exercise without pain for 10 to 15 seconds, you may raise one straight leg and hold  Repeat with the other leg  Quadruped:  Place your hands and knees on the floor  Keep your wrists directly below your shoulders and your knees directly below your hips  Pull your belly button in toward your spine  Do not flatten or arch your back  Tighten your abdominal muscles below your belly button  Hold for 5 seconds  When you can do this exercise without pain for 10 to 15 seconds, you may extend one arm and hold  Repeat on the other side  Side bridge exercises:      Standing side bridge:  Stand next to a wall and extend one arm toward the wall  Place your palm flat on the wall with your fingers pointing upward  Begin with abdominal bracing  Next, without moving your feet, slowly bend your arm to 90 degrees  Hold for 5 seconds  Repeat on the other side  When you can do this exercise without pain for 10 to 15 seconds, you may do the bent leg side bridge on the floor  Bent leg side bridge:  Lie on one side with your legs, hips, and shoulders in a straight line   Prop yourself up onto your forearm so your elbow is directly below your shoulder  Bend your knees back to 90 degrees  Begin with abdominal bracing  Next, lift your hips and balance yourself on your forearm and knees  Hold for 5 seconds  Repeat on the other side  When you can do this exercise without pain for 10 to 15 seconds, you may do the straight leg side bridge on the floor  Straight leg side bridge:  Lie on one side with your legs, hips, and shoulders in a straight line  Prop yourself up onto your forearm so your elbow is directly below your shoulder  Begin with abdominal bracing  Lift your hips off the floor and balance yourself on your forearm and the outside of your flexed foot  Do not let your ankle bend sideways  Hold for 5 seconds  Repeat on the other side  When you can do this exercise without pain for 10 to 15 seconds, ask your healthcare provider for more advanced exercises  Superman:  Lie on your stomach  Extend your arms forward on the floor  Tighten your abdominal muscles and lift your right hand and left leg off the floor  Hold this position  Slowly return to the starting position  Tighten your abdominal muscles and lift your left hand and right leg off the floor  Hold this position  Slowly return to the starting position  Clam:  Lie on your side with your knees bent  Put your bottom arm under your head to keep your neck in line  Put your top hand on your hip to keep your pelvis from moving  Put your heels together, and keep them together during this exercise  Slowly raise your top knee toward the ceiling  Then lower your leg so your knees are together  Repeat this exercise 10 times  Then switch sides and do the exercise 10 times with the other leg  Curl up:  Lie on your back with your knees bent and feet flat on the floor  Place your hands, palms down, underneath your lower back  Next, with your elbows on the floor, lift your shoulders and chest 2 to 3 inches off the floor   Keep your head in line with your shoulders  Hold this position  Slowly return to the starting position  Straight leg raises:  Lie on your back with one leg straight  Bend the other knee and place your foot flat on the floor  Tighten your abdominal muscles  Keep your leg straight and slowly lift it straight up 6 to 12 inches off the floor  Hold this position  Lower your leg slowly  Do as many repetitions as directed on this side  Repeat with the other leg  Plank:  Lie on your stomach  Bend your elbows and place your forearms flat on the floor  Lift your chest, stomach, and knees off the floor  Make sure your elbows are below your shoulders  Your body should be in a straight line  Do not let your hips or lower back sink to the ground  Squeeze your abdominal muscles together and hold for 15 seconds  To make this exercise harder, hold for 30 seconds or lift 1 leg at a time  Bicycles:  Lie on your back  Bend both knees and bring them toward your chest  Your calves should be parallel to the floor  Place the palms of your hands on the back of your head  Straighten your right leg and keep it lifted 2 inches off the floor  Raise your head and shoulders off the floor and twist towards your left  Keep your head and shoulders lifted  Bend your right knee while you straighten your left leg  Keep your left leg 2 inches off the floor  Twist your head and chest towards the left leg  Continue to straighten 1 leg at a time and twist        Follow up with your doctor as directed:  Write down your questions so you remember to ask them during your visits  © Copyright [employer] 2022 Information is for End User's use only and may not be sold, redistributed or otherwise used for commercial purposes  All illustrations and images included in CareNotes® are the copyrighted property of [facility] [employer]  or [facility] [employer]by   The above information is an  only   It is not intended as medical advice for individual conditions or treatments  Talk to your doctor, nurse or pharmacist before following any medical regimen to see if it is safe and effective for you

## 2023-01-20 NOTE — H&P (VIEW-ONLY)
Assessment  1  Lumbar spondylosis  -     Ambulatory referral to Pain Management  -     Case request operating room: BLOCK MEDIAL BRANCH L3, L4, L5 #1; Standing  -     Case request operating room: BLOCK MEDIAL BRANCH L3, L4, L5 #1    2  Spinal muscular atrophy, unspecified ([facility] [address] )  -     Ambulatory referral to Pain Management    3  Radiculopathy  -     Ambulatory referral to Pain Management    4  Major depressive disorder, single episode, mild (HCC)    Greater than 75% relief of leg pain from both radiculopathy and diabetic peripheral neuropathy as well as low back pain with improved ability to participate with IADLs after Nevro SCS trial (dual lead)  Axial low back pain described primarily by arthritic features  Aching, nagging, indolent, stabbing, throbbing features in axial low back without radicular components  5/5 strength bilaterally, negative SLR  Positive facet loading maneuvers in lumbar spine elicited pain, positive tenderness to palpation over lumbar paraspinal muscles  Findings correlate with prominent lumbar facet arthropathy seen throughout axial low back on NRU  Currently he is neurologically intact without gait instability, saddle anesthesia or bowel/bladder abnormality  Risks, benefits alternative to medial branch blocks and subsequent radiofrequency ablation of successful thoroughly discussed with patient  Handouts provided questions answered to patient satisfaction  Had consulted with NSGY who recommended no intervention given only mild/moderate right sided transforaminal stenosis at L4-L5    Pain consistent with thoracic radicular symptoms secondary to T10-T11 disc herniation which from 2018 MRI thoracic spine does not contribute to significant cord compression  Radiating and radicular pain in T10 and T11 dermatomal distribution  Pain is along lower thoracic T10 and T11 dermatomes, particularly with palpation of lower thoracic ribs and posterolateral distribution   Interestingly his significant and advanced lumbar facet arthropathy which is not a  primary source of his pain at this time  He previously had epidural steroid injections in the past with Dr Hazel Rose with noted relief  For radicular pain  Reasonable to reobtain imaging, and pursue multimodal pain therapy plan as noted below  Plan  - Bilateral L3, L4, L5 medial branch nerve blocks #1; f/u 2 weeks post procedure  -cymbalta 30mg/day rx; discussed sedation/drowsiness along with other side effects  Handout regarding sedative effects of taking this medication provided; additionally provided up titration calendar  Counseled not to take medication while driving or operating heavy machinery/using stairs  -continue other analgesics as prescribed previously well provider's  Counseled extensively as noted below regarding the risks of opioid medications in combination with Lyrica  -physical therapy for  Lumbar facet arthropathy, lumbar radiculopathy, mid back pain    There are risks associated with opioid medications, including dependence, addiction and tolerance  The patient understands and agrees to use these medications only as prescribed  Potential side effects of the medications include, but are not limited to, constipation, drowsiness, addiction, impaired judgment and risk of fatal overdose if not taken as prescribed  The patient was warned against driving while taking sedation medications  Sharing medications is a felony  At this point in time, the patient is showing no signs of addiction, abuse, diversion or suicidal ideation  A urine drug screen was collected at today's office visit as part of our medication management protocol  The point of care testing results were appropriate for what was being prescribed  The specimen will be sent for confirmatory testing   The drug screen is medically necessary because the patient is either dependent on opioid medication or is being considered for opioid medication therapy and the results could impact ongoing or future treatment  The drug screen is to evaluate for the presences or absence of prescribed, non-prescribed, and/or illicit drugs/substances  [place] Prescription Drug Monitoring Program report was reviewed and was appropriate     Complete risks and benefits including bleeding, infection, tissue reaction, nerve injury and allergic reaction were discussed  The approach was demonstrated using models and literature was provided  Verbal and written consent was obtained  My impressions and treatment recommendations were discussed in detail with the patient who verbalized understanding and had no further questions  Discharge instructions were provided  I personally saw and examined the patient and I agree with the above discussed plan of care  No orders of the defined types were placed in this encounter  History of Present Illness    Greater than 75% relief of leg pain from both radiculopathy and diabetic peripheral neuropathy as well as low back pain with improved ability to participate with IADLs after Nevro SCS trial (dual lead)  Continues to describe right hip flexor weakness without any pain  Additionally notes low back pain described by arthritic features  Describes achy, nagging, indolent, crampy and throbbing pain worse with standing/ambulation  Esteban Hendrix is a 65 y o  male with a past medical history of  Non-insulin controlled type 2 diabetes, obesity, chronic low back pain described primarily as arthritic in nature  He describes 8/10 low back pain that is worse in the mornings and worse at the end of the day  The pain is characterized by achy, nagging, indolent, crampy, stabbing pain in his axial low back  The patient describes that the pain is worse with standing for long periods of time on hard surfaces as well as with walking    The patient is a very active individual and feels as though this pain compromises his participation with independent activities of daily living  He ambulates with a walker  The pain can be debilitating at times and contribute to significant disability, compromising overall activity and independent activities of daily living  He has tried physical therapy with limited relief of symptoms  He additionally has pain radiating across his ribs bilaterally in the T10 and T11 dermatomal distribution  He demonstrates good strength and denies any weakness numbness or paresthesias  The patient denies any bowel or bladder dysfunction as well  I have personally reviewed and/or updated the patient's past medical history, past surgical history, family history, social history, current medications, allergies, and vital signs today  Review of Systems   Constitutional: Positive for activity change  HENT: Negative  Eyes: Negative  Respiratory: Negative  Cardiovascular: Negative  Gastrointestinal: Negative  Endocrine: Negative  Genitourinary: Negative  Musculoskeletal: Positive for arthralgias, back pain, gait problem and myalgias  Skin: Negative  Allergic/Immunologic: Negative  Neurological: Positive for numbness  Negative for weakness  Hematological: Negative  Psychiatric/Behavioral: Negative  All other systems reviewed and are negative        Patient Active Problem List   Diagnosis   • Herniation of intervertebral disc of thoracic region   • Diabetic peripheral neuropathy (HCC)   • Morbid obesity ([address] )   • Cervical spinal cord compression (HCC)   • Mild depression   • Major depressive disorder, single episode, mild (HCC)   • Thoracic spondylosis   • Lumbar spondylosis   • Spinal muscular atrophy, unspecified ([facility])   • Neurogenic claudication due to lumbar spinal stenosis   • Muscular atrophy   • Other spondylosis with myelopathy, cervical region   • Radiculopathy   • Myelomalacia of cervical cord [facility])       Past Medical History:   Diagnosis Date   • Anxiety    • Arthritis    • Chronic pain disorder     mid back region   • Colon polyp    • COVID-19    • CPAP (continuous positive airway pressure) dependence     Pt uses nightly   • Diabetes mellitus ([facility] [address] )    • Diverticulosis    • History of recent hospitalization 06/16/2021    Pt was admitted to [facility] after syncopal episode  Pt saw cardiology- all tests negative  Pt had nl EEG  pt states is was a medication interaction  • Hyperlipidemia    • Hypertension    • Obesity    • Sleep apnea    • Spinal stenosis        Past Surgical History:   Procedure Laterality Date   • ACHILLES TENDON REPAIR      Pt had a rupture in right and left 2 years apart   • COLONOSCOPY     • EPIDURAL BLOCK INJECTION      Pt had in lumbar region  • EPIDURAL BLOCK INJECTION N/A 4/6/2021    Procedure: T-11-T-12 INTERLAMINAR THORACIC EPIDURAL STEROID INJECTION;  Surgeon: Rony Cohn MD;  [facility]: [facility];  Service: Pain Management    • EPIDURAL BLOCK INJECTION Right 7/20/2021    Procedure: L5 and S1 TRANSFORAMINAL EPIDURAL STEROID INJECTION;  Surgeon: Rony Cohn MD;  [facility]: [facility];  Service: Pain Management    • FL GUIDED NEEDLE PLAC BX/ASP/INJ  7/14/2022   • FL GUIDED NEEDLE PLAC BX/ASP/INJ  8/18/2022   • FL GUIDED NEEDLE PLAC BX/ASP/INJ  9/15/2022   • FOOT SURGERY Left     Left great toe- had a hammertoe     • JOINT REPLACEMENT Left     Total hip, knee   • JOINT REPLACEMENT Right     Total hip, knee   • KNEE ARTHROSCOPY Bilateral    • NERVE BLOCK Bilateral 7/14/2022    Procedure: BLOCK MEDIAL BRANCH T12, L1, L2;  Surgeon: Rony Cohn MD;  [facility]: [facility];  Service: Pain Management    • NERVE BLOCK Bilateral 8/18/2022    Procedure: BLOCK MEDIAL BRANCH T12, L1, L2 #2;  Surgeon: Rony Cohn MD;  [facility];  Service: Pain Management    • OH JOE IMPLTJ NSTIM ELTRDS PLATE/PADDLE EDRL Left 10/26/2021    Procedure: Thoracic laminectomies for placement of spinal cord stimulator and internal pulse generator, use of neuromonitoring, left sided pulse generator;  Surgeon: Kameron Smith MD;  Location: [facility];  Service: Neurosurgery   • OK PRQ IMPLTJ NSTIM ELECTRODE ARRAY EPIDURAL Bilateral 9/30/2021    Procedure: Shasha Medina SCS TRIAL;  Surgeon: Rony Cohn MD;  [facility]: [facility];  Service: Pain Management    • RHIZOTOMY Bilateral 9/15/2022    Procedure: RHIZOTOMY LUMBAR T12, L1, L2 medial branch nerves;  Surgeon: Rony Cohn MD;  [facility]: [facility];  Service: Pain Management    • TOTAL KNEE ARTHROPLASTY Left        Family History   Problem Relation Age of Onset   • Arthritis Mother    • Hypertension Father        Social History     Occupational History   • Not on file   Tobacco Use   • Smoking status: Never   • Smokeless tobacco: Current     Types: Snuff   • Tobacco comments:     still using snuff   Vaping Use   • Vaping Use: Never used   Substance and Sexual Activity   • Alcohol use:  Yes     Alcohol/week: 2 0 standard drinks     Types: 2 Cans of beer per week     Comment: social, weekends   • Drug use: No   • Sexual activity: Yes       Current Outpatient Medications on File Prior to Visit   Medication Sig   • acetaminophen (TYLENOL) 500 mg tablet Take 1,000 mg by mouth every 6 (six) hours as needed for mild pain   • atorvastatin (LIPITOR) 20 mg tablet Take 20 mg by mouth daily   • Empagliflozin (Jardiance) 25 MG TABS Take 25 mg by mouth daily   • ERGOCALCIFEROL PO Take 50,000 Units by mouth 2 (two) times a week    • Fiber Select Gummies CHEW Chew 10grams per day   • LORazepam (ATIVAN) 0 5 mg tablet Take 0 5 mg by mouth daily as needed   • Semaglutide,0 25 or 0 5MG/DOS, (Ozempic, 0 25 or 0 5 MG/DOSE,) 2 MG/1 5ML SOPN Inject 0 5 mg under the skin once a week    • sertraline (ZOLOFT) 50 mg tablet Take 75 mg by mouth    • valsartan (DIOVAN) 80 mg tablet Take 80 mg by mouth daily    • desloratadine (CLARINEX) 5 MG tablet TAKE 1 TABLET BY MOUTH EVERY DAY FOR ALLERGY SYMPTOMS   • famotidine (PEPCID) 20 mg tablet Take 20 mg by mouth as needed      No current facility-administered medications on file prior to visit  No Known Allergies      Physical Exam    /78   Pulse 71   Temp 97 7 °F (36 5 °C)   Resp 20   Ht 6' 4" (1 93 m)   Wt (!) 160 kg (353 lb 3 2 oz)   BMI 42 99 kg/m²     Constitutional: normal, well developed, well nourished, alert, in no distress and non-toxic and no overt pain behavior  and obese  Eyes: anicteric  HEENT: grossly intact  Neck: supple, symmetric, trachea midline and no masses   Pulmonary:even and unlabored  Cardiovascular:No edema or pitting edema present  Skin:Normal without rashes or lesions and well hydrated  Psychiatric:Mood and affect appropriate  Neurologic:Cranial Nerves II-XII grossly intact Sensation grossly intact; no clonus negative diallo's  Reflexes 2+ and brisk  SLR  Weakly positive right-sided we  Musculoskeletal: hunched gait  Unable to perform normal heel toe and tip toe walking  Slight weakness with right lower extremity strong plantar flexion; 5/5 strength with active range of motion movements in all other extremities bilaterally  mild pain with thoracic and lumbar facet loading bilaterally and with lateral spine rotation  mild ttp over thoracic and lumbar paraspinal muscles  Negative jahaira's test, negative gaenslen's negative SIJ loading bilaterally  Tenderness to palpation over inferior rib borders of T10-T11 posterolateral ribs bilaterally,  Eliciting radicular pain in aformentioned dermatomal distributions  Imaging    Result Narrative   Exam - thoracic spine mri scan     History: Bilateral leg weakness  Technique: Using a surface coil sagittal and axial T1-weighted and T2-weighted  scans were obtained of the thoracic spine  Findings: Image detail is suboptimal secondary to patient's obesity  Thoracic spinal cord is normal in size and configuration and MRI signal  intensity  There are no intramedullary lesions  Vertebral bodies are in anatomic alignment      There are degenerative changes in endplates and facet joints throughout the  thoracic spine  There is central spinal stenosis at T10-T11  There is no marlyn spinal cord  compression however  Other Result Information   Interface, Rad Results In - 08/07/2018  7:34 PM EDT  Formatting of this note might be different from the original   Exam - thoracic spine mri scan     History: Bilateral leg weakness  Technique: Using a surface coil sagittal and axial T1-weighted and T2-weighted  scans were obtained of the thoracic spine  Findings: Image detail is suboptimal secondary to patient's obesity  Thoracic spinal cord is normal in size and configuration and MRI signal  intensity  There are no intramedullary lesions  Vertebral bodies are in anatomic alignment  There are degenerative changes in endplates and facet joints throughout the  thoracic spine  There is central spinal stenosis at T10-T11  There is no marlyn spinal cord  compression however  IMPRESSION:  Impression:  1  Technically suboptimal study  2  Marked spondylosis  3  At T10-T11 there is spinal stenosis however there is no marlyn spinal cord  compression  MRI LUMBAR SPINE WITHOUT CONTRAST     INDICATION: [id number]: Radiculopathy, lumbar region      COMPARISON:  5/6/2018; 3/16/2021     TECHNIQUE:  Sagittal T1, sagittal T2, sagittal inversion recovery, axial T1 and axial T2, coronal T2     IMAGE QUALITY:  Diagnostic     FINDINGS:     VERTEBRAL BODIES:  There are 5 lumbar type vertebral bodies  Mild to moderate levoscoliosis mid lumbar spine  Trace grade 1 anterolisthesis of L3 on L4  Minimal grade 1 retrolisthesis of L4 on L5  Scattered degenerative endplate changes  No focally   suspicious marrow lesions  No bone marrow edema or compression abnormality      SACRUM:  Scattered degenerative endplate changes  No focally suspicious marrow lesions    No bone marrow edema or compression abnormality      DISTAL CORD AND CONUS:  Normal size and signal within the distal cord and conus  The conus medullaris terminates at the L2 level      PARASPINAL SOFT TISSUES:  Fatty atrophy of the psoas muscles noted, similar to the prior CT possibly from disuse  Marked atrophy of the left iliac is muscle as well      LOWER THORACIC DISC SPACES:  T11-T12: There is a small central disc herniation, protrusion type  Minimal central canal narrowing  Neural foramina patent bilaterally      T12-L1: There is no focal disk herniation, central canal or neural foraminal narrowing      LUMBAR DISC SPACES:     L1-L2:  There is bilateral facet hypertrophy  There is a left neural foraminal disc protrusion  Mild left neural foraminal narrowing  Central canal patent  Minimal right neural foraminal narrowing      L2-L3:  There is bilateral facet hypertrophy  There is a right neural foraminal disc protrusion  Moderate to severe right neural foraminal narrowing  Mild central canal and left neural foraminal narrowing      L3-L4:  There is bilateral facet hypertrophy  There is a right neural foraminal disc protrusion  Moderate right neural foraminal narrowing  Mild central canal and left neural foraminal narrowing      L4-L5:  There is loss of disc height and signal   There is a disc osteophyte complex with a superimposed left neural foraminal disc protrusion  There is moderate to severe left neural foraminal narrowing  Mild central canal narrowing  Moderate right   neural foraminal narrowing     L5-S1:  There is bilateral facet hypertrophy  There is a left neural foraminal disc protrusion  Severe left neural foraminal narrowing  Mild central canal and right neural foraminal narrowing      IMPRESSION:        1  Advanced multilevel spondylosis    2   Moderate to severe fatty atrophy of the bilateral psoas muscles and iliac is muscles left greater than right, possibly from disuse or denervation atrophy

## 2023-01-25 NOTE — DISCHARGE INSTR - AVS FIRST PAGE
YOUR 2 WEEK FOLLOW UP HAS BEEN SCHEDULED; IF YOU WISH TO CHANGE THE FOLLOW UP, PLEASE CALL THE [facility] AT [place]: [phone]    MEDIAL BRANCH BLOCK DISCHARGE INSTRUCTIONS  ACTIVITY  Please do activities that will bring the normal pain that we are rating  For example, if vacuuming or walking increases the painm do that  Issac twill give the most accurate response to the diary  You may shower, but no tub baths today, or applied heat  CARE OF THE INJECTION SITE  This area may be numb for several hours after the injection  Notify the [facility] if you have any of the following: redness, drainage, swelling or fever above 100°F     SPECIAL INSTRUCTIONS  Please return the MBB diary to our office by mail, fax, or drop it off  MEDICATIONS  Please do not take any break through or short acting pain medications for 8 hours after the block  Continue to take all routine medications  Our office may have instructed you to hold some medications  You may resume _______________________________________________  If you have any problems specifically related to your procedure, please call our office at [phone]  Problems not related to your procedure should be directed at your primary care physician  Lumbar Radiofrequency Ablation   WHAT YOU NEED TO KNOW:   What do I need to know about lumbar radiofrequency ablation? Lumbar radiofrequency ablation (RFA) is a procedure used to treat facet joint pain in your lower back  Facet joints are found at the back of each vertebra  A needle electrode is used to send electrical currents to the nerves in your facet joint  The electrical currents create heat that damages the nerve so it cannot send pain signals  How do I prepare for lumbar RFA? Your healthcare provider will talk to you about how to prepare for this procedure  He may tell you not to eat or drink anything after midnight on the day of your procedure   He will tell you what medicines to take or not take on the day of your procedure  What will happen during lumbar RFA? You will lie on your stomach  You will be given local anesthesia to numb the area of your back where the needle electrode will be inserted  You may be given a sedative to help keep you relaxed  You may still feel pressure or pushing during the procedure, but you should not feel any pain  Your healthcare provider will use fluoroscopy (a type of x-ray) to guide the needle electrode to the nerves near your facet joint  Your healthcare provider may touch the affected nerve to make sure the needle electrode is in the right place  You will feel tingling or pressure when he does this  He will then apply local anesthesia to the nerve to numb it  This will prevent you from feeling pain when he applies heat to the nerve  Your healthcare provider will then apply heat to the nerve using the needle electrode  He may need to apply heat to more than one nerve  He will remove the needle electrode and apply a bandage over the area  What are the risks of lumbar RFA? You may have pain, numbness, tingling, or burning in the area where the lumbar RFA was done  These normally go away within 6 weeks  The needle electrode may injure your spinal nerves  This may cause permanent leg weakness or nerve pain  CARE AGREEMENT:   You have the right to help plan your care  Learn about your health condition and how it may be treated  Discuss treatment options with your healthcare providers to decide what care you want to receive  You always have the right to refuse treatment  The above information is an  only  It is not intended as medical advice for individual conditions or treatments  Talk to your doctor, nurse or pharmacist before following any medical regimen to see if it is safe and effective for you    © Copyright [employer] 2022 Information is for End User's use only and may not be sold, redistributed or otherwise used for commercial purposes   All illustrations and images included in CareNotes® are the copyrighted property of A D A M , Inc  or [facility] Flaquita Cameron

## 2023-01-26 ENCOUNTER — HOSPITAL ENCOUNTER (OUTPATIENT)
Facility: HOSPITAL | Age: 63
Setting detail: OUTPATIENT SURGERY
Discharge: HOME | End: 2023-01-26
Attending: ANESTHESIOLOGY | Admitting: ANESTHESIOLOGY

## 2023-01-26 ENCOUNTER — APPOINTMENT (OUTPATIENT)
Dept: RADIOLOGY | Facility: HOSPITAL | Age: 63
End: 2023-01-26

## 2023-01-26 VITALS
SYSTOLIC BLOOD PRESSURE: 133 MMHG | DIASTOLIC BLOOD PRESSURE: 70 MMHG | RESPIRATION RATE: 18 BRPM | OXYGEN SATURATION: 95 % | TEMPERATURE: 97.5 F | HEART RATE: 62 BPM

## 2023-01-26 LAB — GLUCOSE SERPL-MCNC: 92 MG/DL (ref 65–140)

## 2023-01-26 RX ORDER — METHYLPREDNISOLONE ACETATE 80 MG/ML
INJECTION, SUSPENSION INTRA-ARTICULAR; INTRALESIONAL; INTRAMUSCULAR; SOFT TISSUE AS NEEDED
Status: DISCONTINUED | OUTPATIENT
Start: 2023-01-26 | End: 2023-01-26 | Stop reason: HOSPADM

## 2023-01-26 RX ORDER — LIDOCAINE HYDROCHLORIDE 10 MG/ML
INJECTION, SOLUTION EPIDURAL; INFILTRATION; INTRACAUDAL; PERINEURAL AS NEEDED
Status: DISCONTINUED | OUTPATIENT
Start: 2023-01-26 | End: 2023-01-26 | Stop reason: HOSPADM

## 2023-01-26 RX ORDER — BUPIVACAINE HYDROCHLORIDE 2.5 MG/ML
INJECTION, SOLUTION EPIDURAL; INFILTRATION; INTRACAUDAL AS NEEDED
Status: DISCONTINUED | OUTPATIENT
Start: 2023-01-26 | End: 2023-01-26 | Stop reason: HOSPADM

## 2023-01-26 NOTE — INTERVAL H&P NOTE
H&P reviewed  After examining the patient I find no changes in the patients condition since the H&P had been written      Vitals:    01/26/23 0819   BP: 155/86   Pulse: 70   Resp: 20   Temp: 98 °F (36 7 °C)   SpO2: 96%

## 2023-01-26 NOTE — PROCEDURES
Pre-procedure Diagnosis: Lumbar Facet Arthropathy  Post-procedure Diagnosis: Lumbar Facet Arthropathy  Procedure Title(s):  [BILATERAL L3, L4, L5] medial branch nerve blocks [(DIAGNOSTIC)]  Attending Surgeon:   Shobha Madrid MD  Anesthesia:   Local     Indications: The patient is a 65y o  year-old male with a diagnosis of lumbar facet arthropathy  The patient's history and physical exam were reviewed  The risks, benefits and alternatives to the procedure were discussed, and all questions were answered to the patient's satisfaction  The patient agreed to proceed, and written informed consent was obtained  Procedure in Detail: The patient was brought into the procedure room and placed in the prone position on the fluoroscopy table  The area of the lumbar spine was prepped with chloraprep and then draped in a sterile manner  AP fluoroscopy was used to identify the [L3-L5] levels on the [LEFT] side  The C-arm was obliqued to visualize the junction of the superior articulate process and transverse process  The sacral ala was identified and marked  The skin in these identified areas was anesthetized with 1% lidocaine  A 22-gauge, 5-inch spinal needle was advanced toward each of these points under fluoroscopic guidance  Once bone was contacted, negative aspiration was confirmed and [1-mL] of a [6mL]mixture of [5mL] [0 25% bupivicaine] and 1mL of 80mg/mL Depomedrol was injected at each level  (The same procedure was performed on the RIGHT side )    After the procedure was completed, the patient's back was cleaned and bandages were placed at the needle insertion sites  Disposition: The patient tolerated the procedure well, and there were no apparent complications  The patient was taken to the recovery area where written discharge instructions for the procedure were given  The patient was given a pain diary to determine if the patient's pain improves following the injection   Once the diary is returned we will consider next appropriate course of treatment      Postoperative pain relief [WAS] significant    Estimated Blood Loss: None  Specimens Obtained: N/A

## 2023-01-26 NOTE — OP NOTE
OPERATIVE REPORT  PATIENT NAME: Jude Ruiz    :  1960  MRN: [id number]  Pt Location:  GI ROOM 01    SURGERY DATE: 2023    Surgeon(s) and Role: Fariha Longo MD - Primary    Preop Diagnosis:  Lumbar spondylosis [M47 816]    Post-Op Diagnosis Codes:     * Lumbar spondylosis [M47 816]    Procedure(s):  Bilateral - BLOCK MEDIAL BRANCH L3  L4  L5 #1    Specimen(s):  * No specimens in log *    Estimated Blood Loss:   Minimal    Drains:  * No LDAs found *    Anesthesia Type:   Local    Operative Indications:  Lumbar spondylosis [M47 816]    Operative Findings:  Bilateral L3, L4, L5 medial branch nerve regions identified under fluoroscopic guidance       Complications:   None    Procedure and Technique:  Please see detailed procedure note    I was present for the entire procedure    Patient Disposition:  PACU     SIGNATURE: Wong Luu MD  DATE: 2023  TIME: 8:52 AM

## 2023-02-10 ENCOUNTER — OFFICE VISIT (OUTPATIENT)
Dept: PAIN MEDICINE | Facility: CLINIC | Age: 63
End: 2023-02-10

## 2023-02-10 VITALS
HEART RATE: 80 BPM | SYSTOLIC BLOOD PRESSURE: 148 MMHG | DIASTOLIC BLOOD PRESSURE: 68 MMHG | RESPIRATION RATE: 20 BRPM | TEMPERATURE: 98 F | BODY MASS INDEX: 38.36 KG/M2 | HEIGHT: 76 IN | WEIGHT: 315 LBS

## 2023-02-10 DIAGNOSIS — M47.816 LUMBAR SPONDYLOSIS: Primary | ICD-10-CM

## 2023-02-10 RX ORDER — LIDOCAINE HYDROCHLORIDE 10 MG/ML
10 INJECTION, SOLUTION EPIDURAL; INFILTRATION; INTRACAUDAL; PERINEURAL ONCE
OUTPATIENT
Start: 2023-02-10 | End: 2023-02-10

## 2023-02-10 RX ORDER — BUPIVACAINE HCL/PF 2.5 MG/ML
10 VIAL (ML) INJECTION ONCE
OUTPATIENT
Start: 2023-02-10 | End: 2023-02-10

## 2023-02-10 RX ORDER — METHYLPREDNISOLONE ACETATE 80 MG/ML
80 INJECTION, SUSPENSION INTRA-ARTICULAR; INTRALESIONAL; INTRAMUSCULAR; PARENTERAL; SOFT TISSUE ONCE
OUTPATIENT
Start: 2023-02-10 | End: 2023-02-10

## 2023-02-10 NOTE — H&P (VIEW-ONLY)
Assessment  1  Lumbar spondylosis  -     Case request operating room: BLOCK MEDIAL BRANCH L3, L4, L5 #2; Standing  -     Case request operating room: BLOCK MEDIAL BRANCH L3, L4, L5 #2    Greater than 75% relief of leg pain from both radiculopathy and diabetic peripheral neuropathy as well as low back pain with improved ability to participate with IADLs after Nevro SCS trial (dual lead)  Axial low back pain described primarily by arthritic features  Aching, nagging, indolent, stabbing, throbbing features in axial mid and low back without radicular components  5/5 strength bilaterally, negative SLR  Positive facet loading maneuvers in lumbar spine elicited pain, positive tenderness to palpation over lumbar paraspinal muscles  Findings correlate with prominent thoracic and lumbar facet arthropathy seen throughout axial low back on MRI  Currently he is neurologically intact without gait instability, saddle anesthesia or bowel/bladder abnormality  Greater than 90% relief of pain with improved ability to participate with IADLs after Bilateral L3, L4, L5 medial branch nerve blocks #1 for approximately 2 days  Risks, benefits alternative to repeat medial branch blocks and subsequent radiofrequency ablation of successful thoroughly discussed with patient  Handouts provided questions answered to patient satisfaction  Had consulted with NSGY who recommended no intervention given only mild/moderate right sided transforaminal stenosis at L4-L5  Pain consistent with thoracic radicular symptoms secondary to T10-T11 disc herniation which from 2018 MRI thoracic spine does not contribute to significant cord compression  Radiating and radicular pain in T10 and T11 dermatomal distribution  Pain is along lower thoracic T10 and T11 dermatomes, particularly with palpation of lower thoracic ribs and posterolateral distribution   Interestingly his significant and advanced lumbar facet arthropathy which is not a  primary source of his pain at this time  He previously had epidural steroid injections in the past with Dr Hope Armstrong with noted relief  For radicular pain  Reasonable to reobtain imaging, and pursue multimodal pain therapy plan as noted below  Plan  - Bilateral L3, L4, L5 medial branch nerve blocks #2; f/u 2 weeks post procedure  - Bilateral T10, T11, T12 medial branch nerve radiofrequency ablation 3/16 given 6 months relief of pain with prior thoracic medial branch nerve RF ablation and recurrence of mid back pain  -cymbalta 30mg/day rx; patient has since tapered given sedation/drowsiness along with other side effects  Handout regarding sedative effects of taking this medication provided; additionally provided up titration calendar  Counseled not to take medication while driving or operating heavy machinery/using stairs  -continue other analgesics as prescribed previously well provider's  Counseled extensively as noted below regarding the risks of opioid medications in combination with Lyrica  -physical therapy for  Lumbar facet arthropathy, lumbar radiculopathy, mid back pain    There are risks associated with opioid medications, including dependence, addiction and tolerance  The patient understands and agrees to use these medications only as prescribed  Potential side effects of the medications include, but are not limited to, constipation, drowsiness, addiction, impaired judgment and risk of fatal overdose if not taken as prescribed  The patient was warned against driving while taking sedation medications  Sharing medications is a felony  At this point in time, the patient is showing no signs of addiction, abuse, diversion or suicidal ideation  A urine drug screen was collected at today's office visit as part of our medication management protocol  The point of care testing results were appropriate for what was being prescribed  The specimen will be sent for confirmatory testing   The drug screen is medically necessary because the patient is either dependent on opioid medication or is being considered for opioid medication therapy and the results could impact ongoing or future treatment  The drug screen is to evaluate for the presences or absence of prescribed, non-prescribed, and/or illicit drugs/substances  [facility] Prescription Drug Monitoring Program report was reviewed and was appropriate     Complete risks and benefits including bleeding, infection, tissue reaction, nerve injury and allergic reaction were discussed  The approach was demonstrated using models and literature was provided  Verbal and written consent was obtained  My impressions and treatment recommendations were discussed in detail with the patient who verbalized understanding and had no further questions  Discharge instructions were provided  I personally saw and examined the patient and I agree with the above discussed plan of care  No orders of the defined types were placed in this encounter  History of Present Illness    Greater than 75% relief of leg pain from both radiculopathy and diabetic peripheral neuropathy as well as low back pain with improved ability to participate with IADLs after Nevro SCS trial (dual lead)  Continues to describe right hip flexor weakness without any pain  Additionally notes low back pain described by arthritic features  Describes achy, nagging, indolent, crampy and throbbing pain worse with standing/ambulation  Greater than 90% relief of pain with improved ability to participate with IADLs after Bilateral L3, L4, L5 medial branch nerve blocks #1 for approximately 2 days  Previously reported the following:    Chivo Ghosh is a 65 y o  male with a past medical history of  Non-insulin controlled type 2 diabetes, obesity presenting with chronic mid and low back pain described primarily as arthritic in nature    He describes 8/10 mid and low back pain that is worse in the mornings and worse at the end of the day  The pain is characterized by achy, nagging, indolent, crampy, stabbing pain in his axial mid and low back  The patient describes that the pain is worse with standing for long periods of time on hard surfaces as well as with walking  The patient is a very active individual and feels as though this pain compromises his participation with independent activities of daily living  He ambulates with a walker  The pain can be debilitating at times and contribute to significant disability, compromising overall activity and independent activities of daily living  He has tried physical therapy with limited relief of symptoms  He additionally has pain radiating across his ribs bilaterally in the T10 and T11 dermatomal distribution  He demonstrates good strength and denies any weakness numbness or paresthesias  The patient denies any bowel or bladder dysfunction as well  I have personally reviewed and/or updated the patient's past medical history, past surgical history, family history, social history, current medications, allergies, and vital signs today  Review of Systems   Constitutional: Positive for activity change  HENT: Negative  Eyes: Negative  Respiratory: Negative  Cardiovascular: Negative  Gastrointestinal: Negative  Endocrine: Negative  Genitourinary: Negative  Musculoskeletal: Positive for arthralgias, back pain, gait problem and myalgias  Skin: Negative  Allergic/Immunologic: Negative  Neurological: Positive for numbness  Negative for weakness  Hematological: Negative  Psychiatric/Behavioral: Negative  All other systems reviewed and are negative        Patient Active Problem List   Diagnosis   • Herniation of intervertebral disc of thoracic region   • Diabetic peripheral neuropathy (HCC)   • Morbid obesity ([address] )   • Cervical spinal cord compression (HCC)   • Mild depression   • Major depressive disorder, single episode, mild ([address] )   • Thoracic spondylosis   • Lumbar spondylosis   • Spinal muscular atrophy, unspecified (HCC)   • Neurogenic claudication due to lumbar spinal stenosis   • Muscular atrophy   • Other spondylosis with myelopathy, cervical region   • Radiculopathy   • Myelomalacia of cervical cord [facility])       Past Medical History:   Diagnosis Date   • Anxiety    • Arthritis    • Chronic pain disorder     mid back region   • Colon polyp    • COVID-19    • CPAP (continuous positive airway pressure) dependence     Pt uses nightly   • Diabetes mellitus ([facility] [address] )    • Diverticulosis    • History of recent hospitalization 06/16/2021    Pt was admitted to [facility] after syncopal episode  Pt saw cardiology- all tests negative  Pt had nl EEG  pt states is was a medication interaction  • Hyperlipidemia    • Hypertension    • Obesity    • Sleep apnea    • Spinal stenosis        Past Surgical History:   Procedure Laterality Date   • ACHILLES TENDON REPAIR      Pt had a rupture in right and left 2 years apart   • COLONOSCOPY     • EPIDURAL BLOCK INJECTION      Pt had in lumbar region  • EPIDURAL BLOCK INJECTION N/A 4/6/2021    Procedure: T-11-T-12 INTERLAMINAR THORACIC EPIDURAL STEROID INJECTION;  Surgeon: Shobha Madrid MD;  Location: [facility];  Service: Pain Management    • EPIDURAL BLOCK INJECTION Right 7/20/2021    Procedure: L5 and S1 TRANSFORAMINAL EPIDURAL STEROID INJECTION;  Surgeon: Shobha Madrid MD;  [facility]: [facility];  Service: Pain Management    • FL GUIDED NEEDLE PLAC BX/ASP/INJ  7/14/2022   • FL GUIDED NEEDLE PLAC BX/ASP/INJ  8/18/2022   • FL GUIDED NEEDLE PLAC BX/ASP/INJ  9/15/2022   • FOOT SURGERY Left     Left great toe- had a hammertoe     • JOINT REPLACEMENT Left     Total hip, knee   • JOINT REPLACEMENT Right     Total hip, knee   • KNEE ARTHROSCOPY Bilateral    • NERVE BLOCK Bilateral 7/14/2022    Procedure: BLOCK MEDIAL BRANCH T12, L1, L2;  Surgeon: Shobha Madrid MD;  [facility]: [facility];  Service: Pain Management    • NERVE BLOCK Bilateral 8/18/2022    Procedure: BLOCK MEDIAL BRANCH T12, L1, L2 #2;  Surgeon: Penny Parks MD;  [facility]: [facility];  Service: Pain Management    • NERVE BLOCK Bilateral 1/26/2023    Procedure: BLOCK MEDIAL BRANCH L3, L4, L5 #1;  Surgeon: Penny Parks MD;  [facility]: [facility];  Service: Pain Management    • ID JOE [id number] NSTIM ELTRDS PLATE/PADDLE EDRL Left 10/26/2021    Procedure: Thoracic laminectomies for placement of spinal cord stimulator and internal pulse generator, use of neuromonitoring, left sided pulse generator;  Surgeon: Michael Olivares MD;  Location: [facility];  Service: Neurosurgery   • ID PRQ [id number] NSTIM ELECTRODE ARRAY EPIDURAL Bilateral 9/30/2021    Procedure: Jesus Skyo SCS TRIAL;  Surgeon: Penny Parks MD;  [facility]: [facility];  Service: Pain Management    • RHIZOTOMY Bilateral 9/15/2022    Procedure: RHIZOTOMY LUMBAR T12, L1, L2 medial branch nerves;  Surgeon: Penny Parks MD;  [facility];  Service: Pain Management    • TOTAL KNEE ARTHROPLASTY Left        Family History   Problem Relation Age of Onset   • Arthritis Mother    • Hypertension Father        Social History     Occupational History   • Not on file   Tobacco Use   • Smoking status: Never   • Smokeless tobacco: Current     Types: Snuff   • Tobacco comments:     still using snuff   Vaping Use   • Vaping Use: Never used   Substance and Sexual Activity   • Alcohol use:  Yes     Alcohol/week: 2 0 standard drinks     Types: 2 Cans of beer per week     Comment: social, weekends   • Drug use: No   • Sexual activity: Yes       Current Outpatient Medications on File Prior to Visit   Medication Sig   • acetaminophen (TYLENOL) 500 mg tablet Take 1,000 mg by mouth every 6 (six) hours as needed for mild pain   • atorvastatin (LIPITOR) 20 mg tablet Take 20 mg by mouth daily   • Empagliflozin (Jardiance) 25 MG TABS Take 25 mg by mouth daily   • ERGOCALCIFEROL PO Take 50,000 Units by mouth 2 (two) times a week    • Fiber Select Gummies CHEW Chew 10grams per day   • LORazepam (ATIVAN) 0 5 mg tablet Take 0 5 mg by mouth daily as needed   • Semaglutide,0 25 or 0 5MG/DOS, (Ozempic, 0 25 or 0 5 MG/DOSE,) 2 MG/1 5ML SOPN Inject 0 5 mg under the skin once a week    • sertraline (ZOLOFT) 50 mg tablet Take 75 mg by mouth    • valsartan (DIOVAN) 80 mg tablet Take 80 mg by mouth daily    • desloratadine (CLARINEX) 5 MG tablet TAKE 1 TABLET BY MOUTH EVERY DAY FOR ALLERGY SYMPTOMS   • DULoxetine (CYMBALTA) 30 mg delayed release capsule Take 1 capsule (30 mg total) by mouth daily   • famotidine (PEPCID) 20 mg tablet Take 20 mg by mouth as needed      No current facility-administered medications on file prior to visit  No Known Allergies      Physical Exam    /68   Pulse 80   Temp 98 °F (36 7 °C)   Resp 20   Ht 6' 4" (1 93 m)   Wt (!) 159 kg (349 lb 12 8 oz)   BMI 42 58 kg/m²     Constitutional: normal, well developed, well nourished, alert, in no distress and non-toxic and no overt pain behavior  and obese  Eyes: anicteric  HEENT: grossly intact  Neck: supple, symmetric, trachea midline and no masses   Pulmonary:even and unlabored  Cardiovascular:No edema or pitting edema present  Skin:Normal without rashes or lesions and well hydrated  Psychiatric:Mood and affect appropriate  Neurologic:Cranial Nerves II-XII grossly intact Sensation grossly intact; no clonus negative diallo's  Reflexes 2+ and brisk  SLR  Weakly positive right-sided we  Musculoskeletal: hunched gait  Unable to perform normal heel toe and tip toe walking  Slight weakness with right lower extremity strong plantar flexion; 5/5 strength with active range of motion movements in all other extremities bilaterally  mild pain with thoracic and lumbar facet loading bilaterally and with lateral spine rotation  mild ttp over thoracic and lumbar paraspinal muscles  Negative jhaaira's test, negative gaenslen's negative SIJ loading bilaterally    Tenderness to palpation over inferior rib borders of T10-T11 posterolateral ribs bilaterally,  Eliciting radicular pain in aformentioned dermatomal distributions  Imaging    Result Narrative   Exam - thoracic spine mri scan     History: Bilateral leg weakness  Technique: Using a surface coil sagittal and axial T1-weighted and T2-weighted  scans were obtained of the thoracic spine  Findings: Image detail is suboptimal secondary to patient's obesity  Thoracic spinal cord is normal in size and configuration and MRI signal  intensity  There are no intramedullary lesions  Vertebral bodies are in anatomic alignment  There are degenerative changes in endplates and facet joints throughout the  thoracic spine  There is central spinal stenosis at T10-T11  There is no marlyn spinal cord  compression however  Other Result Information   Interface, Rad Results In - 08/07/2018  7:34 PM EDT  Formatting of this note might be different from the original   Exam - thoracic spine mri scan     History: Bilateral leg weakness  Technique: Using a surface coil sagittal and axial T1-weighted and T2-weighted  scans were obtained of the thoracic spine  Findings: Image detail is suboptimal secondary to patient's obesity  Thoracic spinal cord is normal in size and configuration and MRI signal  intensity  There are no intramedullary lesions  Vertebral bodies are in anatomic alignment  There are degenerative changes in endplates and facet joints throughout the  thoracic spine  There is central spinal stenosis at T10-T11  There is no marlyn spinal cord  compression however  IMPRESSION:  Impression:  1  Technically suboptimal study  2  Marked spondylosis  3  At T10-T11 there is spinal stenosis however there is no marlyn spinal cord  compression       MRI LUMBAR SPINE WITHOUT CONTRAST     INDICATION: M54 16: Radiculopathy, lumbar region      COMPARISON:  5/6/2018; 3/16/2021     TECHNIQUE:  Sagittal T1, sagittal T2, sagittal inversion recovery, axial T1 and axial T2, coronal T2     IMAGE QUALITY:  Diagnostic     FINDINGS:     VERTEBRAL BODIES:  There are 5 lumbar type vertebral bodies  Mild to moderate levoscoliosis mid lumbar spine  Trace grade 1 anterolisthesis of L3 on L4  Minimal grade 1 retrolisthesis of L4 on L5  Scattered degenerative endplate changes  No focally   suspicious marrow lesions  No bone marrow edema or compression abnormality      SACRUM:  Scattered degenerative endplate changes  No focally suspicious marrow lesions  No bone marrow edema or compression abnormality      DISTAL CORD AND CONUS:  Normal size and signal within the distal cord and conus  The conus medullaris terminates at the L2 level      PARASPINAL SOFT TISSUES:  Fatty atrophy of the psoas muscles noted, similar to the prior CT possibly from disuse  Marked atrophy of the left iliac is muscle as well      LOWER THORACIC DISC SPACES:  T11-T12: There is a small central disc herniation, protrusion type  Minimal central canal narrowing  Neural foramina patent bilaterally      T12-L1: There is no focal disk herniation, central canal or neural foraminal narrowing      LUMBAR DISC SPACES:     L1-L2:  There is bilateral facet hypertrophy  There is a left neural foraminal disc protrusion  Mild left neural foraminal narrowing  Central canal patent  Minimal right neural foraminal narrowing      L2-L3:  There is bilateral facet hypertrophy  There is a right neural foraminal disc protrusion  Moderate to severe right neural foraminal narrowing  Mild central canal and left neural foraminal narrowing      L3-L4:  There is bilateral facet hypertrophy  There is a right neural foraminal disc protrusion  Moderate right neural foraminal narrowing  Mild central canal and left neural foraminal narrowing      L4-L5:  There is loss of disc height and signal   There is a disc osteophyte complex with a superimposed left neural foraminal disc protrusion    There is moderate to severe left neural foraminal narrowing  Mild central canal narrowing  Moderate right   neural foraminal narrowing     L5-S1:  There is bilateral facet hypertrophy  There is a left neural foraminal disc protrusion  Severe left neural foraminal narrowing  Mild central canal and right neural foraminal narrowing      IMPRESSION:        1  Advanced multilevel spondylosis    2   Moderate to severe fatty atrophy of the bilateral psoas muscles and iliac is muscles left greater than right, possibly from disuse or denervation atrophy

## 2023-02-10 NOTE — PATIENT INSTRUCTIONS
Core Strengthening Exercises   WHAT YOU NEED TO KNOW:   Your core includes the muscles of your lower back, hip, pelvis, and abdomen  Core strengthening exercises help heal and strengthen these muscles  This helps prevent another injury, and keeps your pelvis, spine, and hips in the correct position  DISCHARGE INSTRUCTIONS:   Contact your healthcare provider if:   You have sharp or worsening pain during exercise or at rest     You have questions or concerns about your shoulder exercises  Safety tips:  Talk to your healthcare provider before you start an exercise program  A physical therapist can teach you how to do core strengthening exercises safely  Do the exercises on a mat or firm surface  A firm surface will support your spine and prevent low back pain  Do not do these exercises on a bed  Move slowly and smoothly  Avoid fast or jerky motions  Stop if you feel pain  Core exercises should not be painful  Stop if you feel pain  Breathe normally during core exercises  Do not hold your breath  This may cause an increase in blood pressure and prevent muscle strengthening  Your healthcare provider will tell you when to inhale and exhale during the exercise  Begin all of your exercises with abdominal bracing  Abdominal bracing helps warm up your core muscles  You can also practice abdominal bracing throughout the day  Lie on your back with your knees bent and feet flat on the floor  Place your arms in a relaxed position beside your body  Tighten your abdominal muscles  Pull your belly button in and up toward your spine  Hold for 5 seconds  Relax your muscles  Repeat 10 times  Core strengthening exercises: Your healthcare provider will tell you how often to do these exercises  The provider will also tell you how many repetitions of each exercise you should do  Hold each exercise for 5 seconds or as directed  As you get stronger, increase your hold to 10 to 15 seconds   You can do some of these exercises on a stability ball, or with a weight  Ask your healthcare provider how to use a stability ball or weight for these exercises:  Bridging:  Lie on your back with your knees bent and feet flat on the floor  Rest your arms at your side  Tighten your buttocks, and then lift your hips 1 inch off the floor  Hold for 5 seconds  When you can do this exercise without pain for 10 seconds, increase the distance you lift your hips  A good goal is to be able to lift your hips so that your shoulders, hips, and knees are in a straight line  Dead bug:  Lie on your back with your knees bent and feet flat on the floor  Place your arms in a relaxed position beside your body  Begin with abdominal bracing  Next, raise one leg, keeping your knee bent  Hold for 5 seconds  Repeat with the other leg  When you can do this exercise without pain for 10 to 15 seconds, you may raise one straight leg and hold  Repeat with the other leg  Quadruped:  Place your hands and knees on the floor  Keep your wrists directly below your shoulders and your knees directly below your hips  Pull your belly button in toward your spine  Do not flatten or arch your back  Tighten your abdominal muscles below your belly button  Hold for 5 seconds  When you can do this exercise without pain for 10 to 15 seconds, you may extend one arm and hold  Repeat on the other side  Side bridge exercises:      Standing side bridge:  Stand next to a wall and extend one arm toward the wall  Place your palm flat on the wall with your fingers pointing upward  Begin with abdominal bracing  Next, without moving your feet, slowly bend your arm to 90 degrees  Hold for 5 seconds  Repeat on the other side  When you can do this exercise without pain for 10 to 15 seconds, you may do the bent leg side bridge on the floor  Bent leg side bridge:  Lie on one side with your legs, hips, and shoulders in a straight line   Prop yourself up onto your forearm so your elbow is directly below your shoulder  Bend your knees back to 90 degrees  Begin with abdominal bracing  Next, lift your hips and balance yourself on your forearm and knees  Hold for 5 seconds  Repeat on the other side  When you can do this exercise without pain for 10 to 15 seconds, you may do the straight leg side bridge on the floor  Straight leg side bridge:  Lie on one side with your legs, hips, and shoulders in a straight line  Prop yourself up onto your forearm so your elbow is directly below your shoulder  Begin with abdominal bracing  Lift your hips off the floor and balance yourself on your forearm and the outside of your flexed foot  Do not let your ankle bend sideways  Hold for 5 seconds  Repeat on the other side  When you can do this exercise without pain for 10 to 15 seconds, ask your healthcare provider for more advanced exercises  Superman:  Lie on your stomach  Extend your arms forward on the floor  Tighten your abdominal muscles and lift your right hand and left leg off the floor  Hold this position  Slowly return to the starting position  Tighten your abdominal muscles and lift your left hand and right leg off the floor  Hold this position  Slowly return to the starting position  Clam:  Lie on your side with your knees bent  Put your bottom arm under your head to keep your neck in line  Put your top hand on your hip to keep your pelvis from moving  Put your heels together, and keep them together during this exercise  Slowly raise your top knee toward the ceiling  Then lower your leg so your knees are together  Repeat this exercise 10 times  Then switch sides and do the exercise 10 times with the other leg  Curl up:  Lie on your back with your knees bent and feet flat on the floor  Place your hands, palms down, underneath your lower back  Next, with your elbows on the floor, lift your shoulders and chest 2 to 3 inches off the floor   Keep your head in line with your shoulders  Hold this position  Slowly return to the starting position  Straight leg raises:  Lie on your back with one leg straight  Bend the other knee and place your foot flat on the floor  Tighten your abdominal muscles  Keep your leg straight and slowly lift it straight up 6 to 12 inches off the floor  Hold this position  Lower your leg slowly  Do as many repetitions as directed on this side  Repeat with the other leg  Plank:  Lie on your stomach  Bend your elbows and place your forearms flat on the floor  Lift your chest, stomach, and knees off the floor  Make sure your elbows are below your shoulders  Your body should be in a straight line  Do not let your hips or lower back sink to the ground  Squeeze your abdominal muscles together and hold for 15 seconds  To make this exercise harder, hold for 30 seconds or lift 1 leg at a time  Bicycles:  Lie on your back  Bend both knees and bring them toward your chest  Your calves should be parallel to the floor  Place the palms of your hands on the back of your head  Straighten your right leg and keep it lifted 2 inches off the floor  Raise your head and shoulders off the floor and twist towards your left  Keep your head and shoulders lifted  Bend your right knee while you straighten your left leg  Keep your left leg 2 inches off the floor  Twist your head and chest towards the left leg  Continue to straighten 1 leg at a time and twist        Follow up with your doctor as directed:  Write down your questions so you remember to ask them during your visits  © Copyright [employer] 2022 Information is for End User's use only and may not be sold, redistributed or otherwise used for commercial purposes  All illustrations and images included in CareNotes® are the copyrighted property of [employer]  or [facility] Flaquita Darby   The above information is an  only   It is not intended as medical advice for individual conditions or treatments  Talk to your doctor, nurse or pharmacist before following any medical regimen to see if it is safe and effective for you

## 2023-02-10 NOTE — PROGRESS NOTES
Assessment  1  Lumbar spondylosis  -     Case request operating room: BLOCK MEDIAL BRANCH L3, L4, L5 #2; Standing  -     Case request operating room: BLOCK MEDIAL BRANCH L3, L4, L5 #2    Greater than 75% relief of leg pain from both radiculopathy and diabetic peripheral neuropathy as well as low back pain with improved ability to participate with IADLs after Nevro SCS trial (dual lead)  Axial low back pain described primarily by arthritic features  Aching, nagging, indolent, stabbing, throbbing features in axial mid and low back without radicular components  5/5 strength bilaterally, negative SLR  Positive facet loading maneuvers in lumbar spine elicited pain, positive tenderness to palpation over lumbar paraspinal muscles  Findings correlate with prominent thoracic and lumbar facet arthropathy seen throughout axial low back on MRI  Currently he is neurologically intact without gait instability, saddle anesthesia or bowel/bladder abnormality  Greater than 90% relief of pain with improved ability to participate with IADLs after Bilateral L3, L4, L5 medial branch nerve blocks #1 for approximately 2 days  Risks, benefits alternative to repeat medial branch blocks and subsequent radiofrequency ablation of successful thoroughly discussed with patient  Handouts provided questions answered to patient satisfaction  Had consulted with NSGY who recommended no intervention given only mild/moderate right sided transforaminal stenosis at L4-L5  Pain consistent with thoracic radicular symptoms secondary to T10-T11 disc herniation which from 2018 MRI thoracic spine does not contribute to significant cord compression  Radiating and radicular pain in T10 and T11 dermatomal distribution  Pain is along lower thoracic T10 and T11 dermatomes, particularly with palpation of lower thoracic ribs and posterolateral distribution   Interestingly his significant and advanced lumbar facet arthropathy which is not a  primary source of his pain at this time  He previously had epidural steroid injections in the past with Dr Amna Pearson with noted relief  For radicular pain  Reasonable to reobtain imaging, and pursue multimodal pain therapy plan as noted below  Plan  - Bilateral L3, L4, L5 medial branch nerve blocks #2; f/u 2 weeks post procedure  - Bilateral T10, T11, T12 medial branch nerve radiofrequency ablation 3/16 given 6 months relief of pain with prior thoracic medial branch nerve RF ablation and recurrence of mid back pain  -cymbalta 30mg/day rx; patient has since tapered given sedation/drowsiness along with other side effects  Handout regarding sedative effects of taking this medication provided; additionally provided up titration calendar  Counseled not to take medication while driving or operating heavy machinery/using stairs  -continue other analgesics as prescribed previously well provider's  Counseled extensively as noted below regarding the risks of opioid medications in combination with Lyrica  -physical therapy for  Lumbar facet arthropathy, lumbar radiculopathy, mid back pain    There are risks associated with opioid medications, including dependence, addiction and tolerance  The patient understands and agrees to use these medications only as prescribed  Potential side effects of the medications include, but are not limited to, constipation, drowsiness, addiction, impaired judgment and risk of fatal overdose if not taken as prescribed  The patient was warned against driving while taking sedation medications  Sharing medications is a felony  At this point in time, the patient is showing no signs of addiction, abuse, diversion or suicidal ideation  A urine drug screen was collected at today's office visit as part of our medication management protocol  The point of care testing results were appropriate for what was being prescribed  The specimen will be sent for confirmatory testing   The drug screen is medically necessary because the patient is either dependent on opioid medication or is being considered for opioid medication therapy and the results could impact ongoing or future treatment  The drug screen is to evaluate for the presences or absence of prescribed, non-prescribed, and/or illicit drugs/substances  [place] Prescription Drug Monitoring Program report was reviewed and was appropriate     Complete risks and benefits including bleeding, infection, tissue reaction, nerve injury and allergic reaction were discussed  The approach was demonstrated using models and literature was provided  Verbal and written consent was obtained  My impressions and treatment recommendations were discussed in detail with the patient who verbalized understanding and had no further questions  Discharge instructions were provided  I personally saw and examined the patient and I agree with the above discussed plan of care  No orders of the defined types were placed in this encounter  History of Present Illness    Greater than 75% relief of leg pain from both radiculopathy and diabetic peripheral neuropathy as well as low back pain with improved ability to participate with IADLs after Nevro SCS trial (dual lead)  Continues to describe right hip flexor weakness without any pain  Additionally notes low back pain described by arthritic features  Describes achy, nagging, indolent, crampy and throbbing pain worse with standing/ambulation  Greater than 90% relief of pain with improved ability to participate with IADLs after Bilateral L3, L4, L5 medial branch nerve blocks #1 for approximately 2 days  Previously reported the following:    Bibi Beckett is a 65 y o  male with a past medical history of  Non-insulin controlled type 2 diabetes, obesity presenting with chronic mid and low back pain described primarily as arthritic in nature    He describes 8/10 mid and low back pain that is worse in the mornings and worse at the end of the day  The pain is characterized by achy, nagging, indolent, crampy, stabbing pain in his axial mid and low back  The patient describes that the pain is worse with standing for long periods of time on hard surfaces as well as with walking  The patient is a very active individual and feels as though this pain compromises his participation with independent activities of daily living  He ambulates with a walker  The pain can be debilitating at times and contribute to significant disability, compromising overall activity and independent activities of daily living  He has tried physical therapy with limited relief of symptoms  He additionally has pain radiating across his ribs bilaterally in the T10 and T11 dermatomal distribution  He demonstrates good strength and denies any weakness numbness or paresthesias  The patient denies any bowel or bladder dysfunction as well  I have personally reviewed and/or updated the patient's past medical history, past surgical history, family history, social history, current medications, allergies, and vital signs today  Review of Systems   Constitutional: Positive for activity change  HENT: Negative  Eyes: Negative  Respiratory: Negative  Cardiovascular: Negative  Gastrointestinal: Negative  Endocrine: Negative  Genitourinary: Negative  Musculoskeletal: Positive for arthralgias, back pain, gait problem and myalgias  Skin: Negative  Allergic/Immunologic: Negative  Neurological: Positive for numbness  Negative for weakness  Hematological: Negative  Psychiatric/Behavioral: Negative  All other systems reviewed and are negative        Patient Active Problem List   Diagnosis   • Herniation of intervertebral disc of thoracic region   • Diabetic peripheral neuropathy (HCC)   • Morbid obesity ([address] )   • Cervical spinal cord compression (HCC)   • Mild depression   • Major depressive disorder, single episode, mild ([address] )   • Thoracic spondylosis   • Lumbar spondylosis   • Spinal muscular atrophy, unspecified (HCC)   • Neurogenic claudication due to lumbar spinal stenosis   • Muscular atrophy   • Other spondylosis with myelopathy, cervical region   • Radiculopathy   • Myelomalacia of cervical cord [facility])       Past Medical History:   Diagnosis Date   • Anxiety    • Arthritis    • Chronic pain disorder     mid back region   • Colon polyp    • COVID-19    • CPAP (continuous positive airway pressure) dependence     Pt uses nightly   • Diabetes mellitus ([facility] [address] )    • Diverticulosis    • History of recent hospitalization 06/16/2021    Pt was admitted to [facility] after syncopal episode  Pt saw cardiology- all tests negative  Pt had nl EEG  pt states is was a medication interaction  • Hyperlipidemia    • Hypertension    • Obesity    • Sleep apnea    • Spinal stenosis        Past Surgical History:   Procedure Laterality Date   • ACHILLES TENDON REPAIR      Pt had a rupture in right and left 2 years apart   • COLONOSCOPY     • EPIDURAL BLOCK INJECTION      Pt had in lumbar region  • EPIDURAL BLOCK INJECTION N/A 4/6/2021    Procedure: T-11-T-12 INTERLAMINAR THORACIC EPIDURAL STEROID INJECTION;  Surgeon: Alanna Whiteside MD;  [facility]: [facility];  Service: Pain Management    • EPIDURAL BLOCK INJECTION Right 7/20/2021    Procedure: L5 and S1 TRANSFORAMINAL EPIDURAL STEROID INJECTION;  Surgeon: Alanna Whiteside MD;  [facility];  Service: Pain Management    • FL GUIDED NEEDLE PLAC BX/ASP/INJ  7/14/2022   • FL GUIDED NEEDLE PLAC BX/ASP/INJ  8/18/2022   • FL GUIDED NEEDLE PLAC BX/ASP/INJ  9/15/2022   • FOOT SURGERY Left     Left great toe- had a hammertoe     • JOINT REPLACEMENT Left     Total hip, knee   • JOINT REPLACEMENT Right     Total hip, knee   • KNEE ARTHROSCOPY Bilateral    • NERVE BLOCK Bilateral 7/14/2022    Procedure: BLOCK MEDIAL BRANCH T12, L1, L2;  Surgeon: Alanna Whiteside MD;  [facility]: [facility];  Service: Pain Management    • NERVE BLOCK Bilateral 8/18/2022    Procedure: BLOCK MEDIAL BRANCH T12, L1, L2 #2;  Surgeon: Hina Morris MD;  [facility];  Service: Pain Management    • NERVE BLOCK Bilateral 1/26/2023    Procedure: BLOCK MEDIAL BRANCH L3, L4, L5 #1;  Surgeon: Hina Morris MD;  [facility];  Service: Pain Management    • NC JOE IMPLTJ NSTIM ELTRDS PLATE/PADDLE EDRL Left 10/26/2021    Procedure: Thoracic laminectomies for placement of spinal cord stimulator and internal pulse generator, use of neuromonitoring, left sided pulse generator;  Surgeon: Alina Jaquez MD;  Location:  MAIN OR;  Service: Neurosurgery   • NC PRQ IMPLTJ NSTIM ELECTRODE ARRAY EPIDURAL Bilateral 9/30/2021    Procedure: Aurelia Alcala SCS TRIAL;  Surgeon: Hina Morris MD;  [facility];  Service: Pain Management    • RHIZOTOMY Bilateral 9/15/2022    Procedure: RHIZOTOMY LUMBAR T12, L1, L2 medial branch nerves;  Surgeon: Hina Morris MD;  [facility];  Service: Pain Management    • TOTAL KNEE ARTHROPLASTY Left        Family History   Problem Relation Age of Onset   • Arthritis Mother    • Hypertension Father        Social History     Occupational History   • Not on file   Tobacco Use   • Smoking status: Never   • Smokeless tobacco: Current     Types: Snuff   • Tobacco comments:     still using snuff   Vaping Use   • Vaping Use: Never used   Substance and Sexual Activity   • Alcohol use:  Yes     Alcohol/week: 2 0 standard drinks     Types: 2 Cans of beer per week     Comment: social, weekends   • Drug use: No   • Sexual activity: Yes       Current Outpatient Medications on File Prior to Visit   Medication Sig   • acetaminophen (TYLENOL) 500 mg tablet Take 1,000 mg by mouth every 6 (six) hours as needed for mild pain   • atorvastatin (LIPITOR) 20 mg tablet Take 20 mg by mouth daily   • Empagliflozin (Jardiance) 25 MG TABS Take 25 mg by mouth daily   • ERGOCALCIFEROL PO Take 50,000 Units by mouth 2 (two) times a week    • Fiber Select Gummies CHEW Chew 10grams per day   • LORazepam (ATIVAN) 0 5 mg tablet Take 0 5 mg by mouth daily as needed   • Semaglutide,0 25 or 0 5MG/DOS, (Ozempic, 0 25 or 0 5 MG/DOSE,) 2 MG/1 5ML SOPN Inject 0 5 mg under the skin once a week    • sertraline (ZOLOFT) 50 mg tablet Take 75 mg by mouth    • valsartan (DIOVAN) 80 mg tablet Take 80 mg by mouth daily    • desloratadine (CLARINEX) 5 MG tablet TAKE 1 TABLET BY MOUTH EVERY DAY FOR ALLERGY SYMPTOMS   • DULoxetine (CYMBALTA) 30 mg delayed release capsule Take 1 capsule (30 mg total) by mouth daily   • famotidine (PEPCID) 20 mg tablet Take 20 mg by mouth as needed      No current facility-administered medications on file prior to visit  No Known Allergies      Physical Exam    /68   Pulse 80   Temp 98 °F (36 7 °C)   Resp 20   Ht 6' 4" (1 93 m)   Wt (!) 159 kg (349 lb 12 8 oz)   BMI 42 58 kg/m²     Constitutional: normal, well developed, well nourished, alert, in no distress and non-toxic and no overt pain behavior  and obese  Eyes: anicteric  HEENT: grossly intact  Neck: supple, symmetric, trachea midline and no masses   Pulmonary:even and unlabored  Cardiovascular:No edema or pitting edema present  Skin:Normal without rashes or lesions and well hydrated  Psychiatric:Mood and affect appropriate  Neurologic:Cranial Nerves II-XII grossly intact Sensation grossly intact; no clonus negative diallo's  Reflexes 2+ and brisk  SLR  Weakly positive right-sided we  Musculoskeletal: hunched gait  Unable to perform normal heel toe and tip toe walking  Slight weakness with right lower extremity strong plantar flexion; 5/5 strength with active range of motion movements in all other extremities bilaterally  mild pain with thoracic and lumbar facet loading bilaterally and with lateral spine rotation  mild ttp over thoracic and lumbar paraspinal muscles  Negative jahaira's test, negative gaenslen's negative SIJ loading bilaterally    Tenderness to palpation over inferior rib borders of T10-T11 posterolateral ribs bilaterally,  Eliciting radicular pain in aformentioned dermatomal distributions  Imaging    Result Narrative   Exam - thoracic spine mri scan     History: Bilateral leg weakness  Technique: Using a surface coil sagittal and axial T1-weighted and T2-weighted  scans were obtained of the thoracic spine  Findings: Image detail is suboptimal secondary to patient's obesity  Thoracic spinal cord is normal in size and configuration and MRI signal  intensity  There are no intramedullary lesions  Vertebral bodies are in anatomic alignment  There are degenerative changes in endplates and facet joints throughout the  thoracic spine  There is central spinal stenosis at T10-T11  There is no marlyn spinal cord  compression however  Other Result Information   Interface, Rad Results In - 08/07/2018  7:34 PM EDT  Formatting of this note might be different from the original   Exam - thoracic spine mri scan     History: Bilateral leg weakness  Technique: Using a surface coil sagittal and axial T1-weighted and T2-weighted  scans were obtained of the thoracic spine  Findings: Image detail is suboptimal secondary to patient's obesity  Thoracic spinal cord is normal in size and configuration and MRI signal  intensity  There are no intramedullary lesions  Vertebral bodies are in anatomic alignment  There are degenerative changes in endplates and facet joints throughout the  thoracic spine  There is central spinal stenosis at T10-T11  There is no marlyn spinal cord  compression however  IMPRESSION:  Impression:  1  Technically suboptimal study  2  Marked spondylosis  3  At T10-T11 there is spinal stenosis however there is no marlyn spinal cord  compression       MRI LUMBAR SPINE WITHOUT CONTRAST     INDICATION: [id number]: Radiculopathy, lumbar region      COMPARISON:  5/6/2018; 3/16/2021     TECHNIQUE:  Sagittal T1, sagittal T2, sagittal inversion recovery, axial T1 and axial T2, coronal T2     IMAGE QUALITY:  Diagnostic     FINDINGS:     VERTEBRAL BODIES:  There are 5 lumbar type vertebral bodies  Mild to moderate levoscoliosis mid lumbar spine  Trace grade 1 anterolisthesis of L3 on L4  Minimal grade 1 retrolisthesis of L4 on L5  Scattered degenerative endplate changes  No focally   suspicious marrow lesions  No bone marrow edema or compression abnormality      SACRUM:  Scattered degenerative endplate changes  No focally suspicious marrow lesions  No bone marrow edema or compression abnormality      DISTAL CORD AND CONUS:  Normal size and signal within the distal cord and conus  The conus medullaris terminates at the L2 level      PARASPINAL SOFT TISSUES:  Fatty atrophy of the psoas muscles noted, similar to the prior CT possibly from disuse  Marked atrophy of the left iliac is muscle as well      LOWER THORACIC DISC SPACES:  T11-T12: There is a small central disc herniation, protrusion type  Minimal central canal narrowing  Neural foramina patent bilaterally      T12-L1: There is no focal disk herniation, central canal or neural foraminal narrowing      LUMBAR DISC SPACES:     L1-L2:  There is bilateral facet hypertrophy  There is a left neural foraminal disc protrusion  Mild left neural foraminal narrowing  Central canal patent  Minimal right neural foraminal narrowing      L2-L3:  There is bilateral facet hypertrophy  There is a right neural foraminal disc protrusion  Moderate to severe right neural foraminal narrowing  Mild central canal and left neural foraminal narrowing      L3-L4:  There is bilateral facet hypertrophy  There is a right neural foraminal disc protrusion  Moderate right neural foraminal narrowing  Mild central canal and left neural foraminal narrowing      L4-L5:  There is loss of disc height and signal   There is a disc osteophyte complex with a superimposed left neural foraminal disc protrusion    There is moderate to severe left neural foraminal narrowing  Mild central canal narrowing  Moderate right   neural foraminal narrowing     L5-S1:  There is bilateral facet hypertrophy  There is a left neural foraminal disc protrusion  Severe left neural foraminal narrowing  Mild central canal and right neural foraminal narrowing      IMPRESSION:        1  Advanced multilevel spondylosis    2   Moderate to severe fatty atrophy of the bilateral psoas muscles and iliac is muscles left greater than right, possibly from disuse or denervation atrophy

## 2023-02-13 DIAGNOSIS — M47.816 LUMBAR SPONDYLOSIS: ICD-10-CM

## 2023-02-13 DIAGNOSIS — F32.0 MAJOR DEPRESSIVE DISORDER, SINGLE EPISODE, MILD (HCC): ICD-10-CM

## 2023-02-13 RX ORDER — DULOXETIN HYDROCHLORIDE 30 MG/1
CAPSULE, DELAYED RELEASE ORAL
Qty: 90 CAPSULE | Refills: 1 | Status: SHIPPED | OUTPATIENT
Start: 2023-02-13 | End: 2023-02-13

## 2023-02-15 NOTE — DISCHARGE INSTR - AVS FIRST PAGE
YOUR 2 WEEK FOLLOW UP HAS BEEN SCHEDULED; IF YOU WISH TO CHANGE THE FOLLOW UP, PLEASE CALL THE [facility] AT [place]: [phone]    MEDIAL BRANCH BLOCK DISCHARGE INSTRUCTIONS      ACTIVITY  Please do activities that will bring the normal pain that we are rating  For example, if vacuuming or walking increases the painm do that  Issac twill give the most accurate response to the diary  You may shower, but no tub baths today, or applied heat  CARE OF THE INJECTION SITE  This area may be numb for several hours after the injection  Notify the [facility] if you have any of the following: redness, drainage, swelling or fever above 100°F     SPECIAL INSTRUCTIONS  Please return the MBB diary to our office by mail, fax, or drop it off  MEDICATIONS  Please do not take any break through or short acting pain medications for 8 hours after the block  Continue to take all routine medications  Our office may have instructed you to hold some medications  You may resume _______________________________________________  If you have any problems specifically related to your procedure, please call our office at [phone]  Problems not related to your procedure should be directed at your primary care physician  Lumbar Radiofrequency Ablation   WHAT YOU NEED TO KNOW:   Lumbar radiofrequency ablation (RFA) is a procedure used to treat facet joint pain in your lower back  Facet joints are found at the back of each vertebra  A needle electrode is used to send electrical currents to the nerves in your facet joint  The electrical currents create heat that damages the nerve so it cannot send pain signals  DISCHARGE INSTRUCTIONS:   Seek care immediately if:   You cannot move your leg  You cannot control your urine or bowel movements  You have severe pain in your lower back  Contact your healthcare provider if:   You have leg weakness  You develop new symptoms       You have questions or concerns about your condition or care  Medicines:   Pain medicine  may be given  Ask how to take this medicine safely  Take your medicine as directed  Contact your healthcare provider if you think your medicine is not helping or if you have side effects  Tell him or her if you are allergic to any medicine  Keep a list of the medicines, vitamins, and herbs you take  Include the amounts, and when and why you take them  Bring the list or the pill bottles to follow-up visits  Carry your medicine list with you in case of an emergency  Follow up with your healthcare provider as directed:  Write down your questions so you remember to ask them during your visits  Activity:  Do not drive a car or operate machinery within 24 hours after your procedure  Ask your healthcare provider about any other activities you should avoid  © Copyright [employer] 2022 Information is for End User's use only and may not be sold, redistributed or otherwise used for commercial purposes  All illustrations and images included in CareNotes® are the copyrighted property of A D A M , Inc  or [facility]   The above information is an  only  It is not intended as medical advice for individual conditions or treatments  Talk to your doctor, nurse or pharmacist before following any medical regimen to see if it is safe and effective for you

## 2023-02-16 ENCOUNTER — APPOINTMENT (OUTPATIENT)
Dept: RADIOLOGY | Facility: HOSPITAL | Age: 63
End: 2023-02-16

## 2023-02-16 ENCOUNTER — HOSPITAL ENCOUNTER (OUTPATIENT)
Facility: HOSPITAL | Age: 63
Setting detail: OUTPATIENT SURGERY
Discharge: HOME | End: 2023-02-16
Attending: ANESTHESIOLOGY | Admitting: ANESTHESIOLOGY

## 2023-02-16 VITALS
HEART RATE: 63 BPM | WEIGHT: 315 LBS | HEIGHT: 76 IN | TEMPERATURE: 98.4 F | DIASTOLIC BLOOD PRESSURE: 66 MMHG | SYSTOLIC BLOOD PRESSURE: 113 MMHG | OXYGEN SATURATION: 95 % | RESPIRATION RATE: 18 BRPM | BODY MASS INDEX: 38.36 KG/M2

## 2023-02-16 LAB
GLUCOSE SERPL-MCNC: 127 MG/DL (ref 65–140)
GLUCOSE SERPL-MCNC: 87 MG/DL (ref 65–140)

## 2023-02-16 RX ORDER — BUPIVACAINE HYDROCHLORIDE 2.5 MG/ML
INJECTION, SOLUTION EPIDURAL; INFILTRATION; INTRACAUDAL AS NEEDED
Status: DISCONTINUED | OUTPATIENT
Start: 2023-02-16 | End: 2023-02-16 | Stop reason: HOSPADM

## 2023-02-16 RX ORDER — METHYLPREDNISOLONE ACETATE 80 MG/ML
INJECTION, SUSPENSION INTRA-ARTICULAR; INTRALESIONAL; INTRAMUSCULAR; SOFT TISSUE AS NEEDED
Status: DISCONTINUED | OUTPATIENT
Start: 2023-02-16 | End: 2023-02-16 | Stop reason: HOSPADM

## 2023-02-16 RX ORDER — LIDOCAINE HYDROCHLORIDE 10 MG/ML
INJECTION, SOLUTION EPIDURAL; INFILTRATION; INTRACAUDAL; PERINEURAL AS NEEDED
Status: DISCONTINUED | OUTPATIENT
Start: 2023-02-16 | End: 2023-02-16 | Stop reason: HOSPADM

## 2023-02-16 NOTE — INTERVAL H&P NOTE
H&P reviewed  After examining the patient I find no changes in the patients condition since the H&P had been written      Vitals:    02/16/23 1124   BP: 150/88   Pulse: 82   Resp: 20   Temp: 98 3 °F (36 8 °C)   SpO2: 96%

## 2023-02-16 NOTE — PROCEDURES
Pre-procedure Diagnosis: Lumbar Facet Arthropathy  Post-procedure Diagnosis: Lumbar Facet Arthropathy  Procedure Title(s):  [BILATERAL L3, L4, L5] medial branch nerve blocks [(CONFIRMATORY)]  Attending Surgeon:   Anthony Carmichael MD  Anesthesia:   Local     Indications: The patient is a 65y o  year-old male with a diagnosis of lumbar facet arthropathy  The patient's history and physical exam were reviewed  The risks, benefits and alternatives to the procedure were discussed, and all questions were answered to the patient's satisfaction  The patient agreed to proceed, and written informed consent was obtained  Procedure in Detail: The patient was brought into the procedure room and placed in the prone position on the fluoroscopy table  The area of the lumbar spine was prepped with chloraprep and then draped in a sterile manner  AP fluoroscopy was used to identify the [L3-L5] levels on the [LEFT] side  The C-arm was obliqued to visualize the junction of the superior articulate process and transverse process  The sacral ala was identified and marked  The skin in these identified areas was anesthetized with 1% lidocaine  A 22-gauge, 5-inch spinal needle was advanced toward each of these points under fluoroscopic guidance  Once bone was contacted, negative aspiration was confirmed and [1-mL] of a [6mL]mixture of [5mL] [0 25% bupivicaine] and 1mL of 80mg/mL Depomedrol was injected at each level  (The same procedure was performed on the RIGHT side )    After the procedure was completed, the patient's back was cleaned and bandages were placed at the needle insertion sites  Disposition: The patient tolerated the procedure well, and there were no apparent complications  The patient was taken to the recovery area where written discharge instructions for the procedure were given  The patient was given a pain diary to determine if the patient's pain improves following the injection   Once the diary is returned we will consider next appropriate course of treatment      Postoperative pain relief [WAS] significant    Estimated Blood Loss: None  Specimens Obtained: N/A

## 2023-02-16 NOTE — OP NOTE
OPERATIVE REPORT  PATIENT NAME: Maida Lucas    :  1960  MRN: [id number]  Pt Location:  GI ROOM 01    SURGERY DATE: 2023    Surgeon(s) and Role: Deny Rodriguez MD - Primary    Preop Diagnosis:  Lumbar spondylosis [M47 816]    Post-Op Diagnosis Codes:     * Lumbar spondylosis [M47 816]    Procedure(s):  Bilateral - BLOCK MEDIAL BRANCH L3  L4  L5 #2    Specimen(s):  * No specimens in log *    Estimated Blood Loss:   Minimal    Drains:  * No LDAs found *    Anesthesia Type:   Local    Operative Indications:  Lumbar spondylosis [M47 816]    Operative Findings:  Bilateral L3, L4, L5 medial branch nerve regions identified under fluoroscopic guidance       Complications:   None    Procedure and Technique:  Please see detailed procedure note    I was present for the entire procedure    Patient Disposition:  PACU     SIGNATURE: Capo Li MD  DATE: 2023  TIME: 11:54 AM

## 2023-02-22 ENCOUNTER — OFFICE VISIT (OUTPATIENT)
Dept: PAIN MEDICINE | Facility: CLINIC | Age: 63
End: 2023-02-22

## 2023-02-22 VITALS
BODY MASS INDEX: 38.36 KG/M2 | HEIGHT: 76 IN | SYSTOLIC BLOOD PRESSURE: 125 MMHG | HEART RATE: 95 BPM | WEIGHT: 315 LBS | TEMPERATURE: 98 F | DIASTOLIC BLOOD PRESSURE: 80 MMHG

## 2023-02-22 DIAGNOSIS — M47.816 LUMBAR SPONDYLOSIS: Primary | ICD-10-CM

## 2023-02-22 RX ORDER — METHYLPREDNISOLONE ACETATE 80 MG/ML
80 INJECTION, SUSPENSION INTRA-ARTICULAR; INTRALESIONAL; INTRAMUSCULAR; PARENTERAL; SOFT TISSUE ONCE
OUTPATIENT
Start: 2023-02-22 | End: 2023-02-22

## 2023-02-22 RX ORDER — LIDOCAINE HYDROCHLORIDE 10 MG/ML
10 INJECTION, SOLUTION EPIDURAL; INFILTRATION; INTRACAUDAL; PERINEURAL ONCE
OUTPATIENT
Start: 2023-02-22 | End: 2023-02-22

## 2023-02-22 RX ORDER — BUPIVACAINE HCL/PF 2.5 MG/ML
5 VIAL (ML) INJECTION ONCE
OUTPATIENT
Start: 2023-02-22 | End: 2023-02-22

## 2023-02-22 NOTE — PATIENT INSTRUCTIONS
Core Strengthening Exercises   WHAT YOU NEED TO KNOW:   Your core includes the muscles of your lower back, hip, pelvis, and abdomen  Core strengthening exercises help heal and strengthen these muscles  This helps prevent another injury, and keeps your pelvis, spine, and hips in the correct position  DISCHARGE INSTRUCTIONS:   Call your doctor or physical therapist if:   You have sharp or worsening pain during exercise or at rest     You have questions or concerns about your shoulder exercises  Safety tips:  Talk to your healthcare provider before you start an exercise program  A physical therapist can teach you how to do core strengthening exercises safely  Do the exercises on a mat or firm surface  A firm surface will support your spine and prevent low back pain  Do not do these exercises on a bed  Move slowly and smoothly  Avoid fast or jerky motions  Stop if you feel pain  You may feel some discomfort at first, but you should not feel pain  Tell your provider or physical therapist if you have pain while you exercise  Regular exercise will help decrease your discomfort over time  Breathe normally during core exercises  Do not hold your breath  This may cause an increase in blood pressure and prevent muscle strengthening  Your healthcare provider will tell you when to inhale and exhale during the exercise  Begin all of your exercises with abdominal bracing  Abdominal bracing helps warm up your core muscles  You can also practice abdominal bracing throughout the day  Lie on your back with your knees bent and feet flat on the floor  Place your arms in a relaxed position beside your body  Tighten your abdominal muscles  Pull your belly button in and up toward your spine  Hold for 5 seconds  Relax your muscles  Repeat 10 times  Core strengthening exercises: Your healthcare provider will tell you how often to do these exercises   The provider will also tell you how many repetitions of each exercise you should do  Hold each exercise for 5 seconds or as directed  As you get stronger, increase your hold to 10 to 15 seconds  You can do some of these exercises on a stability ball, or with a weight  Ask your healthcare provider how to use a stability ball or weight for these exercises:  Bridging:  Lie on your back with your knees bent and feet flat on the floor  Rest your arms at your side  Tighten your buttocks, and then lift your hips 1 inch off the floor  Hold for 5 seconds  When you can do this exercise without pain for 10 seconds, increase the distance you lift your hips  A good goal is to be able to lift your hips so that your shoulders, hips, and knees are in a straight line  Dead bug:  Lie on your back with your knees bent and feet flat on the floor  Place your arms in a relaxed position beside your body  Begin with abdominal bracing  Next, raise one leg, keeping your knee bent  Hold for 5 seconds  Repeat with the other leg  When you can do this exercise without pain for 10 to 15 seconds, you may raise one straight leg and hold  Repeat with the other leg  Quadruped:  Place your hands and knees on the floor  Keep your wrists directly below your shoulders and your knees directly below your hips  Pull your belly button in toward your spine  Do not flatten or arch your back  Tighten your abdominal muscles below your belly button  Hold for 5 seconds  When you can do this exercise without pain for 10 to 15 seconds, you may extend one arm and hold  Repeat on the other side  Side bridge exercises:      Standing side bridge:  Stand next to a wall and extend one arm toward the wall  Place your palm flat on the wall with your fingers pointing upward  Begin with abdominal bracing  Next, without moving your feet, slowly bend your arm to 90 degrees  Hold for 5 seconds  Repeat on the other side   When you can do this exercise without pain for 10 to 15 seconds, you may do the bent leg side bridge on the floor  Bent leg side bridge:  Lie on one side with your legs, hips, and shoulders in a straight line  Prop yourself up onto your forearm so your elbow is directly below your shoulder  Bend your knees back to 90 degrees  Begin with abdominal bracing  Next, lift your hips and balance yourself on your forearm and knees  Hold for 5 seconds  Repeat on the other side  When you can do this exercise without pain for 10 to 15 seconds, you may do the straight leg side bridge on the floor  Straight leg side bridge:  Lie on one side with your legs, hips, and shoulders in a straight line  Prop yourself up onto your forearm so your elbow is directly below your shoulder  Begin with abdominal bracing  Lift your hips off the floor and balance yourself on your forearm and the outside of your flexed foot  Do not let your ankle bend sideways  Hold for 5 seconds  Repeat on the other side  When you can do this exercise without pain for 10 to 15 seconds, ask your healthcare provider for more advanced exercises  Superman:  Lie on your stomach  Extend your arms forward on the floor  Tighten your abdominal muscles and lift your right hand and left leg off the floor  Hold this position  Slowly return to the starting position  Tighten your abdominal muscles and lift your left hand and right leg off the floor  Hold this position  Slowly return to the starting position  Clam:  Lie on your side with your knees bent  Put your bottom arm under your head to keep your neck in line  Put your top hand on your hip to keep your pelvis from moving  Put your heels together, and keep them together during this exercise  Slowly raise your top knee toward the ceiling  Then lower your leg so your knees are together  Repeat this exercise 10 times  Then switch sides and do the exercise 10 times with the other leg  Curl up:  Lie on your back with your knees bent and feet flat on the floor   Place your hands, palms down, underneath your lower back  Next, with your elbows on the floor, lift your shoulders and chest 2 to 3 inches off the floor  Keep your head in line with your shoulders  Hold this position  Slowly return to the starting position  Straight leg raises:  Lie on your back with one leg straight  Bend the other knee and place your foot flat on the floor  Tighten your abdominal muscles  Keep your leg straight and slowly lift it straight up 6 to 12 inches off the floor  Hold this position  Lower your leg slowly  Do as many repetitions as directed on this side  Repeat with the other leg  Plank:  Lie on your stomach  Bend your elbows and place your forearms flat on the floor  Lift your chest, stomach, and knees off the floor  Make sure your elbows are below your shoulders  Your body should be in a straight line  Do not let your hips or lower back sink to the ground  Squeeze your abdominal muscles together and hold for 15 seconds  To make this exercise harder, hold for 30 seconds or lift 1 leg at a time  Bicycles:  Lie on your back  Bend both knees and bring them toward your chest  Your calves should be parallel to the floor  Place the palms of your hands on the back of your head  Straighten your right leg and keep it lifted 2 inches off the floor  Raise your head and shoulders off the floor and twist towards your left  Keep your head and shoulders lifted  Bend your right knee while you straighten your left leg  Keep your left leg 2 inches off the floor  Twist your head and chest towards the left leg  Continue to straighten 1 leg at a time and twist        Follow up with your doctor or physical therapist as directed:  Write down your questions so you remember to ask them during your visits  © Copyright [employer] 2022 Information is for End User's use only and may not be sold, redistributed or otherwise used for commercial purposes  The above information is an  only   It is not intended as medical advice for individual conditions or treatments  Talk to your doctor, nurse or pharmacist before following any medical regimen to see if it is safe and effective for you

## 2023-02-22 NOTE — H&P (VIEW-ONLY)
Assessment  1  Lumbar spondylosis  -     Case request operating room: RHIZOTOMY LUMBAR L3, L4, L5; Standing  -     Case request operating room: RHIZOTOMY LUMBAR L3, L4, L5    Greater than 75% relief of leg pain from both radiculopathy and diabetic peripheral neuropathy as well as low back pain with improved ability to participate with IADLs after Nevro SCS trial (dual lead)  Axial low back pain described primarily by arthritic features  Aching, nagging, indolent, stabbing, throbbing features in axial mid and low back without radicular components  5/5 strength bilaterally, negative SLR  Positive facet loading maneuvers in lumbar spine elicited pain, positive tenderness to palpation over lumbar paraspinal muscles  Findings correlate with prominent thoracic and lumbar facet arthropathy seen throughout axial low back on MRI  Currently he is neurologically intact without gait instability, saddle anesthesia or bowel/bladder abnormality  Greater than 90% relief of pain with improved ability to participate with IADLs after Bilateral L3, L4, L5 medial branch nerve blocks #1 and #2 for approximately 2 days  Risks, benefits alternative to radiofrequency ablation of successful thoroughly discussed with patient  Handouts provided questions answered to patient satisfaction  Had consulted with NSGY who recommended no intervention given only mild/moderate right sided transforaminal stenosis at L4-L5  Pain consistent with thoracic radicular symptoms secondary to T10-T11 disc herniation which from 2018 MRI thoracic spine does not contribute to significant cord compression  Radiating and radicular pain in T10 and T11 dermatomal distribution  Pain is along lower thoracic T10 and T11 dermatomes, particularly with palpation of lower thoracic ribs and posterolateral distribution  Interestingly his significant and advanced lumbar facet arthropathy which is not a  primary source of his pain at this time      He previously had epidural steroid injections in the past with Dr Letty Unger with noted relief  For radicular pain  Reasonable to reobtain imaging, and pursue multimodal pain therapy plan as noted below  Plan  - Bilateral L3, L4, L5 medial branch nerve nerve radiofrequency ablation with IV sedation; f/u 2 weeks post procedure  - Bilateral T10, T11, T12 medial branch nerve radiofrequency ablation 3/16 given 6 months relief of pain with prior thoracic medial branch nerve RF ablation and recurrence of mid back pain  -cymbalta 30mg/day rx; patient has since tapered given sedation/drowsiness along with other side effects  Handout regarding sedative effects of taking this medication provided; additionally provided up titration calendar  Counseled not to take medication while driving or operating heavy machinery/using stairs  -continue other analgesics as prescribed previously well provider's  Counseled extensively as noted below regarding the risks of opioid medications in combination with Lyrica  -physical therapy for  Lumbar facet arthropathy, lumbar radiculopathy, mid back pain    There are risks associated with opioid medications, including dependence, addiction and tolerance  The patient understands and agrees to use these medications only as prescribed  Potential side effects of the medications include, but are not limited to, constipation, drowsiness, addiction, impaired judgment and risk of fatal overdose if not taken as prescribed  The patient was warned against driving while taking sedation medications  Sharing medications is a felony  At this point in time, the patient is showing no signs of addiction, abuse, diversion or suicidal ideation  A urine drug screen was collected at today's office visit as part of our medication management protocol  The point of care testing results were appropriate for what was being prescribed  The specimen will be sent for confirmatory testing   The drug screen is medically necessary because the patient is either dependent on opioid medication or is being considered for opioid medication therapy and the results could impact ongoing or future treatment  The drug screen is to evaluate for the presences or absence of prescribed, non-prescribed, and/or illicit drugs/substances  [place] Prescription Drug Monitoring Program report was reviewed and was appropriate     Complete risks and benefits including bleeding, infection, tissue reaction, nerve injury and allergic reaction were discussed  The approach was demonstrated using models and literature was provided  Verbal and written consent was obtained  My impressions and treatment recommendations were discussed in detail with the patient who verbalized understanding and had no further questions  Discharge instructions were provided  I personally saw and examined the patient and I agree with the above discussed plan of care  No orders of the defined types were placed in this encounter  History of Present Illness    Greater than 75% relief of leg pain from both radiculopathy and diabetic peripheral neuropathy as well as low back pain with improved ability to participate with IADLs after Nevro SCS trial (dual lead)  Continues to describe right hip flexor weakness without any pain  Additionally notes low back pain described by arthritic features  Describes achy, nagging, indolent, crampy and throbbing pain worse with standing/ambulation  Greater than 90% relief of pain with improved ability to participate with IADLs after Bilateral L3, L4, L5 medial branch nerve blocks #1 and #2 for approximately 2 days  Previously reported the following:    Evelio Kang is a 65 y o  male with a past medical history of  Non-insulin controlled type 2 diabetes, obesity presenting with chronic mid and low back pain described primarily as arthritic in nature    He describes 8/10 mid and low back pain that is worse in the mornings and worse at the end of the day   The pain is characterized by achy, nagging, indolent, crampy, stabbing pain in his axial mid and low back  The patient describes that the pain is worse with standing for long periods of time on hard surfaces as well as with walking  The patient is a very active individual and feels as though this pain compromises his participation with independent activities of daily living  He ambulates with a walker  The pain can be debilitating at times and contribute to significant disability, compromising overall activity and independent activities of daily living  He has tried physical therapy with limited relief of symptoms  He additionally has pain radiating across his ribs bilaterally in the T10 and T11 dermatomal distribution  He demonstrates good strength and denies any weakness numbness or paresthesias  The patient denies any bowel or bladder dysfunction as well  I have personally reviewed and/or updated the patient's past medical history, past surgical history, family history, social history, current medications, allergies, and vital signs today  Review of Systems   Constitutional: Positive for activity change  HENT: Negative  Eyes: Negative  Respiratory: Negative  Cardiovascular: Negative  Gastrointestinal: Negative  Endocrine: Negative  Genitourinary: Negative  Musculoskeletal: Positive for arthralgias, back pain, gait problem and myalgias  Skin: Negative  Allergic/Immunologic: Negative  Neurological: Positive for numbness  Negative for weakness  Hematological: Negative  Psychiatric/Behavioral: Negative  All other systems reviewed and are negative        Patient Active Problem List   Diagnosis   • Herniation of intervertebral disc of thoracic region   • Diabetic peripheral neuropathy (HCC)   • Morbid obesity ([address] )   • Cervical spinal cord compression (HCC)   • Mild depression   • Major depressive disorder, single episode, mild (HCC)   • Thoracic spondylosis • Lumbar spondylosis   • Spinal muscular atrophy, unspecified (HCC)   • Neurogenic claudication due to lumbar spinal stenosis   • Muscular atrophy   • Other spondylosis with myelopathy, cervical region   • Radiculopathy   • Myelomalacia of cervical cord [facility])       Past Medical History:   Diagnosis Date   • Anxiety    • Arthritis    • Chronic pain disorder     mid back region   • Colon polyp    • COVID-19    • CPAP (continuous positive airway pressure) dependence     Pt uses nightly   • Diabetes mellitus ([facility] [place] [address] )    • Diverticulosis    • History of recent hospitalization 06/16/2021    Pt was admitted to [facility] after syncopal episode  Pt saw cardiology- all tests negative  Pt had nl EEG  pt states is was a medication interaction  • Hyperlipidemia    • Hypertension    • Obesity    • Sleep apnea    • Spinal stenosis        Past Surgical History:   Procedure Laterality Date   • ACHILLES TENDON REPAIR      Pt had a rupture in right and left 2 years apart   • COLONOSCOPY     • EPIDURAL BLOCK INJECTION      Pt had in lumbar region  • EPIDURAL BLOCK INJECTION N/A 4/6/2021    Procedure: T-11-T-12 INTERLAMINAR THORACIC EPIDURAL STEROID INJECTION;  Surgeon: aSntosh Mendoza MD;  [facility]: [facility];  Service: Pain Management    • EPIDURAL BLOCK INJECTION Right 7/20/2021    Procedure: L5 and S1 TRANSFORAMINAL EPIDURAL STEROID INJECTION;  Surgeon: Santosh Mendoza MD;  [facility]: [facility];  Service: Pain Management    • FL GUIDED NEEDLE PLAC BX/ASP/INJ  7/14/2022   • FL GUIDED NEEDLE PLAC BX/ASP/INJ  8/18/2022   • FL GUIDED NEEDLE PLAC BX/ASP/INJ  9/15/2022   • FOOT SURGERY Left     Left great toe- had a hammertoe     • JOINT REPLACEMENT Left     Total hip, knee   • JOINT REPLACEMENT Right     Total hip, knee   • KNEE ARTHROSCOPY Bilateral    • NERVE BLOCK Bilateral 7/14/2022    Procedure: BLOCK MEDIAL BRANCH T12, L1, L2;  Surgeon: Santosh Mendoza MD;  [facility]: [facility];  Service: Pain Management    • NERVE BLOCK Bilateral 8/18/2022    Procedure: BLOCK MEDIAL BRANCH T12, L1, L2 #2;  Surgeon: Sary Sharma MD;  [facility];  Service: Pain Management    • NERVE BLOCK Bilateral 1/26/2023    Procedure: BLOCK MEDIAL BRANCH L3, L4, L5 #1;  Surgeon: Sary Sharma MD;  [facility];  Service: Pain Management    • NERVE BLOCK Bilateral 2/16/2023    Procedure: BLOCK MEDIAL BRANCH L3, L4, L5 #2;  Surgeon: Sary Sharma MD;  [facility]: [facility];  Service: Pain Management    • WA JOE IMPLTJ NSTIM ELTRDS PLATE/PADDLE EDRL Left 10/26/2021    Procedure: Thoracic laminectomies for placement of spinal cord stimulator and internal pulse generator, use of neuromonitoring, left sided pulse generator;  Surgeon: Nahun Neumann MD;  Location:  MAIN OR;  Service: Neurosurgery   • WA PRQ IMPLTJ NSTIM ELECTRODE ARRAY EPIDURAL Bilateral 9/30/2021    Procedure: Marzette Borer SCS TRIAL;  Surgeon: Sary Sharma MD;  [facility]: [facility];  Service: Pain Management    • RHIZOTOMY Bilateral 9/15/2022    Procedure: RHIZOTOMY LUMBAR T12, L1, L2 medial branch nerves;  Surgeon: Sary Sharma MD;  [facility];  Service: Pain Management    • TOTAL KNEE ARTHROPLASTY Left        Family History   Problem Relation Age of Onset   • Arthritis Mother    • Hypertension Father        Social History     Occupational History   • Not on file   Tobacco Use   • Smoking status: Never   • Smokeless tobacco: Current     Types: Snuff   • Tobacco comments:     still using snuff   Vaping Use   • Vaping Use: Never used   Substance and Sexual Activity   • Alcohol use:  Yes     Alcohol/week: 2 0 standard drinks     Types: 2 Cans of beer per week     Comment: social, weekends   • Drug use: No   • Sexual activity: Yes       Current Outpatient Medications on File Prior to Visit   Medication Sig   • acetaminophen (TYLENOL) 500 mg tablet Take 1,000 mg by mouth every 6 (six) hours as needed for mild pain   • atorvastatin (LIPITOR) 20 mg tablet Take 20 mg by mouth daily • Empagliflozin (Jardiance) 25 MG TABS Take 25 mg by mouth daily   • ERGOCALCIFEROL PO Take 50,000 Units by mouth 2 (two) times a week    • famotidine (PEPCID) 20 mg tablet Take 20 mg by mouth as needed    • Fiber Select Gummies CHEW Chew 10grams per day   • LORazepam (ATIVAN) 0 5 mg tablet Take 0 5 mg by mouth daily as needed   • Semaglutide,0 25 or 0 5MG/DOS, (Ozempic, 0 25 or 0 5 MG/DOSE,) 2 MG/1 5ML SOPN Inject 0 5 mg under the skin once a week    • sertraline (ZOLOFT) 50 mg tablet Take 75 mg by mouth    • valsartan (DIOVAN) 80 mg tablet Take 80 mg by mouth daily    • desloratadine (CLARINEX) 5 MG tablet TAKE 1 TABLET BY MOUTH EVERY DAY FOR ALLERGY SYMPTOMS     No current facility-administered medications on file prior to visit  No Known Allergies      Physical Exam    /80 (BP Location: Right arm)   Pulse 95   Temp 98 °F (36 7 °C) (Temporal)   Ht 6' 4" (1 93 m)   Wt (!) 158 kg (349 lb)   BMI 42 48 kg/m²     Constitutional: normal, well developed, well nourished, alert, in no distress and non-toxic and no overt pain behavior  and obese  Eyes: anicteric  HEENT: grossly intact  Neck: supple, symmetric, trachea midline and no masses   Pulmonary:even and unlabored  Cardiovascular:No edema or pitting edema present  Skin:Normal without rashes or lesions and well hydrated  Psychiatric:Mood and affect appropriate  Neurologic:Cranial Nerves II-XII grossly intact Sensation grossly intact; no clonus negative diallo's  Reflexes 2+ and brisk  SLR  Weakly positive right-sided we  Musculoskeletal: hunched gait  Unable to perform normal heel toe and tip toe walking  Slight weakness with right lower extremity strong plantar flexion; 5/5 strength with active range of motion movements in all other extremities bilaterally  mild pain with thoracic and lumbar facet loading bilaterally and with lateral spine rotation  mild ttp over thoracic and lumbar paraspinal muscles   Negative jahaira's test, negative gaenslen's negative SIJ loading bilaterally  Tenderness to palpation over inferior rib borders of T10-T11 posterolateral ribs bilaterally,  Eliciting radicular pain in aformentioned dermatomal distributions  Imaging    Result Narrative   Exam - thoracic spine mri scan     History: Bilateral leg weakness  Technique: Using a surface coil sagittal and axial T1-weighted and T2-weighted  scans were obtained of the thoracic spine  Findings: Image detail is suboptimal secondary to patient's obesity  Thoracic spinal cord is normal in size and configuration and MRI signal  intensity  There are no intramedullary lesions  Vertebral bodies are in anatomic alignment  There are degenerative changes in endplates and facet joints throughout the  thoracic spine  There is central spinal stenosis at T10-T11  There is no marlyn spinal cord  compression however  Other Result Information   Interface, Rad Results In - 08/07/2018  7:34 PM EDT  Formatting of this note might be different from the original   Exam - thoracic spine mri scan     History: Bilateral leg weakness  Technique: Using a surface coil sagittal and axial T1-weighted and T2-weighted  scans were obtained of the thoracic spine  Findings: Image detail is suboptimal secondary to patient's obesity  Thoracic spinal cord is normal in size and configuration and MRI signal  intensity  There are no intramedullary lesions  Vertebral bodies are in anatomic alignment  There are degenerative changes in endplates and facet joints throughout the  thoracic spine  There is central spinal stenosis at T10-T11  There is no marlyn spinal cord  compression however  IMPRESSION:  Impression:  1  Technically suboptimal study  2  Marked spondylosis  3  At T10-T11 there is spinal stenosis however there is no marlyn spinal cord  compression       MRI LUMBAR SPINE WITHOUT CONTRAST     INDICATION: [id number]: Radiculopathy, lumbar region      COMPARISON:  5/6/2018; 3/16/2021     TECHNIQUE:  Sagittal T1, sagittal T2, sagittal inversion recovery, axial T1 and axial T2, coronal T2     IMAGE QUALITY:  Diagnostic     FINDINGS:     VERTEBRAL BODIES:  There are 5 lumbar type vertebral bodies  Mild to moderate levoscoliosis mid lumbar spine  Trace grade 1 anterolisthesis of L3 on L4  Minimal grade 1 retrolisthesis of L4 on L5  Scattered degenerative endplate changes  No focally   suspicious marrow lesions  No bone marrow edema or compression abnormality      SACRUM:  Scattered degenerative endplate changes  No focally suspicious marrow lesions  No bone marrow edema or compression abnormality      DISTAL CORD AND CONUS:  Normal size and signal within the distal cord and conus  The conus medullaris terminates at the L2 level      PARASPINAL SOFT TISSUES:  Fatty atrophy of the psoas muscles noted, similar to the prior CT possibly from disuse  Marked atrophy of the left iliac is muscle as well      LOWER THORACIC DISC SPACES:  T11-T12: There is a small central disc herniation, protrusion type  Minimal central canal narrowing  Neural foramina patent bilaterally      T12-L1: There is no focal disk herniation, central canal or neural foraminal narrowing      LUMBAR DISC SPACES:     L1-L2:  There is bilateral facet hypertrophy  There is a left neural foraminal disc protrusion  Mild left neural foraminal narrowing  Central canal patent  Minimal right neural foraminal narrowing      L2-L3:  There is bilateral facet hypertrophy  There is a right neural foraminal disc protrusion  Moderate to severe right neural foraminal narrowing  Mild central canal and left neural foraminal narrowing      L3-L4:  There is bilateral facet hypertrophy  There is a right neural foraminal disc protrusion  Moderate right neural foraminal narrowing    Mild central canal and left neural foraminal narrowing      L4-L5:  There is loss of disc height and signal   There is a disc osteophyte complex with a superimposed left neural foraminal disc protrusion  There is moderate to severe left neural foraminal narrowing  Mild central canal narrowing  Moderate right   neural foraminal narrowing     L5-S1:  There is bilateral facet hypertrophy  There is a left neural foraminal disc protrusion  Severe left neural foraminal narrowing  Mild central canal and right neural foraminal narrowing      IMPRESSION:        1  Advanced multilevel spondylosis    2   Moderate to severe fatty atrophy of the bilateral psoas muscles and iliac is muscles left greater than right, possibly from disuse or denervation atrophy

## 2023-02-22 NOTE — H&P (VIEW-ONLY)
Assessment  1  Lumbar spondylosis  -     Case request operating room: RHIZOTOMY LUMBAR L3, L4, L5; Standing  -     Case request operating room: RHIZOTOMY LUMBAR L3, L4, L5    Greater than 75% relief of leg pain from both radiculopathy and diabetic peripheral neuropathy as well as low back pain with improved ability to participate with IADLs after Nevro SCS trial (dual lead)  Axial low back pain described primarily by arthritic features  Aching, nagging, indolent, stabbing, throbbing features in axial mid and low back without radicular components  5/5 strength bilaterally, negative SLR  Positive facet loading maneuvers in lumbar spine elicited pain, positive tenderness to palpation over lumbar paraspinal muscles  Findings correlate with prominent thoracic and lumbar facet arthropathy seen throughout axial low back on MRI  Currently he is neurologically intact without gait instability, saddle anesthesia or bowel/bladder abnormality  Greater than 90% relief of pain with improved ability to participate with IADLs after Bilateral L3, L4, L5 medial branch nerve blocks #1 and #2 for approximately 2 days  Risks, benefits alternative to radiofrequency ablation of successful thoroughly discussed with patient  Handouts provided questions answered to patient satisfaction  Had consulted with NSGY who recommended no intervention given only mild/moderate right sided transforaminal stenosis at L4-L5  Pain consistent with thoracic radicular symptoms secondary to T10-T11 disc herniation which from 2018 MRI thoracic spine does not contribute to significant cord compression  Radiating and radicular pain in T10 and T11 dermatomal distribution  Pain is along lower thoracic T10 and T11 dermatomes, particularly with palpation of lower thoracic ribs and posterolateral distribution  Interestingly his significant and advanced lumbar facet arthropathy which is not a  primary source of his pain at this time      He previously had epidural steroid injections in the past with Dr Grace Giang with noted relief  For radicular pain  Reasonable to reobtain imaging, and pursue multimodal pain therapy plan as noted below  Plan  - Bilateral L3, L4, L5 medial branch nerve nerve radiofrequency ablation with IV sedation; f/u 2 weeks post procedure  - Bilateral T10, T11, T12 medial branch nerve radiofrequency ablation 3/16 given 6 months relief of pain with prior thoracic medial branch nerve RF ablation and recurrence of mid back pain  -cymbalta 30mg/day rx; patient has since tapered given sedation/drowsiness along with other side effects  Handout regarding sedative effects of taking this medication provided; additionally provided up titration calendar  Counseled not to take medication while driving or operating heavy machinery/using stairs  -continue other analgesics as prescribed previously well provider's  Counseled extensively as noted below regarding the risks of opioid medications in combination with Lyrica  -physical therapy for  Lumbar facet arthropathy, lumbar radiculopathy, mid back pain    There are risks associated with opioid medications, including dependence, addiction and tolerance  The patient understands and agrees to use these medications only as prescribed  Potential side effects of the medications include, but are not limited to, constipation, drowsiness, addiction, impaired judgment and risk of fatal overdose if not taken as prescribed  The patient was warned against driving while taking sedation medications  Sharing medications is a felony  At this point in time, the patient is showing no signs of addiction, abuse, diversion or suicidal ideation  A urine drug screen was collected at today's office visit as part of our medication management protocol  The point of care testing results were appropriate for what was being prescribed  The specimen will be sent for confirmatory testing   The drug screen is medically necessary because the patient is either dependent on opioid medication or is being considered for opioid medication therapy and the results could impact ongoing or future treatment  The drug screen is to evaluate for the presences or absence of prescribed, non-prescribed, and/or illicit drugs/substances  [place] Prescription Drug Monitoring Program report was reviewed and was appropriate     Complete risks and benefits including bleeding, infection, tissue reaction, nerve injury and allergic reaction were discussed  The approach was demonstrated using models and literature was provided  Verbal and written consent was obtained  My impressions and treatment recommendations were discussed in detail with the patient who verbalized understanding and had no further questions  Discharge instructions were provided  I personally saw and examined the patient and I agree with the above discussed plan of care  No orders of the defined types were placed in this encounter  History of Present Illness    Greater than 75% relief of leg pain from both radiculopathy and diabetic peripheral neuropathy as well as low back pain with improved ability to participate with IADLs after Nevro SCS trial (dual lead)  Continues to describe right hip flexor weakness without any pain  Additionally notes low back pain described by arthritic features  Describes achy, nagging, indolent, crampy and throbbing pain worse with standing/ambulation  Greater than 90% relief of pain with improved ability to participate with IADLs after Bilateral L3, L4, L5 medial branch nerve blocks #1 and #2 for approximately 2 days  Previously reported the following:    Wali Ryan is a 65 y o  male with a past medical history of  Non-insulin controlled type 2 diabetes, obesity presenting with chronic mid and low back pain described primarily as arthritic in nature    He describes 8/10 mid and low back pain that is worse in the mornings and worse at the end of the day   The pain is characterized by achy, nagging, indolent, crampy, stabbing pain in his axial mid and low back  The patient describes that the pain is worse with standing for long periods of time on hard surfaces as well as with walking  The patient is a very active individual and feels as though this pain compromises his participation with independent activities of daily living  He ambulates with a walker  The pain can be debilitating at times and contribute to significant disability, compromising overall activity and independent activities of daily living  He has tried physical therapy with limited relief of symptoms  He additionally has pain radiating across his ribs bilaterally in the T10 and T11 dermatomal distribution  He demonstrates good strength and denies any weakness numbness or paresthesias  The patient denies any bowel or bladder dysfunction as well  I have personally reviewed and/or updated the patient's past medical history, past surgical history, family history, social history, current medications, allergies, and vital signs today  Review of Systems   Constitutional: Positive for activity change  HENT: Negative  Eyes: Negative  Respiratory: Negative  Cardiovascular: Negative  Gastrointestinal: Negative  Endocrine: Negative  Genitourinary: Negative  Musculoskeletal: Positive for arthralgias, back pain, gait problem and myalgias  Skin: Negative  Allergic/Immunologic: Negative  Neurological: Positive for numbness  Negative for weakness  Hematological: Negative  Psychiatric/Behavioral: Negative  All other systems reviewed and are negative        Patient Active Problem List   Diagnosis   • Herniation of intervertebral disc of thoracic region   • Diabetic peripheral neuropathy (HCC)   • Morbid obesity ([address] )   • Cervical spinal cord compression (HCC)   • Mild depression   • Major depressive disorder, single episode, mild (HCC)   • Thoracic spondylosis • Lumbar spondylosis   • Spinal muscular atrophy, unspecified (HCC)   • Neurogenic claudication due to lumbar spinal stenosis   • Muscular atrophy   • Other spondylosis with myelopathy, cervical region   • Radiculopathy   • Myelomalacia of cervical cord [facility])       Past Medical History:   Diagnosis Date   • Anxiety    • Arthritis    • Chronic pain disorder     mid back region   • Colon polyp    • COVID-19    • CPAP (continuous positive airway pressure) dependence     Pt uses nightly   • Diabetes mellitus ([facility] [address] )    • Diverticulosis    • History of recent hospitalization 06/16/2021    Pt was admitted to [facility] after syncopal episode  Pt saw cardiology- all tests negative  Pt had nl EEG  pt states is was a medication interaction  • Hyperlipidemia    • Hypertension    • Obesity    • Sleep apnea    • Spinal stenosis        Past Surgical History:   Procedure Laterality Date   • ACHILLES TENDON REPAIR      Pt had a rupture in right and left 2 years apart   • COLONOSCOPY     • EPIDURAL BLOCK INJECTION      Pt had in lumbar region  • EPIDURAL BLOCK INJECTION N/A 4/6/2021    Procedure: T-11-T-12 INTERLAMINAR THORACIC EPIDURAL STEROID INJECTION;  Surgeon: Junito Peterson MD;  [facility]: [facility];  Service: Pain Management    • EPIDURAL BLOCK INJECTION Right 7/20/2021    Procedure: L5 and S1 TRANSFORAMINAL EPIDURAL STEROID INJECTION;  Surgeon: Junito Peterson MD;  [facility]: [facility];  Service: Pain Management    • FL GUIDED NEEDLE PLAC BX/ASP/INJ  7/14/2022   • FL GUIDED NEEDLE PLAC BX/ASP/INJ  8/18/2022   • FL GUIDED NEEDLE PLAC BX/ASP/INJ  9/15/2022   • FOOT SURGERY Left     Left great toe- had a hammertoe     • JOINT REPLACEMENT Left     Total hip, knee   • JOINT REPLACEMENT Right     Total hip, knee   • KNEE ARTHROSCOPY Bilateral    • NERVE BLOCK Bilateral 7/14/2022    Procedure: BLOCK MEDIAL BRANCH T12, L1, L2;  Surgeon: Junito Peterson MD;  [facility]: [facility];  Service: Pain Management    • NERVE BLOCK Bilateral 8/18/2022    Procedure: BLOCK MEDIAL BRANCH T12, L1, L2 #2;  Surgeon: Graciela Dick MD;  [facility]: [facility];  Service: Pain Management    • NERVE BLOCK Bilateral 1/26/2023    Procedure: BLOCK MEDIAL BRANCH L3, L4, L5 #1;  Surgeon: Graciela Dick MD;  [facility]: [facility];  Service: Pain Management    • NERVE BLOCK Bilateral 2/16/2023    Procedure: BLOCK MEDIAL BRANCH L3, L4, L5 #2;  Surgeon: Graciela Dick MD;  [facility]: [facility];  Service: Pain Management    • GA JOE IMPLTJ NSTIM ELTRDS PLATE/PADDLE EDRL Left 10/26/2021    Procedure: Thoracic laminectomies for placement of spinal cord stimulator and internal pulse generator, use of neuromonitoring, left sided pulse generator;  Surgeon: Arabella Espinal MD;  Location:  MAIN OR;  Service: Neurosurgery   • GA PRQ IMPLTJ NSTIM ELECTRODE ARRAY EPIDURAL Bilateral 9/30/2021    Procedure: Nneka  SCS TRIAL;  Surgeon: Graciela Dick MD;  [facility]: [facility];  Service: Pain Management    • RHIZOTOMY Bilateral 9/15/2022    Procedure: RHIZOTOMY LUMBAR T12, L1, L2 medial branch nerves;  Surgeon: Graciela Dick MD;  [facility];  Service: Pain Management    • TOTAL KNEE ARTHROPLASTY Left        Family History   Problem Relation Age of Onset   • Arthritis Mother    • Hypertension Father        Social History     Occupational History   • Not on file   Tobacco Use   • Smoking status: Never   • Smokeless tobacco: Current     Types: Snuff   • Tobacco comments:     still using snuff   Vaping Use   • Vaping Use: Never used   Substance and Sexual Activity   • Alcohol use:  Yes     Alcohol/week: 2 0 standard drinks     Types: 2 Cans of beer per week     Comment: social, weekends   • Drug use: No   • Sexual activity: Yes       Current Outpatient Medications on File Prior to Visit   Medication Sig   • acetaminophen (TYLENOL) 500 mg tablet Take 1,000 mg by mouth every 6 (six) hours as needed for mild pain   • atorvastatin (LIPITOR) 20 mg tablet Take 20 mg by mouth daily • Empagliflozin (Jardiance) 25 MG TABS Take 25 mg by mouth daily   • ERGOCALCIFEROL PO Take 50,000 Units by mouth 2 (two) times a week    • famotidine (PEPCID) 20 mg tablet Take 20 mg by mouth as needed    • Fiber Select Gummies CHEW Chew 10grams per day   • LORazepam (ATIVAN) 0 5 mg tablet Take 0 5 mg by mouth daily as needed   • Semaglutide,0 25 or 0 5MG/DOS, (Ozempic, 0 25 or 0 5 MG/DOSE,) 2 MG/1 5ML SOPN Inject 0 5 mg under the skin once a week    • sertraline (ZOLOFT) 50 mg tablet Take 75 mg by mouth    • valsartan (DIOVAN) 80 mg tablet Take 80 mg by mouth daily    • desloratadine (CLARINEX) 5 MG tablet TAKE 1 TABLET BY MOUTH EVERY DAY FOR ALLERGY SYMPTOMS     No current facility-administered medications on file prior to visit  No Known Allergies      Physical Exam    /80 (BP Location: Right arm)   Pulse 95   Temp 98 °F (36 7 °C) (Temporal)   Ht 6' 4" (1 93 m)   Wt (!) 158 kg (349 lb)   BMI 42 48 kg/m²     Constitutional: normal, well developed, well nourished, alert, in no distress and non-toxic and no overt pain behavior  and obese  Eyes: anicteric  HEENT: grossly intact  Neck: supple, symmetric, trachea midline and no masses   Pulmonary:even and unlabored  Cardiovascular:No edema or pitting edema present  Skin:Normal without rashes or lesions and well hydrated  Psychiatric:Mood and affect appropriate  Neurologic:Cranial Nerves II-XII grossly intact Sensation grossly intact; no clonus negative diallo's  Reflexes 2+ and brisk  SLR  Weakly positive right-sided we  Musculoskeletal: hunched gait  Unable to perform normal heel toe and tip toe walking  Slight weakness with right lower extremity strong plantar flexion; 5/5 strength with active range of motion movements in all other extremities bilaterally  mild pain with thoracic and lumbar facet loading bilaterally and with lateral spine rotation  mild ttp over thoracic and lumbar paraspinal muscles   Negative jahaira's test, negative gaenslen's negative SIJ loading bilaterally  Tenderness to palpation over inferior rib borders of T10-T11 posterolateral ribs bilaterally,  Eliciting radicular pain in aformentioned dermatomal distributions  Imaging    Result Narrative   Exam - thoracic spine mri scan     History: Bilateral leg weakness  Technique: Using a surface coil sagittal and axial T1-weighted and T2-weighted  scans were obtained of the thoracic spine  Findings: Image detail is suboptimal secondary to patient's obesity  Thoracic spinal cord is normal in size and configuration and MRI signal  intensity  There are no intramedullary lesions  Vertebral bodies are in anatomic alignment  There are degenerative changes in endplates and facet joints throughout the  thoracic spine  There is central spinal stenosis at T10-T11  There is no marlyn spinal cord  compression however  Other Result Information   Interface, Rad Results In - 08/07/2018  7:34 PM EDT  Formatting of this note might be different from the original   Exam - thoracic spine mri scan     History: Bilateral leg weakness  Technique: Using a surface coil sagittal and axial T1-weighted and T2-weighted  scans were obtained of the thoracic spine  Findings: Image detail is suboptimal secondary to patient's obesity  Thoracic spinal cord is normal in size and configuration and MRI signal  intensity  There are no intramedullary lesions  Vertebral bodies are in anatomic alignment  There are degenerative changes in endplates and facet joints throughout the  thoracic spine  There is central spinal stenosis at T10-T11  There is no marlyn spinal cord  compression however  IMPRESSION:  Impression:  1  Technically suboptimal study  2  Marked spondylosis  3  At T10-T11 there is spinal stenosis however there is no marlyn spinal cord  compression       MRI LUMBAR SPINE WITHOUT CONTRAST     INDICATION: [id number]: Radiculopathy, lumbar region      COMPARISON:  5/6/2018; 3/16/2021     TECHNIQUE:  Sagittal T1, sagittal T2, sagittal inversion recovery, axial T1 and axial T2, coronal T2     IMAGE QUALITY:  Diagnostic     FINDINGS:     VERTEBRAL BODIES:  There are 5 lumbar type vertebral bodies  Mild to moderate levoscoliosis mid lumbar spine  Trace grade 1 anterolisthesis of L3 on L4  Minimal grade 1 retrolisthesis of L4 on L5  Scattered degenerative endplate changes  No focally   suspicious marrow lesions  No bone marrow edema or compression abnormality      SACRUM:  Scattered degenerative endplate changes  No focally suspicious marrow lesions  No bone marrow edema or compression abnormality      DISTAL CORD AND CONUS:  Normal size and signal within the distal cord and conus  The conus medullaris terminates at the L2 level      PARASPINAL SOFT TISSUES:  Fatty atrophy of the psoas muscles noted, similar to the prior CT possibly from disuse  Marked atrophy of the left iliac is muscle as well      LOWER THORACIC DISC SPACES:  T11-T12: There is a small central disc herniation, protrusion type  Minimal central canal narrowing  Neural foramina patent bilaterally      T12-L1: There is no focal disk herniation, central canal or neural foraminal narrowing      LUMBAR DISC SPACES:     L1-L2:  There is bilateral facet hypertrophy  There is a left neural foraminal disc protrusion  Mild left neural foraminal narrowing  Central canal patent  Minimal right neural foraminal narrowing      L2-L3:  There is bilateral facet hypertrophy  There is a right neural foraminal disc protrusion  Moderate to severe right neural foraminal narrowing  Mild central canal and left neural foraminal narrowing      L3-L4:  There is bilateral facet hypertrophy  There is a right neural foraminal disc protrusion  Moderate right neural foraminal narrowing    Mild central canal and left neural foraminal narrowing      L4-L5:  There is loss of disc height and signal   There is a disc osteophyte complex with a superimposed left neural foraminal disc protrusion  There is moderate to severe left neural foraminal narrowing  Mild central canal narrowing  Moderate right   neural foraminal narrowing     L5-S1:  There is bilateral facet hypertrophy  There is a left neural foraminal disc protrusion  Severe left neural foraminal narrowing  Mild central canal and right neural foraminal narrowing      IMPRESSION:        1  Advanced multilevel spondylosis    2   Moderate to severe fatty atrophy of the bilateral psoas muscles and iliac is muscles left greater than right, possibly from disuse or denervation atrophy

## 2023-02-22 NOTE — PROGRESS NOTES
Assessment  1  Lumbar spondylosis  -     Case request operating room: RHIZOTOMY LUMBAR L3, L4, L5; Standing  -     Case request operating room: RHIZOTOMY LUMBAR L3, L4, L5    Greater than 75% relief of leg pain from both radiculopathy and diabetic peripheral neuropathy as well as low back pain with improved ability to participate with IADLs after Nevro SCS trial (dual lead)  Axial low back pain described primarily by arthritic features  Aching, nagging, indolent, stabbing, throbbing features in axial mid and low back without radicular components  5/5 strength bilaterally, negative SLR  Positive facet loading maneuvers in lumbar spine elicited pain, positive tenderness to palpation over lumbar paraspinal muscles  Findings correlate with prominent thoracic and lumbar facet arthropathy seen throughout axial low back on MRI  Currently he is neurologically intact without gait instability, saddle anesthesia or bowel/bladder abnormality  Greater than 90% relief of pain with improved ability to participate with IADLs after Bilateral L3, L4, L5 medial branch nerve blocks #1 and #2 for approximately 2 days  Risks, benefits alternative to radiofrequency ablation of successful thoroughly discussed with patient  Handouts provided questions answered to patient satisfaction  Had consulted with NSGY who recommended no intervention given only mild/moderate right sided transforaminal stenosis at L4-L5  Pain consistent with thoracic radicular symptoms secondary to T10-T11 disc herniation which from 2018 MRI thoracic spine does not contribute to significant cord compression  Radiating and radicular pain in T10 and T11 dermatomal distribution  Pain is along lower thoracic T10 and T11 dermatomes, particularly with palpation of lower thoracic ribs and posterolateral distribution  Interestingly his significant and advanced lumbar facet arthropathy which is not a  primary source of his pain at this time      He previously had epidural steroid injections in the past with Dr Suzan Benoit with noted relief  For radicular pain  Reasonable to reobtain imaging, and pursue multimodal pain therapy plan as noted below  Plan  - Bilateral L3, L4, L5 medial branch nerve nerve radiofrequency ablation with IV sedation; f/u 2 weeks post procedure  - Bilateral T10, T11, T12 medial branch nerve radiofrequency ablation 3/16 given 6 months relief of pain with prior thoracic medial branch nerve RF ablation and recurrence of mid back pain  -cymbalta 30mg/day rx; patient has since tapered given sedation/drowsiness along with other side effects  Handout regarding sedative effects of taking this medication provided; additionally provided up titration calendar  Counseled not to take medication while driving or operating heavy machinery/using stairs  -continue other analgesics as prescribed previously well provider's  Counseled extensively as noted below regarding the risks of opioid medications in combination with Lyrica  -physical therapy for  Lumbar facet arthropathy, lumbar radiculopathy, mid back pain    There are risks associated with opioid medications, including dependence, addiction and tolerance  The patient understands and agrees to use these medications only as prescribed  Potential side effects of the medications include, but are not limited to, constipation, drowsiness, addiction, impaired judgment and risk of fatal overdose if not taken as prescribed  The patient was warned against driving while taking sedation medications  Sharing medications is a felony  At this point in time, the patient is showing no signs of addiction, abuse, diversion or suicidal ideation  A urine drug screen was collected at today's office visit as part of our medication management protocol  The point of care testing results were appropriate for what was being prescribed  The specimen will be sent for confirmatory testing   The drug screen is medically necessary because the patient is either dependent on opioid medication or is being considered for opioid medication therapy and the results could impact ongoing or future treatment  The drug screen is to evaluate for the presences or absence of prescribed, non-prescribed, and/or illicit drugs/substances  [place] Prescription Drug Monitoring Program report was reviewed and was appropriate     Complete risks and benefits including bleeding, infection, tissue reaction, nerve injury and allergic reaction were discussed  The approach was demonstrated using models and literature was provided  Verbal and written consent was obtained  My impressions and treatment recommendations were discussed in detail with the patient who verbalized understanding and had no further questions  Discharge instructions were provided  I personally saw and examined the patient and I agree with the above discussed plan of care  No orders of the defined types were placed in this encounter  History of Present Illness    Greater than 75% relief of leg pain from both radiculopathy and diabetic peripheral neuropathy as well as low back pain with improved ability to participate with IADLs after Nevro SCS trial (dual lead)  Continues to describe right hip flexor weakness without any pain  Additionally notes low back pain described by arthritic features  Describes achy, nagging, indolent, crampy and throbbing pain worse with standing/ambulation  Greater than 90% relief of pain with improved ability to participate with IADLs after Bilateral L3, L4, L5 medial branch nerve blocks #1 and #2 for approximately 2 days  Previously reported the following:    Leo Mims is a 65 y o  male with a past medical history of  Non-insulin controlled type 2 diabetes, obesity presenting with chronic mid and low back pain described primarily as arthritic in nature    He describes 8/10 mid and low back pain that is worse in the mornings and worse at the end of the day   The pain is characterized by achy, nagging, indolent, crampy, stabbing pain in his axial mid and low back  The patient describes that the pain is worse with standing for long periods of time on hard surfaces as well as with walking  The patient is a very active individual and feels as though this pain compromises his participation with independent activities of daily living  He ambulates with a walker  The pain can be debilitating at times and contribute to significant disability, compromising overall activity and independent activities of daily living  He has tried physical therapy with limited relief of symptoms  He additionally has pain radiating across his ribs bilaterally in the T10 and T11 dermatomal distribution  He demonstrates good strength and denies any weakness numbness or paresthesias  The patient denies any bowel or bladder dysfunction as well  I have personally reviewed and/or updated the patient's past medical history, past surgical history, family history, social history, current medications, allergies, and vital signs today  Review of Systems   Constitutional: Positive for activity change  HENT: Negative  Eyes: Negative  Respiratory: Negative  Cardiovascular: Negative  Gastrointestinal: Negative  Endocrine: Negative  Genitourinary: Negative  Musculoskeletal: Positive for arthralgias, back pain, gait problem and myalgias  Skin: Negative  Allergic/Immunologic: Negative  Neurological: Positive for numbness  Negative for weakness  Hematological: Negative  Psychiatric/Behavioral: Negative  All other systems reviewed and are negative        Patient Active Problem List   Diagnosis   • Herniation of intervertebral disc of thoracic region   • Diabetic peripheral neuropathy (HCC)   • Morbid obesity ([address] )   • Cervical spinal cord compression (HCC)   • Mild depression   • Major depressive disorder, single episode, mild (HCC)   • Thoracic spondylosis • Lumbar spondylosis   • Spinal muscular atrophy, unspecified (HCC)   • Neurogenic claudication due to lumbar spinal stenosis   • Muscular atrophy   • Other spondylosis with myelopathy, cervical region   • Radiculopathy   • Myelomalacia of cervical cord [facility])       Past Medical History:   Diagnosis Date   • Anxiety    • Arthritis    • Chronic pain disorder     mid back region   • Colon polyp    • COVID-19    • CPAP (continuous positive airway pressure) dependence     Pt uses nightly   • Diabetes mellitus ([facility] [address] )    • Diverticulosis    • History of recent hospitalization 06/16/2021    Pt was admitted to [facility] after syncopal episode  Pt saw cardiology- all tests negative  Pt had nl EEG  pt states is was a medication interaction  • Hyperlipidemia    • Hypertension    • Obesity    • Sleep apnea    • Spinal stenosis        Past Surgical History:   Procedure Laterality Date   • ACHILLES TENDON REPAIR      Pt had a rupture in right and left 2 years apart   • COLONOSCOPY     • EPIDURAL BLOCK INJECTION      Pt had in lumbar region  • EPIDURAL BLOCK INJECTION N/A 4/6/2021    Procedure: T-11-T-12 INTERLAMINAR THORACIC EPIDURAL STEROID INJECTION;  Surgeon: Salvador Cote MD;  [facility]: [facility];  Service: Pain Management    • EPIDURAL BLOCK INJECTION Right 7/20/2021    Procedure: L5 and S1 TRANSFORAMINAL EPIDURAL STEROID INJECTION;  Surgeon: Salvador Cote MD;  [facility];  Service: Pain Management    • FL GUIDED NEEDLE PLAC BX/ASP/INJ  7/14/2022   • FL GUIDED NEEDLE PLAC BX/ASP/INJ  8/18/2022   • FL GUIDED NEEDLE PLAC BX/ASP/INJ  9/15/2022   • FOOT SURGERY Left     Left great toe- had a hammertoe     • JOINT REPLACEMENT Left     Total hip, knee   • JOINT REPLACEMENT Right     Total hip, knee   • KNEE ARTHROSCOPY Bilateral    • NERVE BLOCK Bilateral 7/14/2022    Procedure: BLOCK MEDIAL BRANCH T12, L1, L2;  Surgeon: Salvador Cote MD;  [facility]: [facility];  Service: Pain Management    • NERVE BLOCK Bilateral 8/18/2022    Procedure: BLOCK MEDIAL BRANCH T12, L1, L2 #2;  Surgeon: Che Tang MD;  [facility];  Service: Pain Management    • NERVE BLOCK Bilateral 1/26/2023    Procedure: BLOCK MEDIAL BRANCH L3, L4, L5 #1;  Surgeon: Che Tang MD;  [facility];  Service: Pain Management    • NERVE BLOCK Bilateral 2/16/2023    Procedure: BLOCK MEDIAL BRANCH L3, L4, L5 #2;  Surgeon: Che Tang MD;  [facility];  Service: Pain Management    • NH JOE IMPLTJ NSTIM ELTRDS PLATE/PADDLE EDRL Left 10/26/2021    Procedure: Thoracic laminectomies for placement of spinal cord stimulator and internal pulse generator, use of neuromonitoring, left sided pulse generator;  Surgeon: Carmen Castañeda MD;  Location: [facility];  Service: Neurosurgery   • NH PRQ IMPLTJ NSTIM ELECTRODE ARRAY EPIDURAL Bilateral 9/30/2021    Procedure: Jennie Dunham SCS TRIAL;  Surgeon: Che Tang MD;  [facility];  Service: Pain Management    • RHIZOTOMY Bilateral 9/15/2022    Procedure: RHIZOTOMY LUMBAR T12, L1, L2 medial branch nerves;  Surgeon: Che Tang MD;  [facility];  Service: Pain Management    • TOTAL KNEE ARTHROPLASTY Left        Family History   Problem Relation Age of Onset   • Arthritis Mother    • Hypertension Father        Social History     Occupational History   • Not on file   Tobacco Use   • Smoking status: Never   • Smokeless tobacco: Current     Types: Snuff   • Tobacco comments:     still using snuff   Vaping Use   • Vaping Use: Never used   Substance and Sexual Activity   • Alcohol use:  Yes     Alcohol/week: 2 0 standard drinks     Types: 2 Cans of beer per week     Comment: social, weekends   • Drug use: No   • Sexual activity: Yes       Current Outpatient Medications on File Prior to Visit   Medication Sig   • acetaminophen (TYLENOL) 500 mg tablet Take 1,000 mg by mouth every 6 (six) hours as needed for mild pain   • atorvastatin (LIPITOR) 20 mg tablet Take 20 mg by mouth daily • Empagliflozin (Jardiance) 25 MG TABS Take 25 mg by mouth daily   • ERGOCALCIFEROL PO Take 50,000 Units by mouth 2 (two) times a week    • famotidine (PEPCID) 20 mg tablet Take 20 mg by mouth as needed    • Fiber Select Gummies CHEW Chew 10grams per day   • LORazepam (ATIVAN) 0 5 mg tablet Take 0 5 mg by mouth daily as needed   • Semaglutide,0 25 or 0 5MG/DOS, (Ozempic, 0 25 or 0 5 MG/DOSE,) 2 MG/1 5ML SOPN Inject 0 5 mg under the skin once a week    • sertraline (ZOLOFT) 50 mg tablet Take 75 mg by mouth    • valsartan (DIOVAN) 80 mg tablet Take 80 mg by mouth daily    • desloratadine (CLARINEX) 5 MG tablet TAKE 1 TABLET BY MOUTH EVERY DAY FOR ALLERGY SYMPTOMS     No current facility-administered medications on file prior to visit  No Known Allergies      Physical Exam    /80 (BP Location: Right arm)   Pulse 95   Temp 98 °F (36 7 °C) (Temporal)   Ht 6' 4" (1 93 m)   Wt (!) 158 kg (349 lb)   BMI 42 48 kg/m²     Constitutional: normal, well developed, well nourished, alert, in no distress and non-toxic and no overt pain behavior  and obese  Eyes: anicteric  HEENT: grossly intact  Neck: supple, symmetric, trachea midline and no masses   Pulmonary:even and unlabored  Cardiovascular:No edema or pitting edema present  Skin:Normal without rashes or lesions and well hydrated  Psychiatric:Mood and affect appropriate  Neurologic:Cranial Nerves II-XII grossly intact Sensation grossly intact; no clonus negative diallo's  Reflexes 2+ and brisk  SLR  Weakly positive right-sided we  Musculoskeletal: hunched gait  Unable to perform normal heel toe and tip toe walking  Slight weakness with right lower extremity strong plantar flexion; 5/5 strength with active range of motion movements in all other extremities bilaterally  mild pain with thoracic and lumbar facet loading bilaterally and with lateral spine rotation  mild ttp over thoracic and lumbar paraspinal muscles   Negative jahaira's test, negative gaenslen's negative SIJ loading bilaterally  Tenderness to palpation over inferior rib borders of T10-T11 posterolateral ribs bilaterally,  Eliciting radicular pain in aformentioned dermatomal distributions  Imaging    Result Narrative   Exam - thoracic spine mri scan     History: Bilateral leg weakness  Technique: Using a surface coil sagittal and axial T1-weighted and T2-weighted  scans were obtained of the thoracic spine  Findings: Image detail is suboptimal secondary to patient's obesity  Thoracic spinal cord is normal in size and configuration and MRI signal  intensity  There are no intramedullary lesions  Vertebral bodies are in anatomic alignment  There are degenerative changes in endplates and facet joints throughout the  thoracic spine  There is central spinal stenosis at T10-T11  There is no marlyn spinal cord  compression however  Other Result Information   Interface, Rad Results In - 08/07/2018  7:34 PM EDT  Formatting of this note might be different from the original   Exam - thoracic spine mri scan     History: Bilateral leg weakness  Technique: Using a surface coil sagittal and axial T1-weighted and T2-weighted  scans were obtained of the thoracic spine  Findings: Image detail is suboptimal secondary to patient's obesity  Thoracic spinal cord is normal in size and configuration and MRI signal  intensity  There are no intramedullary lesions  Vertebral bodies are in anatomic alignment  There are degenerative changes in endplates and facet joints throughout the  thoracic spine  There is central spinal stenosis at T10-T11  There is no marlyn spinal cord  compression however  IMPRESSION:  Impression:  1  Technically suboptimal study  2  Marked spondylosis  3  At T10-T11 there is spinal stenosis however there is no marlyn spinal cord  compression       MRI LUMBAR SPINE WITHOUT CONTRAST     INDICATION: [id number]: Radiculopathy, lumbar region      COMPARISON:  5/6/2018; 3/16/2021     TECHNIQUE:  Sagittal T1, sagittal T2, sagittal inversion recovery, axial T1 and axial T2, coronal T2     IMAGE QUALITY:  Diagnostic     FINDINGS:     VERTEBRAL BODIES:  There are 5 lumbar type vertebral bodies  Mild to moderate levoscoliosis mid lumbar spine  Trace grade 1 anterolisthesis of L3 on L4  Minimal grade 1 retrolisthesis of L4 on L5  Scattered degenerative endplate changes  No focally   suspicious marrow lesions  No bone marrow edema or compression abnormality      SACRUM:  Scattered degenerative endplate changes  No focally suspicious marrow lesions  No bone marrow edema or compression abnormality      DISTAL CORD AND CONUS:  Normal size and signal within the distal cord and conus  The conus medullaris terminates at the L2 level      PARASPINAL SOFT TISSUES:  Fatty atrophy of the psoas muscles noted, similar to the prior CT possibly from disuse  Marked atrophy of the left iliac is muscle as well      LOWER THORACIC DISC SPACES:  T11-T12: There is a small central disc herniation, protrusion type  Minimal central canal narrowing  Neural foramina patent bilaterally      T12-L1: There is no focal disk herniation, central canal or neural foraminal narrowing      LUMBAR DISC SPACES:     L1-L2:  There is bilateral facet hypertrophy  There is a left neural foraminal disc protrusion  Mild left neural foraminal narrowing  Central canal patent  Minimal right neural foraminal narrowing      L2-L3:  There is bilateral facet hypertrophy  There is a right neural foraminal disc protrusion  Moderate to severe right neural foraminal narrowing  Mild central canal and left neural foraminal narrowing      L3-L4:  There is bilateral facet hypertrophy  There is a right neural foraminal disc protrusion  Moderate right neural foraminal narrowing    Mild central canal and left neural foraminal narrowing      L4-L5:  There is loss of disc height and signal   There is a disc osteophyte complex with a superimposed left neural foraminal disc protrusion  There is moderate to severe left neural foraminal narrowing  Mild central canal narrowing  Moderate right   neural foraminal narrowing     L5-S1:  There is bilateral facet hypertrophy  There is a left neural foraminal disc protrusion  Severe left neural foraminal narrowing  Mild central canal and right neural foraminal narrowing      IMPRESSION:        1  Advanced multilevel spondylosis    2   Moderate to severe fatty atrophy of the bilateral psoas muscles and iliac is muscles left greater than right, possibly from disuse or denervation atrophy

## 2023-03-01 ENCOUNTER — ANESTHESIA EVENT (OUTPATIENT)
Dept: GASTROENTEROLOGY | Facility: HOSPITAL | Age: 63
End: 2023-03-01

## 2023-03-01 NOTE — ANESTHESIA PREPROCEDURE EVALUATION
Procedure:  RHIZOTOMY LUMBAR L3, L4, L5 (Bilateral: Spine Lumbar)    Relevant Problems   MUSCULOSKELETAL   (+) Lumbar spondylosis   (+) Muscular atrophy   (+) Neurogenic claudication due to lumbar spinal stenosis   (+) Other spondylosis with myelopathy, cervical region   (+) Thoracic spondylosis      NEURO/PSYCH   (+) Cervical spinal cord compression (HCC)   (+) Diabetic peripheral neuropathy (HCC)   (+) Major depressive disorder, single episode, mild (HCC)   (+) Mild depression   (+) Myelomalacia of cervical cord (HCC)   (+) Neurogenic claudication due to lumbar spinal stenosis        Physical Exam    Airway    Mallampati score: II  TM Distance: >3 FB  Neck ROM: full     Dental   No notable dental hx     Cardiovascular  Cardiovascular exam normal    Pulmonary  Pulmonary exam normal     Other Findings        Anesthesia Plan  ASA Score- 3     Anesthesia Type- IV sedation with anesthesia with ASA Monitors  Additional Monitors:   Airway Plan:           Plan Factors-Exercise tolerance (METS): >4 METS  Chart reviewed  EKG reviewed  Imaging results reviewed  Existing labs reviewed  Patient summary reviewed  Patient is not a current smoker  Patient not instructed to abstain from smoking on day of procedure  Patient did not smoke on day of surgery  Obstructive sleep apnea risk education given perioperatively  Induction-     Postoperative Plan-     Informed Consent- Anesthetic plan and risks discussed with patient  I personally reviewed this patient with the CRNA  Discussed and agreed on the Anesthesia Plan with the CRNA  Janene Mccarthy

## 2023-03-01 NOTE — PRE-PROCEDURE INSTRUCTIONS
Pre-Surgery Instructions:   Medication Instructions   • acetaminophen (TYLENOL) 500 mg tablet Uses PRN- OK to take day of surgery   • atorvastatin (LIPITOR) 20 mg tablet Take day of surgery  • Empagliflozin (Jardiance) 25 MG TABS Hold day of surgery  • ERGOCALCIFEROL PO Hold day of surgery  • Fiber Select Gummies CHEW Hold day of surgery  • LORazepam (ATIVAN) 0 5 mg tablet Take night before surgery   • Semaglutide,0 25 or 0 5MG/DOS, (Ozempic, 0 25 or 0 5 MG/DOSE,) 2 MG/1 5ML SOPN Pt states he will take when he gets home   • sertraline (ZOLOFT) 50 mg tablet Take night before surgery   • valsartan (DIOVAN) 80 mg tablet Hold day of surgery  You will receive a phone call from hospital for arrival time day before the procedure between 2-8pm  Please call surgeons office if any changes in your condition  Wear easy on/off clothing; consider type of surgery  Valuables, jewelry, piercings please keep at home  No contact lenses  Reviewed COVID protocol and masking policy  Reviewed NPO after MN except medications the morning of with a small sip of water  Follow pre surgery showering or cleaning instructions as  Reviewed by nurse or surgeons office      Questions answered and concerns addressed

## 2023-03-02 ENCOUNTER — APPOINTMENT (OUTPATIENT)
Dept: RADIOLOGY | Facility: HOSPITAL | Age: 63
End: 2023-03-02

## 2023-03-02 ENCOUNTER — ANESTHESIA (OUTPATIENT)
Dept: GASTROENTEROLOGY | Facility: HOSPITAL | Age: 63
End: 2023-03-02

## 2023-03-02 ENCOUNTER — HOSPITAL ENCOUNTER (OUTPATIENT)
Facility: HOSPITAL | Age: 63
Setting detail: OUTPATIENT SURGERY
Discharge: HOME | End: 2023-03-02
Attending: ANESTHESIOLOGY | Admitting: ANESTHESIOLOGY

## 2023-03-02 VITALS
HEART RATE: 63 BPM | DIASTOLIC BLOOD PRESSURE: 86 MMHG | RESPIRATION RATE: 18 BRPM | HEIGHT: 76 IN | BODY MASS INDEX: 38.36 KG/M2 | TEMPERATURE: 97.9 F | OXYGEN SATURATION: 95 % | WEIGHT: 315 LBS | SYSTOLIC BLOOD PRESSURE: 130 MMHG

## 2023-03-02 LAB — GLUCOSE SERPL-MCNC: 109 MG/DL (ref 65–140)

## 2023-03-02 RX ORDER — BUPIVACAINE HYDROCHLORIDE 2.5 MG/ML
INJECTION, SOLUTION EPIDURAL; INFILTRATION; INTRACAUDAL AS NEEDED
Status: DISCONTINUED | OUTPATIENT
Start: 2023-03-02 | End: 2023-03-02 | Stop reason: HOSPADM

## 2023-03-02 RX ORDER — METHYLPREDNISOLONE ACETATE 80 MG/ML
INJECTION, SUSPENSION INTRA-ARTICULAR; INTRALESIONAL; INTRAMUSCULAR; SOFT TISSUE AS NEEDED
Status: DISCONTINUED | OUTPATIENT
Start: 2023-03-02 | End: 2023-03-02 | Stop reason: HOSPADM

## 2023-03-02 RX ORDER — MIDAZOLAM HYDROCHLORIDE 2 MG/2ML
INJECTION, SOLUTION INTRAMUSCULAR; INTRAVENOUS AS NEEDED
Status: DISCONTINUED | OUTPATIENT
Start: 2023-03-02 | End: 2023-03-02

## 2023-03-02 RX ORDER — SODIUM CHLORIDE, SODIUM LACTATE, POTASSIUM CHLORIDE, CALCIUM CHLORIDE 600; 310; 30; 20 MG/100ML; MG/100ML; MG/100ML; MG/100ML
INJECTION, SOLUTION INTRAVENOUS CONTINUOUS PRN
Status: DISCONTINUED | OUTPATIENT
Start: 2023-03-02 | End: 2023-03-02

## 2023-03-02 RX ORDER — PROPOFOL 10 MG/ML
INJECTION, EMULSION INTRAVENOUS CONTINUOUS PRN
Status: DISCONTINUED | OUTPATIENT
Start: 2023-03-02 | End: 2023-03-02

## 2023-03-02 RX ORDER — FENTANYL CITRATE/PF 50 MCG/ML
25 SYRINGE (ML) INJECTION
Status: DISCONTINUED | OUTPATIENT
Start: 2023-03-02 | End: 2023-03-02 | Stop reason: HOSPADM

## 2023-03-02 RX ORDER — DEXMEDETOMIDINE HYDROCHLORIDE 100 UG/ML
INJECTION, SOLUTION INTRAVENOUS AS NEEDED
Status: DISCONTINUED | OUTPATIENT
Start: 2023-03-02 | End: 2023-03-02

## 2023-03-02 RX ORDER — ONDANSETRON 2 MG/ML
4 INJECTION INTRAMUSCULAR; INTRAVENOUS ONCE AS NEEDED
Status: DISCONTINUED | OUTPATIENT
Start: 2023-03-02 | End: 2023-03-02 | Stop reason: HOSPADM

## 2023-03-02 RX ORDER — SODIUM CHLORIDE, SODIUM LACTATE, POTASSIUM CHLORIDE, CALCIUM CHLORIDE 600; 310; 30; 20 MG/100ML; MG/100ML; MG/100ML; MG/100ML
50 INJECTION, SOLUTION INTRAVENOUS CONTINUOUS
Status: CANCELLED | OUTPATIENT
Start: 2023-03-02

## 2023-03-02 RX ORDER — KETAMINE HYDROCHLORIDE 50 MG/ML
INJECTION, SOLUTION, CONCENTRATE INTRAMUSCULAR; INTRAVENOUS AS NEEDED
Status: DISCONTINUED | OUTPATIENT
Start: 2023-03-02 | End: 2023-03-02

## 2023-03-02 RX ORDER — LIDOCAINE HYDROCHLORIDE 20 MG/ML
INJECTION, SOLUTION EPIDURAL; INFILTRATION; INTRACAUDAL; PERINEURAL AS NEEDED
Status: DISCONTINUED | OUTPATIENT
Start: 2023-03-02 | End: 2023-03-02 | Stop reason: HOSPADM

## 2023-03-02 RX ORDER — LIDOCAINE HYDROCHLORIDE 10 MG/ML
INJECTION, SOLUTION EPIDURAL; INFILTRATION; INTRACAUDAL; PERINEURAL AS NEEDED
Status: DISCONTINUED | OUTPATIENT
Start: 2023-03-02 | End: 2023-03-02 | Stop reason: HOSPADM

## 2023-03-02 RX ADMIN — KETAMINE HYDROCHLORIDE 15 MG: 50 INJECTION INTRAMUSCULAR; INTRAVENOUS at 13:03

## 2023-03-02 RX ADMIN — MIDAZOLAM 1 MG: 1 INJECTION INTRAMUSCULAR; INTRAVENOUS at 12:58

## 2023-03-02 RX ADMIN — MIDAZOLAM 1 MG: 1 INJECTION INTRAMUSCULAR; INTRAVENOUS at 13:03

## 2023-03-02 RX ADMIN — PROPOFOL 60 MCG/KG/MIN: 10 INJECTION, EMULSION INTRAVENOUS at 13:03

## 2023-03-02 RX ADMIN — DEXMEDETOMIDINE HCL 8 MCG: 100 INJECTION INTRAVENOUS at 13:03

## 2023-03-02 RX ADMIN — SODIUM CHLORIDE, SODIUM LACTATE, POTASSIUM CHLORIDE, AND CALCIUM CHLORIDE: .6; .31; .03; .02 INJECTION, SOLUTION INTRAVENOUS at 12:50

## 2023-03-02 NOTE — PROCEDURES
Pre-procedure Diagnosis: Lumbar facet arthropathy  Post-procedure Diagnosis: Lumbar facet arthropathy  Operation Title(s):  1  Radiofrequency ablation of [BILATERAL] L3, L4, L5 medial branch nerves      2  Intraoperative fluoroscopy  Attending Surgeon:   Penny Parks MD  Anesthesia:   Local & IV SEDATION WITH ANESTHESIA    Indications: The patient is a 65y o  year-old male with a diagnosis of lumbar facet arthropathy  The patient's history and physical exam were reviewed  The patient has previously undergone diagnostic and confirmatory lumbar medial branch blocks  Fluoroscopy is being used for the precise placement of the needles at the lumbar medial branch nerves  The risks, benefits and alternatives to the procedure were discussed, and all questions were answered to the patient's satisfaction  The patient agreed to proceed, and written informed consent was obtained  Procedure in Detail: The patient was brought into the procedure room and placed in the prone position on the fluoroscopy table  The low back and upper buttock were prepped with chloraprep times two and draped in a sterile manner  AP fluoroscopy was used to identify the lumbar levels on the [LEFT] side  The fluoroscope beam was then obliqued to better visualize the junction of the superior articular process and transverse process on the [LEFT] side and then tilted caudally about 25 degrees  An 18-gauge, 150mm length, 10mm curved active tip radiofrequency probe was advanced toward the targeted points until bone was contacted  Multiple fluoroscopic views were made to ensure placement of the needle tip at the appropriate location of the medial branch nerve  Motor stimulation was performed at 2 Hz and 1 2 volts generating a twitch in the paraspinal muscles with no motor activity in the lower extremities  Next, AP fluoroscopy was used to identify the L5-S1 level  The fluoroscope been was tilted cephalad to visualize the sacral ala   The fluoroscope beam was then tilted about 45 degrees from that point and the skin and subcutaneous tissues in these identified areas were anesthetized with 1% lidocaine  An 18-gauge, 150mm length, 10mm curved active tip radiofrequency probe was advanced toward the targeted points until bone was contacted  Motor stimulation was performed at 2 Hz and 1 2 volts generating a twitch in the paraspinal muscles with no motor activity in the lower extremities  0 5ml of 2% lidocaine was injected prior to lesioning, which was performed for 90 seconds at 80 degrees centigrade  The lesion was repeated once more after slight repositioning of the needles on an oblique view  Once the lesions were complete, 1ml of a solution consisting of 5mL 0 25% bupivacaine and 1 mL Depo-medrol (80mg/mL) was injected through each needle and then removed with a 1% lidocaine flush  The patient's back was cleaned, and bandages were placed at the needle insertion site  The same procedure was repeated at the lumbar levels on the [RIGHT] side    Disposition: The patient tolerated the procedure well and there were no apparent complications  Vital signs remained stable throughout the procedure  The patient was taken to the recovery area where written discharge instructions for the procedure were given      Estimated Blood Loss: None  Specimens Obtained: N/A

## 2023-03-02 NOTE — INTERVAL H&P NOTE
H&P reviewed  After examining the patient I find no changes in the patients condition since the H&P had been written      Vitals:    03/02/23 1202   BP: 129/80   Pulse: 71   Resp: 20   Temp: 98 7 °F (37 1 °C)   SpO2: 94%

## 2023-03-02 NOTE — ANESTHESIA POSTPROCEDURE EVALUATION
Post-Op Assessment Note    CV Status:  Stable  Pain Score: 0    Pain management: adequate     Mental Status:  Alert and awake   Hydration Status:  Euvolemic   PONV Controlled:  Controlled   Airway Patency:  Patent      Post Op Vitals Reviewed: Yes      Staff: CRNA, Anesthesiologist         No notable events documented      /65 (03/02/23 1331)    Temp 98 1 °F (36 7 °C) (03/02/23 1331)    Pulse 75 (03/02/23 1331)   Resp 18 (03/02/23 1331)    SpO2 94 % (03/02/23 1331)

## 2023-03-02 NOTE — DISCHARGE INSTR - AVS FIRST PAGE
YOUR 2 WEEK FOLLOW UP HAS BEEN SCHEDULED; IF YOU WISH TO CHANGE THE FOLLOW UP, PLEASE CALL THE SPINE AND PAIN CENTER AT [facility]: [phone]    Lumbar Radiofrequency Ablation   WHAT YOU NEED TO KNOW:   Lumbar radiofrequency ablation (RFA) is a procedure used to treat facet joint pain in your lower back  Facet joints are found at the back of each vertebra  A needle electrode is used to send electrical currents to the nerves in your facet joint  The electrical currents create heat that damages the nerve so it cannot send pain signals  DISCHARGE INSTRUCTIONS:   Seek care immediately if:   You cannot move your leg  You cannot control your urine or bowel movements  You have severe pain in your lower back  Call your doctor if:   You have leg weakness  You develop new symptoms  You have questions or concerns about your condition or care  Medicines:   Pain medicine  may be given  Ask how to take this medicine safely  Take your medicine as directed  Contact your healthcare provider if you think your medicine is not helping or if you have side effects  Tell your provider if you are allergic to any medicine  Keep a list of the medicines, vitamins, and herbs you take  Include the amounts, and when and why you take them  Bring the list or the pill bottles to follow-up visits  Carry your medicine list with you in case of an emergency  Activity:  Do not drive a car or operate machinery within 24 hours after your procedure  Ask your healthcare provider about any other activities you should avoid  Follow up with your doctor as directed:  Write down your questions so you remember to ask them during your visits  © Copyright [employer] 2022 Information is for End User's use only and may not be sold, redistributed or otherwise used for commercial purposes  The above information is an  only  It is not intended as medical advice for individual conditions or treatments   Talk to your doctor, nurse or pharmacist before following any medical regimen to see if it is safe and effective for you

## 2023-03-02 NOTE — OP NOTE
OPERATIVE REPORT  PATIENT NAME: Huy Brown    :  1960  MRN: [id number]  Pt Location:  GI ROOM 01    SURGERY DATE: 3/2/2023    Surgeon(s) and Role: Barrett Edgar MD - Primary    Preop Diagnosis:  Lumbar spondylosis [M47 816]    Post-Op Diagnosis Codes:     * Lumbar spondylosis [M47 816]    Procedure(s):  Bilateral - RHIZOTOMY LUMBAR L3  L4  L5    Specimen(s):  * No specimens in log *    Estimated Blood Loss:   Minimal    Drains:  * No LDAs found *    Anesthesia Type:   IV Sedation with Anesthesia    Operative Indications:  Lumbar spondylosis [M47 816]    Operative Findings:  Bilateral L3, L4, L5 medial branch nerve regions identified under fluoroscopic guidance   Appropriate motor testing performed prior to radiofrequency lesioning of medial branch nerve regions    Complications:   None    Procedure and Technique:  Please see detailed procedure note    I was present for the entire procedure    Patient Disposition:  PACU     SIGNATURE: Ashley Huber MD  DATE: 2023  TIME: 1:29 PM

## 2023-03-03 ENCOUNTER — TELEPHONE (OUTPATIENT)
Dept: RADIOLOGY | Facility: CLINIC | Age: 63
End: 2023-03-03

## 2023-03-03 NOTE — TELEPHONE ENCOUNTER
S/w pt who states that his is doing great  Pt denies sunburn like sensation, fever, or signs of infection  Pt aware it may take 4-6 weeks for the full effect of the procedure  Pt aware he can take routine meds for pain if needed and use ice and or heat 20 mins off and on     Pt scheduled for thoracic RFA 3/16  Confirmed f/u [facility] 3/22

## 2023-03-03 NOTE — TELEPHONE ENCOUNTER
Caller: patient    Doctor: Melanie Angel    Reason for call: returning a missed call, transfer to RN    Call back#:

## 2023-03-15 NOTE — DISCHARGE INSTR - AVS FIRST PAGE
YOUR 2 WEEK FOLLOW UP HAS BEEN SCHEDULED; IF YOU WISH TO CHANGE THE FOLLOW UP, PLEASE CALL THE SPINE AND PAIN CENTER AT [facility]: [phone]      Thoracic Radiofrequency Ablation   WHAT YOU NEED TO KNOW:   Thoracic radiofrequency ablation (RFA) is a procedure used to treat facet joint pain in your mid back  Facet joints are found at the back of each vertebra  A needle electrode is used to send electrical currents to the nerves in your facet joint  The electrical currents create heat that damages the nerve so it cannot send pain signals  DISCHARGE INSTRUCTIONS:   Seek care immediately if:   You cannot move your leg  You cannot control your urine or bowel movements  You have severe pain in your lower back  Contact your healthcare provider if:   You have leg weakness  You develop new symptoms  You have questions or concerns about your condition or care  Medicines:   Pain medicine  may be given  Ask how to take this medicine safely  Take your medicine as directed  Contact your healthcare provider if you think your medicine is not helping or if you have side effects  Tell him or her if you are allergic to any medicine  Keep a list of the medicines, vitamins, and herbs you take  Include the amounts, and when and why you take them  Bring the list or the pill bottles to follow-up visits  Carry your medicine list with you in case of an emergency  Follow up with your healthcare provider as directed:  Write down your questions so you remember to ask them during your visits  Activity:  Do not drive a car or operate machinery within 24 hours after your procedure  Ask your healthcare provider about any other activities you should avoid  © Copyright [employer] 2022 Information is for End User's use only and may not be sold, redistributed or otherwise used for commercial purposes   All illustrations and images included in CareNotes® are the copyrighted property of [employer]  or [employer]  The above information is an  only  It is not intended as medical advice for individual conditions or treatments  Talk to your doctor, nurse or pharmacist before following any medical regimen to see if it is safe and effective for you

## 2023-03-16 ENCOUNTER — APPOINTMENT (OUTPATIENT)
Dept: RADIOLOGY | Facility: HOSPITAL | Age: 63
End: 2023-03-16

## 2023-03-16 ENCOUNTER — ANESTHESIA EVENT (OUTPATIENT)
Dept: GASTROENTEROLOGY | Facility: HOSPITAL | Age: 63
End: 2023-03-16

## 2023-03-16 ENCOUNTER — TELEPHONE (OUTPATIENT)
Dept: RADIOLOGY | Facility: CLINIC | Age: 63
End: 2023-03-16

## 2023-03-16 ENCOUNTER — HOSPITAL ENCOUNTER (OUTPATIENT)
Facility: HOSPITAL | Age: 63
Setting detail: OUTPATIENT SURGERY
Discharge: HOME | End: 2023-03-16
Attending: ANESTHESIOLOGY | Admitting: ANESTHESIOLOGY

## 2023-03-16 ENCOUNTER — ANESTHESIA (OUTPATIENT)
Dept: GASTROENTEROLOGY | Facility: HOSPITAL | Age: 63
End: 2023-03-16

## 2023-03-16 VITALS
HEIGHT: 76 IN | DIASTOLIC BLOOD PRESSURE: 68 MMHG | OXYGEN SATURATION: 95 % | TEMPERATURE: 97.9 F | SYSTOLIC BLOOD PRESSURE: 118 MMHG | WEIGHT: 315 LBS | RESPIRATION RATE: 18 BRPM | HEART RATE: 78 BPM | BODY MASS INDEX: 38.36 KG/M2

## 2023-03-16 DIAGNOSIS — G44.86 CERVICOGENIC HEADACHE: Primary | ICD-10-CM

## 2023-03-16 LAB — GLUCOSE SERPL-MCNC: 97 MG/DL (ref 65–140)

## 2023-03-16 RX ORDER — SODIUM CHLORIDE, SODIUM LACTATE, POTASSIUM CHLORIDE, CALCIUM CHLORIDE 600; 310; 30; 20 MG/100ML; MG/100ML; MG/100ML; MG/100ML
INJECTION, SOLUTION INTRAVENOUS CONTINUOUS PRN
Status: DISCONTINUED | OUTPATIENT
Start: 2023-03-16 | End: 2023-03-16

## 2023-03-16 RX ORDER — BUPIVACAINE HYDROCHLORIDE 2.5 MG/ML
INJECTION, SOLUTION EPIDURAL; INFILTRATION; INTRACAUDAL AS NEEDED
Status: DISCONTINUED | OUTPATIENT
Start: 2023-03-16 | End: 2023-03-16 | Stop reason: HOSPADM

## 2023-03-16 RX ORDER — LIDOCAINE HYDROCHLORIDE 20 MG/ML
INJECTION, SOLUTION EPIDURAL; INFILTRATION; INTRACAUDAL; PERINEURAL AS NEEDED
Status: DISCONTINUED | OUTPATIENT
Start: 2023-03-16 | End: 2023-03-16 | Stop reason: HOSPADM

## 2023-03-16 RX ORDER — FENTANYL CITRATE 50 UG/ML
INJECTION, SOLUTION INTRAMUSCULAR; INTRAVENOUS AS NEEDED
Status: DISCONTINUED | OUTPATIENT
Start: 2023-03-16 | End: 2023-03-16

## 2023-03-16 RX ORDER — SODIUM CHLORIDE, SODIUM LACTATE, POTASSIUM CHLORIDE, CALCIUM CHLORIDE 600; 310; 30; 20 MG/100ML; MG/100ML; MG/100ML; MG/100ML
20 INJECTION, SOLUTION INTRAVENOUS CONTINUOUS
Status: DISCONTINUED | OUTPATIENT
Start: 2023-03-16 | End: 2023-03-16 | Stop reason: HOSPADM

## 2023-03-16 RX ORDER — LIDOCAINE HYDROCHLORIDE 10 MG/ML
INJECTION, SOLUTION EPIDURAL; INFILTRATION; INTRACAUDAL; PERINEURAL AS NEEDED
Status: DISCONTINUED | OUTPATIENT
Start: 2023-03-16 | End: 2023-03-16 | Stop reason: HOSPADM

## 2023-03-16 RX ORDER — PROPOFOL 10 MG/ML
INJECTION, EMULSION INTRAVENOUS CONTINUOUS PRN
Status: DISCONTINUED | OUTPATIENT
Start: 2023-03-16 | End: 2023-03-16

## 2023-03-16 RX ORDER — METHYLPREDNISOLONE ACETATE 80 MG/ML
INJECTION, SUSPENSION INTRA-ARTICULAR; INTRALESIONAL; INTRAMUSCULAR; SOFT TISSUE AS NEEDED
Status: DISCONTINUED | OUTPATIENT
Start: 2023-03-16 | End: 2023-03-16 | Stop reason: HOSPADM

## 2023-03-16 RX ORDER — PROPOFOL 10 MG/ML
INJECTION, EMULSION INTRAVENOUS AS NEEDED
Status: DISCONTINUED | OUTPATIENT
Start: 2023-03-16 | End: 2023-03-16

## 2023-03-16 RX ORDER — TOPIRAMATE 25 MG/1
25 TABLET ORAL 2 TIMES DAILY
Qty: 60 TABLET | Refills: 0 | Status: SHIPPED | OUTPATIENT
Start: 2023-03-16

## 2023-03-16 RX ORDER — LIDOCAINE HYDROCHLORIDE 10 MG/ML
INJECTION, SOLUTION EPIDURAL; INFILTRATION; INTRACAUDAL; PERINEURAL AS NEEDED
Status: DISCONTINUED | OUTPATIENT
Start: 2023-03-16 | End: 2023-03-16

## 2023-03-16 RX ORDER — MIDAZOLAM HYDROCHLORIDE 2 MG/2ML
INJECTION, SOLUTION INTRAMUSCULAR; INTRAVENOUS AS NEEDED
Status: DISCONTINUED | OUTPATIENT
Start: 2023-03-16 | End: 2023-03-16

## 2023-03-16 RX ORDER — DEXMEDETOMIDINE HYDROCHLORIDE 100 UG/ML
INJECTION, SOLUTION INTRAVENOUS AS NEEDED
Status: DISCONTINUED | OUTPATIENT
Start: 2023-03-16 | End: 2023-03-16

## 2023-03-16 RX ADMIN — MIDAZOLAM 2 MG: 1 INJECTION INTRAMUSCULAR; INTRAVENOUS at 07:39

## 2023-03-16 RX ADMIN — LIDOCAINE HYDROCHLORIDE 50 MG: 10 INJECTION, SOLUTION EPIDURAL; INFILTRATION; INTRACAUDAL; PERINEURAL at 07:39

## 2023-03-16 RX ADMIN — FENTANYL CITRATE 50 MCG: 50 INJECTION INTRAMUSCULAR; INTRAVENOUS at 07:49

## 2023-03-16 RX ADMIN — PROPOFOL 50 MG: 10 INJECTION, EMULSION INTRAVENOUS at 07:39

## 2023-03-16 RX ADMIN — DEXMEDETOMIDINE HCL 12 MCG: 100 INJECTION INTRAVENOUS at 07:39

## 2023-03-16 RX ADMIN — SODIUM CHLORIDE, SODIUM LACTATE, POTASSIUM CHLORIDE, AND CALCIUM CHLORIDE: .6; .31; .03; .02 INJECTION, SOLUTION INTRAVENOUS at 07:31

## 2023-03-16 RX ADMIN — PROPOFOL 80 MCG/KG/MIN: 10 INJECTION, EMULSION INTRAVENOUS at 07:43

## 2023-03-16 NOTE — ANESTHESIA POSTPROCEDURE EVALUATION
Post-Op Assessment Note    CV Status:  Stable  Pain Score: 0    Pain management: adequate     Mental Status:  Awake and sleepy   Hydration Status:  Stable   PONV Controlled:  Controlled   Airway Patency:  Patent      Post Op Vitals Reviewed: Yes      Staff: CRNA         No notable events documented      BP (!) 87/54 (03/16/23 0810)    Temp 97 8 °F (36 6 °C) (03/16/23 0810)    Pulse 65 (03/16/23 0810)   Resp 18 (03/16/23 0810)    SpO2 94 % (03/16/23 0810)

## 2023-03-16 NOTE — INTERVAL H&P NOTE
H&P reviewed  After examining the patient I find no changes in the patients condition since the H&P had been written      Vitals:    03/16/23 0638   BP: 155/89   Pulse: 78   Resp: 18   Temp: 97 6 °F (36 4 °C)   SpO2: 96%

## 2023-03-16 NOTE — PROCEDURES
Pre-procedure Diagnosis: Thoracic Spondylosis  Post-procedure Diagnosis: Thoracic Spondylosis  Operation Title(s):  1  Radiofrequency ablation of [BILATERAL] T10, T11, T12 medial branch nerves      2  Intraoperative fluoroscopy  Attending Surgeon:   Felix Jimenes MD  Anesthesia:   Local    Indications: The patient is a 65y o  year-old male with a diagnosis of thoracic spondylosis  The patient's history and physical exam were reviewed  The patient has previously undergone diagnostic and confirmatory thoracic medial branch blocks  Fluoroscopy is being used for the precise placement of the needles at the thoracic medial branch nerves  The risks, benefits and alternatives to the procedure were discussed, and all questions were answered to the patient's satisfaction  The patient agreed to proceed, and written informed consent was obtained  Procedure in Detail: The patient was brought into the procedure room and placed in the prone position on the fluoroscopy table  The low back and upper buttock were prepped with chloraprep times two and draped in a sterile manner  AP fluoroscopy was used to identify the thoracic levels on the [LEFT] side  The fluoroscope beam was then obliqued to better visualize the junction of the superior articular process and transverse process on the [LEFT] side  An 18-gauge, 100mm length, 10mm curved active tip radiofrequency probe was advanced toward the targeted points until bone was contacted  Multiple fluoroscopic views were made to ensure placement of the needle tip at the appropriate location of the medial branch nerve  0 5ml of 2% lidocaine was injected prior to lesioning, which was performed for 90 seconds at 80 degrees centigrade  The lesion was repeated once more after slight repositioning of the needles on an oblique view    Once the lesions were complete, 1ml of a solution consisting of 5mL 0 25% bupivacaine and 1 mL Depo-medrol (80mg/mL) was injected through each needle and then removed with a 1% lidocaine flush  The patient's back was cleaned, and bandages were placed at the needle insertion site  The same procedure was repeated at the lumbar levels on the [RIGHT] side    Disposition: The patient tolerated the procedure well and there were no apparent complications  Vital signs remained stable throughout the procedure  The patient was taken to the recovery area where written discharge instructions for the procedure were given      Estimated Blood Loss: None  Specimens Obtained: N/A

## 2023-03-16 NOTE — OP NOTE
OPERATIVE REPORT  PATIENT NAME: Esteban Hendrix    :  1960  MRN: [id number]  Pt Location:  GI ROOM 01    SURGERY DATE: 3/16/2023    Surgeon(s) and Role: Demar Maldonado MD - Primary    Preop Diagnosis:  Thoracic spondylosis [E27 864]  Lumbar spondylosis [M47 816]    Post-Op Diagnosis Codes: * Thoracic spondylosis [id number]     * Lumbar spondylosis [M47 816]    Procedure(s):  Bilateral - RHIZOTOMY LUMBAR T10  T11  T12 medial branch nerves    Specimen(s):  * No specimens in log *    Estimated Blood Loss:   Minimal    Drains:  * No LDAs found *    Anesthesia Type:   IV Sedation with Anesthesia    Operative Indications:  Thoracic spondylosis [M47 814]  Lumbar spondylosis [M47 816]    Operative Findings:  Bilateral T10, T11, T12 medial branch nerve regions identified under fluoroscopic guidance   Radiofrequency lesioning of medial branch nerve regions performed    Complications:   None    Procedure and Technique:  Please see detailed procedure note    I was present for the entire procedure    Patient Disposition:  PACU     SIGNATURE: Hina Morris MD  DATE: 2023  TIME: 8:10 AM

## 2023-03-16 NOTE — ANESTHESIA PREPROCEDURE EVALUATION
Procedure:  RHIZOTOMY LUMBAR T10, T11, T12 medial branch nerves (Bilateral: Spine Thoracic)    Relevant Problems   ANESTHESIA (within normal limits)      MUSCULOSKELETAL   (+) Lumbar spondylosis   (+) Muscular atrophy   (+) Neurogenic claudication due to lumbar spinal stenosis   (+) Other spondylosis with myelopathy, cervical region   (+) Thoracic spondylosis      NEURO/PSYCH   (+) Cervical spinal cord compression (HCC)   (+) Diabetic peripheral neuropathy (HCC)   (+) Major depressive disorder, single episode, mild (HCC)   (+) Mild depression   (+) Myelomalacia of cervical cord (HCC)   (+) Neurogenic claudication due to lumbar spinal stenosis      Musculoskeletal and Integument   (+) Herniation of intervertebral disc of thoracic region      Other   (+) CPAP (continuous positive airway pressure) dependence        Physical Exam    Airway    Mallampati score: II  TM Distance: >3 FB  Neck ROM: full     Dental   upper dentures,     Cardiovascular  Rhythm: regular, Cardiovascular exam normal    Pulmonary  Pulmonary exam normal Breath sounds clear to auscultation,     Other Findings        Anesthesia Plan  ASA Score- 3     Anesthesia Type- IV sedation with anesthesia with ASA Monitors  Additional Monitors:   Airway Plan:           Plan Factors-Exercise tolerance (METS): >4 METS  Chart reviewed  EKG reviewed  Imaging results reviewed  Existing labs reviewed  Patient summary reviewed  Patient is not a current smoker  Patient not instructed to abstain from smoking on day of procedure  Patient did not smoke on day of surgery  Obstructive sleep apnea risk education given perioperatively  Induction-     Postoperative Plan-     Informed Consent- Anesthetic plan and risks discussed with patient  I personally reviewed this patient with the CRNA  Discussed and agreed on the Anesthesia Plan with the CRNA  [place]

## 2023-03-21 ENCOUNTER — TELEPHONE (OUTPATIENT)
Dept: OTHER | Facility: OTHER | Age: 63
End: 2023-03-21

## 2023-03-21 NOTE — TELEPHONE ENCOUNTER
Patient is calling regarding cancelling an appointment      Date/Time: 03/22 @2:15    Patient was rescheduled: YES [] NO [x]    Patient requesting call back to reschedule: YES [x] NO []

## 2023-03-22 NOTE — TELEPHONE ENCOUNTER
Spoke to patient he will call us back o reschedule appt   Stated they are having family issues right now

## 2023-04-07 DIAGNOSIS — G44.86 CERVICOGENIC HEADACHE: ICD-10-CM

## 2023-04-07 RX ORDER — TOPIRAMATE 25 MG/1
TABLET ORAL
Qty: 180 TABLET | Refills: 1 | Status: SHIPPED | OUTPATIENT
Start: 2023-04-07

## 2023-04-12 ENCOUNTER — DOCTOR'S OFFICE (OUTPATIENT)
Dept: URBAN - NONMETROPOLITAN AREA CLINIC 1 | Facility: CLINIC | Age: 63
Setting detail: OPHTHALMOLOGY
End: 2023-04-12
Payer: COMMERCIAL

## 2023-04-12 DIAGNOSIS — H52.4: ICD-10-CM

## 2023-04-12 LAB
LEFT EYE DIABETIC RETINOPATHY: NORMAL
RIGHT EYE DIABETIC RETINOPATHY: NORMAL

## 2023-04-12 PROCEDURE — 92014 COMPRE OPH EXAM EST PT 1/>: CPT | Performed by: OPTOMETRIST

## 2023-04-12 ASSESSMENT — REFRACTION_MANIFEST
OD_AXIS: 005
OS_CYLINDER: -0.75
OS_ADD: +2.50
OD_CYLINDER: -0.75
OD_ADD: +2.50
OD_VA2: 20/20-2
OS_VA1: 20/20-2
OD_VA1: 20/20-2
OS_VA2: 20/20-2
OS_SPHERE: -0.75
OD_SPHERE: -0.75
OS_AXIS: 150

## 2023-04-12 ASSESSMENT — KERATOMETRY
OS_K2POWER_DIOPTERS: 46.75
OD_K1POWER_DIOPTERS: 43.12
OD_AXISANGLE_DEGREES: 175
OD_K2POWER_DIOPTERS: 43.87
OS_AXISANGLE_DEGREES: 165
OS_K1POWER_DIOPTERS: 45.00

## 2023-04-12 ASSESSMENT — REFRACTION_AUTOREFRACTION
OD_SPHERE: -0.75
OS_CYLINDER: 0.00
OS_AXIS: 180
OS_SPHERE: -1.00
OD_CYLINDER: -0.50
OD_AXIS: 019

## 2023-04-12 ASSESSMENT — VISUAL ACUITY
OD_BCVA: 20/50+2
OS_BCVA: 20/60-1

## 2023-04-12 ASSESSMENT — AXIALLENGTH_DERIVED
OS_AL: 23.1188
OS_AL: 23.1657
OD_AL: 23.9899
OD_AL: 24.04

## 2023-04-12 ASSESSMENT — SUPERFICIAL PUNCTATE KERATITIS (SPK)
OS_SPK: 2+
OD_SPK: 2+

## 2023-04-12 ASSESSMENT — TONOMETRY
OS_IOP_MMHG: 16
OD_IOP_MMHG: 16

## 2023-04-12 ASSESSMENT — REFRACTION_CURRENTRX
OS_SPHERE: -0.75
OD_AXIS: 005
OD_CYLINDER: -0.75
OS_OVR_VA: 20/
OD_OVR_VA: 20/
OD_ADD: +2.25
OS_VPRISM_DIRECTION: BF
OS_AXIS: 150
OS_ADD: +2.25
OD_VPRISM_DIRECTION: BF
OS_CYLINDER: -0.75
OD_SPHERE: -0.75

## 2023-04-12 ASSESSMENT — SPHEQUIV_DERIVED
OS_SPHEQUIV: -1.125
OD_SPHEQUIV: -1.125
OD_SPHEQUIV: -1
OS_SPHEQUIV: -1

## 2023-04-12 ASSESSMENT — CONFRONTATIONAL VISUAL FIELD TEST (CVF)
OS_FINDINGS: FULL
OD_FINDINGS: FULL

## 2023-04-12 ASSESSMENT — TEAR BREAK UP TIME (TBUT): OD_TBUT: 5-6 SEC

## 2023-06-13 ENCOUNTER — HOSPITAL ENCOUNTER (OUTPATIENT)
Dept: MRI IMAGING | Facility: HOSPITAL | Age: 63
Discharge: HOME | End: 2023-06-13
Payer: COMMERCIAL

## 2023-06-13 DIAGNOSIS — M48.062 SPINAL STENOSIS, LUMBAR REGION WITH NEUROGENIC CLAUDICATION: ICD-10-CM

## 2023-06-13 DIAGNOSIS — M54.9 DORSALGIA, UNSPECIFIED: ICD-10-CM

## 2023-06-13 DIAGNOSIS — M54.50 LOW BACK PAIN, UNSPECIFIED: ICD-10-CM

## 2023-06-13 PROCEDURE — 72148 MRI LUMBAR SPINE W/O DYE: CPT

## 2023-07-03 RX ORDER — ERGOCALCIFEROL 1.25 MG/1
CAPSULE ORAL
COMMUNITY
Start: 2023-04-26

## 2023-07-05 ENCOUNTER — OFFICE VISIT (OUTPATIENT)
Dept: FAMILY MEDICINE CLINIC | Facility: CLINIC | Age: 63
End: 2023-07-05
Payer: COMMERCIAL

## 2023-07-05 VITALS
OXYGEN SATURATION: 95 % | HEART RATE: 77 BPM | WEIGHT: 315 LBS | HEIGHT: 75 IN | BODY MASS INDEX: 39.17 KG/M2 | SYSTOLIC BLOOD PRESSURE: 126 MMHG | DIASTOLIC BLOOD PRESSURE: 82 MMHG | TEMPERATURE: 98.5 F

## 2023-07-05 DIAGNOSIS — M47.816 LUMBAR SPONDYLOSIS: ICD-10-CM

## 2023-07-05 DIAGNOSIS — E66.01 MORBID OBESITY (HCC): ICD-10-CM

## 2023-07-05 DIAGNOSIS — Z11.59 NEED FOR HEPATITIS C SCREENING TEST: ICD-10-CM

## 2023-07-05 DIAGNOSIS — I10 ESSENTIAL HYPERTENSION: ICD-10-CM

## 2023-07-05 DIAGNOSIS — Z99.89 CPAP (CONTINUOUS POSITIVE AIRWAY PRESSURE) DEPENDENCE: ICD-10-CM

## 2023-07-05 DIAGNOSIS — M51.24 HERNIATION OF INTERVERTEBRAL DISC OF THORACIC REGION: ICD-10-CM

## 2023-07-05 DIAGNOSIS — I87.2 CHRONIC VENOUS INSUFFICIENCY: ICD-10-CM

## 2023-07-05 DIAGNOSIS — R79.89 LOW VITAMIN D LEVEL: ICD-10-CM

## 2023-07-05 DIAGNOSIS — Z11.4 ENCOUNTER FOR SCREENING FOR HIV: ICD-10-CM

## 2023-07-05 DIAGNOSIS — M15.9 PRIMARY OSTEOARTHRITIS INVOLVING MULTIPLE JOINTS: ICD-10-CM

## 2023-07-05 DIAGNOSIS — E11.42 DIABETIC PERIPHERAL NEUROPATHY (HCC): Primary | ICD-10-CM

## 2023-07-05 DIAGNOSIS — B35.1 ONYCHOMYCOSIS OF TOENAIL: ICD-10-CM

## 2023-07-05 DIAGNOSIS — G95.20 CERVICAL SPINAL CORD COMPRESSION (HCC): ICD-10-CM

## 2023-07-05 DIAGNOSIS — M54.16 LUMBAR RADICULOPATHY: ICD-10-CM

## 2023-07-05 DIAGNOSIS — E78.2 MIXED HYPERLIPIDEMIA: ICD-10-CM

## 2023-07-05 DIAGNOSIS — F41.1 GAD (GENERALIZED ANXIETY DISORDER): ICD-10-CM

## 2023-07-05 DIAGNOSIS — G12.9 SPINAL MUSCULAR ATROPHY, UNSPECIFIED (HCC): ICD-10-CM

## 2023-07-05 DIAGNOSIS — M47.814 THORACIC SPONDYLOSIS: ICD-10-CM

## 2023-07-05 DIAGNOSIS — M48.062 NEUROGENIC CLAUDICATION DUE TO LUMBAR SPINAL STENOSIS: ICD-10-CM

## 2023-07-05 LAB — SL AMB POCT HEMOGLOBIN AIC: 5.8 (ref ?–6.5)

## 2023-07-05 PROCEDURE — 3725F SCREEN DEPRESSION PERFORMED: CPT | Performed by: PHYSICIAN ASSISTANT

## 2023-07-05 PROCEDURE — 99205 OFFICE O/P NEW HI 60 MIN: CPT | Performed by: PHYSICIAN ASSISTANT

## 2023-07-05 PROCEDURE — 83036 HEMOGLOBIN GLYCOSYLATED A1C: CPT | Performed by: PHYSICIAN ASSISTANT

## 2023-07-05 RX ORDER — COVID-19 ANTIGEN TEST
KIT MISCELLANEOUS
COMMUNITY
End: 2023-07-05 | Stop reason: ALTCHOICE

## 2023-07-05 RX ORDER — LORAZEPAM 0.5 MG/1
0.5 TABLET ORAL DAILY PRN
Qty: 60 TABLET | Refills: 0 | Status: SHIPPED | OUTPATIENT
Start: 2023-07-05

## 2023-07-05 NOTE — PROGRESS NOTES
Assessment/Plan:       1. Diabetic peripheral neuropathy (HCC)  -     Basic metabolic panel; Future  -     Albumin / creatinine urine ratio; Future  -     semaglutide, 0.25 or 0.5 mg/dose, (Ozempic, 0.25 or 0.5 MG/DOSE,) 2 mg/1.5 mL injection pen; Inject 0.38 mL (0.5 mg total) under the skin every 7 days  -     HEMOGLOBIN A1C W/ EAG ESTIMATION; Future  -     Lipid panel; Future  -     POCT hemoglobin A1c    2. Spinal muscular atrophy, unspecified ([address])    3. Lumbar radiculopathy    4. Herniation of intervertebral disc of thoracic region    5. Thoracic spondylosis    6. Lumbar spondylosis    7. Morbid obesity ([address])    8. Cervical spinal cord compression (HCC)    9. Primary osteoarthritis involving multiple joints    10. CPAP (continuous positive airway pressure) dependence    11. Neurogenic claudication due to lumbar spinal stenosis    12. Essential hypertension  -     Lipid panel; Future    13. BMI 40.0-44.9, adult ([address])    14. Mixed hyperlipidemia  -     Lipid panel; Future    15. PAOLA (generalized anxiety disorder)  -     LORazepam (ATIVAN) 0.5 mg tablet; Take 1 tablet (0.5 mg total) by mouth daily as needed for anxiety  -     sertraline (ZOLOFT) 50 mg tablet; Take 1.5 tablets (75 mg total) by mouth daily at bedtime    16. Chronic venous insufficiency    17. Onychomycosis of toenail    18. Need for hepatitis C screening test  -     Hepatitis C Antibody; Future    19. Encounter for screening for HIV  -     : HIV 1/2 AB/AG w [facility] for 2 yr old and above; Future    20. Low vitamin D level  -     Vitamin D 25 hydroxy; Future      This patient is establishing care at this facility. He has longstanding history of low back pain. He has multiple sites of spondylolysis along with cervical spinal cord compression. He has primary osteoarthritis of multiple joints and has had both knees and his right hip replaced. He was evaluated for rheumatoid arthritis in the evaluation was negative.     Patient has an elevated BMI at 43.62. He has had morbid obesity. Although he currently weighs 348 pounds, he previously had weighed 418 pounds. He had been evaluated for possible gastric bypass in the past but for variety of reasons, he never had this done. He did try to lose weight on his own. He has had some success. He was diagnosed with diabetes in the past and is currently on Ozempic and [place]. He had a point-of-care hemoglobin A1c today in the office and it was 5.8%. He continues to lose weight and is trying to watch his diet along with improvement in his activity. BMI is above normal. Nutrition recommendations include reducing portion sizes, decreasing overall calorie intake and consuming healthier snacks. He is already on Ozempic and Jardiance which can also support weight loss in the setting of diabetes. Patient is to have a Medicare wellness exam check in October and this will be his next visit. On January 5, 2023, he had a colonoscopy by Dr. Surjit Arreola. There were no specimen noted. He did have diverticulosis. Because of a family history of colon cancer in his mother, he is due for colonoscopies every 3 years. Patient previously had a prolonged time to heal ulcer on the right heel. He was seeing the podiatrist Dr. Emma Cooley who put on Unna boots and low contact touchdown on the heel and full healing has occurred. I have suggested that he see this podiatrist for his nail care as it appears he has onychomycosis and thickened nails need for expert trimming. He has chronic venous insufficiency with swelling of his legs and brawny edema secondary to hemosiderin deposition. Patient has essential hypertension but is currently well controlled at 126/82. I did suggest that he take his valsartan in the evening. Patient has a history of depression that is extremely well responsive to 50 mg of Zoloft.   He does have some generalized anxiety and this is treated with both the sertraline and as needed lorazepam.    Patient has a longstanding history of vitamin D deficiency. His last assessment was more than a year ago. He is taking to 50,000 unit replacement of vitamin D on a weekly basis. Because vitamin D is a fat-soluble vitamin, we will assess his most current vitamin D level to assess the need for replacement. Patient has difficulty with ambulation secondary to his chronic low back pain and spondylolysis. He is willing to have hepatitis C and HIV testing. Patient has stopped his atorvastatin because he was told his lipids were normal.  I did a risk calculation on him and he was at 16.4% ASCVD risk over 10 years. He is also diabetic which is the equivalent of cardiac disease. He is going to restart his atorvastatin. In 3 months, the patient will come for his Medicare wellness. He is going to have BMP urine microalbumin hemoglobin A1c hep C lipid profile and HIV test prior to this visit    A total of 70 minutes was spent rendering care for this patient. This time included review of the patient's electronic medical record, performing the history and physical, reviewing appropriate labs and/or images, developing a treatment and assessment plan, answering patient's questions and concerns, and documenting the patient visit. I will see him in October for his Medicare wellness exam.  Head to toe exam performed as part of today's visit as he is establishing care. Subjective:      Patient ID: Castillo Diaz is a 65 y.o. male. HPI: 28-year-old male who is pleasant cooperative. He is joining our practice as I take care of his granddaughter and his daughter-in-law. He was disappointed with his previous caregiver who tended to yell at him and he felt disrespected. Patient is following with Dr. Laurence Valdez at [facility] for neurosurgical evaluation. He recently had an MRI and we discussed the results of that MRI with regard to the amount of damage that he has to his low back area.   He also has some cervical myelopathy noted. Patient states that his chronic pain really affects him adversely. He had been taking 1 Percocet daily to every other day. This had been discontinued by previous healthcare provider and if he is able to get pain relief of his osteoarthritis of multiple sites, that would be preferable than using a narcotic. If Dr. Kyle Savage suggests a surgical benefit, the patient is willing to undergo his surgery to try to relieve the pain which has really consumed his life over the last several years. Patient feels that the dose of sertraline is perfect for him helping him with his depression. His depression inventory scale score was 0 today. He had a score of 17 for generalized anxiety showing moderate anxiety. He tends to worry about many different things and not just worrying about himself. He is not having pain in her chest heart palpitations dizziness or lightheadedness. No recent URI or viral syndrome. Does have chronic swelling in both legs. The ulcer on his right heel has completely healed. He is applying a lot of emollients to his feet. He does admit having peripheral neuropathy on both lower extremities from the mid tibial areas distally. Patient continues to lose weight as a result of using Ozempic and Jardiance. He also feels that his diabetes is responding to therapy. Blood pressure is well controlled with the angiotensin receptor blocker. He is going to get a repeat lipid profile after taking Lipitor for several months which he is restarting at my suggestion. The following portions of the patient's history were reviewed and updated as appropriate: allergies, current medications, past family history, past medical history, past social history, past surgical history, and problem list.    Review of Systems   No recent URI or viral syndrome. Legs remain swollen and very insensate. No open areas on his legs.     Objective:      /82 (BP Location: Left arm, Patient Position: Sitting)   Pulse 77   Temp 98.5 °F (36.9 °C) (Tympanic)   Ht 6' 3" (1.905 m)   Wt (!) 158 kg (348 lb 15.8 oz)   SpO2 95%   BMI 43.62 kg/m²          Physical Exam  Cardiovascular:      Pulses: Pulses are weak. Dorsalis pedis pulses are 1+ on the right side and 1+ on the left side. Posterior tibial pulses are 0 on the right side and 0 on the left side. Musculoskeletal:        Feet:    Feet:      Right foot:      Skin integrity: No ulcer, skin breakdown, erythema, warmth, callus or dry skin. Left foot:      Skin integrity: No ulcer, skin breakdown, erythema, warmth, callus or dry skin. Well-developed well-nourished 65y.o. year old male who is cooperative with the exam.  Patient is alert and oriented x3. Patient is appropriate in answering all questions. HEENT:  Normocephalic. PERRLA. EOMs intact. TMs are clear with identification of bony landmarks. No tragus or pinnae tenderness. No pre or posterior auricular adenopathy. Sinuses without tenderness. Throat without hyperemia. Neck:  Supple without adenopathy. Thyroid midline without thyromegaly or bruits. No carotid bruits. Chest symmetric and nontender. Heart regular rate and rhythm. No murmur rubs or gallops. Point of maximum impulse not displaced. Lungs are clear to auscultation. Breathing is nonlabored. Aerating bases well. Abdomen round and soft positive bowel sounds without masses tenderness or organomegaly. Extremities reveal a great deal of swelling consistent with his chronic venous insufficiency preventing any peripheral pulses to be felt. Diabetic Foot Exam    Patient's shoes and socks removed. Right Foot/Ankle   Right Foot Inspection  Skin Exam: skin normal and skin intact. No dry skin, no warmth, no callus, no erythema, no maceration, no abnormal color, no pre-ulcer, no ulcer and no callus. Toe Exam: swelling and right toe deformity.      Sensory   Vibration: absent  Monofilament testing: absent    Vascular  Capillary refills: elevated  The right DP pulse is 1+. The right PT pulse is 0. Left Foot/Ankle  Left Foot Inspection  Skin Exam: skin normal and skin intact. No dry skin, no warmth, no erythema, no maceration, normal color, no pre-ulcer, no ulcer and no callus. Toe Exam: swelling. Sensory   Vibration: absent  Monofilament testing: absent    Vascular  Capillary refills: elevated  The left DP pulse is 1+. The left PT pulse is 0. Assign Risk Category  Deformity present  Loss of protective sensation  Weak pulses  Risk: 3      .

## 2023-07-10 ENCOUNTER — TELEPHONE (OUTPATIENT)
Dept: FAMILY MEDICINE CLINIC | Facility: CLINIC | Age: 63
End: 2023-07-10

## 2023-07-10 NOTE — TELEPHONE ENCOUNTER
I called the patient regarding his questions about his medication and supplements. I let him know that Angelena Lombard is on vacation and I would relay the message to her and call him back with answers next week.

## 2023-07-21 DIAGNOSIS — E11.42 DIABETIC PERIPHERAL NEUROPATHY (HCC): ICD-10-CM

## 2023-07-21 RX ORDER — SEMAGLUTIDE 0.68 MG/ML
INJECTION, SOLUTION SUBCUTANEOUS
Qty: 3 ML | Refills: 3 | Status: SHIPPED | OUTPATIENT
Start: 2023-07-21

## 2023-08-04 DIAGNOSIS — E78.2 MIXED HYPERLIPIDEMIA: Primary | ICD-10-CM

## 2023-08-07 RX ORDER — ATORVASTATIN CALCIUM 20 MG/1
20 TABLET, FILM COATED ORAL DAILY
Qty: 90 TABLET | Refills: 3 | Status: SHIPPED | OUTPATIENT
Start: 2023-08-07

## 2023-08-14 DIAGNOSIS — E55.9 VITAMIN D DEFICIENCY: ICD-10-CM

## 2023-08-14 DIAGNOSIS — E11.42 DIABETIC PERIPHERAL NEUROPATHY (HCC): ICD-10-CM

## 2023-08-14 DIAGNOSIS — I10 ESSENTIAL HYPERTENSION: ICD-10-CM

## 2023-08-14 DIAGNOSIS — I10 ESSENTIAL HYPERTENSION: Primary | ICD-10-CM

## 2023-08-14 DIAGNOSIS — E11.9 DM TYPE 2, GOAL HBA1C 7%-8% (HCC): Primary | ICD-10-CM

## 2023-08-14 DIAGNOSIS — E11.9 DM TYPE 2, GOAL HBA1C 7%-8% (HCC): ICD-10-CM

## 2023-08-14 RX ORDER — ERGOCALCIFEROL 1.25 MG/1
CAPSULE ORAL
Refills: 0 | OUTPATIENT
Start: 2023-08-14

## 2023-08-14 RX ORDER — VALSARTAN 80 MG/1
80 TABLET ORAL DAILY
Qty: 90 TABLET | Refills: 3 | Status: SHIPPED | OUTPATIENT
Start: 2023-08-14 | End: 2024-08-08

## 2023-08-14 RX ORDER — VALSARTAN 80 MG/1
80 TABLET ORAL DAILY
Qty: 90 TABLET | Refills: 3 | Status: CANCELLED | OUTPATIENT
Start: 2023-08-14 | End: 2024-08-08

## 2023-09-07 DIAGNOSIS — F41.1 GAD (GENERALIZED ANXIETY DISORDER): ICD-10-CM

## 2023-09-07 RX ORDER — LORAZEPAM 0.5 MG/1
0.5 TABLET ORAL DAILY PRN
Qty: 60 TABLET | Refills: 0 | Status: SHIPPED | OUTPATIENT
Start: 2023-09-07

## 2023-09-25 ENCOUNTER — TELEPHONE (OUTPATIENT)
Dept: ADMINISTRATIVE | Facility: OTHER | Age: 63
End: 2023-09-25

## 2023-09-25 NOTE — TELEPHONE ENCOUNTER
----- Message from [facility] sent at 9/21/2023 12:19 PM EDT -----  Regarding: Care Gap Request CRC: Colonoscopy  09/21/23 12:19 PM    Hello, our patient attached above has had CRC: Colonoscopy completed/performed. Please assist in updating the patient chart by pulling the Care Everywhere (CE) document. The date of service is 1/4/2023. PDF attached to visit  encounter.     Thank you,  Dolores CUELLO [facility]

## 2023-09-25 NOTE — TELEPHONE ENCOUNTER
Upon review of the In Basket request we were able to locate, review, and update the patient chart as requested for CRC: Colonoscopy. Any additional questions or concerns should be emailed to the Practice Liaisons via the appropriate education email address, please do not reply via In Basket.     Thank you  Jhoan Guzman

## 2023-09-29 ENCOUNTER — APPOINTMENT (OUTPATIENT)
Dept: LAB | Facility: CLINIC | Age: 63
End: 2023-09-29
Payer: COMMERCIAL

## 2023-09-29 DIAGNOSIS — Z11.4 ENCOUNTER FOR SCREENING FOR HIV: ICD-10-CM

## 2023-09-29 DIAGNOSIS — R79.89 LOW VITAMIN D LEVEL: ICD-10-CM

## 2023-09-29 DIAGNOSIS — I10 ESSENTIAL HYPERTENSION: ICD-10-CM

## 2023-09-29 DIAGNOSIS — E11.42 DIABETIC PERIPHERAL NEUROPATHY (HCC): ICD-10-CM

## 2023-09-29 DIAGNOSIS — Z11.59 NEED FOR HEPATITIS C SCREENING TEST: ICD-10-CM

## 2023-09-29 DIAGNOSIS — E78.2 MIXED HYPERLIPIDEMIA: ICD-10-CM

## 2023-09-29 LAB
25(OH)D3 SERPL-MCNC: 38.3 NG/ML (ref 30–100)
ANION GAP SERPL CALCULATED.3IONS-SCNC: 10 MMOL/L
BUN SERPL-MCNC: 15 MG/DL (ref 5–25)
CALCIUM SERPL-MCNC: 9.7 MG/DL (ref 8.4–10.2)
CHLORIDE SERPL-SCNC: 104 MMOL/L (ref 96–108)
CHOLEST SERPL-MCNC: 125 MG/DL
CO2 SERPL-SCNC: 25 MMOL/L (ref 21–32)
CREAT SERPL-MCNC: 1.14 MG/DL (ref 0.6–1.3)
CREAT UR-MCNC: 117 MG/DL
EST. AVERAGE GLUCOSE BLD GHB EST-MCNC: 114 MG/DL
GFR SERPL CREATININE-BSD FRML MDRD: 68 ML/MIN/1.73SQ M
GLUCOSE SERPL-MCNC: 123 MG/DL (ref 65–140)
HBA1C MFR BLD: 5.6 %
HCV AB SER QL: NORMAL
HDLC SERPL-MCNC: 36 MG/DL
HIV 1+2 AB+HIV1 P24 AG SERPL QL IA: NORMAL
HIV 2 AB SERPL QL IA: NORMAL
HIV1 AB SERPL QL IA: NORMAL
HIV1 P24 AG SERPL QL IA: NORMAL
LDLC SERPL CALC-MCNC: 53 MG/DL (ref 0–100)
MICROALBUMIN UR-MCNC: <7 MG/L
MICROALBUMIN/CREAT 24H UR: <6 MG/G CREATININE (ref 0–30)
NONHDLC SERPL-MCNC: 89 MG/DL
POTASSIUM SERPL-SCNC: 4.3 MMOL/L (ref 3.5–5.3)
SODIUM SERPL-SCNC: 139 MMOL/L (ref 135–147)
TRIGL SERPL-MCNC: 180 MG/DL

## 2023-09-29 PROCEDURE — 36415 COLL VENOUS BLD VENIPUNCTURE: CPT

## 2023-09-29 PROCEDURE — 83036 HEMOGLOBIN GLYCOSYLATED A1C: CPT

## 2023-09-29 PROCEDURE — 82306 VITAMIN D 25 HYDROXY: CPT

## 2023-09-29 PROCEDURE — 86803 HEPATITIS C AB TEST: CPT

## 2023-09-29 PROCEDURE — 82570 ASSAY OF URINE CREATININE: CPT

## 2023-09-29 PROCEDURE — 80048 BASIC METABOLIC PNL TOTAL CA: CPT

## 2023-09-29 PROCEDURE — 82043 UR ALBUMIN QUANTITATIVE: CPT

## 2023-09-29 PROCEDURE — 80061 LIPID PANEL: CPT

## 2023-09-29 PROCEDURE — 87389 HIV-1 AG W/HIV-1&-2 AB AG IA: CPT

## 2023-10-01 ENCOUNTER — RA CDI HCC (OUTPATIENT)
Dept: OTHER | Facility: HOSPITAL | Age: 63
End: 2023-10-01

## 2023-10-01 NOTE — PROGRESS NOTES
[address] [facility] coding opportunities       Chart reviewed, no opportunity found: [id number]        Patients Insurance     Medicare Insurance: [employer]

## 2023-10-03 ENCOUNTER — OFFICE VISIT (OUTPATIENT)
Dept: FAMILY MEDICINE CLINIC | Facility: CLINIC | Age: 63
End: 2023-10-03
Payer: COMMERCIAL

## 2023-10-03 VITALS
HEART RATE: 72 BPM | TEMPERATURE: 97 F | SYSTOLIC BLOOD PRESSURE: 136 MMHG | DIASTOLIC BLOOD PRESSURE: 79 MMHG | BODY MASS INDEX: 39.17 KG/M2 | OXYGEN SATURATION: 97 % | WEIGHT: 315 LBS | HEIGHT: 75 IN

## 2023-10-03 DIAGNOSIS — Z23 NEED FOR INFLUENZA VACCINATION: ICD-10-CM

## 2023-10-03 DIAGNOSIS — E78.2 MIXED HYPERLIPIDEMIA: ICD-10-CM

## 2023-10-03 DIAGNOSIS — E11.9 DM TYPE 2, GOAL HBA1C 7%-8% (HCC): ICD-10-CM

## 2023-10-03 DIAGNOSIS — I10 ESSENTIAL HYPERTENSION: ICD-10-CM

## 2023-10-03 DIAGNOSIS — Z00.00 MEDICARE ANNUAL WELLNESS VISIT, INITIAL: Primary | ICD-10-CM

## 2023-10-03 PROCEDURE — 90686 IIV4 VACC NO PRSV 0.5 ML IM: CPT | Performed by: PHYSICIAN ASSISTANT

## 2023-10-03 PROCEDURE — G0008 ADMIN INFLUENZA VIRUS VAC: HCPCS | Performed by: PHYSICIAN ASSISTANT

## 2023-10-03 PROCEDURE — 99173 VISUAL ACUITY SCREEN: CPT | Performed by: PHYSICIAN ASSISTANT

## 2023-10-03 PROCEDURE — G0438 PPPS, INITIAL VISIT: HCPCS | Performed by: PHYSICIAN ASSISTANT

## 2023-10-03 NOTE — PROGRESS NOTES
Assessment and Plan:     Problem List Items Addressed This Visit    None  Visit Diagnoses     Medicare annual wellness visit, initial    -  Primary    DM type 2, goal HbA1c 7%-8% (HCC)        Essential hypertension        Mixed hyperlipidemia        BMI 40.0-44.9, adult [facility])               Preventive health issues were discussed with patient, and age appropriate screening tests were ordered as noted in patient's After Visit Summary. Personalized health advice and appropriate referrals for health education or preventive services given if needed, as noted in patient's After Visit Summary. History of Present Illness:     Patient presents for a Medicare Wellness Visit    HPI   Patient Care Team:  Carli Zarate PA-C as PCP - General (Physician Assistant)     Review of Systems:     Review of Systems     Problem List:     Patient Active Problem List   Diagnosis   • Herniation of intervertebral disc of thoracic region   • Diabetic peripheral neuropathy ([address])   • Morbid obesity ([address])   • Cervical spinal cord compression (HCC)   • Mild depression   • Major depressive disorder, single episode, mild ([address])   • Thoracic spondylosis   • Lumbar spondylosis   • Spinal muscular atrophy, unspecified (HCC)   • Neurogenic claudication due to lumbar spinal stenosis   • Muscular atrophy   • Other spondylosis with myelopathy, cervical region   • Radiculopathy   • Myelomalacia of cervical cord (HCC)   • CPAP (continuous positive airway pressure) dependence      Past Medical and Surgical History:     Past Medical History:   Diagnosis Date   • Anxiety    • Arthritis    • Chronic pain disorder     mid back region   • Colon polyp    • COVID-19    • CPAP (continuous positive airway pressure) dependence     Pt uses nightly   • Diabetes mellitus ([address])    • Diverticulitis of colon 3 years ago    No issues   • Diverticulosis    • History of recent hospitalization 06/16/2021    Pt was admitted to [facility] after syncopal episode.  Pt saw cardiology- all tests negative. Pt had nl EEG. pt states is was a medication interaction. • Hyperlipidemia    • Hypertension    • Obesity    • Sleep apnea    • Spinal stenosis    • Wears hearing aid      Past Surgical History:   Procedure Laterality Date   • ACHILLES TENDON REPAIR      Pt had a rupture in right and left 2 years apart   • COLONOSCOPY     • EPIDURAL BLOCK INJECTION      Pt had in lumbar region. • EPIDURAL BLOCK INJECTION N/A 04/06/2021    Procedure: T-11-T-12 INTERLAMINAR THORACIC EPIDURAL STEROID INJECTION;  Surgeon: Mary Hanley MD;  [facility]: [facility];  Service: Pain Management    • EPIDURAL BLOCK INJECTION Right 07/20/2021    Procedure: L5 and S1 TRANSFORAMINAL EPIDURAL STEROID INJECTION;  Surgeon: Mary Hanley MD;  [facility]: [facility];  Service: Pain Management    • FL GUIDED NEEDLE PLAC BX/ASP/INJ  07/14/2022   • FL GUIDED NEEDLE PLAC BX/ASP/INJ  08/18/2022   • FL GUIDED NEEDLE PLAC BX/ASP/INJ  09/15/2022   • FOOT SURGERY Left     Left great toe- had a hammertoe.    • HIP SURGERY      Replaced   • JOINT REPLACEMENT Left     Total hip, knee   • JOINT REPLACEMENT Right     Total hip, knee   • KNEE ARTHROSCOPY Bilateral    • NERVE BLOCK Bilateral 07/14/2022    Procedure: BLOCK MEDIAL BRANCH T12, L1, L2;  Surgeon: Mary Hanley MD;  [facility]: [facility];  Service: Pain Management    • NERVE BLOCK Bilateral 08/18/2022    Procedure: BLOCK MEDIAL BRANCH T12, L1, L2 #2;  Surgeon: Mary Hanley MD;  [facility]: [facility];  Service: Pain Management    • NERVE BLOCK Bilateral 01/26/2023    Procedure: BLOCK MEDIAL BRANCH L3, L4, L5 #1;  Surgeon: Mary Hanley MD;  [facility]: [facility];  Service: Pain Management    • NERVE BLOCK Bilateral 02/16/2023    Procedure: BLOCK MEDIAL BRANCH L3, L4, L5 #2;  Surgeon: Mary Hanley MD;  [facility]: [facility];  Service: Pain Management    • [place] JOE IMPLTJ NSTIM ELTRDS PLATE/PADDLE EDRL Left 10/26/2021    Procedure: Thoracic laminectomies for placement of spinal cord stimulator and internal pulse generator, use of neuromonitoring, left sided pulse generator;  Surgeon: Shiv Watts MD;  Location: [facility];  Service: Neurosurgery   • [place] PRQ IMPLTJ NSTIM ELECTRODE ARRAY EPIDURAL Bilateral 09/30/2021    Procedure: Mylabryanverito SinghMichael SCS TRIAL;  Surgeon: Phani Rodriguez MD;  [facility]: [facility];  Service: Pain Management    • RHIZOTOMY Bilateral 09/15/2022    Procedure: RHIZOTOMY LUMBAR T12, L1, L2 medial branch nerves;  Surgeon: Phani Rodriguez MD;  [facility];  Service: Pain Management    • RHIZOTOMY Bilateral 03/02/2023    Procedure: RHIZOTOMY LUMBAR L3, L4, L5;  Surgeon: Phani Rodriguez MD;  [facility];  Service: Pain Management    • RHIZOTOMY Bilateral 03/16/2023    Procedure: RHIZOTOMY LUMBAR T10, T11, T12 medial branch nerves;  Surgeon: Phani Rodriguez MD;  [facility];  Service: Pain Management    • TOTAL KNEE ARTHROPLASTY Left       Family History:     Family History   Problem Relation Age of Onset   • Arthritis Mother    • Depression Mother    • Hypertension Father    • Alcohol abuse Father    • Diabetes Son         Aged 15      Social History:     Social History     Socioeconomic History   • Marital status: /Civil Union     Spouse name: None   • Number of children: None   • Years of education: None   • Highest education level: None   Occupational History   • None   Tobacco Use   • Smoking status: Never   • Smokeless tobacco: Current     Types: Snuff   • Tobacco comments:     still using snuff   Vaping Use   • Vaping Use: Never used   Substance and Sexual Activity   • Alcohol use:  Yes     Alcohol/week: 2.0 standard drinks of alcohol     Types: 2 Cans of beer per week     Comment: social, weekends   • Drug use: No   • Sexual activity: Yes     Partners: Female     Birth control/protection: Male Sterilization   Other Topics Concern   • None   Social History Narrative   • None     Social Determinants of Health     Financial Resource Strain: Low Risk  (10/3/2023)    Overall Financial Resource Strain (CARDIA)    • Difficulty of Paying Living Expenses: Not hard at all   Food Insecurity: Not on file   Transportation Needs: No Transportation Needs (10/3/2023)    PRAPARE - Transportation    • Lack of Transportation (Medical): No    • Lack of Transportation (Non-Medical): No   Physical Activity: Not on file   Stress: Not on file   Social Connections: Not on file   Intimate Partner Violence: Not on file   Housing Stability: Not on file      Medications and Allergies:     Current Outpatient Medications   Medication Sig Dispense Refill   • atorvastatin (LIPITOR) 20 mg tablet Take 1 tablet (20 mg total) by mouth daily 90 tablet 3   • Empagliflozin (Jardiance) 25 MG TABS Take 1 tablet (25 mg total) by mouth daily 90 tablet 3   • Fiber Select Gummies CHEW Chew 10grams per day     • LORazepam (ATIVAN) 0.5 mg tablet TAKE 1 TABLET BY MOUTH EVERY DAY AS NEEDED FOR ANXIETY 60 tablet 0   • semaglutide, 0.25 or 0.5 mg/dose, (Ozempic, 0.25 or 0.5 MG/DOSE,) 2 mg/3 mL injection pen Inject 0.75 mL (0.5 mg total) under the skin every 7 days 12 mL 0   • sertraline (ZOLOFT) 50 mg tablet Take 1.5 tablets (75 mg total) by mouth daily at bedtime 135 tablet 3   • valsartan (DIOVAN) 80 mg tablet Take 1 tablet (80 mg total) by mouth daily 90 tablet 3   • acetaminophen (TYLENOL) 500 mg tablet Take 1,000 mg by mouth every 6 (six) hours as needed for mild pain (Patient not taking: Reported on 7/5/2023)     • ergocalciferol (VITAMIN D2) 50,000 units 1 CAP BY MOUTH TWICE WEEKLY ON MONDAY AND THURSDAY. (Patient not taking: Reported on 10/3/2023)     • ERGOCALCIFEROL PO Take 50,000 Units by mouth 2 (two) times a week  (Patient not taking: Reported on 10/3/2023)       No current facility-administered medications for this visit.      No Known Allergies   Immunizations:     Immunization History   Administered Date(s) Administered   • H1N1, All Formulations 01/19/2010   • INFLUENZA 10/15/2007, 10/20/2008, 10/25/2010, 10/04/2021   • Pneumococcal Polysaccharide PPV23 12/23/2014, 03/27/2023   • Td (adult), adsorbed 05/20/1999   • Tdap 12/23/2014      Health Maintenance:         Topic Date Due   • Colorectal Cancer Screening  01/05/2026   • HIV Screening  Completed   • Hepatitis C Screening  Completed         Topic Date Due   • COVID-19 Vaccine (1) Never done   • Influenza Vaccine (1) 09/01/2023      Medicare Screening Tests and Risk Assessments:         Health Risk Assessment:   Patient rates overall health as good. Patient feels that their physical health rating is slightly better. Patient is satisfied with their life. Eyesight was rated as same. Hearing was rated as same. Patient feels that their emotional and mental health rating is much better. Patients states they are never, rarely angry. Patient states they are sometimes unusually tired/fatigued. Pain experienced in the last 7 days has been some. Patient's pain rating has been 6/10. Patient states that he has experienced no weight loss or gain in last 6 months. Fall Risk Screening: In the past year, patient has experienced: no history of falling in past year      Home Safety:  Patient does not have trouble with stairs inside or outside of their home. Patient has working smoke alarms and has working carbon monoxide detector. Home safety hazards include: none. Nutrition:   Current diet is Regular. Medications:   Patient is currently taking over-the-counter supplements. OTC medications include: Fiber gummies. Patient is able to manage medications. Activities of Daily Living (ADLs)/Instrumental Activities of Daily Living (IADLs):   Walk and transfer into and out of bed and chair?: Yes  Dress and groom yourself?: Yes    Bathe or shower yourself?: Yes    Feed yourself?  Yes  Do your laundry/housekeeping?: Yes  Manage your money, pay your bills and track your expenses?: Yes  Make your own meals?: Yes    Do your own shopping?: Yes    Durable Medical Equipment Suppliers  [employer]    Previous Hospitalizations:   Any hospitalizations or ED visits within the last 12 months?: No      Advance Care Planning:   Living will: Yes    Durable POA for healthcare: Yes    Advanced directive: Yes      Screening, Brief Intervention, and Referral to Treatment (SBIRT)    Screening  Typical number of drinks in a day: 0  Typical number of drinks in a week: 2  Interpretation: Low risk drinking behavior. AUDIT-C Screenin) How often did you have a drink containing alcohol in the past year? never  2) How many drinks did you have on a typical day when you were drinking in the past year? 0  3) How often did you have 6 or more drinks on one occasion in the past year? less than monthly    AUDIT-C Score: 1  Interpretation: Score 0-3 (male): Negative screen for alcohol misuse    Single Item Drug Screening:  How often have you used an illegal drug (including marijuana) or a prescription medication for non-medical reasons in the past year? never    Single Item Drug Screen Score: 0  Interpretation: Negative screen for possible drug use disorder    No results found.      Physical Exam:     /79 (BP Location: Left arm, Patient Position: Sitting, Cuff Size: Large)   Pulse 72   Temp (!) 97 °F (36.1 °C) (Tympanic)   Ht 6' 3" (1.905 m)   Wt (!) 161 kg (354 lb 15.1 oz)   SpO2 97%   BMI 44.36 kg/m²     Physical Exam     Lenore Luevano PA-C

## 2023-10-03 NOTE — PROGRESS NOTES
Assessment and Plan:     Problem List Items Addressed This Visit    None  Visit Diagnoses     Medicare annual wellness visit, initial    -  Primary    DM type 2, goal HbA1c 7%-8% ([facility])        Relevant Orders    Albumin / creatinine urine ratio    Basic metabolic panel    Hemoglobin A1C    Lipid Panel with Direct LDL reflex    Essential hypertension        Mixed hyperlipidemia        Relevant Orders    Lipid Panel with Direct LDL reflex    BMI 40.0-44.9, adult ([address])        Need for influenza vaccination        Relevant Orders    influenza vaccine, quadrivalent, 0.5 mL, preservative-free, for adult and pediatric patients 6 mos+ (AFLURIA, FLUARIX, FLULAVAL, FLUZONE) (Completed)      Visual acuity test: Left eye 20/25; right eye 20/40; both eyes 20/25. Patient underwent surgery on his back by Dr. Tyler Hendrix at [facility] in August.  The initial attempted surgery was complicated by an anesthesia event during which time the IV had not been patent and when it was flushed, patient received a great deal of his medications all at once. He ended up in the surgical ICU on August 9. He was monitored overnight and was well enough for surgery by Dr. Nahid Mendenhall the following day. After having his surgery, patient had immediate relief of pain in both lower legs along with improved function. He is not taking anything for his pain other than Tylenol. He continues to attend [facility] that physical therapy. He is walking on a treadmill which sometimes aggravates his low back pain. I had suggested the use of an elliptical machine rather than a treadmill so that it is not as harsh on his low back. We reviewed multiple labs. His hemoglobin A1c is 5.6 much improved over previous A1c which was 6.9. Vitamin D level was normal at 38.3. He is going to take 2000 mg of vitamin D daily along with 1200 mg of calcium. His GFR is 68.   We talked about how good blood pressure and good blood glucose control is going to slow the progression of any kidney disease. Patient is adherent with his Ozempic and Jardiance. Patient's 10-year ASCVD risk is 18% while taking 20 mg of atorvastatin. We are increasing his atorvastatin to 40 mg daily. He also takes his blood pressure medicine losartan at night in order to ensure a good nocturnal dip. We are going to recheck his hemoglobin A1c BMP urine microalbumin and lipid profile in 4 months when I see him. He will get the labs drawn 1 week before his next visit. A total of 50 minutes was spent rendering care for this patient. This time included review of the patient's electronic medical record, performing the history and physical, reviewing appropriate labs and/or images, developing a treatment and assessment plan, answering patient's questions and concerns, and documenting the patient visit. His blood pressure and blood sugars are under excellent control at this time. He has an eye visit coming up in November for his diabetes. He also sees a dentist every 6 months. Preventive health issues were discussed with patient, and age appropriate screening tests were ordered as noted in patient's After Visit Summary. Personalized health advice and appropriate referrals for health education or preventive services given if needed, as noted in patient's After Visit Summary. History of Present Illness:     Patient presents for a Medicare Wellness Visit    HPI patient recovering well from lumbar surgery by Dr. Jose David Gonzalez at [facility] in August.  Will attending physical therapy and slowly making progress. His stamina is improving. Leg weakness also improving. Still has days where he has pain but this responds to Tylenol alone. Going to physical therapy at [facility]. No pain in his chest heart palpitations dizziness or lightheadedness. No changes in his bowel or bladder habits. Using a cane for ambulation.   Patient Care Team:  Jenn Marshall as PCP - General (Physician Assistant)     Review of Systems:     Review of Systems no recent URI or viral syndrome. Has been doing a lot of babysitting of grandchildren which she really enjoys but would like to be a more active participant which will come with time. Mood is greatly improved. Sleeping better. Problem List:     Patient Active Problem List   Diagnosis   • Herniation of intervertebral disc of thoracic region   • Diabetic peripheral neuropathy (HCC)   • Morbid obesity ([address])   • Cervical spinal cord compression (HCC)   • Mild depression   • Major depressive disorder, single episode, mild (HCC)   • Thoracic spondylosis   • Lumbar spondylosis   • Spinal muscular atrophy, unspecified (HCC)   • Neurogenic claudication due to lumbar spinal stenosis   • Muscular atrophy   • Other spondylosis with myelopathy, cervical region   • Radiculopathy   • Myelomalacia of cervical cord (HCC)   • CPAP (continuous positive airway pressure) dependence      Past Medical and Surgical History:     Past Medical History:   Diagnosis Date   • Anxiety    • Arthritis    • Chronic pain disorder     mid back region   • Colon polyp    • COVID-19    • CPAP (continuous positive airway pressure) dependence     Pt uses nightly   • Diabetes mellitus ([address])    • Diverticulitis of colon 3 years ago    No issues   • Diverticulosis    • History of recent hospitalization 06/16/2021    Pt was admitted to [facility] after syncopal episode. Pt saw cardiology- all tests negative. Pt had nl EEG. pt states is was a medication interaction. • Hyperlipidemia    • Hypertension    • Obesity    • Sleep apnea    • Spinal stenosis    • Wears hearing aid      Past Surgical History:   Procedure Laterality Date   • ACHILLES TENDON REPAIR      Pt had a rupture in right and left 2 years apart   • COLONOSCOPY     • EPIDURAL BLOCK INJECTION      Pt had in lumbar region.    • EPIDURAL BLOCK INJECTION N/A 04/06/2021    Procedure: T-11-T-12 INTERLAMINAR THORACIC EPIDURAL STEROID INJECTION; Surgeon: Mary Hanley MD;  [facility]: [facility];  Service: Pain Management    • EPIDURAL BLOCK INJECTION Right 07/20/2021    Procedure: L5 and S1 TRANSFORAMINAL EPIDURAL STEROID INJECTION;  Surgeon: Mary Hanley MD;  [facility]: [facility];  Service: Pain Management    • FL GUIDED NEEDLE PLAC BX/ASP/INJ  07/14/2022   • FL GUIDED NEEDLE PLAC BX/ASP/INJ  08/18/2022   • FL GUIDED NEEDLE PLAC BX/ASP/INJ  09/15/2022   • FOOT SURGERY Left     Left great toe- had a hammertoe.    • HIP SURGERY      Replaced   • JOINT REPLACEMENT Left     Total hip, knee   • JOINT REPLACEMENT Right     Total hip, knee   • KNEE ARTHROSCOPY Bilateral    • NERVE BLOCK Bilateral 07/14/2022    Procedure: BLOCK MEDIAL BRANCH T12, L1, L2;  Surgeon: Mary Hanley MD;  [facility]: [facility];  Service: Pain Management    • NERVE BLOCK Bilateral 08/18/2022    Procedure: BLOCK MEDIAL BRANCH T12, L1, L2 #2;  Surgeon: Mary Hanley MD;  [facility];  Service: Pain Management    • NERVE BLOCK Bilateral 01/26/2023    Procedure: BLOCK MEDIAL BRANCH L3, L4, L5 #1;  Surgeon: Mary Hanley MD;  [facility];  Service: Pain Management    • NERVE BLOCK Bilateral 02/16/2023    Procedure: BLOCK MEDIAL BRANCH L3, L4, L5 #2;  Surgeon: Mary Hanley MD;  [facility]: [facility];  Service: Pain Management    • [place] JOE IMPLTJ NSTIM ELTRDS PLATE/PADDLE EDRL Left 10/26/2021    Procedure: Thoracic laminectomies for placement of spinal cord stimulator and internal pulse generator, use of neuromonitoring, left sided pulse generator;  Surgeon: Adiel Trevizo MD;  Location:  [facility];  Service: Neurosurgery   • [place] PRQ [address] NSTIM ELECTRODE ARRAY EPIDURAL Bilateral 09/30/2021    Procedure: López Jay SCS TRIAL;  Surgeon: Mary Hanley MD;  [facility]: [facility];  Service: Pain Management    • RHIZOTOMY Bilateral 09/15/2022    Procedure: RHIZOTOMY LUMBAR T12, L1, L2 medial branch nerves;  Surgeon: Mary Hanley MD;  [facility];  Service: Pain Management    • RHIZOTOMY Bilateral 03/02/2023    Procedure: RHIZOTOMY LUMBAR L3, L4, L5;  Surgeon: Alida Mccarty MD;  [facility];  Service: Pain Management    • RHIZOTOMY Bilateral 03/16/2023    Procedure: RHIZOTOMY LUMBAR T10, T11, T12 medial branch nerves;  Surgeon: Alida Mccarty MD;  [facility];  Service: Pain Management    • TOTAL KNEE ARTHROPLASTY Left       Family History:     Family History   Problem Relation Age of Onset   • Arthritis Mother    • Depression Mother    • Hypertension Father    • Alcohol abuse Father    • Diabetes Son         Aged 15      Social History:     Social History     Socioeconomic History   • Marital status: /Civil Union     Spouse name: None   • Number of children: None   • Years of education: None   • Highest education level: None   Occupational History   • None   Tobacco Use   • Smoking status: Never   • Smokeless tobacco: Current     Types: Snuff   • Tobacco comments:     still using snuff   Vaping Use   • Vaping Use: Never used   Substance and Sexual Activity   • Alcohol use: Yes     Alcohol/week: 2.0 standard drinks of alcohol     Types: 2 Cans of beer per week     Comment: social, weekends   • Drug use: No   • Sexual activity: Yes     Partners: Female     Birth control/protection: Male Sterilization   Other Topics Concern   • None   Social History Narrative   • None     Social Determinants of Health     Financial Resource Strain: Low Risk  (10/3/2023)    Overall Financial Resource Strain (CARDIA)    • Difficulty of Paying Living Expenses: Not hard at all   Food Insecurity: Not on file   Transportation Needs: No Transportation Needs (10/3/2023)    PRAPARE - Transportation    • Lack of Transportation (Medical): No    • Lack of Transportation (Non-Medical):  No   Physical Activity: Not on file   Stress: Not on file   Social Connections: Not on file   Intimate Partner Violence: Not on file   Housing Stability: Not on file      Medications and Allergies: Current Outpatient Medications   Medication Sig Dispense Refill   • atorvastatin (LIPITOR) 20 mg tablet Take 1 tablet (20 mg total) by mouth daily 90 tablet 3   • Empagliflozin (Jardiance) 25 MG TABS Take 1 tablet (25 mg total) by mouth daily 90 tablet 3   • Fiber Select Gummies CHEW Chew 10grams per day     • LORazepam (ATIVAN) 0.5 mg tablet TAKE 1 TABLET BY MOUTH EVERY DAY AS NEEDED FOR ANXIETY 60 tablet 0   • semaglutide, 0.25 or 0.5 mg/dose, (Ozempic, 0.25 or 0.5 MG/DOSE,) 2 mg/3 mL injection pen Inject 0.75 mL (0.5 mg total) under the skin every 7 days 12 mL 0   • sertraline (ZOLOFT) 50 mg tablet Take 1.5 tablets (75 mg total) by mouth daily at bedtime 135 tablet 3   • valsartan (DIOVAN) 80 mg tablet Take 1 tablet (80 mg total) by mouth daily 90 tablet 3     No current facility-administered medications for this visit. No Known Allergies   Immunizations:     Immunization History   Administered Date(s) Administered   • H1N1, All Formulations 01/19/2010   • INFLUENZA 10/15/2007, 10/20/2008, 10/25/2010, 10/04/2021   • Influenza, injectable, quadrivalent, preservative free 0.5 mL 10/03/2023   • Pneumococcal Polysaccharide PPV23 12/23/2014, 03/27/2023   • Td (adult), adsorbed 05/20/1999   • Tdap 12/23/2014      Health Maintenance:         Topic Date Due   • Colorectal Cancer Screening  01/05/2026   • HIV Screening  Completed   • Hepatitis C Screening  Completed         Topic Date Due   • COVID-19 Vaccine (1) Never done      Medicare Screening Tests and Risk Assessments:     Annual Wellness Visit  No results found.      Physical Exam:     /79 (BP Location: Left arm, Patient Position: Sitting, Cuff Size: Large)   Pulse 72   Temp (!) 97 °F (36.1 °C) (Tympanic)   Ht 6' 3" (1.905 m)   Wt (!) 161 kg (354 lb 15.1 oz)   SpO2 97%   BMI 44.36 kg/m²     Physical Exam well-developed well-nourished 61-year-old male who is alert oriented and cooperative with exam.  Patient's heart is regular rate without murmur rub or gallops. Lungs are clear to auscultation. Patient did receive his flu shot today while in the office.     Lotus Barragan PA-C

## 2023-10-03 NOTE — PATIENT INSTRUCTIONS
Patient's vitamin D level is normal at 38. The amount of vitamin D that he needs is 2000 mg/day along with 1200 mg of calcium. The best calcium to take is calcium citrate because it will be absorbed independent of the amount of acid in his stomach. Type 2 Diabetes Management for Adults   AMBULATORY CARE:   Type 2 diabetes  is a disease that affects how your body uses glucose (sugar). Either your body cannot make enough insulin, or it cannot use the insulin correctly. It is important to keep diabetes controlled to prevent damage to your heart, blood vessels, and other organs. Management will help you feel well and enjoy your daily activities. Your diabetes care team providers can help you make a plan to fit diabetes care into your schedule. Your plan can change over time to fit your needs and your family's needs. Have someone call your local emergency number (911 in the [address]) if:   • You cannot be woken. • You have signs of diabetic ketoacidosis:     ? confusion, fatigue    ? vomiting    ? rapid heartbeat    ? fruity smelling breath    ? extreme thirst    ? dry mouth and skin    • You have any of the following signs of a heart attack:      ? Squeezing, pressure, or pain in your chest    ? You may  also have any of the following:     - Discomfort or pain in your back, neck, jaw, stomach, or arm    - Shortness of breath    - Nausea or vomiting    - Lightheadedness or a sudden cold sweat    • You have any of the following signs of a stroke:      ? Numbness or drooping on one side of your face     ? Weakness in an arm or leg    ? Confusion or difficulty speaking    ? Dizziness, a severe headache, or vision loss    Call your doctor or diabetes care team provider if:   • You have a sore or wound that will not heal.    • You have a change in the amount you urinate. • Your blood sugar levels are higher than your target goals. • You often have lower blood sugar levels than your target goals.     • Your skin is red, dry, warm, or swollen. • You have trouble coping with diabetes, or you feel anxious or depressed. • You have trouble following any part of your care plan, such as your meal plan. • You have questions or concerns about your condition or care. What you need to know about high blood sugar levels:  High blood sugar levels may not cause any symptoms. You may feel more thirsty or urinate more often than usual. Over time, high blood sugar levels can damage your nerves, blood vessels, tissues, and organs. The following can increase your blood sugar levels:  • Large meals or large amounts of carbohydrates at one time    • Less physical activity    • Stress    • Illness    • A lower dose of diabetes medicine or insulin, or a late dose    What you need to know about low blood sugar levels:  Symptoms include feeling shaky, dizzy, irritable, or confused. You can prevent symptoms by keeping your blood sugar levels from going too low. • Treat a low blood sugar level right away:      ? Drink 4 ounces of juice or have 1 tube of glucose gel. ? Check your blood sugar level again 10 to 15 minutes later. ? When the level goes back to normal, eat a meal or snack to prevent another decrease. • Keep glucose gel, raisins, or hard candy with you at all times to treat a low blood sugar level. • Your blood sugar level can get too low if you take diabetes medicine or insulin and do not eat enough food. • If you use insulin, check your blood sugar level before you exercise. ? If your blood sugar level is below 100 mg/dL, eat 4 crackers or 2 ounces of raisins, or drink 4 ounces of juice. ? Check your level every 30 minutes if you exercise longer than 1 hour. ? You may need a snack during or after exercise. What you can do to manage your blood sugar levels:   • Check your blood sugar levels as directed and as needed. Several items are available to use to check your levels.  You may need to check by testing a drop of blood in a glucose monitor. You may instead be given a continuous glucose monitoring (CGM) device. The device is worn at all times. The CGM checks your blood sugar level every 5 minutes. It sends results to an electronic device such as a smart phone. A CGM can be used with or without an insulin pump. You and your diabetes care team providers will decide on the best method for you. The goal for blood sugar levels before meals  is between 80 and 130 mg/dL and 2 hours after eating  is lower than 180 mg/dL. • Make healthy food choices. Work with a dietitian to create a meal plan that works for you and your schedule. A dietitian can help you learn how to eat the right amount of carbohydrates (sugar and starchy foods) during your meals and snacks. Examples of carbohydrates are breads, cereals, rice, pasta, fruit, low-fat dairy, and sweets. Carbohydrates can raise your blood sugar level if you eat too many at one time. • Eat high-fiber foods as directed. Fiber helps improve blood sugar levels. Fiber also lowers your risk for heart disease and other problems diabetes can cause. Examples of high-fiber foods include vegetables, whole-grain bread, and beans such as altman beans. Your dietitian can tell you how much fiber to have each day. • Get regular physical activity. Physical activity can help you get to your target blood sugar level goal and manage your weight. Get at least 150 minutes of moderate to vigorous aerobic physical activity each week. Resistance training, such as lifting weights, should be done 3 times each week. Do not miss more than 2 days of physical activity in a row. Do not sit longer than 30 minutes at a time. Your healthcare provider can help you create an activity plan. The plan can include the best activities for you and can help you build your strength and endurance. • Maintain a healthy weight.   Ask your team what a healthy weight is for you. A healthy weight can help you control diabetes and prevent heart disease. Ask your team to help you create a weight-loss plan, if needed. Even a loss of 3% to 7% of your excess body weight can help make a difference in managing diabetes. Your team will help you set a weight-loss goal, such as 10 to 15 pounds, or 5% of your extra weight. Together you and your team can set manageable weight-loss goals. • Take your diabetes medicine or insulin as directed. You may need diabetes medicine, insulin, or both to help control your blood sugar levels. Your healthcare provider will teach you how and when to take your diabetes medicine or insulin. You will also be taught about side effects oral diabetes medicine can cause. Insulin may be injected or given through a pump or pen. You and your providers will decide on the best method for you:    ? An insulin pump  is an implanted device that gives your insulin 24 hours a day. An insulin pump prevents the need for multiple insulin injections in a day. ? An insulin pen  is a device prefilled with the right amount of insulin. ? You and your family members will be taught how to draw up and give insulin  if this is the best method for you. Your providers will also teach you how to dispose of needles and syringes. ? You will learn how much insulin you need  and when to give it. You will be taught when not to give insulin. You will also be taught what to do if your blood sugar level drops too low. This may happen if you take insulin and do not eat the right amount of carbohydrates. More ways to manage type 2 diabetes:   • Wear medical alert identification. Wear medical alert jewelry or carry a card that says you have diabetes. Ask your provider where to get these items. • Do not smoke. Nicotine and other chemicals in cigarettes and cigars can cause lung and blood vessel damage. It also makes it more difficult to manage your diabetes.  Ask your provider for information if you currently smoke and need help to quit. Do not use e-cigarettes or smokeless tobacco in place of cigarettes or to help you quit. They still contain nicotine. • Check your feet each day for cuts, scratches, calluses, or other wounds. Look for redness and swelling, and feel for warmth. Wear shoes that fit well. Check your shoes for rocks or other objects that can hurt your feet. Do not walk barefoot or wear shoes without socks. Wear cotton socks to help keep your feet dry. • Ask about vaccines you may need. You have a higher risk for serious illness if you get the flu, pneumonia, COVID-19, or hepatitis. Ask your provider if you should get vaccines to prevent these or other diseases, and when to get the vaccines. • Talk to your provider if you become stressed about diabetes care. Sometimes being able to fit diabetes care into your life can cause increased stress. The stress can cause you not to take care of yourself properly. Your provider can help by offering tips about self-care. A mental health provider can listen and offer help with self-care issues. Other types of counseling can help you make nutrition or physical activity changes. • Have your A1c checked as directed. Your provider may check your A1c every 3 months, or 2 times each year if your diabetes is controlled. An A1c test shows the average amount of sugar in your blood over the past 2 to 3 months. Your provider will tell you what your A1c level should be. • Have screening tests as directed. Your provider may recommend screening for complications of diabetes and other conditions that may develop. Some screenings may begin right away and some may happen within the first 5 years of diagnosis:    ? Examples of diabetes complications  include kidney problems, high cholesterol, high blood pressure, blood vessel problems, eye problems, and sleep apnea. ?  You may be screened for a low vitamin B level  if you take oral diabetes medicine for a long time. ? You may be screened for polycystic ovarian syndrome (PCOS)  if you are of childbearing age. Follow up with your doctor or diabetes care team providers as directed: You may need to have blood tests done before your follow-up visit. The test results will show if changes need to be made in your treatment or self-care. Talk to your provider if you cannot afford your medicine. Write down your questions so you remember to ask them during your visits. © Copyright [employer] 2023 Information is for End User's use only and may not be sold, redistributed or otherwise used for commercial purposes. The above information is an  only. It is not intended as medical advice for individual conditions or treatments. Talk to your doctor, nurse or pharmacist before following any medical regimen to see if it is safe and effective for you. Medicare Preventive Visit Patient Instructions  Thank you for completing your Welcome to Medicare Visit or Medicare Annual Wellness Visit today. Your next wellness visit will be due in one year (10/3/2024). The screening/preventive services that you may require over the next 5-10 years are detailed below. Some tests may not apply to you based off risk factors and/or age. Screening tests ordered at today's visit but not completed yet may show as past due. Also, please note that scanned in results may not display below.   Preventive Screenings:  Service Recommendations Previous Testing/Comments   Colorectal Cancer Screening  · Colonoscopy    · Fecal Occult Blood Test (FOBT)/Fecal Immunochemical Test (FIT)  · Fecal DNA/Cologuard Test  · Flexible Sigmoidoscopy Age: 43-73 years old   Colonoscopy: every 10 years (May be performed more frequently if at higher risk)  OR  FOBT/FIT: every 1 year  OR  Cologuard: every 3 years  OR  Sigmoidoscopy: every 5 years  Screening may be recommended earlier than age 39 if at higher risk for colorectal cancer. Also, an individualized decision between you and your healthcare provider will decide whether screening between the ages of 77-80 would be appropriate. Colonoscopy: 01/05/2023  FOBT/FIT: Not on file  Cologuard: Not on file  Sigmoidoscopy: Not on file          Prostate Cancer Screening Individualized decision between patient and health care provider in men between ages of 53-66   Medicare will cover every 12 months beginning on the day after your 50th birthday PSA: No results in last 5 years           Hepatitis C Screening Once for adults born between 1945 and 1965  More frequently in patients at high risk for Hepatitis C Hep C Antibody: 09/29/2023        Diabetes Screening 1-2 times per year if you're at risk for diabetes or have pre-diabetes Fasting glucose: 98 mg/dL (9/27/2021)  A1C: 5.6 % (9/29/2023)      Cholesterol Screening Once every 5 years if you don't have a lipid disorder. May order more often based on risk factors. Lipid panel: 09/29/2023         Other Preventive Screenings Covered by Medicare:  1. Abdominal Aortic Aneurysm (AAA) Screening: covered once if your at risk. You're considered to be at risk if you have a family history of AAA or a male between the age of 70-76 who smoking at least 100 cigarettes in your lifetime. 2. Lung Cancer Screening: covers low dose CT scan once per year if you meet all of the following conditions: (1) Age 48-67; (2) No signs or symptoms of lung cancer; (3) Current smoker or have quit smoking within the last 15 years; (4) You have a tobacco smoking history of at least 20 pack years (packs per day x number of years you smoked); (5) You get a written order from a healthcare provider. 3. Glaucoma Screening: covered annually if you're considered high risk: (1) You have diabetes OR (2) Family history of glaucoma OR (3)  aged 48 and older OR (3)  American aged 72 and older  3.  Osteoporosis Screening: covered every 2 years if you meet one of the following conditions: (1) Have a vertebral abnormality; (2) On glucocorticoid therapy for more than 3 months; (3) Have primary hyperparathyroidism; (4) On osteoporosis medications and need to assess response to drug therapy. 5. HIV Screening: covered annually if you're between the age of 14-79. Also covered annually if you are younger than 13 and older than 72 with risk factors for HIV infection. For pregnant patients, it is covered up to 3 times per pregnancy. Immunizations:  Immunization Recommendations   Influenza Vaccine Annual influenza vaccination during flu season is recommended for all persons aged >= 6 months who do not have contraindications   Pneumococcal Vaccine   * Pneumococcal conjugate vaccine = PCV13 (Prevnar 13), PCV15 (Vaxneuvance), PCV20 (Prevnar 20)  * Pneumococcal polysaccharide vaccine = PPSV23 (Pneumovax) Adults 20-63 years old: 1-3 doses may be recommended based on certain risk factors  Adults 72 years old: 1-2 doses may be recommended based off what pneumonia vaccine you previously received   Hepatitis B Vaccine 3 dose series if at intermediate or high risk (ex: diabetes, end stage renal disease, liver disease)   Tetanus (Td) Vaccine - COST NOT COVERED BY MEDICARE PART B Following completion of primary series, a booster dose should be given every 10 years to maintain immunity against tetanus. Td may also be given as tetanus wound prophylaxis. Tdap Vaccine - COST NOT COVERED BY MEDICARE PART B Recommended at least once for all adults. For pregnant patients, recommended with each pregnancy.    Shingles Vaccine (Shingrix) - COST NOT COVERED BY MEDICARE PART B  2 shot series recommended in those aged 48 and above     Health Maintenance Due:      Topic Date Due   • Colorectal Cancer Screening  01/05/2026   • HIV Screening  Completed   • Hepatitis C Screening  Completed     Immunizations Due:      Topic Date Due   • COVID-19 Vaccine (1) Never done     Advance Directives   What are advance directives? Advance directives are legal documents that state your wishes and plans for medical care. These plans are made ahead of time in case you lose your ability to make decisions for yourself. Advance directives can apply to any medical decision, such as the treatments you want, and if you want to donate organs. What are the types of advance directives? There are many types of advance directives, and each state has rules about how to use them. You may choose a combination of any of the following:  · Living will: This is a written record of the treatment you want. You can also choose which treatments you do not want, which to limit, and which to stop at a certain time. This includes surgery, medicine, IV fluid, and tube feedings. · Durable power of  for healthcare [facility]): This is a written record that states who you want to make healthcare choices for you when you are unable to make them for yourself. This person, called a proxy, is usually a family member or a friend. You may choose more than 1 proxy. · Do not resuscitate (DNR) order:  A DNR order is used in case your heart stops beating or you stop breathing. It is a request not to have certain forms of treatment, such as CPR. A DNR order may be included in other types of advance directives. · Medical directive: This covers the care that you want if you are in a coma, near death, or unable to make decisions for yourself. You can list the treatments you want for each condition. Treatment may include pain medicine, surgery, blood transfusions, dialysis, IV or tube feedings, and a ventilator (breathing machine). · Values history: This document has questions about your views, beliefs, and how you feel and think about life. This information can help others choose the care that you would choose. Why are advance directives important? An advance directive helps you control your care.  Although spoken wishes may be used, it is better to have your wishes written down. Spoken wishes can be misunderstood, or not followed. Treatments may be given even if you do not want them. An advance directive may make it easier for your family to make difficult choices about your care. How to Quit Using Smokeless Tobacco   Why it is important to stop using smokeless tobacco:  Smokeless tobacco comes in many forms. Examples include chew, snuff, dip, dissolvable tobacco, and snus. All smokeless tobacco products contain nicotine and may contain as much nicotine as 3 cigarettes. You may be physically dependent on nicotine. You may also be emotionally addicted to it. The cravings can be strong, but it is important to quit using smokeless tobacco. You will improve your health and decrease your cancer, stroke, and heart attack risk. Mouth sores and tooth problems will also improve when you quit. You can benefit from quitting no matter how long you have used smokeless tobacco.   Prepare to stop using smokeless tobacco:  Nicotine is a highly addictive drug. Withdrawal symptoms can happen when you stop and make it hard to quit. The following can help keep you on track:  · Set a quit date. · Tell friends, family, and coworkers that you plan to quit. · Remove all smokeless tobacco products from your home, car, and workplace. Manage weight gain after you quit:  Nicotine can affect your metabolism. You may gain a few pounds after you quit. The following can help you control your weight:  · Eat healthy foods. · Drink water before, during, and between meals. · Exercise as directed. Weight Management   Why it is important to manage your weight:  Being overweight increases your risk of health conditions such as heart disease, high blood pressure, type 2 diabetes, and certain types of cancer. It can also increase your risk for osteoarthritis, sleep apnea, and other respiratory problems. Aim for a slow, steady weight loss.  Even a small amount of weight loss can lower your risk of health problems. How to lose weight safely:  A safe and healthy way to lose weight is to eat fewer calories and get regular exercise. You can lose up about 1 pound a week by decreasing the number of calories you eat by 500 calories each day. Healthy meal plan for weight management:  A healthy meal plan includes a variety of foods, contains fewer calories, and helps you stay healthy. A healthy meal plan includes the following:  · Eat whole-grain foods more often. A healthy meal plan should contain fiber. Fiber is the part of grains, fruits, and vegetables that is not broken down by your body. Whole-grain foods are healthy and provide extra fiber in your diet. Some examples of whole-grain foods are whole-wheat breads and pastas, oatmeal, brown rice, and bulgur. · Eat a variety of vegetables every day. Include dark, leafy greens such as spinach, kale, natacha greens, and mustard greens. Eat yellow and orange vegetables such as carrots, sweet potatoes, and winter squash. · Eat a variety of fruits every day. Choose fresh or canned fruit (canned in its own juice or light syrup) instead of juice. Fruit juice has very little or no fiber. · Eat low-fat dairy foods. Drink fat-free (skim) milk or 1% milk. Eat fat-free yogurt and low-fat cottage cheese. Try low-fat cheeses such as mozzarella and other reduced-fat cheeses. · Choose meat and other protein foods that are low in fat. Choose beans or other legumes such as split peas or lentils. Choose fish, skinless poultry (chicken or turkey), or lean cuts of red meat (beef or pork). Before you cook meat or poultry, cut off any visible fat. · Use less fat and oil. Try baking foods instead of frying them. Add less fat, such as margarine, sour cream, regular salad dressing and mayonnaise to foods. Eat fewer high-fat foods. Some examples of high-fat foods include french fries, doughnuts, ice cream, and cakes. · Eat fewer sweets.   Limit foods and drinks that are high in sugar. This includes candy, cookies, regular soda, and sweetened drinks. Exercise:  Exercise at least 30 minutes per day on most days of the week. Some examples of exercise include walking, biking, dancing, and swimming. You can also fit in more physical activity by taking the stairs instead of the elevator or parking farther away from stores. Ask your healthcare provider about the best exercise plan for you. © Copyright [employer] 2018 Information is for End User's use only and may not be sold, redistributed or otherwise used for commercial purposes.  All illustrations and images included in CareNotes® are the copyrighted property of [employer] or  [address]

## 2023-10-27 DIAGNOSIS — E78.2 MIXED HYPERLIPIDEMIA: ICD-10-CM

## 2023-10-30 DIAGNOSIS — E78.2 MIXED HYPERLIPIDEMIA: ICD-10-CM

## 2023-10-30 RX ORDER — ATORVASTATIN CALCIUM 10 MG/1
TABLET, FILM COATED ORAL
Qty: 135 TABLET | Refills: 3 | Status: SHIPPED | OUTPATIENT
Start: 2023-10-30 | End: 2023-10-31 | Stop reason: SDUPTHER

## 2023-10-30 RX ORDER — ATORVASTATIN CALCIUM 20 MG/1
20 TABLET, FILM COATED ORAL DAILY
Qty: 90 TABLET | Refills: 0 | Status: SHIPPED | OUTPATIENT
Start: 2023-10-30 | End: 2023-10-30 | Stop reason: SDUPTHER

## 2023-10-31 DIAGNOSIS — E78.2 MIXED HYPERLIPIDEMIA: ICD-10-CM

## 2023-10-31 RX ORDER — FLUVASTATIN SODIUM 80 MG/1
TABLET, FILM COATED, EXTENDED RELEASE ORAL
Refills: 0 | OUTPATIENT
Start: 2023-10-31

## 2023-10-31 RX ORDER — ATORVASTATIN CALCIUM 20 MG/1
TABLET, FILM COATED ORAL
Qty: 45 TABLET | Refills: 3 | Status: SHIPPED | OUTPATIENT
Start: 2023-10-31 | End: 2023-12-04 | Stop reason: SDUPTHER

## 2023-10-31 RX ORDER — ATORVASTATIN CALCIUM 10 MG/1
TABLET, FILM COATED ORAL
Qty: 45 TABLET | Refills: 3 | Status: SHIPPED | OUTPATIENT
Start: 2023-10-31 | End: 2023-12-04 | Stop reason: SDUPTHER

## 2023-11-05 DIAGNOSIS — F41.1 GAD (GENERALIZED ANXIETY DISORDER): ICD-10-CM

## 2023-11-06 RX ORDER — LORAZEPAM 0.5 MG/1
0.5 TABLET ORAL DAILY PRN
Qty: 60 TABLET | Refills: 0 | Status: SHIPPED | OUTPATIENT
Start: 2023-11-06

## 2023-11-10 ENCOUNTER — OFFICE VISIT (OUTPATIENT)
Dept: FAMILY MEDICINE CLINIC | Facility: CLINIC | Age: 63
End: 2023-11-10
Payer: COMMERCIAL

## 2023-11-10 VITALS
DIASTOLIC BLOOD PRESSURE: 75 MMHG | SYSTOLIC BLOOD PRESSURE: 137 MMHG | WEIGHT: 315 LBS | OXYGEN SATURATION: 97 % | HEART RATE: 87 BPM | BODY MASS INDEX: 39.17 KG/M2 | HEIGHT: 75 IN

## 2023-11-10 DIAGNOSIS — M54.50 ACUTE LOW BACK PAIN, UNSPECIFIED BACK PAIN LATERALITY, UNSPECIFIED WHETHER SCIATICA PRESENT: Primary | ICD-10-CM

## 2023-11-10 DIAGNOSIS — N23 URETERAL COLIC: ICD-10-CM

## 2023-11-10 DIAGNOSIS — N52.01 ERECTILE DYSFUNCTION DUE TO ARTERIAL INSUFFICIENCY: ICD-10-CM

## 2023-11-10 LAB
SL AMB  POCT GLUCOSE, UA: ABNORMAL
SL AMB LEUKOCYTE ESTERASE,UA: ABNORMAL
SL AMB POCT BILIRUBIN,UA: ABNORMAL
SL AMB POCT BLOOD,UA: ABNORMAL
SL AMB POCT CLARITY,UA: CLEAR
SL AMB POCT COLOR,UA: ABNORMAL
SL AMB POCT KETONES,UA: ABNORMAL
SL AMB POCT NITRITE,UA: ABNORMAL
SL AMB POCT PH,UA: 5
SL AMB POCT SPECIFIC GRAVITY,UA: 1.02
SL AMB POCT URINE PROTEIN: ABNORMAL
SL AMB POCT UROBILINOGEN: NORMAL

## 2023-11-10 PROCEDURE — 81002 URINALYSIS NONAUTO W/O SCOPE: CPT | Performed by: PHYSICIAN ASSISTANT

## 2023-11-10 PROCEDURE — 99214 OFFICE O/P EST MOD 30 MIN: CPT | Performed by: PHYSICIAN ASSISTANT

## 2023-11-10 RX ORDER — TADALAFIL 5 MG/1
5 TABLET ORAL DAILY PRN
Qty: 10 TABLET | Refills: 3 | Status: SHIPPED | OUTPATIENT
Start: 2023-11-10

## 2023-11-10 NOTE — PROGRESS NOTES
Assessment/Plan:       1. Acute low back pain, unspecified back pain laterality, unspecified whether sciatica present  -     POCT urine dip    2. Ureteral colic    3. Erectile dysfunction due to arterial insufficiency  -     tadalafil (CIALIS) 5 MG tablet; Take 1 tablet (5 mg total) by mouth daily as needed for erectile dysfunction for up to 40 doses     This 52-year-old male sees me for his primary care services. Patient notified me 2 days ago with left flank pain radiating into the anterior abdominal wall and into the left groin. Patient states that he had passed some flecks of material looking similar to back at 2. He had had kidney stones in the past but never analyzed. Patient is going to continue to strain his urine with coffee filters and bring in any solid material for analysis. Patient feels as if the pain is less than it had been. No fever or chills. No known metabolic issues that would contribute to the formation of the stone. We talked about the important role of hydration regardless of the type of kidney stone. Urinalysis in the office showed large amount of glucose in his urine. This is secondary to the use of Jardiance so the Tosin Juniorn is actually doing its job and getting sugar out of the body. Patient is having some erectile dysfunction. He is going to try Cialis and we talked about the mechanism of action and activity of this medication. A total of 38 minutes was spent rendering care for this patient. This time included review of the patient's electronic medical record, performing the history and physical, reviewing appropriate labs and/or images, developing a treatment and assessment plan, answering patient's questions and concerns, and documenting the patient visit. I will see the patient in February for his follow-up appointment for his diabetic care. Subjective:      Patient ID: Fahad Diana is a 59 y.o. male.     HPI: 52-year-old male that has ureteral colic symptoms. Symptoms are mildly improving. He is going to continue straining his urine. Pain began in the left flank radiating to the left anterior abdominal wall and into the left groin. No testicular pain. No fever or chills. No changes in his bowel habits. The following portions of the patient's history were reviewed and updated as appropriate: allergies, current medications, past family history, past medical history, past social history, past surgical history, and problem list.    Review of Systems no recent URI or viral syndrome. Objective:      /75 (BP Location: Right arm, Patient Position: Sitting, Cuff Size: Large)   Pulse 87   Ht 6' 3" (1.905 m)   Wt (!) 160 kg (353 lb 13.4 oz)   SpO2 97%   BMI 44.23 kg/m²          Physical Exam vital signs reviewed. Normotensive. Afebrile. Patient's heart is regular rate without murmur rub or gallops. Lungs are clear to auscultation. He has no flank tenderness on palpation. Anterior abdomen is without tenderness on palpation.

## 2023-11-14 ENCOUNTER — TELEPHONE (OUTPATIENT)
Dept: ADMINISTRATIVE | Facility: OTHER | Age: 63
End: 2023-11-14

## 2023-11-14 NOTE — TELEPHONE ENCOUNTER
Upon review of the In Basket request we have found/obtained the documentation. After careful review of the document we are unable to complete this request for Diabetic Eye Exam because the documentation does not have the proper verbiage (wording) needed to close the requested care gap(s). Any additional questions or concerns should be emailed to the Practice Liaisons via the appropriate education email address, please do not reply via In Basket.     Thank you  Nadir Vargas

## 2023-11-14 NOTE — TELEPHONE ENCOUNTER
----- Message from [facility] sent at 11/14/2023 11:57 AM EST -----  Regarding: Care Gap Request: Diabetic Eye Exam  11/14/23 11:57 AM    Hello, our patient attached above has had Diabetic Eye Exam completed/performed. Please assist in updating the patient chart by pulling the Care Everywhere (CE) document. The date of service is 4/11/2023, PDF attached inside encounter.      Thank you,  Dolores [facility]

## 2023-11-20 NOTE — TELEPHONE ENCOUNTER
Spoke with patient and his spouse  Patient states Dr Marck Valles is suggesting an RIGO  Scheduled patient for f/u with Dr Roosevelt Marie to discuss 1/20/2023  No

## 2023-11-30 DIAGNOSIS — N52.01 ERECTILE DYSFUNCTION DUE TO ARTERIAL INSUFFICIENCY: ICD-10-CM

## 2023-11-30 RX ORDER — TADALAFIL 10 MG/1
TABLET ORAL
Qty: 30 TABLET | Refills: 1 | Status: SHIPPED | OUTPATIENT
Start: 2023-11-30 | End: 2023-12-04 | Stop reason: SDUPTHER

## 2023-12-04 DIAGNOSIS — E11.42 DIABETIC PERIPHERAL NEUROPATHY (HCC): ICD-10-CM

## 2023-12-04 DIAGNOSIS — E78.2 MIXED HYPERLIPIDEMIA: ICD-10-CM

## 2023-12-04 DIAGNOSIS — I10 ESSENTIAL HYPERTENSION: ICD-10-CM

## 2023-12-04 DIAGNOSIS — F41.1 GAD (GENERALIZED ANXIETY DISORDER): ICD-10-CM

## 2023-12-04 DIAGNOSIS — M15.9 PRIMARY OSTEOARTHRITIS INVOLVING MULTIPLE JOINTS: Primary | ICD-10-CM

## 2023-12-04 DIAGNOSIS — N52.01 ERECTILE DYSFUNCTION DUE TO ARTERIAL INSUFFICIENCY: ICD-10-CM

## 2023-12-04 DIAGNOSIS — E11.9 DM TYPE 2, GOAL HBA1C 7%-8% (HCC): ICD-10-CM

## 2023-12-05 RX ORDER — VALSARTAN 80 MG/1
80 TABLET ORAL DAILY
Qty: 90 TABLET | Refills: 0 | Status: SHIPPED | OUTPATIENT
Start: 2023-12-05 | End: 2024-11-29

## 2023-12-05 RX ORDER — TADALAFIL 10 MG/1
TABLET ORAL
Qty: 30 TABLET | Refills: 0 | Status: SHIPPED | OUTPATIENT
Start: 2023-12-05

## 2023-12-05 RX ORDER — ATORVASTATIN CALCIUM 10 MG/1
TABLET, FILM COATED ORAL
Qty: 45 TABLET | Refills: 0 | Status: SHIPPED | OUTPATIENT
Start: 2023-12-05

## 2023-12-05 RX ORDER — ATORVASTATIN CALCIUM 20 MG/1
TABLET, FILM COATED ORAL
Qty: 45 TABLET | Refills: 0 | Status: SHIPPED | OUTPATIENT
Start: 2023-12-05

## 2023-12-05 RX ORDER — LORAZEPAM 0.5 MG/1
0.5 TABLET ORAL DAILY PRN
Qty: 60 TABLET | Refills: 0 | Status: SHIPPED | OUTPATIENT
Start: 2023-12-05

## 2024-01-02 DIAGNOSIS — F41.1 GAD (GENERALIZED ANXIETY DISORDER): ICD-10-CM

## 2024-01-03 DIAGNOSIS — E11.9 DM TYPE 2, GOAL HBA1C 7%-8% (HCC): ICD-10-CM

## 2024-01-03 DIAGNOSIS — N52.01 ERECTILE DYSFUNCTION DUE TO ARTERIAL INSUFFICIENCY: ICD-10-CM

## 2024-01-03 DIAGNOSIS — I10 ESSENTIAL HYPERTENSION: ICD-10-CM

## 2024-01-03 DIAGNOSIS — M15.9 PRIMARY OSTEOARTHRITIS INVOLVING MULTIPLE JOINTS: ICD-10-CM

## 2024-01-03 DIAGNOSIS — E11.42 DIABETIC PERIPHERAL NEUROPATHY (HCC): ICD-10-CM

## 2024-01-03 DIAGNOSIS — E78.2 MIXED HYPERLIPIDEMIA: ICD-10-CM

## 2024-01-03 DIAGNOSIS — F41.1 GAD (GENERALIZED ANXIETY DISORDER): ICD-10-CM

## 2024-01-03 RX ORDER — LORAZEPAM 0.5 MG/1
0.5 TABLET ORAL DAILY PRN
Qty: 60 TABLET | Refills: 0 | Status: SHIPPED | OUTPATIENT
Start: 2024-01-03 | End: 2024-01-03 | Stop reason: SDUPTHER

## 2024-01-04 DIAGNOSIS — M51.24 HERNIATION OF INTERVERTEBRAL DISC OF THORACIC REGION: ICD-10-CM

## 2024-01-04 DIAGNOSIS — G95.89 MYELOMALACIA OF CERVICAL CORD (HCC): ICD-10-CM

## 2024-01-04 DIAGNOSIS — M54.16 LUMBAR RADICULOPATHY: Primary | ICD-10-CM

## 2024-01-04 DIAGNOSIS — G12.9 SPINAL MUSCULAR ATROPHY, UNSPECIFIED (HCC): ICD-10-CM

## 2024-01-04 DIAGNOSIS — M47.12 OTHER SPONDYLOSIS WITH MYELOPATHY, CERVICAL REGION: ICD-10-CM

## 2024-01-04 DIAGNOSIS — M54.16 LUMBAR RADICULOPATHY: ICD-10-CM

## 2024-01-04 DIAGNOSIS — E11.42 DIABETIC PERIPHERAL NEUROPATHY (HCC): ICD-10-CM

## 2024-01-04 PROCEDURE — 4010F ACE/ARB THERAPY RXD/TAKEN: CPT | Performed by: PHYSICIAN ASSISTANT

## 2024-01-04 RX ORDER — ATORVASTATIN CALCIUM 10 MG/1
TABLET, FILM COATED ORAL
Qty: 45 TABLET | Refills: 3 | Status: SHIPPED | OUTPATIENT
Start: 2024-01-04

## 2024-01-04 RX ORDER — CELECOXIB 100 MG/1
100 CAPSULE ORAL 2 TIMES DAILY
Qty: 180 CAPSULE | Refills: 1 | Status: SHIPPED | OUTPATIENT
Start: 2024-01-04

## 2024-01-04 RX ORDER — ATORVASTATIN CALCIUM 20 MG/1
TABLET, FILM COATED ORAL
Qty: 45 TABLET | Refills: 3 | Status: SHIPPED | OUTPATIENT
Start: 2024-01-04

## 2024-01-04 RX ORDER — LORAZEPAM 0.5 MG/1
0.5 TABLET ORAL DAILY PRN
Start: 2024-01-04

## 2024-01-04 RX ORDER — CELECOXIB 100 MG/1
100 CAPSULE ORAL 2 TIMES DAILY
Qty: 180 CAPSULE | Refills: 1 | Status: SHIPPED | OUTPATIENT
Start: 2024-01-04 | End: 2024-01-04 | Stop reason: SDUPTHER

## 2024-01-04 RX ORDER — VALSARTAN 80 MG/1
80 TABLET ORAL DAILY
Qty: 90 TABLET | Refills: 3 | Status: SHIPPED | OUTPATIENT
Start: 2024-01-04 | End: 2024-12-29

## 2024-01-04 RX ORDER — CELECOXIB 100 MG/1
100 CAPSULE ORAL 2 TIMES DAILY
COMMUNITY
End: 2024-01-04

## 2024-01-04 RX ORDER — TADALAFIL 10 MG/1
TABLET ORAL
Qty: 30 TABLET | Refills: 3 | Status: SHIPPED | OUTPATIENT
Start: 2024-01-04

## 2024-01-29 ENCOUNTER — RA CDI HCC (OUTPATIENT)
Dept: OTHER | Facility: HOSPITAL | Age: 64
End: 2024-01-29

## 2024-01-29 ENCOUNTER — OFFICE VISIT (OUTPATIENT)
Dept: FAMILY MEDICINE CLINIC | Facility: CLINIC | Age: 64
End: 2024-01-29
Payer: COMMERCIAL

## 2024-01-29 ENCOUNTER — APPOINTMENT (OUTPATIENT)
Dept: LAB | Facility: CLINIC | Age: 64
End: 2024-01-29
Payer: COMMERCIAL

## 2024-01-29 VITALS
WEIGHT: 315 LBS | HEART RATE: 98 BPM | SYSTOLIC BLOOD PRESSURE: 138 MMHG | HEIGHT: 75 IN | BODY MASS INDEX: 39.17 KG/M2 | OXYGEN SATURATION: 98 % | DIASTOLIC BLOOD PRESSURE: 78 MMHG

## 2024-01-29 DIAGNOSIS — E11.9 DM TYPE 2, GOAL HBA1C 7%-8% (HCC): ICD-10-CM

## 2024-01-29 DIAGNOSIS — E78.2 MIXED HYPERLIPIDEMIA: ICD-10-CM

## 2024-01-29 DIAGNOSIS — G12.9 SPINAL MUSCULAR ATROPHY, UNSPECIFIED (HCC): ICD-10-CM

## 2024-01-29 DIAGNOSIS — E11.42 DIABETIC PERIPHERAL NEUROPATHY (HCC): ICD-10-CM

## 2024-01-29 DIAGNOSIS — I87.2 CHRONIC VENOUS INSUFFICIENCY: ICD-10-CM

## 2024-01-29 DIAGNOSIS — J01.10 ACUTE NON-RECURRENT FRONTAL SINUSITIS: Primary | ICD-10-CM

## 2024-01-29 DIAGNOSIS — H61.23 BILATERAL IMPACTED CERUMEN: ICD-10-CM

## 2024-01-29 DIAGNOSIS — G95.20 CERVICAL SPINAL CORD COMPRESSION (HCC): ICD-10-CM

## 2024-01-29 DIAGNOSIS — F41.1 GAD (GENERALIZED ANXIETY DISORDER): ICD-10-CM

## 2024-01-29 DIAGNOSIS — E66.01 MORBID OBESITY (HCC): ICD-10-CM

## 2024-01-29 LAB
ANION GAP SERPL CALCULATED.3IONS-SCNC: 10 MMOL/L
BUN SERPL-MCNC: 9 MG/DL (ref 5–25)
CALCIUM SERPL-MCNC: 9.1 MG/DL (ref 8.4–10.2)
CHLORIDE SERPL-SCNC: 105 MMOL/L (ref 96–108)
CHOLEST SERPL-MCNC: 113 MG/DL
CO2 SERPL-SCNC: 24 MMOL/L (ref 21–32)
CREAT SERPL-MCNC: 0.97 MG/DL (ref 0.6–1.3)
CREAT UR-MCNC: 116.5 MG/DL
EST. AVERAGE GLUCOSE BLD GHB EST-MCNC: 128 MG/DL
GFR SERPL CREATININE-BSD FRML MDRD: 83 ML/MIN/1.73SQ M
GLUCOSE P FAST SERPL-MCNC: 103 MG/DL (ref 65–99)
HBA1C MFR BLD: 6.1 %
HDLC SERPL-MCNC: 34 MG/DL
LDLC SERPL CALC-MCNC: 53 MG/DL (ref 0–100)
MICROALBUMIN UR-MCNC: <7 MG/L
MICROALBUMIN/CREAT 24H UR: <6 MG/G CREATININE (ref 0–30)
POTASSIUM SERPL-SCNC: 3.9 MMOL/L (ref 3.5–5.3)
SODIUM SERPL-SCNC: 139 MMOL/L (ref 135–147)
TRIGL SERPL-MCNC: 132 MG/DL

## 2024-01-29 PROCEDURE — 36415 COLL VENOUS BLD VENIPUNCTURE: CPT

## 2024-01-29 PROCEDURE — 99214 OFFICE O/P EST MOD 30 MIN: CPT | Performed by: PHYSICIAN ASSISTANT

## 2024-01-29 PROCEDURE — 3078F DIAST BP <80 MM HG: CPT | Performed by: PHYSICIAN ASSISTANT

## 2024-01-29 PROCEDURE — 83036 HEMOGLOBIN GLYCOSYLATED A1C: CPT

## 2024-01-29 PROCEDURE — 69209 REMOVE IMPACTED EAR WAX UNI: CPT | Performed by: PHYSICIAN ASSISTANT

## 2024-01-29 PROCEDURE — 82570 ASSAY OF URINE CREATININE: CPT

## 2024-01-29 PROCEDURE — 3075F SYST BP GE 130 - 139MM HG: CPT | Performed by: PHYSICIAN ASSISTANT

## 2024-01-29 PROCEDURE — 80061 LIPID PANEL: CPT

## 2024-01-29 PROCEDURE — 82043 UR ALBUMIN QUANTITATIVE: CPT

## 2024-01-29 PROCEDURE — 80048 BASIC METABOLIC PNL TOTAL CA: CPT

## 2024-01-29 PROCEDURE — 3044F HG A1C LEVEL LT 7.0%: CPT | Performed by: PHYSICIAN ASSISTANT

## 2024-01-29 PROCEDURE — 3725F SCREEN DEPRESSION PERFORMED: CPT | Performed by: PHYSICIAN ASSISTANT

## 2024-01-29 RX ORDER — AMOXICILLIN 500 MG/1
500 CAPSULE ORAL EVERY 8 HOURS SCHEDULED
Qty: 21 CAPSULE | Refills: 0 | Status: SHIPPED | OUTPATIENT
Start: 2024-01-29 | End: 2024-02-05

## 2024-01-29 NOTE — PATIENT INSTRUCTIONS
Debrox can be used to soften the wax to allow the wax to drain out naturally.  People with hearing aids will have more wax accumulation because the hearing aid tends to push the wax deeper into the canal.  Debrox will be helpful for that.    For his sinus pressure pain pain behind his eye this is related to acute sinusitis.  I am going to give him an antibiotic for the acute sinusitis given that his symptoms have been present for almost 2 weeks.

## 2024-01-29 NOTE — PROGRESS NOTES
Name: Vamshi Robbins      : 1960      MRN: [id number]  Encounter Provider: Cristina Lopez PA-C  Encounter Date: 2024   Encounter department: [facility]    Assessment & Plan    1. Acute non-recurrent frontal sinusitis  -     amoxicillin (AMOXIL) 500 mg capsule; Take 1 capsule (500 mg total) by mouth every 8 (eight) hours for 7 days    2. Bilateral impacted cerumen    3. PAOLA (generalized anxiety disorder)    4. Diabetic peripheral neuropathy (HCC)    5. Spinal muscular atrophy, unspecified (HCC)    6. Cervical spinal cord compression (HCC)    7. Morbid obesity (HCC)    8. Chronic venous insufficiency    63-year-old male sees me for his primary care services.  Patient actually has an appointment next week for his diabetic follow-up appointment.  Patient developed an acute illness 3 weeks ago with left lower quadrant and suprapubic pain and fecal urgency.  He did not have diarrhea per se but rather had a lot of urgency.  He then had exposure to a cold 1 week ago and had severe sore throat.  Patient was very nervous about having heart failure and 2 weeks ago he had burning in his chest along with coughing and increased sputum production.  He was taking Mucinex that helped to some degree.    He is having right ear fullness.  He continued to have sharp pain in that ear and in the other ear.  He does wear hearing aids and does get a lot of of wax buildup in his ears.  He has never formally had his ears cleaned out in the past.    Over the past 2 weeks, patient has had a lot of pressure over the frontal sinuses left side more than right side.  He has a lot of pressure behind his ear and eye.  He has been having thick mucus that seems to have gotten worse over time.  As part of shared decision making, patient is willing to take amoxicillin for a 1 week course due to the acute sinusitis.    We had a long discussion with regard to potential for heart failure.  Patient  has no dyspnea upon exertion, paroxysmal nocturnal dyspnea, change in his peripheral edema (patient has chronic venous insufficiency causing peripheral edema which has not changed).  We talked about the role of brain natruretic peptide hormone being drawn which would objectively rule out heart failure.  I told him based upon his symptomology that it would not be likely that this BN P level would be positive.  He was fine and not getting this test drawn.    Patient is having a lot of problems with anxiety.  His previous PAOLA 7 score was 17 and it is 14 currently showing moderate anxiety.  His PHQ 2 score was 0 for depression.  He admits being very worried about other members of his family especially his grandchildren.  He states that he has been a constant worrier in the past and that just does not seem to get any better.  He does not want to go on medications on a long-term basis and takes an occasional lorazepam.    A total of 38 minutes was spent rendering care for this patient.  This time included review of the patient's electronic medical record, performing the history and physical, reviewing appropriate labs and/or images, developing a treatment and assessment plan, answering patient's questions and concerns, and documenting the patient visit.  I will see him in 1 week for his diabetic follow-up appointment and discussion of the labs that he had drawn today.  Subjective    Earache   There is pain in both ears. This is a recurrent problem. The current episode started 1 to 4 weeks ago. The problem occurs every few minutes. The problem has been unchanged. There has been no fever. The pain is at a severity of 3/10. Associated symptoms include abdominal pain, coughing, headaches, rhinorrhea and a sore throat. Pertinent negatives include no diarrhea, ear discharge, hearing loss, neck pain, rash or vomiting.        Objective    /78 (BP Location: Left arm, Patient Position: Sitting, Cuff Size: Large)   Pulse 98   " Ht 6' 3\" (1.905 m)   Wt (!) 164 kg (360 lb 14.3 oz)   SpO2 98%   BMI 45.11 kg/m²      Ear cerumen removal    Date/Time: 1/29/2024 9:40 AM    Performed by: Cristina Lopez PA-C  Authorized by: Cristina Lopez PA-C  Universal Protocol:  Consent: Verbal consent obtained. Written consent not obtained.  Risks and benefits: risks, benefits and alternatives were discussed  Consent given by: patient  Time out: Immediately prior to procedure a \"time out\" was called to verify the correct patient, procedure, equipment, support staff and site/side marked as required.  Patient understanding: patient states understanding of the procedure being performed  Patient consent: the patient's understanding of the procedure matches consent given  Procedure consent: procedure consent matches procedure scheduled  Relevant documents: relevant documents present and verified  Test results: test results available and properly labeled  Site marked: the operative site was not marked  Radiology Images displayed and confirmed. If images not available, report reviewed: imaging studies available  Required items: required blood products, implants, devices, and special equipment available  Patient identity confirmed: verbally with patient    Patient location:  Clinic  Indications / Diagnosis:  Impacted cerumen  Procedure details:     Local anesthetic:  None    Location:  L ear and R ear    Procedure type: irrigation only      Approach:  Natural orifice    Visualization (free text):  Wax present in both ears- clearer after irrigation    Equipment used:  Warm tap water and hydrogen peroxide  Post-procedure details:     Complication:  None    Hearing quality:  Improved    Patient tolerance of procedure:  Tolerated well, no immediate complications    Physical exam: Reviewed vital signs.  He is normotensive and afebrile.  He is alert and cooperative with the exam.    His TM had a lot of wax preprocedure with less wax postprocedure.  He " did put his hearing aids back in at the conclusion of the cleaning of the ears and his hearing seems to be improved.  Ag test did not lateralize.  Xiomara test showed air conduction greater than bone conduction bilaterally.  He had no tenderness on palpation of the pinna or pulling on the tragus.  No pre or posterior auricular adenopathy.  No nuchal adenopathy.    Patient's heart is regular rate without murmur rub or gallops.  He was moving air well over the bronchial tubes and able to speak in full sentences without difficulty.  He had some mild wheezing at the right base but no egophony.    He continues to have swelling in both legs up to the mid tibial areas bilaterally consistent with his chronic venous insufficiency.  No open areas were noted.  Cristina Lopez PA-C

## 2024-01-29 NOTE — PROGRESS NOTES
Answers submitted by the patient for this visit:  Ear Pain Questionnaire (Submitted on 1/28/2024)  Chief Complaint: Ear pain  Affected ear: both  Chronicity: recurrent  Onset: 1 to 4 weeks ago  Progression since onset: unchanged  Frequency: every few minutes  Fever: no fever  Pain - numeric: 3/10  abdominal pain: Yes  ear discharge: No  rash: No  cough: Yes  headaches: Yes  rhinorrhea: Yes  diarrhea: No  hearing loss: No  sore throat: Yes  neck pain: No  vomiting: No

## 2024-01-30 DIAGNOSIS — F41.1 GAD (GENERALIZED ANXIETY DISORDER): ICD-10-CM

## 2024-01-31 RX ORDER — LORAZEPAM 0.5 MG/1
0.5 TABLET ORAL DAILY PRN
Qty: 60 TABLET | Refills: 1 | Status: SHIPPED | OUTPATIENT
Start: 2024-01-31

## 2024-02-05 ENCOUNTER — TELEPHONE (OUTPATIENT)
Dept: FAMILY MEDICINE CLINIC | Facility: CLINIC | Age: 64
End: 2024-02-05

## 2024-02-06 ENCOUNTER — OFFICE VISIT (OUTPATIENT)
Dept: FAMILY MEDICINE CLINIC | Facility: CLINIC | Age: 64
End: 2024-02-06
Payer: COMMERCIAL

## 2024-02-06 VITALS
SYSTOLIC BLOOD PRESSURE: 139 MMHG | OXYGEN SATURATION: 97 % | HEIGHT: 75 IN | BODY MASS INDEX: 39.17 KG/M2 | HEART RATE: 81 BPM | DIASTOLIC BLOOD PRESSURE: 69 MMHG | WEIGHT: 315 LBS

## 2024-02-06 DIAGNOSIS — I10 ESSENTIAL HYPERTENSION: ICD-10-CM

## 2024-02-06 DIAGNOSIS — E78.2 MIXED HYPERLIPIDEMIA: ICD-10-CM

## 2024-02-06 DIAGNOSIS — G95.20 CERVICAL SPINAL CORD COMPRESSION (HCC): ICD-10-CM

## 2024-02-06 DIAGNOSIS — F41.1 GAD (GENERALIZED ANXIETY DISORDER): ICD-10-CM

## 2024-02-06 DIAGNOSIS — M54.16 LUMBAR RADICULOPATHY: ICD-10-CM

## 2024-02-06 DIAGNOSIS — E11.42 DIABETIC PERIPHERAL NEUROPATHY (HCC): Primary | ICD-10-CM

## 2024-02-06 DIAGNOSIS — F32.0 MAJOR DEPRESSIVE DISORDER, SINGLE EPISODE, MILD (HCC): ICD-10-CM

## 2024-02-06 PROCEDURE — 99214 OFFICE O/P EST MOD 30 MIN: CPT | Performed by: PHYSICIAN ASSISTANT

## 2024-02-06 NOTE — PROGRESS NOTES
Assessment/Plan:       1. Diabetic peripheral neuropathy (HCC)  -     Albumin / creatinine urine ratio; Future; Expected date: 06/05/2024  -     Basic metabolic panel; Future; Expected date: 06/05/2024  -     Hemoglobin A1C; Future; Expected date: 06/05/2024    2. Cervical spinal cord compression (HCC)    3. Lumbar radiculopathy    4. Major depressive disorder, single episode, mild (HCC)    5. Essential hypertension    6. Mixed hyperlipidemia    7. PAOLA (generalized anxiety disorder)        63-year-old male who sees me for his primary care services is seen for 4-month follow-up with diabetes.  We reviewed the patient's labs.  His hemoglobin A1c has increased from 5.6 to 6.1%.  Patient has also gained 7 pounds over the holidays.  Patient states that he is attempting to improve his diet.    Patient has been taking care of his feet.  He is dystrophic thickened toenails along with an open area on the dorsum of the second toe of his right foot consistent with a hammertoe.  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  He is seeing his podiatrist Dr. Wild later on today.  He had previously had a discussion with Dr. Wild about potentially putting a pin in the hammertoe and was Dr. Wild's recommendation that the patient instead have an amputation of the second toe.  He is not quite ready to have this done.    Patient states that when his wife was putting emollients on his feet that he felt as if he was gaining some feeling in the feet.  He has a history of lumbar disc disease.  He had peripheral neuropathy as a result of this and he has diabetic peripheral neuropathy in addition to this.    Patient has a lot of anxiety.  He is taking sertraline which is significantly helping his anxiety.  On his previous PAOLA-7 score, the score was 14 and now his PAOLA-7 score is 0.  Patient had a PHQ 2 score passing the depression  screening.    For his back and neck pain, patient is taking Celebrex and Tylenol and very satisfied with the treatment.  He is no longer taking opiates and has not taking them for quite some time.    Patient has a history of essential hypertension.  He is taking valsartan at night with excellent results.  He is also alternating to 20 and 10 mg of atorvastatin as he could not tolerate the 40 mg dose.  Patient has no microalbuminuria.    A total of 30 minutes was spent rendering care for this patient.  This time included review of the patient's electronic medical record, performing the history and physical, reviewing appropriate labs and/or images, developing a treatment and assessment plan, answering patient's questions and concerns, and documenting the patient visit.  I will see the patient in 4 months since his diabetes is under excellent control.  He will have his hemoglobin A1c, urine for microalbumin and BMP done 1 week prior to that visit.  He will need his annual exam done in October.  Subjective:      Patient ID: Vamshi Robbins is a 63 y.o. male.    HPI: Patient states that he felt quite assured of not having any serious condition after our last visit.  He states that he does tend to worry and that he trusts his medical professionals to keep him apprised of any threats to his health.  He states that he is now able to relax and is anxiety score has gone to 0.    He denies pain in his chest heart palpitations dizziness lightheadedness syncope or near syncope.    The following portions of the patient's history were reviewed and updated as appropriate: allergies, current medications, past family history, past medical history, past social history, past surgical history, and problem list.    Review of Systems is respiratory system is stable.  He receives footcare through a local podiatrist Dr. Wild.    Objective:      /69 (BP Location: Left arm, Patient Position: Sitting, Cuff Size: Large)   Pulse 81   Ht  "6' 3\" (1.905 m)   Wt (!) 163 kg (360 lb)   SpO2 97%   BMI 45.00 kg/m²          Physical Exam  Cardiovascular:      Pulses: Pulses are weak.           Dorsalis pedis pulses are 1+ on the right side and 1+ on the left side.        Posterior tibial pulses are 1+ on the right side and 1+ on the left side.   Musculoskeletal:        Feet:    Feet:      Right foot:      Skin integrity: Callus and dry skin present. No ulcer, skin breakdown, erythema or warmth.      Left foot:      Skin integrity: Callus and dry skin present. No ulcer, skin breakdown, erythema or warmth.      reviewed vital signs.  He is normotensive.    Patient's heart is regular rate without murmur rub or gallops.  Lungs are clear to auscultation.    Diabetic Foot Exam    Patient's shoes and socks removed.    Right Foot/Ankle   Right Foot Inspection  Skin Exam: skin normal, skin intact, dry skin, callus and callus. No warmth, no erythema, no maceration, no abnormal color, no pre-ulcer and no ulcer.     Toe Exam: ROM and strength within normal limits, swelling and erythema.     Sensory   Vibration: diminished  Monofilament testing: absent    Vascular  The right DP pulse is 1+. The right PT pulse is 1+.     Left Foot/Ankle  Left Foot Inspection  Skin Exam: skin normal, skin intact, dry skin and callus. No warmth, no erythema, no maceration, normal color, no pre-ulcer and no ulcer.     Toe Exam: ROM and strength within normal limits, swelling and erythema.     Sensory   Vibration: diminished  Monofilament testing: absent    Vascular  The left DP pulse is 1+. The left PT pulse is 1+.     Assign Risk Category  Deformity present  Loss of protective sensation  Weak pulses  Risk: 2        "

## 2024-02-07 NOTE — PROCEDURES
Pre-procedure Diagnosis: Lumbar radiculopathy  Post-procedure Diagnosis: Lumbar radiculopathy  Procedure Title(s):  [RIGHT L5 and S1 TRANSFORAMINAL EPIDURAL STEROID INJECTION]  Attending Surgeon:   Dennis cMclure MD  Anesthesia:   Local     Indications: The patient is a 62y o  year-old male with a diagnosis of Lumbar radiculopathyThe patient's history and physical exam were reviewed  The risks, benefits and alternatives to the procedure were discussed, and all questions were answered to the patient's satisfaction  The patient agreed to proceed, and written informed consent was obtained  Procedure in Detail: The patient was brought into the procedure room and placed in the prone position on the fluoroscopy table  The area of the lumbar spine was prepped with chloraprep solution  then draped in a sterile manner  The [L5] vertebral body was identified with AP fluoroscopy  An oblique view to the [RIGHT] was obtained to better visualize the inferior junction of the pedicle and transverse process  The 6 o'clock position of the pedicle was marked and identified  The skin and subcutaneous tissues in the area were anesthetized with 1% lidocaine  A 22-gauge, [5] inch needle was directed toward the targeted point under fluoroscopy until bone was contacted  The needle was then walked inferiorly until the neural foramen was entered  A lateral fluoroscopic view was then used to place the needle tip at the 10 o'clock position of the foramen  The [RIGHT]  S1 foramen was identified and the 2 o'clock position was marked  The skin and subcutaneous tissues in the area were anesthetized with 1% lidocaine  A 22-gauge,  [5] inch needle was directed toward the targeted point under fluoroscopy until bone was contacted  The needle was then walked inferiorly until the neural foramen was entered  A lateral fluoroscopic view was then used to place the needle tip in the middle of the foramen      Negative aspiration was confirmed, and 1 ml Omnipaque 240 was injected at each level  Appropriate neurograms were observed under AP fluoroscopy  Digital subtraction angiography was performed showing no vascular uptake and appropriate spread in the epidural space  Then, after negative aspiration, a solution consisting of [0 5mL] normal saline and [2mL] betamethasone (6mg/mL) was easily injected at each level  The needles were removed with a 1% lidocaine flush  The patient's back was cleaned and a bandage was placed over the needle insertion points  Disposition: The patient tolerated the procedure well, and there were no apparent complications  Strength at baseline when examined in post-op area  The patient was taken to the recovery area where written discharge instructions for the procedure were given        Estimated Blood Loss: None  Specimens Obtained: N/A negative...

## 2024-02-20 DIAGNOSIS — R39.11 BENIGN PROSTATIC HYPERPLASIA WITH URINARY HESITANCY: ICD-10-CM

## 2024-02-20 DIAGNOSIS — R79.89 LOW TESTOSTERONE IN MALE: Primary | ICD-10-CM

## 2024-02-20 DIAGNOSIS — Z12.5 ENCOUNTER FOR SCREENING FOR MALIGNANT NEOPLASM OF PROSTATE: ICD-10-CM

## 2024-02-20 DIAGNOSIS — N40.1 BENIGN PROSTATIC HYPERPLASIA WITH URINARY HESITANCY: ICD-10-CM

## 2024-04-15 ENCOUNTER — TELEPHONE (OUTPATIENT)
Dept: FAMILY MEDICINE CLINIC | Facility: CLINIC | Age: 64
End: 2024-04-15

## 2024-04-15 ENCOUNTER — TELEPHONE (OUTPATIENT)
Age: 64
End: 2024-04-15

## 2024-04-15 NOTE — TELEPHONE ENCOUNTER
Pt returned call to Madison alvarez transferred to the practice call was answered by Madison. Thanks

## 2024-04-27 ENCOUNTER — APPOINTMENT (EMERGENCY)
Dept: RADIOLOGY | Facility: HOSPITAL | Age: 64
DRG: 872 | End: 2024-04-27
Payer: COMMERCIAL

## 2024-04-27 ENCOUNTER — HOSPITAL ENCOUNTER (INPATIENT)
Facility: HOSPITAL | Age: 64
LOS: 4 days | Discharge: HOME | DRG: 872 | End: 2024-05-01
Attending: EMERGENCY MEDICINE | Admitting: HOSPITALIST
Payer: COMMERCIAL

## 2024-04-27 DIAGNOSIS — L03.115 CELLULITIS OF RIGHT LOWER EXTREMITY: ICD-10-CM

## 2024-04-27 DIAGNOSIS — E11.42 DIABETIC PERIPHERAL NEUROPATHY (HCC): ICD-10-CM

## 2024-04-27 DIAGNOSIS — L03.115 CELLULITIS OF RIGHT FOOT: Primary | ICD-10-CM

## 2024-04-27 DIAGNOSIS — R65.20 SEVERE SEPSIS (HCC): ICD-10-CM

## 2024-04-27 DIAGNOSIS — A41.9 SEVERE SEPSIS (HCC): ICD-10-CM

## 2024-04-27 PROBLEM — E11.49 TYPE 2 DIABETES MELLITUS WITH NEUROLOGIC COMPLICATION, WITHOUT LONG-TERM CURRENT USE OF INSULIN (HCC): Status: ACTIVE | Noted: 2024-04-27

## 2024-04-27 PROBLEM — E78.5 HLD (HYPERLIPIDEMIA): Status: ACTIVE | Noted: 2024-04-27

## 2024-04-27 PROBLEM — I10 PRIMARY HYPERTENSION: Status: ACTIVE | Noted: 2024-04-27

## 2024-04-27 LAB
ALBUMIN SERPL BCP-MCNC: 3.9 G/DL (ref 3.5–5)
ALP SERPL-CCNC: 112 U/L (ref 34–104)
ALT SERPL W P-5'-P-CCNC: 16 U/L (ref 7–52)
ANION GAP SERPL CALCULATED.3IONS-SCNC: 10 MMOL/L (ref 4–13)
APTT PPP: 24 SECONDS (ref 23–37)
AST SERPL W P-5'-P-CCNC: 15 U/L (ref 13–39)
BACTERIA UR QL AUTO: NORMAL /HPF
BASOPHILS # BLD AUTO: 0.04 THOUSANDS/ÂΜL (ref 0–0.1)
BASOPHILS NFR BLD AUTO: 0 % (ref 0–1)
BILIRUB SERPL-MCNC: 0.53 MG/DL (ref 0.2–1)
BILIRUB UR QL STRIP: NEGATIVE
BUN SERPL-MCNC: 15 MG/DL (ref 5–25)
CALCIUM SERPL-MCNC: 9 MG/DL (ref 8.4–10.2)
CHLORIDE SERPL-SCNC: 104 MMOL/L (ref 96–108)
CLARITY UR: CLEAR
CO2 SERPL-SCNC: 21 MMOL/L (ref 21–32)
COLOR UR: YELLOW
CREAT SERPL-MCNC: 1.01 MG/DL (ref 0.6–1.3)
EOSINOPHIL # BLD AUTO: 0.04 THOUSAND/ÂΜL (ref 0–0.61)
EOSINOPHIL NFR BLD AUTO: 0 % (ref 0–6)
ERYTHROCYTE [DISTWIDTH] IN BLOOD BY AUTOMATED COUNT: 13.9 % (ref 11.6–15.1)
ERYTHROCYTE [SEDIMENTATION RATE] IN BLOOD: 40 MM/HOUR (ref 0–19)
FLUAV RNA RESP QL NAA+PROBE: NEGATIVE
FLUBV RNA RESP QL NAA+PROBE: NEGATIVE
GFR SERPL CREATININE-BSD FRML MDRD: 79 ML/MIN/1.73SQ M
GLUCOSE SERPL-MCNC: 183 MG/DL (ref 65–140)
GLUCOSE UR STRIP-MCNC: ABNORMAL MG/DL
HCT VFR BLD AUTO: 42.7 % (ref 36.5–49.3)
HGB BLD-MCNC: 14.7 G/DL (ref 12–17)
HGB UR QL STRIP.AUTO: ABNORMAL
IMM GRANULOCYTES # BLD AUTO: 0.07 THOUSAND/UL (ref 0–0.2)
IMM GRANULOCYTES NFR BLD AUTO: 1 % (ref 0–2)
INR PPP: 1 (ref 0.84–1.19)
KETONES UR STRIP-MCNC: NEGATIVE MG/DL
LACTATE SERPL-SCNC: 2.3 MMOL/L (ref 0.5–2)
LEUKOCYTE ESTERASE UR QL STRIP: ABNORMAL
LYMPHOCYTES # BLD AUTO: 1.06 THOUSANDS/ÂΜL (ref 0.6–4.47)
LYMPHOCYTES NFR BLD AUTO: 10 % (ref 14–44)
MCH RBC QN AUTO: 30.3 PG (ref 26.8–34.3)
MCHC RBC AUTO-ENTMCNC: 34.4 G/DL (ref 31.4–37.4)
MCV RBC AUTO: 88 FL (ref 82–98)
MONOCYTES # BLD AUTO: 0.77 THOUSAND/ÂΜL (ref 0.17–1.22)
MONOCYTES NFR BLD AUTO: 7 % (ref 4–12)
NEUTROPHILS # BLD AUTO: 8.89 THOUSANDS/ÂΜL (ref 1.85–7.62)
NEUTS SEG NFR BLD AUTO: 82 % (ref 43–75)
NITRITE UR QL STRIP: NEGATIVE
NON-SQ EPI CELLS URNS QL MICRO: NORMAL /HPF
NRBC BLD AUTO-RTO: 0 /100 WBCS
PH UR STRIP.AUTO: 5.5 [PH]
PLATELET # BLD AUTO: 135 THOUSANDS/UL (ref 149–390)
PMV BLD AUTO: 9.5 FL (ref 8.9–12.7)
POTASSIUM SERPL-SCNC: 3.9 MMOL/L (ref 3.5–5.3)
PROCALCITONIN SERPL-MCNC: 0.07 NG/ML
PROT SERPL-MCNC: 7.3 G/DL (ref 6.4–8.4)
PROT UR STRIP-MCNC: NEGATIVE MG/DL
PROTHROMBIN TIME: 13.5 SECONDS (ref 11.6–14.5)
RBC # BLD AUTO: 4.85 MILLION/UL (ref 3.88–5.62)
RBC #/AREA URNS AUTO: NORMAL /HPF
RSV RNA RESP QL NAA+PROBE: NEGATIVE
SARS-COV-2 RNA RESP QL NAA+PROBE: NEGATIVE
SODIUM SERPL-SCNC: 135 MMOL/L (ref 135–147)
SP GR UR STRIP.AUTO: 1.01 (ref 1–1.03)
UROBILINOGEN UR QL STRIP.AUTO: 0.2 E.U./DL
WBC # BLD AUTO: 10.87 THOUSAND/UL (ref 4.31–10.16)
WBC #/AREA URNS AUTO: NORMAL /HPF

## 2024-04-27 PROCEDURE — 85610 PROTHROMBIN TIME: CPT | Performed by: EMERGENCY MEDICINE

## 2024-04-27 PROCEDURE — 85652 RBC SED RATE AUTOMATED: CPT | Performed by: EMERGENCY MEDICINE

## 2024-04-27 PROCEDURE — 71045 X-RAY EXAM CHEST 1 VIEW: CPT

## 2024-04-27 PROCEDURE — 99284 EMERGENCY DEPT VISIT MOD MDM: CPT

## 2024-04-27 PROCEDURE — 83605 ASSAY OF LACTIC ACID: CPT | Performed by: EMERGENCY MEDICINE

## 2024-04-27 PROCEDURE — 85025 COMPLETE CBC W/AUTO DIFF WBC: CPT | Performed by: EMERGENCY MEDICINE

## 2024-04-27 PROCEDURE — 99222 1ST HOSP IP/OBS MODERATE 55: CPT | Performed by: PHYSICIAN ASSISTANT

## 2024-04-27 PROCEDURE — 80053 COMPREHEN METABOLIC PANEL: CPT | Performed by: EMERGENCY MEDICINE

## 2024-04-27 PROCEDURE — 96368 THER/DIAG CONCURRENT INF: CPT

## 2024-04-27 PROCEDURE — 87040 BLOOD CULTURE FOR BACTERIA: CPT | Performed by: EMERGENCY MEDICINE

## 2024-04-27 PROCEDURE — 36415 COLL VENOUS BLD VENIPUNCTURE: CPT | Performed by: EMERGENCY MEDICINE

## 2024-04-27 PROCEDURE — 73630 X-RAY EXAM OF FOOT: CPT

## 2024-04-27 PROCEDURE — 96375 TX/PRO/DX INJ NEW DRUG ADDON: CPT

## 2024-04-27 PROCEDURE — 0241U HB NFCT DS VIR RESP RNA 4 TRGT: CPT | Performed by: EMERGENCY MEDICINE

## 2024-04-27 PROCEDURE — 81001 URINALYSIS AUTO W/SCOPE: CPT | Performed by: EMERGENCY MEDICINE

## 2024-04-27 PROCEDURE — 96365 THER/PROPH/DIAG IV INF INIT: CPT

## 2024-04-27 PROCEDURE — 84145 PROCALCITONIN (PCT): CPT | Performed by: EMERGENCY MEDICINE

## 2024-04-27 PROCEDURE — 85730 THROMBOPLASTIN TIME PARTIAL: CPT | Performed by: EMERGENCY MEDICINE

## 2024-04-27 PROCEDURE — 99285 EMERGENCY DEPT VISIT HI MDM: CPT | Performed by: EMERGENCY MEDICINE

## 2024-04-27 RX ORDER — INSULIN LISPRO 100 [IU]/ML
1-5 INJECTION, SOLUTION INTRAVENOUS; SUBCUTANEOUS
Status: DISCONTINUED | OUTPATIENT
Start: 2024-04-28 | End: 2024-05-01 | Stop reason: HOSPADM

## 2024-04-27 RX ORDER — FENTANYL CITRATE 50 UG/ML
50 INJECTION, SOLUTION INTRAMUSCULAR; INTRAVENOUS ONCE
Status: COMPLETED | OUTPATIENT
Start: 2024-04-27 | End: 2024-04-27

## 2024-04-27 RX ORDER — CEFAZOLIN SODIUM 2 G/50ML
2000 SOLUTION INTRAVENOUS EVERY 8 HOURS
Status: DISCONTINUED | OUTPATIENT
Start: 2024-04-28 | End: 2024-05-01 | Stop reason: HOSPADM

## 2024-04-27 RX ORDER — ATORVASTATIN CALCIUM 10 MG/1
10 TABLET, FILM COATED ORAL
Status: DISCONTINUED | OUTPATIENT
Start: 2024-04-28 | End: 2024-05-01 | Stop reason: HOSPADM

## 2024-04-27 RX ORDER — LORAZEPAM 0.5 MG/1
0.5 TABLET ORAL DAILY PRN
Status: DISCONTINUED | OUTPATIENT
Start: 2024-04-27 | End: 2024-05-01 | Stop reason: HOSPADM

## 2024-04-27 RX ORDER — SODIUM CHLORIDE, SODIUM LACTATE, POTASSIUM CHLORIDE, CALCIUM CHLORIDE 600; 310; 30; 20 MG/100ML; MG/100ML; MG/100ML; MG/100ML
125 INJECTION, SOLUTION INTRAVENOUS CONTINUOUS
Status: DISCONTINUED | OUTPATIENT
Start: 2024-04-28 | End: 2024-04-28

## 2024-04-27 RX ORDER — ENOXAPARIN SODIUM 100 MG/ML
40 INJECTION SUBCUTANEOUS DAILY
Status: DISCONTINUED | OUTPATIENT
Start: 2024-04-28 | End: 2024-05-01 | Stop reason: HOSPADM

## 2024-04-27 RX ORDER — ONDANSETRON 2 MG/ML
4 INJECTION INTRAMUSCULAR; INTRAVENOUS EVERY 6 HOURS PRN
Status: DISCONTINUED | OUTPATIENT
Start: 2024-04-27 | End: 2024-05-01 | Stop reason: HOSPADM

## 2024-04-27 RX ORDER — NICOTINE 21 MG/24HR
1 PATCH, TRANSDERMAL 24 HOURS TRANSDERMAL DAILY
Status: DISCONTINUED | OUTPATIENT
Start: 2024-04-28 | End: 2024-05-01 | Stop reason: HOSPADM

## 2024-04-27 RX ORDER — LOSARTAN POTASSIUM 50 MG/1
50 TABLET ORAL DAILY
Status: DISCONTINUED | OUTPATIENT
Start: 2024-04-28 | End: 2024-05-01 | Stop reason: HOSPADM

## 2024-04-27 RX ORDER — CEFEPIME HYDROCHLORIDE 2 G/50ML
2000 INJECTION, SOLUTION INTRAVENOUS ONCE
Status: COMPLETED | OUTPATIENT
Start: 2024-04-27 | End: 2024-04-27

## 2024-04-27 RX ORDER — ACETAMINOPHEN 325 MG/1
650 TABLET ORAL EVERY 6 HOURS PRN
Status: DISCONTINUED | OUTPATIENT
Start: 2024-04-27 | End: 2024-04-28

## 2024-04-27 RX ORDER — CALCIUM CARBONATE 500 MG/1
1000 TABLET, CHEWABLE ORAL DAILY PRN
Status: DISCONTINUED | OUTPATIENT
Start: 2024-04-27 | End: 2024-05-01 | Stop reason: HOSPADM

## 2024-04-27 RX ORDER — SENNOSIDES 8.6 MG
1 TABLET ORAL
Status: DISCONTINUED | OUTPATIENT
Start: 2024-04-27 | End: 2024-05-01 | Stop reason: HOSPADM

## 2024-04-27 RX ADMIN — SODIUM CHLORIDE, SODIUM LACTATE, POTASSIUM CHLORIDE, AND CALCIUM CHLORIDE 500 ML: .6; .31; .03; .02 INJECTION, SOLUTION INTRAVENOUS at 21:58

## 2024-04-27 RX ADMIN — FENTANYL CITRATE 50 MCG: 50 INJECTION INTRAMUSCULAR; INTRAVENOUS at 22:34

## 2024-04-27 RX ADMIN — ACETAMINOPHEN 325MG 650 MG: 325 TABLET ORAL at 23:36

## 2024-04-27 RX ADMIN — CEFEPIME HYDROCHLORIDE 2000 MG: 2 INJECTION, SOLUTION INTRAVENOUS at 21:56

## 2024-04-27 RX ADMIN — VANCOMYCIN HYDROCHLORIDE 1750 MG: 1 INJECTION, POWDER, LYOPHILIZED, FOR SOLUTION INTRAVENOUS at 22:41

## 2024-04-28 LAB
ANION GAP SERPL CALCULATED.3IONS-SCNC: 6 MMOL/L (ref 4–13)
BASOPHILS # BLD AUTO: 0.03 THOUSANDS/ÂΜL (ref 0–0.1)
BASOPHILS NFR BLD AUTO: 0 % (ref 0–1)
BUN SERPL-MCNC: 13 MG/DL (ref 5–25)
CALCIUM SERPL-MCNC: 8.7 MG/DL (ref 8.4–10.2)
CHLORIDE SERPL-SCNC: 106 MMOL/L (ref 96–108)
CO2 SERPL-SCNC: 24 MMOL/L (ref 21–32)
CREAT SERPL-MCNC: 1.06 MG/DL (ref 0.6–1.3)
EOSINOPHIL # BLD AUTO: 0 THOUSAND/ÂΜL (ref 0–0.61)
EOSINOPHIL NFR BLD AUTO: 0 % (ref 0–6)
ERYTHROCYTE [DISTWIDTH] IN BLOOD BY AUTOMATED COUNT: 14.1 % (ref 11.6–15.1)
GFR SERPL CREATININE-BSD FRML MDRD: 74 ML/MIN/1.73SQ M
GLUCOSE SERPL-MCNC: 113 MG/DL (ref 65–140)
GLUCOSE SERPL-MCNC: 115 MG/DL (ref 65–140)
GLUCOSE SERPL-MCNC: 124 MG/DL (ref 65–140)
GLUCOSE SERPL-MCNC: 126 MG/DL (ref 65–140)
GLUCOSE SERPL-MCNC: 152 MG/DL (ref 65–140)
GLUCOSE SERPL-MCNC: 89 MG/DL (ref 65–140)
HCT VFR BLD AUTO: 40.4 % (ref 36.5–49.3)
HGB BLD-MCNC: 13.4 G/DL (ref 12–17)
IMM GRANULOCYTES # BLD AUTO: 0.06 THOUSAND/UL (ref 0–0.2)
IMM GRANULOCYTES NFR BLD AUTO: 1 % (ref 0–2)
LACTATE SERPL-SCNC: 1.8 MMOL/L (ref 0.5–2)
LACTATE SERPL-SCNC: 1.9 MMOL/L (ref 0.5–2)
LYMPHOCYTES # BLD AUTO: 1.01 THOUSANDS/ÂΜL (ref 0.6–4.47)
LYMPHOCYTES NFR BLD AUTO: 8 % (ref 14–44)
MCH RBC QN AUTO: 30 PG (ref 26.8–34.3)
MCHC RBC AUTO-ENTMCNC: 33.2 G/DL (ref 31.4–37.4)
MCV RBC AUTO: 90 FL (ref 82–98)
MONOCYTES # BLD AUTO: 0.82 THOUSAND/ÂΜL (ref 0.17–1.22)
MONOCYTES NFR BLD AUTO: 6 % (ref 4–12)
NEUTROPHILS # BLD AUTO: 11.03 THOUSANDS/ÂΜL (ref 1.85–7.62)
NEUTS SEG NFR BLD AUTO: 85 % (ref 43–75)
NRBC BLD AUTO-RTO: 0 /100 WBCS
PLATELET # BLD AUTO: 116 THOUSANDS/UL (ref 149–390)
PMV BLD AUTO: 9.5 FL (ref 8.9–12.7)
POTASSIUM SERPL-SCNC: 4.3 MMOL/L (ref 3.5–5.3)
PROCALCITONIN SERPL-MCNC: 0.1 NG/ML
RBC # BLD AUTO: 4.47 MILLION/UL (ref 3.88–5.62)
SODIUM SERPL-SCNC: 136 MMOL/L (ref 135–147)
WBC # BLD AUTO: 12.95 THOUSAND/UL (ref 4.31–10.16)

## 2024-04-28 PROCEDURE — 85025 COMPLETE CBC W/AUTO DIFF WBC: CPT | Performed by: PHYSICIAN ASSISTANT

## 2024-04-28 PROCEDURE — 84145 PROCALCITONIN (PCT): CPT | Performed by: PHYSICIAN ASSISTANT

## 2024-04-28 PROCEDURE — 82948 REAGENT STRIP/BLOOD GLUCOSE: CPT

## 2024-04-28 PROCEDURE — 94760 N-INVAS EAR/PLS OXIMETRY 1: CPT

## 2024-04-28 PROCEDURE — 94660 CPAP INITIATION&MGMT: CPT

## 2024-04-28 PROCEDURE — 99233 SBSQ HOSP IP/OBS HIGH 50: CPT | Performed by: HOSPITALIST

## 2024-04-28 PROCEDURE — 80048 BASIC METABOLIC PNL TOTAL CA: CPT | Performed by: PHYSICIAN ASSISTANT

## 2024-04-28 PROCEDURE — 83605 ASSAY OF LACTIC ACID: CPT | Performed by: PHYSICIAN ASSISTANT

## 2024-04-28 PROCEDURE — 99223 1ST HOSP IP/OBS HIGH 75: CPT | Performed by: STUDENT IN AN ORGANIZED HEALTH CARE EDUCATION/TRAINING PROGRAM

## 2024-04-28 RX ORDER — OXYCODONE HYDROCHLORIDE 10 MG/1
10 TABLET ORAL EVERY 4 HOURS PRN
Status: DISCONTINUED | OUTPATIENT
Start: 2024-04-28 | End: 2024-05-01 | Stop reason: HOSPADM

## 2024-04-28 RX ORDER — ACETAMINOPHEN 325 MG/1
650 TABLET ORAL EVERY 6 HOURS PRN
Status: DISCONTINUED | OUTPATIENT
Start: 2024-04-28 | End: 2024-05-01 | Stop reason: HOSPADM

## 2024-04-28 RX ORDER — ECHINACEA PURPUREA EXTRACT 125 MG
2 TABLET ORAL 4 TIMES DAILY PRN
Status: DISCONTINUED | OUTPATIENT
Start: 2024-04-28 | End: 2024-05-01 | Stop reason: HOSPADM

## 2024-04-28 RX ORDER — OXYCODONE HYDROCHLORIDE 5 MG/1
5 TABLET ORAL EVERY 4 HOURS PRN
Status: DISCONTINUED | OUTPATIENT
Start: 2024-04-28 | End: 2024-05-01 | Stop reason: HOSPADM

## 2024-04-28 RX ORDER — HYDROMORPHONE HCL/PF 1 MG/ML
0.5 SYRINGE (ML) INJECTION ONCE
Status: COMPLETED | OUTPATIENT
Start: 2024-04-28 | End: 2024-04-28

## 2024-04-28 RX ORDER — ACETAMINOPHEN 325 MG/1
975 TABLET ORAL EVERY 8 HOURS SCHEDULED
Status: DISCONTINUED | OUTPATIENT
Start: 2024-04-28 | End: 2024-05-01 | Stop reason: HOSPADM

## 2024-04-28 RX ADMIN — INSULIN LISPRO 1 UNITS: 100 INJECTION, SOLUTION INTRAVENOUS; SUBCUTANEOUS at 11:13

## 2024-04-28 RX ADMIN — ACETAMINOPHEN 325MG 975 MG: 325 TABLET ORAL at 13:23

## 2024-04-28 RX ADMIN — SALINE NASAL SPRAY 2 SPRAY: 1.5 SOLUTION NASAL at 21:39

## 2024-04-28 RX ADMIN — OXYCODONE HYDROCHLORIDE 5 MG: 5 TABLET ORAL at 05:49

## 2024-04-28 RX ADMIN — LORAZEPAM 0.5 MG: 0.5 TABLET ORAL at 00:37

## 2024-04-28 RX ADMIN — SODIUM CHLORIDE, SODIUM LACTATE, POTASSIUM CHLORIDE, AND CALCIUM CHLORIDE 1000 ML: .6; .31; .03; .02 INJECTION, SOLUTION INTRAVENOUS at 00:40

## 2024-04-28 RX ADMIN — CALCIUM CARBONATE (ANTACID) CHEW TAB 500 MG 1000 MG: 500 CHEW TAB at 02:48

## 2024-04-28 RX ADMIN — ACETAMINOPHEN 325MG 650 MG: 325 TABLET ORAL at 09:46

## 2024-04-28 RX ADMIN — CEFAZOLIN SODIUM 2000 MG: 2 SOLUTION INTRAVENOUS at 05:49

## 2024-04-28 RX ADMIN — LORAZEPAM 0.5 MG: 0.5 TABLET ORAL at 21:39

## 2024-04-28 RX ADMIN — SODIUM CHLORIDE, SODIUM LACTATE, POTASSIUM CHLORIDE, AND CALCIUM CHLORIDE 1000 ML: .6; .31; .03; .02 INJECTION, SOLUTION INTRAVENOUS at 02:36

## 2024-04-28 RX ADMIN — ENOXAPARIN SODIUM 40 MG: 40 INJECTION SUBCUTANEOUS at 09:49

## 2024-04-28 RX ADMIN — SERTRALINE HYDROCHLORIDE 75 MG: 50 TABLET ORAL at 00:37

## 2024-04-28 RX ADMIN — OXYCODONE HYDROCHLORIDE 5 MG: 5 TABLET ORAL at 16:35

## 2024-04-28 RX ADMIN — LOSARTAN POTASSIUM 50 MG: 50 TABLET, FILM COATED ORAL at 09:46

## 2024-04-28 RX ADMIN — CEFAZOLIN SODIUM 2000 MG: 2 SOLUTION INTRAVENOUS at 13:23

## 2024-04-28 RX ADMIN — ACETAMINOPHEN 325MG 975 MG: 325 TABLET ORAL at 20:52

## 2024-04-28 RX ADMIN — SODIUM CHLORIDE, SODIUM LACTATE, POTASSIUM CHLORIDE, AND CALCIUM CHLORIDE 1000 ML: .6; .31; .03; .02 INJECTION, SOLUTION INTRAVENOUS at 01:45

## 2024-04-28 RX ADMIN — HYDROMORPHONE HYDROCHLORIDE 0.5 MG: 1 INJECTION, SOLUTION INTRAMUSCULAR; INTRAVENOUS; SUBCUTANEOUS at 11:12

## 2024-04-28 RX ADMIN — CEFAZOLIN SODIUM 2000 MG: 2 SOLUTION INTRAVENOUS at 21:39

## 2024-04-28 RX ADMIN — SERTRALINE HYDROCHLORIDE 75 MG: 50 TABLET ORAL at 21:39

## 2024-04-28 RX ADMIN — ATORVASTATIN CALCIUM 10 MG: 10 TABLET, FILM COATED ORAL at 16:30

## 2024-04-28 NOTE — ASSESSMENT & PLAN NOTE
"Lab Results   Component Value Date    HGBA1C 6.1 (H) 01/29/2024     No results for input(s): \"POCGLU\" in the last 72 hours.  Blood Sugar Average: Last 72 hrs:  Well controlled based on recent HA1C   Hold oral/injectable diabetic medications  QID sugars and SSI  Diabetic diet   "

## 2024-04-28 NOTE — NURSING NOTE
Patient found with tin of chewing tobacco on his bedside table. Tin of chewing tobacco removed from patient room and placed in closet near charge nurse desk/manager's office. Explained to patient that per our protocols at [facility] we do not allow use of tobacco products during patient hospitalizations. Patient verbalized understanding. Explained that prior to discharge, patient can ask for chewing tobacco to be returned to him and we will gladly return it to him. Patient verbalized understanding.

## 2024-04-28 NOTE — PROGRESS NOTES
[facility]  Progress Note  Name: Vamshi Robbins I  MRN: [id number]  Unit/Bed#: -01 I Date of Admission: 4/27/2024   Date of Service: 4/28/2024 I Hospital Day: 1    Assessment/Plan   * Cellulitis of right lower extremity  Assessment & Plan  This is likely due to known diabetic ulceration of right heel with charcot foot deformity   Ulceration debrided yesterday w/ podiatry   Continue IV cefazolin 2g q8h   Low suspicion for osteo based on exam findings  Podiatry consult  MRI, ABIs ordered  Local wound care and serial skin exams     Severe sepsis (HCC)  Assessment & Plan  POA, e/b tachycardia, tachypnea, elevated lactic acid, mild leukocytosis with neutrophilia   Likely due to cellulitis from diabetic ulceration   That is criteria resolved, negative procalcitonin x 2  Consider possibility of bacteremia with e/o rigors on exam   Blood cultures pending  IV cefazolin     HLD (hyperlipidemia)  Assessment & Plan  Continue lipitor     Primary hypertension  Assessment & Plan  Continue valsartan     Type 2 diabetes mellitus with neurologic complication, without long-term current use of insulin (HCC)  Assessment & Plan  Lab Results   Component Value Date    HGBA1C 6.1 (H) 01/29/2024     Recent Labs     04/28/24  0036 04/28/24  0731 04/28/24  1108   POCGLU 124 126 152*     Blood Sugar Average: Last 72 hrs:  (P) 134Well controlled based on recent HA1C   Hold oral/injectable diabetic medications  QID sugars and SSI  Diabetic diet     Mild depression  Assessment & Plan  Continue Zoloft     Morbid obesity (HCC)  Assessment & Plan  BMI 46.25  Recommend diet/lifestyle modification                VTE Pharmacologic Prophylaxis: VTE Score: 6 High Risk (Score >/= 5) - Pharmacological DVT Prophylaxis Ordered: enoxaparin (Lovenox). Sequential Compression Devices Ordered.    Mobility:   Basic Mobility Inpatient Raw Score: 15  -HL Goal: 4: Move to chair/commode  -HL Achieved: 5: Stand (1 or more  minutes)  -[facility] Goal achieved. Continue to encourage appropriate mobility.    Patient Centered Rounds: I performed bedside rounds with nursing staff today.   Discussions with Specialists or Other Care Team Provider: none      Total Time Spent on Date of Encounter in care of patient: 31 mins. This time was spent on one or more of the following: performing physical exam; counseling and coordination of care; obtaining or reviewing history; documenting in the medical record; reviewing/ordering tests, medications or procedures; communicating with other healthcare professionals and discussing with patient's family/caregivers.    Current Length of Stay: 1 day(s)  Current Patient Status: Inpatient   Certification Statement: The patient will continue to require additional inpatient hospital stay due to cellulitis and foot wound  Discharge Plan: Anticipate discharge in 24-48 hrs to home.    Code Status: Level 1 - Full Code    Subjective:   Patient complaining of significant headache this morning, had issues with the hospital CPAP.  His wife is bringing in his home CPAP.  Otherwise his foot feels about the same as prior    Objective:     Vitals:   Temp (24hrs), Av °F (37.2 °C), Min:98.2 °F (36.8 °C), Max:100.3 °F (37.9 °C)    Temp:  [98.2 °F (36.8 °C)-100.3 °F (37.9 °C)] 98.2 °F (36.8 °C)  HR:  [82-90] 84  Resp:  [16-20] 18  BP: (111-156)/(58-87) 151/79  SpO2:  [93 %-96 %] 93 %  Body mass index is 46.25 kg/m².     Input and Output Summary (last 24 hours):     Intake/Output Summary (Last 24 hours) at 2024 1220  Last data filed at 2024 1109  Gross per 24 hour   Intake 120 ml   Output 1580 ml   Net -1460 ml       Physical Exam:   Physical Exam  Constitutional:       Appearance: Normal appearance. He is obese. He is not ill-appearing.   HENT:      Head: Normocephalic and atraumatic.   Cardiovascular:      Rate and Rhythm: Normal rate and regular rhythm.   Pulmonary:      Effort: Pulmonary effort is normal.       Breath sounds: Normal breath sounds.   Abdominal:      General: Bowel sounds are normal.      Palpations: Abdomen is soft.      Tenderness: There is no abdominal tenderness.   Musculoskeletal:         General: Swelling present. Normal range of motion.      Cervical back: Normal range of motion and neck supple.   Skin:     General: Skin is warm and dry.      Comments: Right heel wound with exposed layer of subcu tissue without purulence or fluctuance.  Minimal surrounding erythema   Neurological:      General: No focal deficit present.      Mental Status: He is alert and oriented to person, place, and time.   Psychiatric:         Mood and Affect: Mood normal.         Behavior: Behavior normal.          Additional Data:     Labs:  Results from last 7 days   Lab Units 04/28/24  0545   WBC Thousand/uL 12.95*   HEMOGLOBIN g/dL 13.4   HEMATOCRIT % 40.4   PLATELETS Thousands/uL 116*   SEGS PCT % 85*   LYMPHO PCT % 8*   MONO PCT % 6   EOS PCT % 0     Results from last 7 days   Lab Units 04/28/24  0545 04/27/24  2149   SODIUM mmol/L 136 135   POTASSIUM mmol/L 4.3 3.9   CHLORIDE mmol/L 106 104   CO2 mmol/L 24 21   BUN mg/dL 13 15   CREATININE mg/dL 1.06 1.01   ANION GAP mmol/L 6 10   CALCIUM mg/dL 8.7 9.0   ALBUMIN g/dL  --  3.9   TOTAL BILIRUBIN mg/dL  --  0.53   ALK PHOS U/L  --  112*   ALT U/L  --  16   AST U/L  --  15   GLUCOSE RANDOM mg/dL 115 183*     Results from last 7 days   Lab Units 04/27/24  2149   INR  1.00     Results from last 7 days   Lab Units 04/28/24  1108 04/28/24  0731 04/28/24  0036   POC GLUCOSE mg/dl 152* 126 124         Results from last 7 days   Lab Units 04/28/24  0048 04/27/24  2149   LACTIC ACID mmol/L 1.8  1.9 2.3*   PROCALCITONIN ng/ml 0.10 0.07       Lines/Drains:  Invasive Devices       Peripheral Intravenous Line  Duration             Peripheral IV 04/27/24 Left Antecubital <1 day    Peripheral IV 04/27/24 Right Antecubital <1 day                          Imaging: No pertinent imaging  reviewed.    Recent Cultures (last 7 days):         Last 24 Hours Medication List:   Current Facility-Administered Medications   Medication Dose Route Frequency Provider Last Rate    acetaminophen  975 mg Oral Q8H [facility] Eliezer Munson DO      atorvastatin  10 mg Oral Daily With Dinner Lee Mireles PA-C      calcium carbonate  1,000 mg Oral Daily PRN Lee Mireles PA-C      cefazolin  2,000 mg Intravenous Q8H Lee Mireles PA-C 2,000 mg (04/28/24 0549)    enoxaparin  40 mg Subcutaneous Daily Lee Mireles PA-C      insulin lispro  1-5 Units Subcutaneous TID AC Lee Mireles PA-C      insulin lispro  1-5 Units Subcutaneous HS Lee Mireles PA-C      LORazepam  0.5 mg Oral Daily PRN Lee Mireles PA-C      losartan  50 mg Oral Daily Lee Mireles PA-C      nicotine  1 patch Transdermal Daily Lee Mireles PA-C      ondansetron  4 mg Intravenous Q6H PRN Lee Mireles PA-C      oxyCODONE  10 mg Oral Q4H PRN Lee Mireles PA-C      oxyCODONE  5 mg Oral Q4H PRN Lee Mireles PA-C      senna  1 tablet Oral HS PRN Lee Mireles, NEL      sertraline  75 mg Oral HS Lee Mireles PA-C          Today, Patient Was Seen By: Eliezer Munson DO    **Please Note: This note may have been constructed using a voice recognition system.**

## 2024-04-28 NOTE — PLAN OF CARE
Problem: SKIN/TISSUE INTEGRITY - ADULT  Goal: Incision(s), wounds(s) or drain site(s) healing without S/S of infection  Description: INTERVENTIONS  - Assess and document dressing, incision, wound bed, drain sites and surrounding tissue  - Provide patient and family education  - Perform skin care/dressing changes every prn  Problem: INFECTION - ADULT  Goal: Absence or prevention of progression during hospitalization  Description: INTERVENTIONS:  - Assess and monitor for signs and symptoms of infection  - Monitor lab/diagnostic results  - Monitor all insertion sites, i.e. indwelling lines, tubes, and drains  - Monitor endotracheal if appropriate and nasal secretions for changes in amount and color  - [place] appropriate cooling/warming therapies per order  - Administer medications as ordered  - Instruct and encourage patient and family to use good hand hygiene technique  - Identify and instruct in appropriate isolation precautions for identified infection/condition  Outcome: Progressing  Goal: Absence of fever/infection during neutropenic period  Description: INTERVENTIONS:  - Monitor WBC    Outcome: Progressing     Problem: PAIN - ADULT  Goal: Verbalizes/displays adequate comfort level or baseline comfort level  Description: Interventions:  - Encourage patient to monitor pain and request assistance  - Assess pain using appropriate pain scale  - Administer analgesics based on type and severity of pain and evaluate response  - Implement non-pharmacological measures as appropriate and evaluate response  - Consider cultural and social influences on pain and pain management  - Notify physician/advanced practitioner if interventions unsuccessful or patient reports new pain  Outcome: Progressing     Outcome: Progressing

## 2024-04-28 NOTE — ASSESSMENT & PLAN NOTE
POA, e/b tachycardia, tachypnea, elevated lactic acid, mild leukocytosis with neutrophilia   Likely due to cellulitis from diabetic ulceration   That is criteria resolved, negative procalcitonin x 2  Consider possibility of bacteremia with e/o rigors on exam   Blood cultures pending  IV cefazolin

## 2024-04-28 NOTE — H&P
"[facility]  H&P  Name: Vamshi Robbins 63 y.o. male I MRN: [id number]  Unit/Bed#: -01 I Date of Admission: 4/27/2024   Date of Service: 4/28/2024 I Hospital Day: 1      Assessment/Plan   * Cellulitis of right lower extremity  Assessment & Plan  This is likely due to known diabetic ulceration of right heel with charcot foot deformity   Ulceration debrided yesterday w/ podiatry   Start IV cefazolin 2g q8h   Low suspicion for osteo based on exam findings; will defer need for additional imaging to podiatry   Local wound care and serial skin exams     Severe sepsis (HCC)  Assessment & Plan  POA, e/b tachycardia, tachypnea, elevated lactic acid, mild leukocytosis with neutrophilia   Likely due to cellulitis from diabetic ulceration   Consider possibility of bacteremia with e/o rigors on exam   F/u blood cultures   IV cefazolin     Type 2 diabetes mellitus with neurologic complication, without long-term current use of insulin (HCC)  Assessment & Plan  Lab Results   Component Value Date    HGBA1C 6.1 (H) 01/29/2024     No results for input(s): \"POCGLU\" in the last 72 hours.  Blood Sugar Average: Last 72 hrs:  Well controlled based on recent HA1C   Hold oral/injectable diabetic medications  QID sugars and SSI  Diabetic diet     HLD (hyperlipidemia)  Assessment & Plan  Continue lipitor     Primary hypertension  Assessment & Plan  Continue valsartan     Mild depression  Assessment & Plan  Continue Zoloft     Morbid obesity (HCC)  Assessment & Plan  BMI 46.25  Recommend diet/lifestyle modification          VTE Pharmacologic Prophylaxis: VTE Score: 6 High Risk (Score >/= 5) - Pharmacological DVT Prophylaxis Ordered: enoxaparin (Lovenox). Sequential Compression Devices Ordered.  Code Status: Level 1 - Full Code   Discussion with family: Patient declined call to .     Anticipated Length of Stay: Patient will be admitted on an inpatient basis with an anticipated length of stay of " greater than 2 midnights secondary to sepsis due to cellulitis, wound.    Total Time Spent on Date of Encounter in care of patient: This time was spent on one or more of the following: performing physical exam; counseling and coordination of care; obtaining or reviewing history; documenting in the medical record; reviewing/ordering tests, medications or procedures; communicating with other healthcare professionals and discussing with patient's family/caregivers.    Chief Complaint: fever     History of Present Illness:  Vamshi Robbins is a 63 y.o. male with a PMH of HTN, HLD, Obesity, DM2, peripheral neuropathy, who presents with fever that started today. Reports over last 1 month with R heel ulceration that initially started out as callus/crack in skin. States he was seen by his podiatrist yesterday and it was debrided. Denies purulent material or any drainage from wound during and after debridement. No recent abx use and denies hx of MRSA. States he has peripheral neuropathy and has difficulty feeling his feet and legs. Denies pain in RLE but noted to have erythema and warmth on exam. Admits to fevers with shaking chills that started earlier today.     Review of Systems:  Review of Systems   Constitutional:  Positive for chills and fever.   HENT:  Negative for congestion.    Respiratory:  Negative for shortness of breath and wheezing.    Cardiovascular:  Positive for leg swelling (chronic). Negative for chest pain.   Gastrointestinal:  Negative for abdominal pain.   Genitourinary:  Negative for difficulty urinating and dysuria.   Musculoskeletal:  Positive for back pain (chronic).   Skin:  Positive for wound.   Allergic/Immunologic: Positive for immunocompromised state (hx dm2).   Neurological:  Negative for dizziness and headaches.   Psychiatric/Behavioral:  Negative for confusion.        Past Medical and Surgical History:   Past Medical History:   Diagnosis Date    Anxiety     Arthritis     Chronic pain disorder      mid back region    Colon polyp     COVID-19     CPAP (continuous positive airway pressure) dependence     Pt uses nightly    Diabetes mellitus (HCC)     Diverticulitis of colon 3 years ago    No issues    Diverticulosis     History of recent hospitalization 06/16/2021    Pt was admitted to [facility] after syncopal episode. Pt saw cardiology- all tests negative. Pt had nl EEG. pt states is was a medication interaction.    Hyperlipidemia     Hypertension     Obesity     Sleep apnea     Spinal stenosis     Wears hearing aid        Past Surgical History:   Procedure Laterality Date    ACHILLES TENDON REPAIR      Pt had a rupture in right and left 2 years apart    COLONOSCOPY      EPIDURAL BLOCK INJECTION      Pt had in lumbar region.    EPIDURAL BLOCK INJECTION N/A 04/06/2021    Procedure: T-11-T-12 INTERLAMINAR THORACIC EPIDURAL STEROID INJECTION;  Surgeon: Guzman Harrison MD;  [facility];  Service: Pain Management     EPIDURAL BLOCK INJECTION Right 07/20/2021    Procedure: L5 and S1 TRANSFORAMINAL EPIDURAL STEROID INJECTION;  Surgeon: Guzman Harrison MD;  [facility]: [facility];  Service: Pain Management     FL GUIDED NEEDLE PLAC BX/ASP/INJ  07/14/2022    FL GUIDED NEEDLE PLAC BX/ASP/INJ  08/18/2022    FL GUIDED NEEDLE PLAC BX/ASP/INJ  09/15/2022    FOOT SURGERY Left     Left great toe- had a hammertoe.    HIP SURGERY      Replaced    JOINT REPLACEMENT Left     Total hip, knee    JOINT REPLACEMENT Right     Total hip, knee    KNEE ARTHROSCOPY Bilateral     NERVE BLOCK Bilateral 07/14/2022    Procedure: BLOCK MEDIAL BRANCH T12, L1, L2;  Surgeon: Guzman Harrison MD;  [facility]: [facility];  Service: Pain Management     NERVE BLOCK Bilateral 08/18/2022    Procedure: BLOCK MEDIAL BRANCH T12, L1, L2 #2;  Surgeon: Guzman Harrison MD;  [facility];  Service: Pain Management     NERVE BLOCK Bilateral 01/26/2023    Procedure: BLOCK MEDIAL BRANCH L3, L4, L5 #1;  Surgeon: Guzman Harrison MD;  [facility]: [facility];   Service: Pain Management     NERVE BLOCK Bilateral 02/16/2023    Procedure: BLOCK MEDIAL BRANCH L3, L4, L5 #2;  Surgeon: Guzman Harrison MD;  [facility]: [facility];  Service: Pain Management     DC JOE IMPLTJ NSTIM ELTRDS PLATE/PADDLE EDRL Left 10/26/2021    Procedure: Thoracic laminectomies for placement of spinal cord stimulator and internal pulse generator, use of neuromonitoring, left sided pulse generator;  Surgeon: Rob Wong MD;  Location: [facility];  Service: Neurosurgery    DC PRQ IMPLTJ NSTIM ELECTRODE ARRAY EPIDURAL Bilateral 09/30/2021    Procedure: NEVRO SCS TRIAL;  Surgeon: Guzman Harrison MD;  [facility];  Service: Pain Management     RHIZOTOMY Bilateral 09/15/2022    Procedure: RHIZOTOMY LUMBAR T12, L1, L2 medial branch nerves;  Surgeon: Guzman Harrison MD;  [facility];  Service: Pain Management     RHIZOTOMY Bilateral 03/02/2023    Procedure: RHIZOTOMY LUMBAR L3, L4, L5;  Surgeon: Guzman Harrison MD;  [facility];  Service: Pain Management     RHIZOTOMY Bilateral 03/16/2023    Procedure: RHIZOTOMY LUMBAR T10, T11, T12 medial branch nerves;  Surgeon: Guzman Harrison MD;  [facility]: [facility];  Service: Pain Management     TOTAL KNEE ARTHROPLASTY Left        Meds/Allergies:  Prior to Admission medications    Medication Sig Start Date End Date Taking? Authorizing Provider   atorvastatin (LIPITOR) 10 mg tablet Take 10 mg tablet every other day 1/4/24  Yes Cristina Lopez PA-C   atorvastatin (LIPITOR) 20 mg tablet Take 1 tablet every other day.  This should be alternating with the other prescription for 10 mg. 1/4/24  Yes Cristina Lopez PA-C   celecoxib (CeleBREX) 100 mg capsule Take 1 capsule (100 mg total) by mouth 2 (two) times a day 1/4/24  Yes Cristina Lopez PA-C   Empagliflozin (Jardiance) 25 MG TABS Take 1 tablet (25 mg total) by mouth daily 1/4/24 12/29/24 Yes Cristina Lopez PA-C   LORazepam (ATIVAN) 0.5 mg tablet Take 1 tablet  "(0.5 mg total) by mouth daily as needed for anxiety 1/31/24  Yes Cristina Lopez PA-C   semaglutide, 0.25 or 0.5 mg/dose, (Ozempic, 0.25 or 0.5 MG/DOSE,) 2 mg/3 mL injection pen Inject 0.75 mL (0.5 mg total) under the skin every 7 days 1/4/24 3/27/25 Yes Cristina Lopez PA-C   sertraline (ZOLOFT) 50 mg tablet Take 1.5 tablets (75 mg total) by mouth daily at bedtime 1/4/24  Yes Cristina Lopez PA-C   valsartan (DIOVAN) 80 mg tablet Take 1 tablet (80 mg total) by mouth daily 1/4/24 12/29/24 Yes Cristina Lopez PA-C   tadalafil (CIALIS) 10 MG tablet Take one tablet as needed for ED. 1/4/24   Cristina Lopez PA-C     I have reviewed home medications with patient personally.    Allergies:   Allergies   Allergen Reactions    Doxycycline Photosensitivity       Social History:  Marital Status: /Civil Union   Occupation: retired    Patient Pre-hospital Living Situation: Home  Patient Pre-hospital Level of Mobility: walks with cane  Patient Pre-hospital Diet Restrictions:   Substance Use History:   Social History     Substance and Sexual Activity   Alcohol Use Yes    Alcohol/week: 2.0 standard drinks of alcohol    Types: 2 Cans of beer per week    Comment: social, weekends     Social History     Tobacco Use   Smoking Status Never   Smokeless Tobacco Current    Types: Snuff   Tobacco Comments    still using snuff     Social History     Substance and Sexual Activity   Drug Use No       Family History:  Family History   Problem Relation Age of Onset    Arthritis Mother     Depression Mother     Hypertension Father     Alcohol abuse Father     Diabetes Son         Aged 14       Physical Exam:     Vitals:   Blood Pressure: 156/87 (04/27/24 2337)  Pulse: 90 (04/27/24 2337)  Temperature: 100.3 °F (37.9 °C) (04/27/24 2337)  Temp Source: Oral (04/27/24 2337)  Respirations: 20 (04/27/24 2337)  Height: 6' 3\" (190.5 cm) (04/27/24 2337)  Weight - Scale: (!) 168 kg (370 lb) (04/27/24 " 2337)  SpO2: 96 % (04/27/24 2334)    Physical Exam  Constitutional:       Appearance: Normal appearance. He is obese. He is not ill-appearing.      Comments: Rigors noted on exam    HENT:      Head: Normocephalic and atraumatic.   Cardiovascular:      Rate and Rhythm: Normal rate and regular rhythm.   Pulmonary:      Effort: Pulmonary effort is normal.      Breath sounds: Normal breath sounds.   Abdominal:      General: Bowel sounds are normal.      Palpations: Abdomen is soft.      Tenderness: There is no abdominal tenderness.   Musculoskeletal:         General: Swelling present. Normal range of motion.      Cervical back: Normal range of motion and neck supple.   Skin:     General: Skin is warm and dry.      Comments: Right heel wound with exposed layer of subcu tissue without purulence or fluctuance. Minimal serous drainage. Pretibial aspect of LE with warmth and mild erythema anteriorly.   Neurological:      General: No focal deficit present.      Mental Status: He is alert and oriented to person, place, and time.   Psychiatric:         Mood and Affect: Mood normal.         Behavior: Behavior normal.       Additional Data:     Lab Results:  Results from last 7 days   Lab Units 04/27/24  2149   WBC Thousand/uL 10.87*   HEMOGLOBIN g/dL 14.7   HEMATOCRIT % 42.7   PLATELETS Thousands/uL 135*   SEGS PCT % 82*   LYMPHO PCT % 10*   MONO PCT % 7   EOS PCT % 0     Results from last 7 days   Lab Units 04/27/24  2149   SODIUM mmol/L 135   POTASSIUM mmol/L 3.9   CHLORIDE mmol/L 104   CO2 mmol/L 21   BUN mg/dL 15   CREATININE mg/dL 1.01   ANION GAP mmol/L 10   CALCIUM mg/dL 9.0   ALBUMIN g/dL 3.9   TOTAL BILIRUBIN mg/dL 0.53   ALK PHOS U/L 112*   ALT U/L 16   AST U/L 15   GLUCOSE RANDOM mg/dL 183*     Results from last 7 days   Lab Units 04/27/24  2149   INR  1.00             Results from last 7 days   Lab Units 04/27/24  2149   LACTIC ACID mmol/L 2.3*   PROCALCITONIN ng/ml 0.07       Lines/Drains:  Invasive Devices        Peripheral Intravenous Line  Duration             Peripheral IV 04/27/24 Left Antecubital <1 day    Peripheral IV 04/27/24 Right Antecubital <1 day                        Imaging: Reviewed radiology reports from this admission including: xray(s)  XR chest portable - 1 view    (Results Pending)   XR foot 3+ views RIGHT    (Results Pending)       EKG and Other Studies Reviewed on Admission:   EKG: No EKG obtained.    ** Please Note: This note has been constructed using a voice recognition system. **

## 2024-04-28 NOTE — ASSESSMENT & PLAN NOTE
This is likely due to known diabetic ulceration of right heel with charcot foot deformity   Ulceration debrided yesterday w/ podiatry   Continue IV cefazolin 2g q8h   Low suspicion for osteo based on exam findings  Podiatry consult  MRI, ABIs ordered  Local wound care and serial skin exams

## 2024-04-28 NOTE — PLAN OF CARE
Problem: PAIN - ADULT  Goal: Verbalizes/displays adequate comfort level or baseline comfort level  Description: Interventions:  - Encourage patient to monitor pain and request assistance  - Assess pain using appropriate pain scale  - Administer analgesics based on type and severity of pain and evaluate response  - Implement non-pharmacological measures as appropriate and evaluate response  - Consider cultural and social influences on pain and pain management  - Notify physician/advanced practitioner if interventions unsuccessful or patient reports new pain  Outcome: Progressing     Problem: SAFETY ADULT  Goal: Patient will remain free of falls  Description: INTERVENTIONS:  - Educate patient/family on patient safety including physical limitations  - Instruct patient to call for assistance with activity   - Consult OT/PT to assist with strengthening/mobility   - Keep Call bell within reach  - Keep bed low and locked with side rails adjusted as appropriate  - Keep care items and personal belongings within reach  - Initiate and maintain comfort rounds  - Make Fall Risk Sign visible to staff  - Offer Toileting every 2 Hours, in advance of need  - Initiate/Maintain bed/chair alarm  - Obtain necessary fall risk management equipment: bed/chair alarm, walker  - Apply yellow socks and bracelet for high fall risk patients  - Consider moving patient to room near nurses station  Outcome: Progressing  Goal: Maintain or return to baseline ADL function  Description: INTERVENTIONS:  -  Assess patient's ability to carry out ADLs; assess patient's baseline for ADL function and identify physical deficits which impact ability to perform ADLs (bathing, care of mouth/teeth, toileting, grooming, dressing, etc.)  - Assess/evaluate cause of self-care deficits   - Assess range of motion  - Assess patient's mobility; develop plan if impaired  - Assess patient's need for assistive devices and provide as appropriate  - Encourage maximum  independence but intervene and supervise when necessary  - Involve family in performance of ADLs  - Assess for home care needs following discharge   - Consider OT consult to assist with ADL evaluation and planning for discharge  - Provide patient education as appropriate  Outcome: Progressing  Goal: Maintains/Returns to pre admission functional level  Description: INTERVENTIONS:  - Perform AM-PAC 6 Click Basic Mobility/ Daily Activity assessment daily.  - Set and communicate daily mobility goal to care team and patient/family/caregiver.   - Collaborate with rehabilitation services on mobility goals if consulted  - Perform Range of Motion 3 times a day.  - Reposition patient every 2 hours.  - Record patient progress and toleration of activity level   Outcome: Progressing

## 2024-04-28 NOTE — SEPSIS NOTE
"  Sepsis Note   Vamshi Robbins 63 y.o. male MRN: [id number]  Unit/Bed#: -01 Encounter: [id number]       Initial Sepsis Screening       Row Name 04/27/24 2966                Is the patient's history suggestive of a new or worsening infection? Yes (Proceed)  -SUSANA        Suspected source of infection soft tissue  -SUSANA        Indicate SIRS criteria Tachycardia > 90 bpm;Tachypnea > 20 resp per min;Leukocytosis (WBC > 57025 IJL) OR Leukopenia (WBC <4000 IJL) OR Bandemia (WBC >10% bands)  -SUSANA        Are two or more of the above signs & symptoms of infection both present and new to the patient? Yes (Proceed)  -SUSANA        Assess for evidence of organ dysfunction: Are any of the below criteria present within 6 hours of suspected infection and SIRS criteria that are NOT considered to be chronic conditions? Lactate > 2.0  -SUSANA        Date of presentation of severe sepsis 04/28/24  -SUSANA        Time of presentation of severe sepsis 2337  -SUSANA        Sepsis Note: Click \"NEXT\" below (NOT \"close\") to generate sepsis note based on above information. YES (proceed by clicking \"NEXT\")  -SUSANA                  User Key  (r) = Recorded By, (t) = Taken By, (c) = Cosigned By      Initials Name Provider Type    SUSANAJANE Mireles PA-C Physician Assistant                        Body mass index is 46.25 kg/m².  Wt Readings from Last 1 Encounters:   04/27/24 (!) 168 kg (370 lb)     IBW (Ideal Body Weight): 84.5 kg    Ideal body weight: 84.5 kg (186 lb 4.6 oz)  Adjusted ideal body weight: 118 kg (259 lb 12.4 oz)    "

## 2024-04-28 NOTE — ED PROVIDER NOTES
History  Chief Complaint   Patient presents with    Fever     Patient presents to the ED with complaints of fever that began this evening. Also complains of nausea and weakness.        History provided by:  Patient and medical records  Fever  Location:  103.1 Fahrenheit  Severity:  Severe  Onset quality:  Gradual  Duration:  3 hours  Timing:  Constant  Progression:  Unchanged  Chronicity:  New  Context:  Patient states over the last 3 hours he felt very fatigued, had rigors, too weak to walk, checked temperature and was 103.1.  Relieved by:  Tylenol  Worsened by:  Nothing  Associated symptoms: cough, fatigue, fever and rhinorrhea    Associated symptoms: no abdominal pain, no chest pain, no congestion, no diarrhea, no ear pain, no headaches, no loss of consciousness, no myalgias, no nausea, no rash, no shortness of breath, no sore throat, no vomiting and no wheezing    Risk factors:  Diabetic, foot ulcer, recently seen by podiatrist and had debridement      Prior to Admission Medications   Prescriptions Last Dose Informant Patient Reported? Taking?   Empagliflozin (Jardiance) 25 MG TABS 4/27/2024  No Yes   Sig: Take 1 tablet (25 mg total) by mouth daily   LORazepam (ATIVAN) 0.5 mg tablet 4/26/2024 at 2200  No Yes   Sig: Take 1 tablet (0.5 mg total) by mouth daily as needed for anxiety   atorvastatin (LIPITOR) 10 mg tablet 4/27/2024  No Yes   Sig: Take 10 mg tablet every other day   atorvastatin (LIPITOR) 20 mg tablet 4/27/2024  No Yes   Sig: Take 1 tablet every other day.  This should be alternating with the other prescription for 10 mg.   celecoxib (CeleBREX) 100 mg capsule 4/27/2024  No Yes   Sig: Take 1 capsule (100 mg total) by mouth 2 (two) times a day   semaglutide, 0.25 or 0.5 mg/dose, (Ozempic, 0.25 or 0.5 MG/DOSE,) 2 mg/3 mL injection pen Past Week  No Yes   Sig: Inject 0.75 mL (0.5 mg total) under the skin every 7 days   sertraline (ZOLOFT) 50 mg tablet 4/26/2024 at 2200  No Yes   Sig: Take 1.5 tablets (75  mg total) by mouth daily at bedtime   tadalafil (CIALIS) 10 MG tablet Unknown  No No   Sig: Take one tablet as needed for ED.   valsartan (DIOVAN) 80 mg tablet 4/27/2024 at 2200  No Yes   Sig: Take 1 tablet (80 mg total) by mouth daily      Facility-Administered Medications: None       Past Medical History:   Diagnosis Date    Anxiety     Arthritis     Chronic pain disorder     mid back region    Colon polyp     COVID-19     CPAP (continuous positive airway pressure) dependence     Pt uses nightly    Diabetes mellitus (HCC)     Diverticulitis of colon 3 years ago    No issues    Diverticulosis     History of recent hospitalization 06/16/2021    Pt was admitted to [facility] after syncopal episode. Pt saw cardiology- all tests negative. Pt had nl EEG. pt states is was a medication interaction.    Hyperlipidemia     Hypertension     Obesity     Sleep apnea     Spinal stenosis     Wears hearing aid        Past Surgical History:   Procedure Laterality Date    ACHILLES TENDON REPAIR      Pt had a rupture in right and left 2 years apart    COLONOSCOPY      EPIDURAL BLOCK INJECTION      Pt had in lumbar region.    EPIDURAL BLOCK INJECTION N/A 04/06/2021    Procedure: T-11-T-12 INTERLAMINAR THORACIC EPIDURAL STEROID INJECTION;  Surgeon: Guzman Harrison MD;  Location: [facility];  Service: Pain Management     EPIDURAL BLOCK INJECTION Right 07/20/2021    Procedure: L5 and S1 TRANSFORAMINAL EPIDURAL STEROID INJECTION;  Surgeon: Guzman Harrison MD;  [facility]: [facility];  Service: Pain Management     FL GUIDED NEEDLE PLAC BX/ASP/INJ  07/14/2022    FL GUIDED NEEDLE PLAC BX/ASP/INJ  08/18/2022    FL GUIDED NEEDLE PLAC BX/ASP/INJ  09/15/2022    FOOT SURGERY Left     Left great toe- had a hammertoe.    HIP SURGERY      Replaced    JOINT REPLACEMENT Left     Total hip, knee    JOINT REPLACEMENT Right     Total hip, knee    KNEE ARTHROSCOPY Bilateral     NERVE BLOCK Bilateral 07/14/2022    Procedure: BLOCK MEDIAL BRANCH T12, L1, L2;  Surgeon:  Guzman Harrison MD;  [facility]: [facility];  Service: Pain Management     NERVE BLOCK Bilateral 08/18/2022    Procedure: BLOCK MEDIAL BRANCH T12, L1, L2 #2;  Surgeon: Guzman Harrison MD;  [facility];  Service: Pain Management     NERVE BLOCK Bilateral 01/26/2023    Procedure: BLOCK MEDIAL BRANCH L3, L4, L5 #1;  Surgeon: Guzman Harrison MD;  [facility]: [facility];  Service: Pain Management     NERVE BLOCK Bilateral 02/16/2023    Procedure: BLOCK MEDIAL BRANCH L3, L4, L5 #2;  Surgeon: Guzman Harrison MD;  [facility]: [facility];  Service: Pain Management     SC JOE IMPLTJ NSTIM ELTRDS PLATE/PADDLE EDRL Left 10/26/2021    Procedure: Thoracic laminectomies for placement of spinal cord stimulator and internal pulse generator, use of neuromonitoring, left sided pulse generator;  Surgeon: Rob Wong MD;  Location: [facility];  Service: Neurosurgery    SC PRQ IMPLTJ NSTIM ELECTRODE ARRAY EPIDURAL Bilateral 09/30/2021    Procedure: NEVRO SCS TRIAL;  Surgeon: Guzman Harrison MD;  [facility]: [facility];  Service: Pain Management     RHIZOTOMY Bilateral 09/15/2022    Procedure: RHIZOTOMY LUMBAR T12, L1, L2 medial branch nerves;  Surgeon: Guzman Harrison MD;  [facility];  Service: Pain Management     RHIZOTOMY Bilateral 03/02/2023    Procedure: RHIZOTOMY LUMBAR L3, L4, L5;  Surgeon: Guzman Harrison MD;  [facility];  Service: Pain Management     RHIZOTOMY Bilateral 03/16/2023    Procedure: RHIZOTOMY LUMBAR T10, T11, T12 medial branch nerves;  Surgeon: Guzman Harrison MD;  [facility];  Service: Pain Management     TOTAL KNEE ARTHROPLASTY Left        Family History   Problem Relation Age of Onset    Arthritis Mother     Depression Mother     Hypertension Father     Alcohol abuse Father     Diabetes Son         Aged 14     I have reviewed and agree with the history as documented.    E-Cigarette/Vaping    E-Cigarette Use Never User      E-Cigarette/Vaping Substances    Nicotine No     THC No      CBD No     Flavoring No     Other No     Unknown No      Social History     Tobacco Use    Smoking status: Never    Smokeless tobacco: Current     Types: Snuff    Tobacco comments:     still using snuff   Vaping Use    Vaping status: Never Used   Substance Use Topics    Alcohol use: Yes     Alcohol/week: 2.0 standard drinks of alcohol     Types: 2 Cans of beer per week     Comment: social, weekends    Drug use: No       Review of Systems   Constitutional:  Positive for fatigue and fever. Negative for appetite change and chills.   HENT:  Positive for postnasal drip and rhinorrhea. Negative for congestion, ear pain, sore throat and trouble swallowing.    Eyes:  Negative for pain, discharge and visual disturbance.   Respiratory:  Positive for cough. Negative for chest tightness, shortness of breath and wheezing.    Cardiovascular:  Negative for chest pain and palpitations.   Gastrointestinal:  Negative for abdominal pain, diarrhea, nausea and vomiting.   Endocrine: Negative for polydipsia, polyphagia and polyuria.   Genitourinary:  Negative for difficulty urinating, dysuria, hematuria and testicular pain.   Musculoskeletal:  Negative for arthralgias, back pain and myalgias.   Skin:  Positive for wound. Negative for color change and rash.   Allergic/Immunologic: Negative for immunocompromised state.   Neurological:  Negative for dizziness, seizures, loss of consciousness, syncope, weakness and headaches.   Hematological:  Negative for adenopathy.   Psychiatric/Behavioral:  Negative for confusion and dysphoric mood.    All other systems reviewed and are negative.      Physical Exam  Physical Exam  Vitals and nursing note reviewed.   Constitutional:       General: He is not in acute distress.     Appearance: Normal appearance. He is obese. He is not ill-appearing, toxic-appearing or diaphoretic.   HENT:      Head: Normocephalic and atraumatic.      Nose: Nose normal. No congestion or rhinorrhea.      Mouth/Throat:       Mouth: Mucous membranes are moist.      Pharynx: Oropharynx is clear. No oropharyngeal exudate or posterior oropharyngeal erythema.   Eyes:      General:         Right eye: No discharge.         Left eye: No discharge.   Cardiovascular:      Rate and Rhythm: Normal rate and regular rhythm.      Pulses: Normal pulses.      Heart sounds: Normal heart sounds. No murmur heard.     No gallop.   Pulmonary:      Effort: Pulmonary effort is normal. No respiratory distress.      Breath sounds: Normal breath sounds. No stridor. No wheezing, rhonchi or rales.   Chest:      Chest wall: No tenderness.   Abdominal:      General: Bowel sounds are normal. There is no distension.      Palpations: Abdomen is soft. There is no mass.      Tenderness: There is no abdominal tenderness. There is no right CVA tenderness, left CVA tenderness, guarding or rebound.      Hernia: No hernia is present.   Musculoskeletal:         General: Normal range of motion.      Cervical back: Normal range of motion and neck supple.      Right lower leg: Edema present.      Left lower leg: Edema present.      Comments: +2 pitting edema bilateral lower extremities.  Chronic venous insufficiency dermatitis.  Bilateral midfoot deformity concerns for Charcot foot.   Skin:     General: Skin is warm and dry.      Capillary Refill: Capillary refill takes less than 2 seconds.      Comments: Nickel sized diabetic ulcer to the right heel, surrounding erythema and warmth.  No discharge.   Neurological:      General: No focal deficit present.      Mental Status: He is alert and oriented to person, place, and time.      Cranial Nerves: No cranial nerve deficit.      Sensory: No sensory deficit.      Motor: No weakness.      Coordination: Coordination normal.      Gait: Gait normal.      Deep Tendon Reflexes: Reflexes normal.   Psychiatric:         Mood and Affect: Mood normal.         Behavior: Behavior normal.         Thought Content: Thought content normal.          Judgment: Judgment normal.         Vital Signs  ED Triage Vitals [04/27/24 2142]   Temperature Pulse Respirations Blood Pressure SpO2   100.2 °F (37.9 °C) 90 16 140/73 96 %      Temp Source Heart Rate Source Patient Position - Orthostatic VS BP Location FiO2 (%)   Oral Monitor Lying Right arm --      Pain Score       5           Vitals:    04/27/24 2142 04/27/24 2334 04/27/24 2337   BP: 140/73 156/87 156/87   Pulse: 90 82 90   Patient Position - Orthostatic VS: Lying           Visual Acuity      ED Medications  Medications   acetaminophen (TYLENOL) tablet 650 mg (650 mg Oral Given 4/27/24 2336)   ceFAZolin (ANCEF) IVPB (premix in dextrose) 2,000 mg 50 mL (has no administration in time range)   sertraline (ZOLOFT) tablet 75 mg (75 mg Oral Given 4/28/24 0037)   losartan (COZAAR) tablet 50 mg (has no administration in time range)   LORazepam (ATIVAN) tablet 0.5 mg (0.5 mg Oral Given 4/28/24 0037)   atorvastatin (LIPITOR) tablet 10 mg (has no administration in time range)   ondansetron (ZOFRAN) injection 4 mg (has no administration in time range)   calcium carbonate (TUMS) chewable tablet 1,000 mg (has no administration in time range)   senna (SENOKOT) tablet 8.6 mg (has no administration in time range)   nicotine (NICODERM CQ) 14 mg/24hr TD 24 hr patch 1 patch (has no administration in time range)   enoxaparin (LOVENOX) subcutaneous injection 40 mg (has no administration in time range)   insulin lispro (HumALOG/ADMELOG) 100 units/mL subcutaneous injection 1-5 Units (has no administration in time range)   insulin lispro (HumALOG/ADMELOG) 100 units/mL subcutaneous injection 1-5 Units ( Subcutaneous Not Given 4/28/24 0036)   lactated ringers bolus 1,000 mL (1,000 mL Intravenous New Bag 4/28/24 0040)     Followed by   lactated ringers bolus 1,000 mL (1,000 mL Intravenous New Bag 4/28/24 0145)     Followed by   lactated ringers bolus 1,000 mL (has no administration in time range)   oxyCODONE (ROXICODONE) IR tablet 5 mg (has  no administration in time range)   oxyCODONE (ROXICODONE) immediate release tablet 10 mg (has no administration in time range)   cefepime (MAXIPIME) IVPB (premix in dextrose) 2,000 mg 50 mL (0 mg Intravenous Stopped 4/27/24 2229)   vancomycin (VANCOCIN) 1750 mg in sodium chloride 0.9% 500 mL IVPB (1,750 mg Intravenous New Bag 4/27/24 2241)   lactated ringers bolus 500 mL (500 mL Intravenous New Bag 4/27/24 2158)   fentaNYL injection 50 mcg (50 mcg Intravenous Given 4/27/24 2234)       Diagnostic Studies  Results Reviewed       Procedure Component Value Units Date/Time    Lactic acid 2 Hours [[id number]]  (Normal) Collected: 04/28/24 0048    Lab Status: Final result Specimen: Blood from Hand, Left Updated: 04/28/24 0134     LACTIC ACID 1.8 mmol/L     Narrative:      Result may be elevated if tourniquet was used during collection.    Urine Microscopic [[id number]]  (Normal) Collected: 04/27/24 2249    Lab Status: Final result Specimen: Urine, Other Updated: 04/27/24 2304     RBC, UA 0-1 /hpf      WBC, UA None Seen /hpf      Epithelial Cells Occasional /hpf      Bacteria, UA None Seen /hpf     Procalcitonin [[id number]]  (Normal) Collected: 04/27/24 2149    Lab Status: Final result Specimen: Blood from Arm, Right Updated: 04/27/24 2300     Procalcitonin 0.07 ng/ml     UA w Reflex to Microscopic w Reflex to Culture [[id number]]  (Abnormal) Collected: 04/27/24 2249    Lab Status: Final result Specimen: Urine, Other Updated: 04/27/24 2256     Color, UA Yellow     Clarity, UA Clear     Specific Gravity, UA 1.015     pH, UA 5.5     Leukocytes, UA Elevated glucose may cause decreased leukocyte values. See urine microscopic for UWBC result     Nitrite, UA Negative     Protein, UA Negative mg/dl      Glucose, UA >=1000 (1%) mg/dl      Ketones, UA Negative mg/dl      Urobilinogen, UA 0.2 E.U./dl      Bilirubin, UA Negative     Occult Blood, UA Small    FLU/RSV/COVID - if FLU/RSV clinically relevant [[id number]]  (Normal) Collected:  04/27/24 2149    Lab Status: Final result Specimen: Nares from Nose Updated: 04/27/24 2243     SARS-CoV-2 Negative     INFLUENZA A PCR Negative     INFLUENZA B PCR Negative     RSV PCR Negative    Narrative:      FOR PEDIATRIC PATIENTS - copy/paste COVID Guidelines URL to browser: [email or web address]    SARS-CoV-2 assay is a Nucleic Acid Amplification assay intended for the  qualitative detection of nucleic acid from SARS-CoV-2 in nasopharyngeal  swabs. Results are for the presumptive identification of SARS-CoV-2 RNA.    Positive results are indicative of infection with SARS-CoV-2, the virus  causing COVID-19, but do not rule out bacterial infection or co-infection  with other viruses. Laboratories within the [place] and its  territories are required to report all positive results to the appropriate  public health authorities. Negative results do not preclude SARS-CoV-2  infection and should not be used as the sole basis for treatment or other  patient management decisions. Negative results must be combined with  clinical observations, patient history, and epidemiological information.  This test has not been FDA cleared or approved.    This test has been authorized by FDA under an Emergency Use Authorization  (EUA). This test is only authorized for the duration of time the  declaration that circumstances exist justifying the authorization of the  emergency use of an in vitro diagnostic tests for detection of SARS-CoV-2  virus and/or diagnosis of COVID-19 infection under section 564(b)(1) of  the Act, 21 U.S.C. 360bbb-3(b)(1), unless the authorization is terminated  or revoked sooner. The test has been validated but independent review by FDA  and CLIA is pending.    Test performed using [employer]: This RT-PCR assay targets N2,  a region unique to SARS-CoV-2. A conserved region in the E-gene was chosen  for pan-Sarbecovirus detection which includes  SARS-CoV-2.    According to CMS-2020-01-R, this platform meets the definition of high-throughput technology.    Lactic acid [[id number]]  (Abnormal) Collected: 04/27/24 2149    Lab Status: Final result Specimen: Blood from Arm, Right Updated: 04/27/24 2237     LACTIC ACID 2.3 mmol/L     Narrative:      Result may be elevated if tourniquet was used during collection.    Comprehensive metabolic panel [[id number]]  (Abnormal) Collected: 04/27/24 2149    Lab Status: Final result Specimen: Blood from Arm, Right Updated: 04/27/24 2236     Sodium 135 mmol/L      Potassium 3.9 mmol/L      Chloride 104 mmol/L      CO2 21 mmol/L      ANION GAP 10 mmol/L      BUN 15 mg/dL      Creatinine 1.01 mg/dL      Glucose 183 mg/dL      Calcium 9.0 mg/dL      AST 15 U/L      ALT 16 U/L      Alkaline Phosphatase 112 U/L      Total Protein 7.3 g/dL      Albumin 3.9 g/dL      Total Bilirubin 0.53 mg/dL      eGFR 79 ml/min/1.73sq m     Narrative:      National Kidney Disease Foundation guidelines for Chronic Kidney Disease (CKD):     Stage 1 with normal or high GFR (GFR > 90 mL/min/1.73 square meters)    Stage 2 Mild CKD (GFR = 60-89 mL/min/1.73 square meters)    Stage 3A Moderate CKD (GFR = 45-59 mL/min/1.73 square meters)    Stage 3B Moderate CKD (GFR = 30-44 mL/min/1.73 square meters)    Stage 4 Severe CKD (GFR = 15-29 mL/min/1.73 square meters)    Stage 5 End Stage CKD (GFR <15 mL/min/1.73 square meters)  Note: GFR calculation is accurate only with a steady state creatinine    Protime-INR [[id number]]  (Normal) Collected: 04/27/24 2149    Lab Status: Final result Specimen: Blood from Arm, Right Updated: 04/27/24 2208     Protime 13.5 seconds      INR 1.00    APTT [[id number]]  (Normal) Collected: 04/27/24 2149    Lab Status: Final result Specimen: Blood from Arm, Right Updated: 04/27/24 2208     PTT 24 seconds     Sedimentation rate, automated [[id number]]  (Abnormal) Collected: 04/27/24 2149    Lab Status: Final result Specimen: Blood from  Arm, Right Updated: 04/27/24 2208     Sed Rate 40 mm/hour     CBC and differential [[id number]]  (Abnormal) Collected: 04/27/24 2149    Lab Status: Final result Specimen: Blood from Arm, Right Updated: 04/27/24 2158     WBC 10.87 Thousand/uL      RBC 4.85 Million/uL      Hemoglobin 14.7 g/dL      Hematocrit 42.7 %      MCV 88 fL      MCH 30.3 pg      MCHC 34.4 g/dL      RDW 13.9 %      MPV 9.5 fL      Platelets 135 Thousands/uL      nRBC 0 /100 WBCs      Segmented % 82 %      Immature Grans % 1 %      Lymphocytes % 10 %      Monocytes % 7 %      Eosinophils Relative 0 %      Basophils Relative 0 %      Absolute Neutrophils 8.89 Thousands/µL      Absolute Immature Grans 0.07 Thousand/uL      Absolute Lymphocytes 1.06 Thousands/µL      Absolute Monocytes 0.77 Thousand/µL      Eosinophils Absolute 0.04 Thousand/µL      Basophils Absolute 0.04 Thousands/µL     Blood culture #2 [[id number]] Collected: 04/27/24 2149    Lab Status: In process Specimen: Blood from Arm, Right Updated: 04/27/24 2156    Blood culture #1 [[id number]] Collected: 04/27/24 2151    Lab Status: In process Specimen: Blood from Arm, Left Updated: 04/27/24 2156                   XR chest portable - 1 view    (Results Pending)   XR foot 3+ views RIGHT    (Results Pending)              Procedures  Procedures         ED Course                            Initial Sepsis Screening       Row Name 04/27/24 2356                Is the patient's history suggestive of a new or worsening infection? Yes (Proceed)  -SUSANA        Suspected source of infection soft tissue  -SUSANA        Indicate SIRS criteria Tachycardia > 90 bpm;Tachypnea > 20 resp per min;Leukocytosis (WBC > 29933 IJL) OR Leukopenia (WBC <4000 IJL) OR Bandemia (WBC >10% bands)  -SUSANA        Are two or more of the above signs & symptoms of infection both present and new to the patient? Yes (Proceed)  -SUSANA        Assess for evidence of organ dysfunction: Are any of the below criteria present within 6 hours of  "suspected infection and SIRS criteria that are NOT considered to be chronic conditions? Lactate > 2.0  -SUSANA        Date of presentation of severe sepsis 04/28/24  -SUSANA        Time of presentation of severe sepsis 2337  -SUSANA        Sepsis Note: Click \"NEXT\" below (NOT \"close\") to generate sepsis note based on above information. YES (proceed by clicking \"NEXT\")  -SUSANA                  User Key  (r) = Recorded By, (t) = Taken By, (c) = Cosigned By      Initials Name Provider Type    SUSANA Mireles PA-C Physician Assistant                    SBIRT 22yo+      Flowsheet Row Most Recent Value   Initial Alcohol Screen: US AUDIT-C     1. How often do you have a drink containing alcohol? 0 Filed at: 04/27/2024 2142   2. How many drinks containing alcohol do you have on a typical day you are drinking?  0 Filed at: 04/27/2024 2142   3a. Male UNDER 65: How often do you have five or more drinks on one occasion? 0 Filed at: 04/27/2024 2142   Audit-C Score 0 Filed at: 04/27/2024 2142   AGATHA: How many times in the past year have you...    Used an illegal drug or used a prescription medication for non-medical reasons? Never Filed at: 04/27/2024 2142                      Medical Decision Making  2140: Patient appears chronically ill, vital signs reviewed.  Patient is noted to have a ulcer to the right heel, he has significant erythema and warmth to the right foot, some swelling to the right foot.  Concerns for cellulitis of the right lower extremity.  At risk for osteomyelitis.  Plan to complete basic labs including lactic acid, blood cultures, sed rate.  Check chest x-ray and right foot x-rays.  We will rehydrate with IV fluids gently as patient has potential for volume overload, suffers from lower extremity chronic venous insufficiency, and patient currently normotensive.  I will empirically start on antibiotics.  Tetanus up-to-date.    2238: X-rays and labs reviewed.  Discussed with hospitalist for admission.    Amount and/or " Complexity of Data Reviewed  Labs: ordered.  Radiology: ordered and independent interpretation performed.     Details: Chest x-ray--no acute pathology  Right foot x-rays--no gross osteomyelitis, calcaneal spurs    Risk  Prescription drug management.  Decision regarding hospitalization.             Disposition  Final diagnoses:   Cellulitis of right foot     Time reflects when diagnosis was documented in both MDM as applicable and the Disposition within this note       Time User Action Codes Description Comment    4/27/2024 10:36 PM Ho Landa Add [L03.115] Cellulitis of right foot           ED Disposition       ED Disposition   Admit    Condition   Stable    Date/Time   Sat Apr 27, 2024 7072    Comment                  Follow-up Information    None         Current Discharge Medication List        CONTINUE these medications which have NOT CHANGED    Details   !! atorvastatin (LIPITOR) 10 mg tablet Take 10 mg tablet every other day  Qty: 45 tablet, Refills: 3    Associated Diagnoses: Mixed hyperlipidemia      !! atorvastatin (LIPITOR) 20 mg tablet Take 1 tablet every other day.  This should be alternating with the other prescription for 10 mg.  Qty: 45 tablet, Refills: 3    Associated Diagnoses: Mixed hyperlipidemia      celecoxib (CeleBREX) 100 mg capsule Take 1 capsule (100 mg total) by mouth 2 (two) times a day  Qty: 180 capsule, Refills: 1    Associated Diagnoses: Lumbar radiculopathy; Diabetic peripheral neuropathy (HCC); Spinal muscular atrophy, unspecified (HCC); Other spondylosis with myelopathy, cervical region; Myelomalacia of cervical cord (HCC); Herniation of intervertebral disc of thoracic region      Empagliflozin (Jardiance) 25 MG TABS Take 1 tablet (25 mg total) by mouth daily  Qty: 90 tablet, Refills: 3    Associated Diagnoses: DM type 2, goal HbA1c 7%-8% (HCC)      LORazepam (ATIVAN) 0.5 mg tablet Take 1 tablet (0.5 mg total) by mouth daily as needed for anxiety  Qty: 60 tablet, Refills: 1     Comments: Not to exceed 5 additional fills before 03/05/2024  Associated Diagnoses: PAOLA (generalized anxiety disorder)      semaglutide, 0.25 or 0.5 mg/dose, (Ozempic, 0.25 or 0.5 MG/DOSE,) 2 mg/3 mL injection pen Inject 0.75 mL (0.5 mg total) under the skin every 7 days  Qty: 12 mL, Refills: 3    Associated Diagnoses: Diabetic peripheral neuropathy (HCC)      sertraline (ZOLOFT) 50 mg tablet Take 1.5 tablets (75 mg total) by mouth daily at bedtime  Qty: 135 tablet, Refills: 3    Associated Diagnoses: PAOLA (generalized anxiety disorder)      valsartan (DIOVAN) 80 mg tablet Take 1 tablet (80 mg total) by mouth daily  Qty: 90 tablet, Refills: 3    Associated Diagnoses: Essential hypertension      tadalafil (CIALIS) 10 MG tablet Take one tablet as needed for ED.  Qty: 30 tablet, Refills: 3    Associated Diagnoses: Erectile dysfunction due to arterial insufficiency       !! - Potential duplicate medications found. Please discuss with provider.          No discharge procedures on file.    PDMP Review         Value Time User    PDMP Reviewed  Yes 1/31/2024  8:54 AM Cristina oLpez PA-C            ED Provider  Electronically Signed by             Ho Landa MD  04/28/24 3891

## 2024-04-28 NOTE — NURSING NOTE
Took over patient's care at 1500. Patient awake and alert. Resps even and non-labored on room air. Skin pink, warm and dry. Family visiting at this time.

## 2024-04-28 NOTE — CONSULTS
Consult - Podiatry   Vamshi Robbins 63 y.o. male MRN: [id number]  Unit/Bed#: -01 Encounter: [id number]      REQUIRED DOCUMENTATION:     1. This service was provided via Telemedicine.  2. Provider located at [place].  3. TeleMed provider: Denisa Bianchi DPM.  4. Identify all parties in room with patient during tele consult:RN  5.Patient was then informed that this was a Telemedicine visit and that the exam was being conducted confidentially over secure lines. My office door was closed. No one else was in the room.  Patient acknowledged consent and understanding of privacy and security of the Telemedicine visit, and gave us permission to have the assistant stay in the room in order to assist with the history and to conduct the exam.  I informed the patient that I have reviewed their record in Epic and presented the opportunity for them to ask any questions regarding the visit today.  The patient agreed to participate.       Assessment:  Right lower extremity cellulitis  Severe Sepsis - Likely due to right lower extremity cellulitis and heel ulcer   Diabetic right heel ulcer    Diabetic neuropathy    Plan:    - Right foot xrays pending - I personally interpreted the findings with patient which is consistent without any acute osseous abnormality. Chronic midfoot Osteoarthritis noted with calcification within the the plantar fascia and large heel spur.   - Given chronic wound to the heel, I will obtain and MRI to r/o deeper abscess or osteomyelitis.   - The right heel ulcer overall appears stable, I recommend local wound care with close monitoring  - Sommer ordered and pending   - Continue IV abx as per primary service   - Rest of care per primary service        Weight bearing status: WBAT with Sx shoe    History of Present Illness     HPI:  Vamshi Robbins is a 63 y.o. male with PMH significant for diabetic neuropathy and ulcer to the right heel with duration of 1 month presented to ER for 1 day onset of fever.  Patient reports he was seen by his podiatrist who has been seeing patient regularly for local wound care and the right heel wound was derided couple days ago. Patient noted right lower leg redness, swelling and warmth since couple days. He does report of diabetic neuropathy. He reports feel well today and no fever.     Inpatient consult to Podiatry  Consult performed by: Denisa Bianchi DPM  Consult ordered by: Lee Mireles PA-C        Review of Systems   Constitutional: Negative.    HENT: Negative.    Eyes: Negative.    Respiratory: Negative.    Cardiovascular: Negative.    Gastrointestinal: Negative.    Musculoskeletal: Right leg swelling and erythema   Skin:Right heel diabetic ulcer    Neurological: Negative.   Psych: negative.       Historical Information   Past Medical History:   Diagnosis Date    Anxiety     Arthritis     Chronic pain disorder     mid back region    Colon polyp     COVID-19     CPAP (continuous positive airway pressure) dependence     Pt uses nightly    Diabetes mellitus (HCC)     Diverticulitis of colon 3 years ago    No issues    Diverticulosis     History of recent hospitalization 06/16/2021    Pt was admitted to [facility] after syncopal episode. Pt saw cardiology- all tests negative. Pt had nl EEG. pt states is was a medication interaction.    Hyperlipidemia     Hypertension     Obesity     Sleep apnea     Spinal stenosis     Wears hearing aid      Past Surgical History:   Procedure Laterality Date    ACHILLES TENDON REPAIR      Pt had a rupture in right and left 2 years apart    COLONOSCOPY      EPIDURAL BLOCK INJECTION      Pt had in lumbar region.    EPIDURAL BLOCK INJECTION N/A 04/06/2021    Procedure: T-11-T-12 INTERLAMINAR THORACIC EPIDURAL STEROID INJECTION;  Surgeon: Guzman Harrison MD;  Location:  ENDO;  Service: Pain Management     EPIDURAL BLOCK INJECTION Right 07/20/2021    Procedure: L5 and S1 TRANSFORAMINAL EPIDURAL STEROID INJECTION;  Surgeon: Guzman Harrison MD;   Location: [facility];  Service: Pain Management     FL GUIDED NEEDLE PLAC BX/ASP/INJ  07/14/2022    FL GUIDED NEEDLE PLAC BX/ASP/INJ  08/18/2022    FL GUIDED NEEDLE PLAC BX/ASP/INJ  09/15/2022    FOOT SURGERY Left     Left great toe- had a hammertoe.    HIP SURGERY      Replaced    JOINT REPLACEMENT Left     Total hip, knee    JOINT REPLACEMENT Right     Total hip, knee    KNEE ARTHROSCOPY Bilateral     NERVE BLOCK Bilateral 07/14/2022    Procedure: BLOCK MEDIAL BRANCH T12, L1, L2;  Surgeon: Guzman Harrison MD;  Location: [facility];  Service: Pain Management     NERVE BLOCK Bilateral 08/18/2022    Procedure: BLOCK MEDIAL BRANCH T12, L1, L2 #2;  Surgeon: Guzman Harrison MD;  [facility]: [facility];  Service: Pain Management     NERVE BLOCK Bilateral 01/26/2023    Procedure: BLOCK MEDIAL BRANCH L3, L4, L5 #1;  Surgeon: Guzman Harrison MD;  [facility]: [facility];  Service: Pain Management     NERVE BLOCK Bilateral 02/16/2023    Procedure: BLOCK MEDIAL BRANCH L3, L4, L5 #2;  Surgeon: Guzman Harrison MD;  Location: [facility];  Service: Pain Management     ME JOE IMPLTJ NSTIM ELTRDS PLATE/PADDLE EDRL Left 10/26/2021    Procedure: Thoracic laminectomies for placement of spinal cord stimulator and internal pulse generator, use of neuromonitoring, left sided pulse generator;  Surgeon: Rob Wong MD;  Location:  MAIN OR;  Service: Neurosurgery    ME PRQ IMPLTJ NSTIM ELECTRODE ARRAY EPIDURAL Bilateral 09/30/2021    Procedure: NEVRO SCS TRIAL;  Surgeon: Guzman Harrison MD;  Location: [facility];  Service: Pain Management     RHIZOTOMY Bilateral 09/15/2022    Procedure: RHIZOTOMY LUMBAR T12, L1, L2 medial branch nerves;  Surgeon: Guzman Harrison MD;  [facility]: [facility];  Service: Pain Management     RHIZOTOMY Bilateral 03/02/2023    Procedure: RHIZOTOMY LUMBAR L3, L4, L5;  Surgeon: Guzman Harrison MD;  [facility]: [facility];  Service: Pain Management     RHIZOTOMY Bilateral 03/16/2023    Procedure: RHIZOTOMY LUMBAR T10, T11,  "T12 medial branch nerves;  Surgeon: Guzman Harrison MD;  Location: [facility];  Service: Pain Management     TOTAL KNEE ARTHROPLASTY Left      Social History   Social History     Substance and Sexual Activity   Alcohol Use Yes    Alcohol/week: 2.0 standard drinks of alcohol    Types: 2 Cans of beer per week    Comment: social, weekends     Social History     Substance and Sexual Activity   Drug Use No     Social History     Tobacco Use   Smoking Status Never   Smokeless Tobacco Current    Types: Snuff   Tobacco Comments    still using snuff     Family History:   Family History   Problem Relation Age of Onset    Arthritis Mother     Depression Mother     Hypertension Father     Alcohol abuse Father     Diabetes Son         Aged 14       Meds/Allergies   Medications Prior to Admission   Medication    atorvastatin (LIPITOR) 10 mg tablet    atorvastatin (LIPITOR) 20 mg tablet    celecoxib (CeleBREX) 100 mg capsule    Empagliflozin (Jardiance) 25 MG TABS    LORazepam (ATIVAN) 0.5 mg tablet    semaglutide, 0.25 or 0.5 mg/dose, (Ozempic, 0.25 or 0.5 MG/DOSE,) 2 mg/3 mL injection pen    sertraline (ZOLOFT) 50 mg tablet    valsartan (DIOVAN) 80 mg tablet    tadalafil (CIALIS) 10 MG tablet     Allergies   Allergen Reactions    Doxycycline Photosensitivity       Objective   First Vitals:   Blood Pressure: 140/73 (04/27/24 2142)  Pulse: 90 (04/27/24 2142)  Temperature: 100.2 °F (37.9 °C) (04/27/24 2142)  Temp Source: Oral (04/27/24 2142)  Respirations: 16 (04/27/24 2142)  Height: 6' 3\" (190.5 cm) (04/27/24 2337)  Weight - Scale: (!) 168 kg (370 lb 13 oz) (04/27/24 2142)  SpO2: 96 % (04/27/24 2142)    Current Vitals:   Blood Pressure: 151/79 (04/28/24 0733)  Pulse: 84 (04/28/24 0733)  Temperature: 98.2 °F (36.8 °C) (04/28/24 0733)  Temp Source: Temporal (04/28/24 0349)  Respirations: 18 (04/28/24 0733)  Height: 6' 3\" (190.5 cm) (04/27/24 2337)  Weight - Scale: (!) 168 kg (370 lb) (04/27/24 0547)  SpO2: 93 % (04/28/24 0733)    BP " "151/79   Pulse 84   Temp 98.2 °F (36.8 °C)   Resp 18   Ht 6' 3\" (1.905 m)   Wt (!) 168 kg (370 lb)   SpO2 93%   BMI 46.25 kg/m²      Physical Exam        Lower Extremities:    Dermatological:  RLE erythema and edema present. There is right lateral plantar heel ulcer noted with granular wound base and mild hyperkeratotic macerated periwound.               Lab Results:   Admission on 04/27/2024   Component Date Value    WBC 04/27/2024 10.87 (H)     RBC 04/27/2024 4.85     Hemoglobin 04/27/2024 14.7     Hematocrit 04/27/2024 42.7     MCV 04/27/2024 88     MCH 04/27/2024 30.3     MCHC 04/27/2024 34.4     RDW 04/27/2024 13.9     MPV 04/27/2024 9.5     Platelets 04/27/2024 135 (L)     nRBC 04/27/2024 0     Segmented % 04/27/2024 82 (H)     Immature Grans % 04/27/2024 1     Lymphocytes % 04/27/2024 10 (L)     Monocytes % 04/27/2024 7     Eosinophils Relative 04/27/2024 0     Basophils Relative 04/27/2024 0     Absolute Neutrophils 04/27/2024 8.89 (H)     Absolute Immature Grans 04/27/2024 0.07     Absolute Lymphocytes 04/27/2024 1.06     Absolute Monocytes 04/27/2024 0.77     Eosinophils Absolute 04/27/2024 0.04     Basophils Absolute 04/27/2024 0.04     Sodium 04/27/2024 135     Potassium 04/27/2024 3.9     Chloride 04/27/2024 104     CO2 04/27/2024 21     ANION GAP 04/27/2024 10     BUN 04/27/2024 15     Creatinine 04/27/2024 1.01     Glucose 04/27/2024 183 (H)     Calcium 04/27/2024 9.0     AST 04/27/2024 15     ALT 04/27/2024 16     Alkaline Phosphatase 04/27/2024 112 (H)     Total Protein 04/27/2024 7.3     Albumin 04/27/2024 3.9     Total Bilirubin 04/27/2024 0.53     eGFR 04/27/2024 79     LACTIC ACID 04/27/2024 2.3 (HH)     Procalcitonin 04/27/2024 0.07     Protime 04/27/2024 13.5     INR 04/27/2024 1.00     PTT 04/27/2024 24     Color, UA 04/27/2024 Yellow     Clarity, UA 04/27/2024 Clear     Specific Gravity, UA 04/27/2024 1.015     pH, UA 04/27/2024 5.5     Leukocytes, UA 04/27/2024 Elevated glucose may " cause decreased leukocyte values. See urine microscopic for UWBC result (A)     Nitrite, UA 04/27/2024 Negative     Protein, UA 04/27/2024 Negative     Glucose, UA 04/27/2024 >=1000 (1%) (A)     Ketones, UA 04/27/2024 Negative     Urobilinogen, UA 04/27/2024 0.2     Bilirubin, UA 04/27/2024 Negative     Occult Blood, UA 04/27/2024 Small (A)     SARS-CoV-2 04/27/2024 Negative     INFLUENZA A PCR 04/27/2024 Negative     INFLUENZA B PCR 04/27/2024 Negative     RSV PCR 04/27/2024 Negative     Sed Rate 04/27/2024 40 (H)     LACTIC ACID 04/28/2024 1.8     RBC, UA 04/27/2024 0-1     WBC, UA 04/27/2024 None Seen     Epithelial Cells 04/27/2024 Occasional     Bacteria, UA 04/27/2024 None Seen     LACTIC ACID 04/28/2024 1.9     Procalcitonin 04/28/2024 0.10     POC Glucose 04/28/2024 124     Sodium 04/28/2024 136     Potassium 04/28/2024 4.3     Chloride 04/28/2024 106     CO2 04/28/2024 24     ANION GAP 04/28/2024 6     BUN 04/28/2024 13     Creatinine 04/28/2024 1.06     Glucose 04/28/2024 115     Calcium 04/28/2024 8.7     eGFR 04/28/2024 74     WBC 04/28/2024 12.95 (H)     RBC 04/28/2024 4.47     Hemoglobin 04/28/2024 13.4     Hematocrit 04/28/2024 40.4     MCV 04/28/2024 90     MCH 04/28/2024 30.0     MCHC 04/28/2024 33.2     RDW 04/28/2024 14.1     MPV 04/28/2024 9.5     Platelets 04/28/2024 116 (L)     nRBC 04/28/2024 0     Segmented % 04/28/2024 85 (H)     Immature Grans % 04/28/2024 1     Lymphocytes % 04/28/2024 8 (L)     Monocytes % 04/28/2024 6     Eosinophils Relative 04/28/2024 0     Basophils Relative 04/28/2024 0     Absolute Neutrophils 04/28/2024 11.03 (H)     Absolute Immature Grans 04/28/2024 0.06     Absolute Lymphocytes 04/28/2024 1.01     Absolute Monocytes 04/28/2024 0.82     Eosinophils Absolute 04/28/2024 0.00     Basophils Absolute 04/28/2024 0.03     POC Glucose 04/28/2024 126     POC Glucose 04/28/2024 152 (H)                            Imaging: I have personally reviewed pertinent films in  "PACS  EKG, Pathology, and Other Studies: I have personally reviewed pertinent reports.      Code Status: Level 1 - Full Code      Portions of the record may have been created with voice recognition software. Occasional wrong word or \"sound a like\" substitutions may have occurred due to the inherent limitations of voice recognition software. Read the chart carefully and recognize, using context, where substitutions have occurred.          "

## 2024-04-28 NOTE — PLAN OF CARE
Problem: PAIN - ADULT  Goal: Verbalizes/displays adequate comfort level or baseline comfort level  Description: Interventions:  - Encourage patient to monitor pain and request assistance  - Assess pain using appropriate pain scale  - Administer analgesics based on type and severity of pain and evaluate response  - Implement non-pharmacological measures as appropriate and evaluate response  - Consider cultural and social influences on pain and pain management  - Notify physician/advanced practitioner if interventions unsuccessful or patient reports new pain  Outcome: Progressing     Problem: INFECTION - ADULT  Goal: Absence or prevention of progression during hospitalization  Description: INTERVENTIONS:  - Assess and monitor for signs and symptoms of infection  - Monitor lab/diagnostic results  - Monitor all insertion sites, i.e. indwelling lines, tubes, and drains  - Monitor endotracheal if appropriate and nasal secretions for changes in amount and color  - [place] appropriate cooling/warming therapies per order  - Administer medications as ordered  - Instruct and encourage patient and family to use good hand hygiene technique  - Identify and instruct in appropriate isolation precautions for identified infection/condition  Outcome: Progressing

## 2024-04-28 NOTE — ASSESSMENT & PLAN NOTE
Lab Results   Component Value Date    HGBA1C 6.1 (H) 01/29/2024     Recent Labs     04/28/24  0036 04/28/24  0731 04/28/24  1108   POCGLU 124 126 152*     Blood Sugar Average: Last 72 hrs:  (P) 134Well controlled based on recent HA1C   Hold oral/injectable diabetic medications  QID sugars and SSI  Diabetic diet

## 2024-04-28 NOTE — ASSESSMENT & PLAN NOTE
POA, e/b tachycardia, tachypnea, elevated lactic acid, mild leukocytosis with neutrophilia   Likely due to cellulitis from diabetic ulceration   Consider possibility of bacteremia with e/o rigors on exam   F/u blood cultures   IV cefazolin

## 2024-04-28 NOTE — ASSESSMENT & PLAN NOTE
This is likely due to known diabetic ulceration of right heel with charcot foot deformity   Ulceration debrided yesterday w/ podiatry   Start IV cefazolin 2g q8h   Low suspicion for osteo based on exam findings; will defer need for additional imaging to podiatry   Local wound care and serial skin exams

## 2024-04-29 ENCOUNTER — APPOINTMENT (INPATIENT)
Dept: NON INVASIVE DIAGNOSTICS | Facility: HOSPITAL | Age: 64
DRG: 872 | End: 2024-04-29
Payer: COMMERCIAL

## 2024-04-29 ENCOUNTER — APPOINTMENT (INPATIENT)
Dept: RADIOLOGY | Facility: HOSPITAL | Age: 64
DRG: 872 | End: 2024-04-29
Payer: COMMERCIAL

## 2024-04-29 LAB
ERYTHROCYTE [DISTWIDTH] IN BLOOD BY AUTOMATED COUNT: 14.1 % (ref 11.6–15.1)
GLUCOSE SERPL-MCNC: 106 MG/DL (ref 65–140)
GLUCOSE SERPL-MCNC: 108 MG/DL (ref 65–140)
GLUCOSE SERPL-MCNC: 115 MG/DL (ref 65–140)
GLUCOSE SERPL-MCNC: 134 MG/DL (ref 65–140)
HCT VFR BLD AUTO: 41.6 % (ref 36.5–49.3)
HGB BLD-MCNC: 14 G/DL (ref 12–17)
MCH RBC QN AUTO: 30 PG (ref 26.8–34.3)
MCHC RBC AUTO-ENTMCNC: 33.7 G/DL (ref 31.4–37.4)
MCV RBC AUTO: 89 FL (ref 82–98)
PLATELET # BLD AUTO: 117 THOUSANDS/UL (ref 149–390)
PMV BLD AUTO: 9.8 FL (ref 8.9–12.7)
PROCALCITONIN SERPL-MCNC: 0.2 NG/ML
RBC # BLD AUTO: 4.66 MILLION/UL (ref 3.88–5.62)
WBC # BLD AUTO: 8.7 THOUSAND/UL (ref 4.31–10.16)

## 2024-04-29 PROCEDURE — 97163 PT EVAL HIGH COMPLEX 45 MIN: CPT

## 2024-04-29 PROCEDURE — 97167 OT EVAL HIGH COMPLEX 60 MIN: CPT

## 2024-04-29 PROCEDURE — 84145 PROCALCITONIN (PCT): CPT | Performed by: PHYSICIAN ASSISTANT

## 2024-04-29 PROCEDURE — 99232 SBSQ HOSP IP/OBS MODERATE 35: CPT | Performed by: PHYSICIAN ASSISTANT

## 2024-04-29 PROCEDURE — 85027 COMPLETE CBC AUTOMATED: CPT | Performed by: HOSPITALIST

## 2024-04-29 PROCEDURE — 93923 UPR/LXTR ART STDY 3+ LVLS: CPT | Performed by: SURGERY

## 2024-04-29 PROCEDURE — 93923 UPR/LXTR ART STDY 3+ LVLS: CPT

## 2024-04-29 PROCEDURE — 82948 REAGENT STRIP/BLOOD GLUCOSE: CPT

## 2024-04-29 RX ADMIN — NICOTINE 1 PATCH: 14 PATCH, EXTENDED RELEASE TRANSDERMAL at 09:46

## 2024-04-29 RX ADMIN — ENOXAPARIN SODIUM 40 MG: 40 INJECTION SUBCUTANEOUS at 08:27

## 2024-04-29 RX ADMIN — SALINE NASAL SPRAY 2 SPRAY: 1.5 SOLUTION NASAL at 07:43

## 2024-04-29 RX ADMIN — LOSARTAN POTASSIUM 50 MG: 50 TABLET, FILM COATED ORAL at 08:28

## 2024-04-29 RX ADMIN — ACETAMINOPHEN 325MG 975 MG: 325 TABLET ORAL at 14:50

## 2024-04-29 RX ADMIN — LORAZEPAM 0.5 MG: 0.5 TABLET ORAL at 22:50

## 2024-04-29 RX ADMIN — CEFAZOLIN SODIUM 2000 MG: 2 SOLUTION INTRAVENOUS at 22:51

## 2024-04-29 RX ADMIN — SERTRALINE HYDROCHLORIDE 75 MG: 50 TABLET ORAL at 22:50

## 2024-04-29 RX ADMIN — ACETAMINOPHEN 325MG 975 MG: 325 TABLET ORAL at 22:50

## 2024-04-29 RX ADMIN — ACETAMINOPHEN 325MG 650 MG: 325 TABLET ORAL at 07:43

## 2024-04-29 RX ADMIN — CEFAZOLIN SODIUM 2000 MG: 2 SOLUTION INTRAVENOUS at 05:38

## 2024-04-29 RX ADMIN — CEFAZOLIN SODIUM 2000 MG: 2 SOLUTION INTRAVENOUS at 14:50

## 2024-04-29 RX ADMIN — ATORVASTATIN CALCIUM 10 MG: 10 TABLET, FILM COATED ORAL at 15:49

## 2024-04-29 NOTE — ASSESSMENT & PLAN NOTE
Lab Results   Component Value Date    HGBA1C 6.1 (H) 01/29/2024     Recent Labs     04/28/24  1526 04/28/24  2101 04/29/24  0732 04/29/24  1137   POCGLU 113 89 106 108     Blood Sugar Average: Last 72 hrs:  (P) [id number]  Well controlled based on recent HA1C   Hold oral/injectable diabetic medications  QID sugars and SSI  Diabetic diet

## 2024-04-29 NOTE — PLAN OF CARE
Problem: PHYSICAL THERAPY ADULT  Goal: Performs mobility at highest level of function for planned discharge setting.  See evaluation for individualized goals.  Description: Treatment/Interventions: Functional transfer training, LE strengthening/ROM, Elevations, Therapeutic exercise, Endurance training, Patient/family training, Equipment eval/education, Gait training, Compensatory technique education, OT          See flowsheet documentation for full assessment, interventions and recommendations.  Note: Prognosis: Fair  Problem List: Decreased strength, Decreased endurance, Impaired balance, Decreased mobility, Obesity, Impaired sensation, Decreased coordination  Assessment: Pt is a 63 y.o. male seen for PT evaluation s/p admission to [facility] on 4/27/2024 with Cellulitis of right lower extremity.  Order placed for PT services.  Upon evaluation: Pt is presenting with impaired functional mobility due to decreased strength, decreased endurance, impaired balance, impaired coordination, impaired proprioception, gait deviations, altered sensation, impaired judgment, fall risk, and impaired skin integrity requiring  stand-by to steadying assistance for transfers and ambulation with SPC . Pt's clinical presentation is currently unstable/unpredictable given the functional mobility deficits above, especially impaired proprioception, gait deviations, decreased activity tolerance, and altered sensation, coupled with fall risks as indicated by AM-PAC 6-Clicks: 20/24 as well as obesity and combined with medical complications of abnormal WBCs and need for input for mobility technique/safety.  Pt's PMHx and comorbidities that may affect physical performance and progress include: anxiety, DM, HTN, and chronic pain . Personal factors affecting pt at time of IE include: step(s) to enter environment, multi-level environment, inability to perform IADLs, inability to navigate community distances, and limited insight  into impairments. Pt will benefit from continued skilled PT services to address deficits as defined above and to maximize level of functional mobility to facilitate return toward PLOF and improved QOL. From PT/mobility standpoint, recommendation at time of d/c would be Level III (Minimum Resource Intensity) pending progress in order to reduce fall risk and maximize pt's functional independence and consistency with mobility in order to facilitate return to PLOF.  Recommend trial with cane next 1-2 sessions, ther ex next 1-2 sessions, and stair navigation .  Barriers to Discharge: Other (Comment)  Barriers to Discharge Comments: fall risk, significant B LE neuropathy, MOLLY/multi-level home, pt declines to trial stairs here  Rehab Resource Intensity Level, PT: III (Minimum Resource Intensity)    See flowsheet documentation for full assessment.

## 2024-04-29 NOTE — PLAN OF CARE
Problem: OCCUPATIONAL THERAPY ADULT  Goal: Performs self-care activities at highest level of function for planned discharge setting.  See evaluation for individualized goals.  Description: Treatment Interventions: ADL retraining, Visual perceptual retraining, Functional transfer training, UE strengthening/ROM, Endurance training, Cognitive reorientation, Patient/family training, Equipment evaluation/education, Neuromuscular reeducation, Fine motor coordination activities, Compensatory technique education, UE splinting, Continued evaluation, Cardiac education, Energy conservation, Activityengagement          See flowsheet documentation for full assessment, interventions and recommendations.   Note: Limitation: Decreased ADL status, Decreased Safe judgement during ADL, Decreased endurance, Decreased self-care trans, Decreased high-level ADLs  Prognosis: Good  Assessment: Pt is a 63 y.o. male, admitted to [facility] 4/27/2024 d/t experiencing fever. Dx: cellulitis of RLE. Pt with PMHx impacting their performance during ADL tasks, including: anxiety, arthritis, chronic back pain, DM, diverticulitis,diverticulosis, hypertension, obesity, spinal stenosis. Prior to admission to the hospital Pt was performing ADLs without physical assistance. IADLs without physical assistance. Functional transfers/ambulation without physical assistance. Cognitive status was PTA was intact. OT order placed to assess Pt's ADLs, cognitive status, and performance during functional tasks in order to maximize safety and independence while making most appropriate d/c recommendations. Pt's clinical presentation is currently unstable/unpredictable given new onset deficits that effect Pt's occupational performance and ability to safely return to PLOF including decrease activity tolerance, decrease standing balance, decrease performance during ADL tasks, decrease safety awareness , decrease performance during functional transfers, and high fall risk  combined with medical complications of abnormal CBC, abnormal blood sugars, edema/swelling, wounds, decreased skin integrity, and need for input for mobility technique/safety. Personal factors affecting Pt at time of initial evaluation include: step(s) to enter environment, multi-level environment, limited insight into impairments, and questionable non-compliance. Pt will benefit from continued skilled OT services to address deficits as defined above and to maximize level independence/participation during ADLs and functional tasks to facilitate return toward PLOF and improved quality of life. From an occupational therapy standpoint, recommendation at time of d/c would be Level III: Minimum Resource Intensity Therapy.     Rehab Resource Intensity Level, OT: III (Minimum Resource Intensity)

## 2024-04-29 NOTE — UTILIZATION REVIEW
Initial Clinical Review    Admission: Date/Time/Statement:   Admission Orders (From admission, onward)       Ordered        04/27/24 2248  INPATIENT ADMISSION  Once                          Orders Placed This Encounter   Procedures    INPATIENT ADMISSION     Standing Status:   Standing     Number of Occurrences:   1     Order Specific Question:   Level of Care     Answer:   Med Surg [16]     Order Specific Question:   Estimated length of stay     Answer:   More than 2 Midnights     Order Specific Question:   Certification     Answer:   I certify that inpatient services are medically necessary for this patient for a duration of greater than two midnights. See H&P and MD Progress Notes for additional information about the patient's course of treatment.     ED Arrival Information       Expected   -    Arrival   4/27/2024 21:38    Acuity   Urgent              Means of arrival   Ambulance    Escorted by   [facility] [place] [facility]    Service   Hospitalist    Admission type   Emergency              Arrival complaint   Fever             Chief Complaint   Patient presents with    Fever     Patient presents to the ED with complaints of fever that began this evening. Also complains of nausea and weakness.        Initial Presentation: 63 y.o. male to ED via EMS from home  Present to ED with fever that started today. Reports over last 1 month worsening R heel ulceration.  States he was seen by his podiatrist yesterday and it was debrided. States he has peripheral neuropathy and has difficulty feeling his feet and legs.   PMHX: HTN, HLD, Obesity, DM2, peripheral neuropathy   Admitted to [facility] with DX: Cellulitis of right lower extremity   on exam: T 100.3; hypertensive; erythema and warmth of RLE; rigors noted; Right heel wound with exposed layer of subcu tissue without purulence or fluctuance. Minimal serous drainage. Pretibial aspect of LE with warmth and mild erythema anteriorly.  WBC 10.87; LA 2.3  PLAN: cont  iv abx; pain control; monitor labs; trend / monitor fever curve; Accuchecks with ssic; podiatry consult; f/u blood cx      Anticipated Length of Stay/Certification Statement: Patient will be admitted on an inpatient basis with an anticipated length of stay of greater than 2 midnights secondary to sepsis due to cellulitis, wound.       Date: 4/28/24     Day 2  Patient complaining of significant headache this morning, had issues with the hospital CPAP.  His wife is bringing in his home CPAP.  Otherwise his foot feels about the same as prior; Exam: Right heel wound with exposed layer of subcu tissue without purulence or fluctuance.  Minimal surrounding erythema; WBC 12.95   Plan: cont iv abx; pain control; monitor labs; trend / monitor fever curve; Accuchecks with ssic; podiatry consult; f/u blood cx; f/u MRI / VICKIE's    PODIATRY CONSULT   Right lower extremity cellulitis / Severe Sepsis - Likely due to right lower extremity cellulitis and heel ulcer / Diabetic right heel ulcer  / Diabetic neuropathy  Rt Foot xray -  consistent without any acute osseous abnormality. Chronic midfoot Osteoarthritis noted with calcification within the the plantar fascia and large heel spur. Given chronic wound to the heel, I will obtain and MRI to r/o deeper abscess or osteomyelitis.  The right heel ulcer overall appears stable, I recommend local wound care with close monitoring; VICKIE's ordered; cont iv abx       Date: 4/29/24     Day 3: Has surpassed a 2nd midnight with active treatments and services.  Require additional inpatient hospital stay due to right lower extremity cellulitis and sepsis pending MRI of foot/VICKIE's.   Patient is doing well. He is tolerating p.o. intake. He notes no recurrent rigors or fevers as yesterday. He had 1 episode of emesis yesterday; he is currently without nausea/vomiting.  Exam: Erythema (Erythema on distal shin with circumfrental tracking ankle/foot around ankle) WBC 10.87  Plan: cont iv abx; pain control;  monitor labs; trend / monitor fever curve; Accuchecks with ssic; podiatry consult; f/u blood cx; f/u MRI / VICKIE's            ED Triage Vitals [04/27/24 2142]   Temperature Pulse Respirations Blood Pressure SpO2   100.2 °F (37.9 °C) 90 16 140/73 96 %      Temp Source Heart Rate Source Patient Position - Orthostatic VS BP Location FiO2 (%)   Oral Monitor Lying Right arm --      Pain Score       5          Wt Readings from Last 1 Encounters:   04/27/24 (!) 168 kg (370 lb)     Additional Vital Signs:   Date/Time Temp Pulse Resp BP MAP (mmHg) SpO2 O2 Device O2 Interface Device Patient Position - Orthostatic VS   04/29/24 15:56:19 -- 71 18 118/76 90 92 % -- -- --   04/29/24 0857 -- -- -- -- -- -- None (Room air) -- --   04/29/24 07:36:34 97.9 °F (36.6 °C) 82 22 -- -- 93 % -- -- --   04/28/24 22:36:39 97.2 °F (36.2 °C) Abnormal  85 18 108/59 75 92 % -- -- --   04/28/24 1958 -- -- -- -- -- 96 % None (Room air) -- --   04/28/24 15:31:42 97.7 °F (36.5 °C) 68 18 126/66 86 95 % None (Room air) -- Lying   04/28/24 0950 -- -- -- -- -- -- None (Room air) -- --   04/28/24 07:33:10 98.2 °F (36.8 °C) 84 18 151/79 103 93 % -- -- --   04/28/24 0349 98.8 °F (37.1 °C) -- 18 111/59 -- -- -- -- Lying   04/28/24 0334 98.2 °F (36.8 °C) -- 18 111/60 -- -- -- -- Lying   04/28/24 0319 98.2 °F (36.8 °C) -- -- -- -- -- -- -- Lying   04/28/24 0304 98.8 °F (37.1 °C) -- 18 116/58 -- -- -- -- Lying   04/28/24 0130 -- -- -- -- -- 93 % None (Room air) -- --   04/28/24 0109 -- -- -- -- -- 96 % -- Face mask --   04/27/24 2337 100.3 °F (37.9 °C) 90 20 156/87 -- -- -- -- --   04/27/24 23:34:57 -- 82 -- 156/87 110 96 % -- -- --   04/27/24 2142 100.2 °F (37.9 °C) 90 16 140/73 -- 96 % None (Room air) -- Lying       EKG: None obtained      Pertinent Labs/Diagnostic Test Results:   VAS VICKIE & waveform analysis, multiple levels   Final Result by Sly Ambrose MD (04/29 1138)      XR chest portable - 1 view   Final Result by Margarita Churchill MD  (04/28 0636)      No acute cardiopulmonary disease.            Workstation performed: [id number]         XR foot 3+ views RIGHT   Final Result by Moustapha Cazares MD (04/28 1420)   Diffuse degenerative change without acute osseous abnormality. No radiographic evidence of osteomyelitis.      Workstation performed: [id number]         MRI inpatient order    (Results Pending)     Results from last 7 days   Lab Units 04/27/24 2149   SARS-COV-2  Negative     Results from last 7 days   Lab Units 04/29/24  0445 04/28/24  0545 04/27/24 2149   WBC Thousand/uL 8.70 12.95* 10.87*   HEMOGLOBIN g/dL 14.0 13.4 14.7   HEMATOCRIT % 41.6 40.4 42.7   PLATELETS Thousands/uL 117* 116* 135*   TOTAL NEUT ABS Thousands/µL  --  11.03* 8.89*        Results from last 7 days   Lab Units 04/28/24  0545 04/27/24 2149   SODIUM mmol/L 136 135   POTASSIUM mmol/L 4.3 3.9   CHLORIDE mmol/L 106 104   CO2 mmol/L 24 21   ANION GAP mmol/L 6 10   BUN mg/dL 13 15   CREATININE mg/dL 1.06 1.01   EGFR ml/min/1.73sq m 74 79   CALCIUM mg/dL 8.7 9.0     Results from last 7 days   Lab Units 04/27/24 2149   AST U/L 15   ALT U/L 16   ALK PHOS U/L 112*   TOTAL PROTEIN g/dL 7.3   ALBUMIN g/dL 3.9   TOTAL BILIRUBIN mg/dL 0.53     Results from last 7 days   Lab Units 04/29/24  1137 04/29/24  0732 04/28/24  2101 04/28/24  1526 04/28/24  1108 04/28/24  0731 04/28/24  0036   POC GLUCOSE mg/dl 108 106 89 113 152* 126 124     Results from last 7 days   Lab Units 04/28/24  0545 04/27/24 2149   GLUCOSE RANDOM mg/dL 115 183*       Results from last 7 days   Lab Units 04/27/24  2149   PROTIME seconds 13.5   INR  1.00   PTT seconds 24        Results from last 7 days   Lab Units 04/29/24  0445 04/28/24  0048 04/27/24 2149   PROCALCITONIN ng/ml 0.20 0.10 0.07     Results from last 7 days   Lab Units 04/28/24  0048 04/27/24  2149   LACTIC ACID mmol/L 1.8  1.9 2.3*        Results from last 7 days   Lab Units 04/27/24  2149   SED RATE mm/hour 40*        Results from last 7 days    Lab Units 04/27/24 2249   CLARITY UA  Clear   COLOR UA  Yellow   SPEC GRAV UA  1.015   PH UA  5.5   GLUCOSE UA mg/dl >=1000 (1%)*   KETONES UA mg/dl Negative   BLOOD UA  Small*   PROTEIN UA mg/dl Negative   NITRITE UA  Negative   BILIRUBIN UA  Negative   UROBILINOGEN UA E.U./dl 0.2   LEUKOCYTES UA  Elevated glucose may cause decreased leukocyte values. See urine microscopic for UWBC result*   WBC UA /hpf None Seen   RBC UA /hpf 0-1   BACTERIA UA /hpf None Seen   EPITHELIAL CELLS WET PREP /hpf Occasional     Results from last 7 days   Lab Units 04/27/24  2149   INFLUENZA A PCR  Negative   INFLUENZA B PCR  Negative   RSV PCR  Negative        Results from last 7 days   Lab Units 04/27/24 2151 04/27/24 2149   BLOOD CULTURE  No Growth at 24 hrs. No Growth at 24 hrs.          ED Treatment:   Medication Administration from 04/27/2024 2138 to 04/27/2024 2329         Date/Time Order Dose Route Action     04/27/2024 2156 EDT cefepime (MAXIPIME) IVPB (premix in dextrose) 2,000 mg 50 mL 2,000 mg Intravenous New Bag     04/27/2024 2241 EDT vancomycin (VANCOCIN) 1750 mg in sodium chloride 0.9% 500 mL IVPB 1,750 mg Intravenous New Bag     04/27/2024 2158 EDT lactated ringers bolus 500 mL 500 mL Intravenous New Bag     04/27/2024 2234 EDT fentaNYL injection 50 mcg 50 mcg Intravenous Given            Present on Admission:   Morbid obesity (HCC)   Type 2 diabetes mellitus with neurologic complication, without long-term current use of insulin ([facility])   Mild depression   Primary hypertension   HLD (hyperlipidemia)   Cellulitis of right lower extremity   Severe sepsis ([facility])      Admitting Diagnosis: Fever [R50.9]  Cellulitis of right foot [L03.115]    Age/Sex: 63 y.o. male    Admission Orders: SCDs; wound care; Consistent Carbohydrate Diet. Blood glucose checks ACHS.       Scheduled Medications:  acetaminophen, 975 mg, Oral, Q8H WOJCIECH  atorvastatin, 10 mg, Oral, Daily With Dinner  cefazolin, 2,000 mg, Intravenous, Q8H  enoxaparin, 40  mg, Subcutaneous, Daily  insulin lispro, 1-5 Units, Subcutaneous, TID AC  insulin lispro, 1-5 Units, Subcutaneous, HS  losartan, 50 mg, Oral, Daily  nicotine, 1 patch, Transdermal, Daily  sertraline, 75 mg, Oral, HS     lactated ringers bolus 1,000 mL  Dose: 1,000 mL  Freq: Once Route: IV  Last Dose: Stopped (04/28/24 1329)  Start: 04/28/24 0015 End: 04/28/24 1330     lactated ringers bolus 1,000 mL  Dose: 1,000 mL  Freq: Once Route: IV  Last Dose: Stopped (04/28/24 1325)  Start: 04/28/24 0022 End: 04/28/24 1325     lactated ringers bolus 1,000 mL  Dose: 1,000 mL  Freq: Once Route: IV  Last Dose: Stopped (04/28/24 0304)  Start: 04/28/24 0042 End: 04/28/24 0304     Continuous IV Infusions: None       PRN Meds:  acetaminophen, 650 mg, Oral, Q6H PRN  (4/27 recd x1)  (4/28 recd x1)  (4/29 recd x1 so far today)   calcium carbonate, 1,000 mg, Oral, Daily PRN  LORazepam, 0.5 mg, Oral, Daily PRN (4/28 recd x2)    ondansetron, 4 mg, Intravenous, Q6H PRN  oxyCODONE, 10 mg, Oral, Q4H PRN  oxyCODONE, 5 mg, Oral, Q4H PRN   (4/28 recd x2)    senna, 1 tablet, Oral, HS PRN  sodium chloride, 2 spray, Each Nare, 4x Daily PRN    HYDROmorphone (DILAUDID) injection 0.5 mg  Dose: 0.5 mg  Freq: Once Route: IV  Start: 04/28/24 1015 End: 04/28/24 1112     IP CONSULT TO PODIATRY    Network Utilization Review Department  ATTENTION: Please call with any questions or concerns to [phone] and carefully listen to the prompts so that you are directed to the right person. All voicemails are confidential.   For Discharge needs, contact Care Management DC Support Team at [phone] opt. [phone]  Send all requests for admission clinical reviews, approved or denied determinations and any other requests to dedicated fax number below belonging to the campus where the patient is receiving treatment. List of dedicated fax numbers for the Facilities:  FACILITY NAME UR FAX NUMBER   ADMISSION DENIALS (Administrative/Medical Necessity) [phone]   DISCHARGE  SUPPORT TEAM (NETWORK) [phone]   PARENT CHILD HEALTH (Maternity/NICU/Pediatrics) [phone]   [facility] [phone]   [facility] [phone]   [facility] [phone]   [facility] [phone]   [facility] [phone]   General acute hospital [phone]   [facility] [phone]   [facility] [phone]   [facility] [phone]   Critical access hospital [phone]   [facility] [phone]   [facility] [phone]

## 2024-04-29 NOTE — CASE MANAGEMENT
Case Management Assessment & Discharge Planning Note    Patient name Vamshi Robbins  Location /-01 MRN [id number]  : 1960 Date 2024       Current Admission Date: 2024  Current Admission Diagnosis:Cellulitis of right lower extremity   Patient Active Problem List    Diagnosis Date Noted    Type 2 diabetes mellitus with neurologic complication, without long-term current use of insulin (HCC) 2024    Primary hypertension 2024    HLD (hyperlipidemia) 2024    Cellulitis of right lower extremity 2024    Severe sepsis (HCC) 2024    CPAP (continuous positive airway pressure) dependence     Other spondylosis with myelopathy, cervical region 2023    Radiculopathy 2023    Myelomalacia of cervical cord (HCC) 2023    Neurogenic claudication due to lumbar spinal stenosis     Muscular atrophy     Spinal muscular atrophy, unspecified (HCC) 2022    Lumbar spondylosis 2022    Major depressive disorder, single episode, mild (HCC) 2022    Thoracic spondylosis 2022    Cervical spinal cord compression (HCC) 2021    Mild depression 2021    Morbid obesity ([facility]) 2021    Herniation of intervertebral disc of thoracic region 2021    Diabetic peripheral neuropathy ([facility]) 2021      LOS (days): 2  Geometric Mean LOS (GMLOS) (days): 3.6  Days to GMLOS:1.9     OBJECTIVE:    Risk of Unplanned Readmission Score: 5.5         Current admission status: Inpatient  Referral Reason: Other (discharge planning)    Preferred Pharmacy:   [id number] - [place] - [address] [facility]  [address]  [facility] [address]  Phone: [phone] Fax: [phone]    Primary Care Provider: Cristina Lopez PA-C    Primary Insurance: [facility]  Secondary Insurance:     ASSESSMENT:  Active Health Care Proxies    There are no active Health Care Proxies on file.       Advance Directives  Does patient  have a Health Care POA?: Yes  Does patient have Advance Directives?: Yes  Advance Directives: Living will  Primary Contact: Cailin (spouse)         Readmission Root Cause  30 Day Readmission: No    Patient Information  Admitted from:: Home  Mental Status: Alert  During Assessment patient was accompanied by: Spouse, Son  Assessment information provided by:: Patient  Primary Caregiver: Self  Support Systems: Spouse/significant other, Children, Family members  County of Residence: [facility]  What [facility] do you live in?: [place] Home entry access options. Select all that apply.: Stairs  Number of steps to enter home.: 5  Do the steps have railings?: Yes  Type of Current Residence: 2 story home  Upon entering residence, is there a bedroom on the main floor (no further steps)?: No  A bedroom is located on the following floor levels of residence (select all that apply):: 2nd Floor  Upon entering residence, is there a bathroom on the main floor (no further steps)?: Yes  Number of steps to 2nd floor from main floor: One Flight  Living Arrangements: Lives w/ Spouse/significant other  Is patient a ?: No    Activities of Daily Living Prior to Admission  Functional Status: Independent  Completes ADLs independently?: Yes  Ambulates independently?: Yes  Does patient use assisted devices?: Yes  Assisted Devices (DME) used: CPAP, Straight Cane, Shower Chair  DME Company Name (respiratory supplies): [employer]  Does patient currently own DME?: Yes  What DME does the patient currently own?: Shower Chair, CPAP, Straight Cane  Does patient have a history of Outpatient Therapy (PT/OT)?: Yes ([facility])  Does the patient have a history of Short-Term Rehab?: Yes ([facility])  Does patient have a history of HHC?: Yes ([facility])  Does patient currently have HHC?: No         Patient Information Continued  Income Source: SSI/SSD  Does patient have prescription coverage?: Yes  Does patient receive dialysis treatments?: No  Does  patient have a history of substance abuse?: No  Does patient have a history of Mental Health Diagnosis?: Yes (depression, anxiety, OCD)  Is patient receiving treatment for mental health?: Yes (med mgt PCP)  Has patient received inpatient treatment related to mental health in the last 2 years?: No         Means of Transportation  Means of Transport to Appts:: Drives Self      Social Determinants of Health (SDOH)      Flowsheet Row Most Recent Value   Housing Stability    In the last 12 months, was there a time when you were not able to pay the mortgage or rent on time? N   In the last 12 months, how many places have you lived? 1   In the last 12 months, was there a time when you did not have a steady place to sleep or slept in a shelter (including now)? N   Transportation Needs    In the past 12 months, has lack of transportation kept you from medical appointments or from getting medications? no   In the past 12 months, has lack of transportation kept you from meetings, work, or from getting things needed for daily living? No   Food Insecurity    Within the past 12 months, you worried that your food would run out before you got the money to buy more. Never true   Within the past 12 months, the food you bought just didn't last and you didn't have money to get more. Never true   Utilities    In the past 12 months has the electric, gas, oil, or water company threatened to shut off services in your home? No            DISCHARGE DETAILS:    Discharge planning discussed with:: Patient, spouse, son  Freedom of Choice: Yes  Comments - Freedom of Choice: Patient/family agreeable to OP PT at discharge  CM contacted family/caregiver?: Yes  Were Treatment Team discharge recommendations reviewed with patient/caregiver?: Yes  Did patient/caregiver verbalize understanding of patient care needs?: Yes  Were patient/caregiver advised of the risks associated with not following Treatment Team discharge recommendations?:  Yes    Contacts  Patient Contacts: spouse, son  Relationship to Patient:: Family  Contact Method: In Person  Reason/Outcome: Continuity of Care, Discharge Planning    Requested Home Health Care         Is the patient interested in HHC at discharge?: No    DME Referral Provided  Referral made for DME?: No    Other Referral/Resources/Interventions Provided:  Interventions: Other (Specify), Outpatient PT (discharge planning)  Referral Comments: [facility] - will be given scripts at discharge    Would you like to participate in our [facility] service program?  : No - Declined    Treatment Team Recommendation: Home  Discharge Destination Plan:: Home  Transport at Discharge : Family        CM met with patient at the bedside,baseline information  was obtained. CM discussed the role of CM in helping the patient develop a discharge plan and assist the patient in carry out their plan. Patient lives with their wife in a 2 floor home with 5 steps to enter and their need on the 2nd floor and bath on the first. Patient uses a cane at baseline and otherwise is independent other than dressing change assistance from spouse. Patient also has a Cpap machine.  CM discussed discharge planning with patient and family - they are agreeable to OP therapy at discharge with [facility].     CM to follow patient's care and discharge needs.

## 2024-04-29 NOTE — ASSESSMENT & PLAN NOTE
Present to ED w/ fever & rigors w/ R foot / distal shin cellulitis  Etiology 2/2 to known diabetic ulceration of right heel with charcot foot deformity   Ulceration debrided on 04/28 w/ podiatry   Podiatry consulted   Continue IV cefazolin 2 G q8h   Xray w/o fx or osteo   Low suspicion for osteo based on exam findings; MRI pending   MRI to be completed on 04/30; patient is awaiting for wife to bring in spinal stimulator controller   ABIs pending   Local wound care and serial skin exams

## 2024-04-29 NOTE — PROGRESS NOTES
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   Ulceration debrided on 04/28 w/ podiatry   Podiatry consulted   Continue IV cefazolin 2 G q8h   Xray w/o fx or osteo   Low suspicion for osteo based on exam findings; MRI pending   MRI to be completed on 04/30; patient is awaiting for wife to bring in spinal stimulator controller   ABIs pending   Local wound care and serial skin exams           Severe sepsis (HCC)  Assessment & Plan  POA, e/b tachycardia, tachypnea, elevated lactic acid, mild leukocytosis with neutrophilia; presented w/ rigors & fever   2/2 cellulitis from diabetic ulceration   Flu/COVID/RSV negative   LA: 2.3 -> 1.9   CXR benign   UA pending   Negative procalcitonin x 2  Blood cultures pending  IV cefazolin     Recent Labs     04/27/24  2149 04/28/24  0545 04/29/24  0445   WBC 10.87* 12.95* 8.70       HLD (hyperlipidemia)  Assessment & Plan  Continue lipitor     Primary hypertension  Assessment & Plan  Continue valsartan   -150     Type 2 diabetes mellitus with neurologic complication, without long-term current use of insulin (HCC)  Assessment & Plan  Lab Results   Component Value Date    HGBA1C 6.1 (H) 01/29/2024     Recent Labs     04/28/24  1526 04/28/24  2101 04/29/24  0732 04/29/24  1137   POCGLU 113 89 106 108     Blood Sugar Average: Last 72 hrs:  (P) [id number]  Well controlled based on recent HA1C   Hold oral/injectable diabetic medications  QID sugars and SSI  Diabetic diet     Mild depression  Assessment & Plan  Continue Zoloft   Mood pleasant and stable    Morbid obesity (HCC)  Assessment &  Plan  BMI 46.25  Recommend diet/lifestyle modification          VTE Pharmacologic Prophylaxis: VTE Score: 6 High Risk (Score >/= 5) - Pharmacological DVT Prophylaxis Ordered: enoxaparin (Lovenox). Sequential Compression Devices Ordered.    Mobility:   Basic Mobility Inpatient Raw Score: 18  JH-HLM Goal: 6: Walk 10 steps or more  JH-HLM Achieved: 7: Walk 25 feet or more  JH-HLM Goal achieved. Continue to encourage appropriate mobility.    Patient Centered Rounds: I performed bedside rounds with nursing staff today.   Discussions with Specialists or Other Care Team Provider: Podiatry consulted; case reviewed with case management/nursing    Education and Discussions with Family / Patient: Patient declined call to .  Patient will update his wife.    Total Time Spent on Date of Encounter in care of patient: 45 mins. This time was spent on one or more of the following: performing physical exam; counseling and coordination of care; obtaining or reviewing history; documenting in the medical record; reviewing/ordering tests, medications or procedures; communicating with other healthcare professionals and discussing with patient's family/caregivers.    Current Length of Stay: 2 day(s)  Current Patient Status: Inpatient   Certification Statement: The patient will continue to require additional inpatient hospital stay due to right lower extremity cellulitis and sepsis pending MRI of foot/VICKIE's.  Discharge Plan: Anticipate discharge in 48 hrs to home with home services.    Code Status: Level 1 - Full Code    Subjective:   Patient is doing well.  He is tolerating p.o. intake.  He notes no recurrent rigors or fevers as yesterday.  He had 1 episode of emesis yesterday; he is currently without nausea/vomiting.  He had a recent bowel movement    Objective:     Vitals:   Temp (24hrs), Av.6 °F (36.4 °C), Min:97.2 °F (36.2 °C), Max:97.9 °F (36.6 °C)    Temp:  [97.2 °F (36.2 °C)-97.9 °F (36.6 °C)] 97.9 °F (36.6 °C)  HR:   [68-85] 82  Resp:  [18-22] 22  BP: (108-126)/(59-66) 108/59  SpO2:  [92 %-96 %] 93 %  Body mass index is 46.25 kg/m².     Input and Output Summary (last 24 hours):     Intake/Output Summary (Last 24 hours) at 4/29/2024 1314  Last data filed at 4/29/2024 1138  Gross per 24 hour   Intake 1005 ml   Output 1900 ml   Net -895 ml       Physical Exam:   Physical Exam  Constitutional:       Appearance: He is obese.   HENT:      Head: Normocephalic.      Right Ear: External ear normal.      Left Ear: External ear normal.      Nose: Nose normal.      Mouth/Throat:      Mouth: Mucous membranes are moist.   Eyes:      Extraocular Movements: Extraocular movements intact.      Conjunctiva/sclera: Conjunctivae normal.   Cardiovascular:      Rate and Rhythm: Normal rate and regular rhythm.      Pulses: Normal pulses.      Heart sounds: Normal heart sounds.   Pulmonary:      Effort: Pulmonary effort is normal.      Breath sounds: Normal breath sounds.   Abdominal:      General: Abdomen is flat. Bowel sounds are normal. There is no distension.      Palpations: Abdomen is soft.      Tenderness: There is no abdominal tenderness.      Comments: obese   Musculoskeletal:         General: Normal range of motion.      Cervical back: Normal range of motion.      Comments: Full range of motion of ankle/toes present; no clinical evidence of septic arthritis   Skin:     Findings: Erythema (Erythema on distal shin with circumfrental tracking ankle/foot around ankle) present.   Neurological:      General: No focal deficit present.      Mental Status: He is alert. Mental status is at baseline.   Psychiatric:         Mood and Affect: Mood normal.         Behavior: Behavior normal.      Comments: Pleasant; follows directions          Additional Data:     Labs:  Results from last 7 days   Lab Units 04/29/24  0445 04/28/24  0545   WBC Thousand/uL 8.70 12.95*   HEMOGLOBIN g/dL 14.0 13.4   HEMATOCRIT % 41.6 40.4   PLATELETS Thousands/uL 117* 116*    SEGS PCT %  --  85*   LYMPHO PCT %  --  8*   MONO PCT %  --  6   EOS PCT %  --  0     Results from last 7 days   Lab Units 04/28/24  0545 04/27/24  2149   SODIUM mmol/L 136 135   POTASSIUM mmol/L 4.3 3.9   CHLORIDE mmol/L 106 104   CO2 mmol/L 24 21   BUN mg/dL 13 15   CREATININE mg/dL 1.06 1.01   ANION GAP mmol/L 6 10   CALCIUM mg/dL 8.7 9.0   ALBUMIN g/dL  --  3.9   TOTAL BILIRUBIN mg/dL  --  0.53   ALK PHOS U/L  --  112*   ALT U/L  --  16   AST U/L  --  15   GLUCOSE RANDOM mg/dL 115 183*     Results from last 7 days   Lab Units 04/27/24  2149   INR  1.00     Results from last 7 days   Lab Units 04/29/24  1137 04/29/24  0732 04/28/24  2101 04/28/24  1526 04/28/24  1108 04/28/24  0731 04/28/24  0036   POC GLUCOSE mg/dl 108 106 89 113 152* 126 124         Results from last 7 days   Lab Units 04/29/24  0445 04/28/24  0048 04/27/24  2149   LACTIC ACID mmol/L  --  1.8  1.9 2.3*   PROCALCITONIN ng/ml 0.20 0.10 0.07       Lines/Drains:  Invasive Devices       Peripheral Intravenous Line  Duration             Peripheral IV 04/27/24 Left Antecubital 1 day    Peripheral IV 04/27/24 Right Antecubital 1 day                        Imaging: Reviewed radiology reports from this admission including: xray(s)    Recent Cultures (last 7 days):   Results from last 7 days   Lab Units 04/27/24  2151 04/27/24  2149   BLOOD CULTURE  Received in Microbiology Lab. Culture in Progress. Received in Microbiology Lab. Culture in Progress.       Last 24 Hours Medication List:   Current Facility-Administered Medications   Medication Dose Route Frequency Provider Last Rate    acetaminophen  650 mg Oral Q6H PRN [place] Plummer      acetaminophen  975 mg Oral Q8H WOJCIECH Gonzalez Counts, DO      atorvastatin  10 mg Oral Daily With Dinner Lee Mireles PA-C      calcium carbonate  1,000 mg Oral Daily PRN Lee Mireles PA-C      cefazolin  2,000 mg Intravenous Q8H Lee Mireles PA-C 2,000 mg (04/29/24 0538)     enoxaparin  40 mg Subcutaneous Daily Lee Mireles, NEL      insulin lispro  1-5 Units Subcutaneous TID AC Lee Mireles, NEL      insulin lispro  1-5 Units Subcutaneous HS Lee Mireles, NEL      LORazepam  0.5 mg Oral Daily PRN Lee Mireles, NEL      losartan  50 mg Oral Daily Lee Mireles, NEL      nicotine  1 patch Transdermal Daily Lee Mireles, NEL      ondansetron  4 mg Intravenous Q6H PRN Lee Mireles, NEL      oxyCODONE  10 mg Oral Q4H PRN Lee Mireles, NEL      oxyCODONE  5 mg Oral Q4H PRN Lee Mireles, NEL      senna  1 tablet Oral HS PRN Lee Mireles, NEL      sertraline  75 mg Oral HS Lee Mireles, NEL      sodium chloride  2 spray Each Nare 4x Daily PRN [place] Plummer          Today, Patient Was Seen By: Kelly Solorzano PA-C    **Please Note: This note may have been constructed using a voice recognition system.**

## 2024-04-29 NOTE — DISCHARGE INSTR - OTHER ORDERS
Skin care plans:  1-Hydraguard to bilateral sacrum, buttock and heels BID and PRN  2-Elevate heels to offload pressure.  3-Ehob cushion in chair when out of bed.  4-Moisturize skin daily with skin nourishing cream.  5-Turn/reposition q2h or when medically stable for pressure re-distribution on skin  6-EHOB boot to right foot   7-Podiatry wound care orders

## 2024-04-29 NOTE — PLAN OF CARE
Problem: PAIN - ADULT  Goal: Verbalizes/displays adequate comfort level or baseline comfort level  Description: Interventions:  - Encourage patient to monitor pain and request assistance  - Assess pain using appropriate pain scale  - Administer analgesics based on type and severity of pain and evaluate response  - Implement non-pharmacological measures as appropriate and evaluate response  - Consider cultural and social influences on pain and pain management  - Notify physician/advanced practitioner if interventions unsuccessful or patient reports new pain  Outcome: Progressing     Problem: INFECTION - ADULT  Goal: Absence or prevention of progression during hospitalization  Description: INTERVENTIONS:  - Assess and monitor for signs and symptoms of infection  - Monitor lab/diagnostic results  - Monitor all insertion sites, i.e. indwelling lines, tubes, and drains  - Monitor endotracheal if appropriate and nasal secretions for changes in amount and color  - [place] appropriate cooling/warming therapies per order  - Administer medications as ordered  - Instruct and encourage patient and family to use good hand hygiene technique  - Identify and instruct in appropriate isolation precautions for identified infection/condition  Outcome: Progressing  Goal: Absence of fever/infection during neutropenic period  Description: INTERVENTIONS:  - Monitor WBC    Outcome: Progressing     Problem: SAFETY ADULT  Goal: Patient will remain free of falls  Description: INTERVENTIONS:  - Educate patient/family on patient safety including physical limitations  - Instruct patient to call for assistance with activity   - Consult OT/PT to assist with strengthening/mobility   - Keep Call bell within reach  - Keep bed low and locked with side rails adjusted as appropriate  - Keep care items and personal belongings within reach  - Initiate and maintain comfort rounds  - Make Fall Risk Sign visible to staff  - Offer Toileting every 2 Hours,  in advance of need  - Initiate/Maintain   alarm  - Obtain necessary fall risk management equipment:     - Apply yellow socks and bracelet for high fall risk patients  - Consider moving patient to room near nurses station  Outcome: Progressing  Goal: Maintain or return to baseline ADL function  Description: INTERVENTIONS:  -  Assess patient's ability to carry out ADLs; assess patient's baseline for ADL function and identify physical deficits which impact ability to perform ADLs (bathing, care of mouth/teeth, toileting, grooming, dressing, etc.)  - Assess/evaluate cause of self-care deficits   - Assess range of motion  - Assess patient's mobility; develop plan if impaired  - Assess patient's need for assistive devices and provide as appropriate  - Encourage maximum independence but intervene and supervise when necessary  - Involve family in performance of ADLs  - Assess for home care needs following discharge   - Consider OT consult to assist with ADL evaluation and planning for discharge  - Provide patient education as appropriate  Outcome: Progressing  Goal: Maintains/Returns to pre admission functional level  Description: INTERVENTIONS:  - Perform AM-PAC 6 Click Basic Mobility/ Daily Activity assessment daily.  - Set and communicate daily mobility goal to care team and patient/family/caregiver.   - Collaborate with rehabilitation services on mobility goals if consulted  - Perform Range of Motion 3 times a day.  - Reposition patient every 2 hours.  - Dangle patient 3 times a day  - Stand patient 3 times a day  - Ambulate patient 3 times a day  - Out of bed to chair 3 times a day   - Out of bed for meals 3 times a day  - Out of bed for toileting  - Record patient progress and toleration of activity level   Outcome: Progressing     Problem: DISCHARGE PLANNING  Goal: Discharge to home or other facility with appropriate resources  Description: INTERVENTIONS:  - Identify barriers to discharge w/patient and caregiver  -  Arrange for needed discharge resources and transportation as appropriate  - Identify discharge learning needs (meds, wound care, etc.)  - Arrange for interpretive services to assist at discharge as needed  - Refer to Case Management Department for coordinating discharge planning if the patient needs post-hospital services based on physician/advanced practitioner order or complex needs related to functional status, cognitive ability, or social support system  Outcome: Progressing     Problem: Knowledge Deficit  Goal: Patient/family/caregiver demonstrates understanding of disease process, treatment plan, medications, and discharge instructions  Description: Complete learning assessment and assess knowledge base.  Interventions:  - Provide teaching at level of understanding  - Provide teaching via preferred learning methods  Outcome: Progressing     Problem: SKIN/TISSUE INTEGRITY - ADULT  Goal: Incision(s), wounds(s) or drain site(s) healing without S/S of infection  Description: INTERVENTIONS  - Assess and document dressing, incision, wound bed, drain sites and surrounding tissue  - Provide patient and family education  - Perform skin care/dressing changes every       Outcome: Progressing     Problem: Prexisting or High Potential for Compromised Skin Integrity  Goal: Skin integrity is maintained or improved  Description: INTERVENTIONS:  - Identify patients at risk for skin breakdown  - Assess and monitor skin integrity  - Assess and monitor nutrition and hydration status  - Monitor labs   - Assess for incontinence   - Turn and reposition patient  - Assist with mobility/ambulation  - Relieve pressure over bony prominences  - Avoid friction and shearing  - Provide appropriate hygiene as needed including keeping skin clean and dry  - Evaluate need for skin moisturizer/barrier cream  - Collaborate with interdisciplinary team   - Patient/family teaching  - Consider wound care consult   Outcome: Progressing

## 2024-04-29 NOTE — PHYSICAL THERAPY NOTE
PHYSICAL THERAPY EVALUATION      NAME:  Vamshi Robbins    DATE: 04/29/24    AGE:   63 y.o.  Mrn:   [id number]  ADMIT DX:  Fever [R50.9]  Cellulitis of right foot [L03.115]    Past Medical History:   Diagnosis Date    Anxiety     Arthritis     Chronic pain disorder     mid back region    Colon polyp     COVID-19     CPAP (continuous positive airway pressure) dependence     Pt uses nightly    Diabetes mellitus (HCC)     Diverticulitis of colon 3 years ago    No issues    Diverticulosis     History of recent hospitalization 06/16/2021    Pt was admitted to [facility] after syncopal episode. Pt saw cardiology- all tests negative. Pt had nl EEG. pt states is was a medication interaction.    Hyperlipidemia     Hypertension     Obesity     Sleep apnea     Spinal stenosis     Wears hearing aid      Length Of Stay: 2  Performed at least 2 patient identifiers during session: Name and Birthday         PHYSICAL THERAPY EVALUATION:       04/29/24 1323   PT Last Visit   PT Visit Date 04/29/24   Note Type   Note type Evaluation   Pain Assessment   Pain Assessment Tool 0-10   Pain Score No Pain   Restrictions/Precautions   Weight Bearing Precautions Per Order Yes   RLE Weight Bearing Per Order WBAT   Braces or Orthoses   (R surgical shoe) - as written in Podiatry note   Other Precautions Fall Risk   Home Living   Type of Home House   Home Layout Two level;Stairs to enter with rails  (5 MOLLY L railing; FF stairs inside L railing; full bath downstairs & bedroom upstairs)   Bathroom Shower/Tub Walk-in shower   Bathroom Toilet Raised   Bathroom Equipment Grab bars in shower;Shower chair;Grab bars around toilet   Home Equipment Cane;Walker;Wheelchair-manual;Sock aid;Long-handled shoehorn  (Lift chair recliner)   Prior Function   Level of [place] Independent with ADLs;Independent with functional mobility;Needs assistance with IADLS   Lives With Spouse   Receives Help From Family   IADLs Independent with meal prep;Family/Friend/Other  "provides transportation;Independent with medication management   Falls in the last 6 months 0   Comments Mod (I) with cane   General   Family/Caregiver Present No   Cognition   Overall Cognitive Status WFL   Arousal/Participation Alert   Orientation Level Oriented X4   Memory Within functional limits   RLE Assessment   RLE Assessment X   Strength RLE   R Hip Flexion 3+/5   R Knee Extension 4/5   R Ankle Dorsiflexion 3+/5   LLE Assessment   LLE Assessment X   Strength LLE   L Hip Flexion 3/5   L Knee Extension 4/5   L Ankle Dorsiflexion 3+/5   Light Touch   RLE Light Touch Impaired   RLE Light Touch Comments absent distal calf throughout foot/toes   LLE Light Touch Impaired   LLE Light Touch Comments absent distal calf throughout foot/toes   Proprioception   RLE Proprioception Impaired  (ankle/toes)   LLE Proprioception Impaired  (ankle/toes)   Bed Mobility   Supine to Sit 7  Independent   Sit to Supine 7  Independent   Transfers   Sit to Stand   (SBA - steadying assist with use of momentum technique (required 2nd try in order to perform full sit to stand))   Additional items Assist x 1;Increased time required;Verbal cues   Stand to Sit   (SBA)   Additional items Assist x 1;Verbal cues   Stand pivot   (SBA - steadying assist)   Additional items Assist x 1;Increased time required;Verbal cues   Additional Comments using pt's personal cane and B surgical shoes   Ambulation/Elevation   Gait pattern Improper Weight shift;Decreased foot clearance;Inconsistent aris;Narrow DOE  (noticable lack of coordination/proprioception especially R LE during gait cycle; NBOS with noticable difficulty keeping feet apart resulting in surgical shoes rubbing together medially)   Gait Assistance   (SBA - steadying assist)   Additional items Assist x 1;Verbal cues   Assistive Device SPC   Distance 86 ft   Stair Management Assistance Not tested   Additional items   (pt declines trial of stairs even with encouragement, he states \"I know I " "can do those no problem\")   Ambulation/Elevation Additional Comments pt declines trial with RW   Balance   Static Sitting Normal   Dynamic Sitting Fair +   Static Standing Fair -   Dynamic Standing Poor +   Ambulatory Poor +   Endurance Deficit   Endurance Deficit Yes   Activity Tolerance   Activity Tolerance Patient limited by fatigue   Medical Staff Made Aware OT [place]   Assessment   Prognosis Fair   Problem List Decreased strength;Decreased endurance;Impaired balance;Decreased mobility;Obesity;Impaired sensation;Decreased coordination   Barriers to Discharge Other (Comment)   Barriers to Discharge Comments fall risk, significant B LE neuropathy, MOLLY/multi-level home, pt declines to trial stairs here   Goals   STG Expiration Date 05/13/24   PT Treatment Day 0   Plan   Treatment/Interventions Functional transfer training;LE strengthening/ROM;Elevations;Therapeutic exercise;Endurance training;Patient/family training;Equipment eval/education;Gait training;Compensatory technique education;OT   PT Frequency 3-5x/wk   Discharge Recommendation   Rehab Resource Intensity Level, PT III (Minimum Resource Intensity)   Additional Comments would benefit from 1st floor set-up to eliminate risk of falling on stairs   AM-PAC Basic Mobility Inpatient   Turning in Flat Bed Without Bedrails 4   Lying on Back to Sitting on Edge of Flat Bed Without Bedrails 4   Moving Bed to Chair 3   Standing Up From Chair Using Arms 3   Walk in Room 3   Climb 3-5 Stairs With Railing 3   Basic Mobility Inpatient Raw Score 20   Basic Mobility Standardized Score 43.99   [facility] Highest Level Of Mobility   -HLM Goal 6: Walk 10 steps or more   JH-HLM Achieved 7: Walk 25 feet or more   End of Consult   Patient Position at End of Consult Supine;All needs within reach   End of Consult Comments spouse present at end of eval     (Please find full objective findings from PT assessment regarding body systems outlined above).     Assessment: Pt is a 63 " y.o. male seen for PT evaluation s/p admission to [facility] on 4/27/2024 with Cellulitis of right lower extremity.  Order placed for PT services.  Upon evaluation: Pt is presenting with impaired functional mobility due to decreased strength, decreased endurance, impaired balance, impaired coordination, impaired proprioception, gait deviations, altered sensation, impaired judgment, fall risk, and impaired skin integrity requiring  stand-by to steadying assistance for transfers and ambulation with SPC . Pt's clinical presentation is currently unstable/unpredictable given the functional mobility deficits above, especially impaired proprioception, gait deviations, decreased activity tolerance, and altered sensation, coupled with fall risks as indicated by AM-PAC 6-Clicks: 20/24 as well as obesity and combined with medical complications of abnormal WBCs and need for input for mobility technique/safety.  Pt's PMHx and comorbidities that may affect physical performance and progress include: anxiety, DM, HTN, and chronic pain . Personal factors affecting pt at time of IE include: step(s) to enter environment, multi-level environment, inability to perform IADLs, inability to navigate community distances, and limited insight into impairments. Pt will benefit from continued skilled PT services to address deficits as defined above and to maximize level of functional mobility to facilitate return toward PLOF and improved QOL. From PT/mobility standpoint, recommendation at time of d/c would be Level III (Minimum Resource Intensity) pending progress in order to reduce fall risk and maximize pt's functional independence and consistency with mobility in order to facilitate return to PLOF.  Recommend trial with cane next 1-2 sessions, ther ex next 1-2 sessions, and stair navigation .     The patient's AM-[facility] Basic Mobility Inpatient Short Form Raw Score is 20. A Raw score of greater than 16 suggests the patient  may benefit from discharge to home. Please also refer to the recommendation of the Physical Therapist for safe discharge planning.       Goals: Pt will: Pt will perform transfers with modified I to increase Indep in home environment and prepare for ambulation. Pt will ambulate with most appropriate AD for >/= 150 ft with  modified I  to decrease risk for falls, improve gait quality, and promote proper use of assistive device and to access home environment. Pt will complete >/= 12 steps with with unilateral handrail with Supervision to return to home with MOLLY and return to [facility] home. Pt will participate in objective balance assessment to determine baseline fall risk.        Jamshid Ross, PT,DPT

## 2024-04-29 NOTE — ASSESSMENT & PLAN NOTE
POA, e/b tachycardia, tachypnea, elevated lactic acid, mild leukocytosis with neutrophilia; presented w/ rigors & fever   2/2 cellulitis from diabetic ulceration   Flu/COVID/RSV negative   LA: 2.3 -> 1.9   CXR benign   UA pending   Negative procalcitonin x 2  Blood cultures pending  IV cefazolin

## 2024-04-29 NOTE — ASSESSMENT & PLAN NOTE
Presented to the ED with fever and rigors with RLE cellulitis   Etiology 2/2 to known diabetic ulceration of right heel with charcot foot deformity   Ulceration debrided on 04/28 w/ podiatry   Podiatry consult appreciated  Continue IV cefazolin   Xray was negative for OM per the results report  MRI pending   Wound care per Podiatry recs

## 2024-04-29 NOTE — ASSESSMENT & PLAN NOTE
POA, aeb tachycardia, tachypnea, elevated lactic acid, mild leukocytosis with neutrophilia; presented w/ rigors & fever   2/2 cellulitis from diabetic ulceration   Flu/COVID/RSV negative   LA: 2.3 -> 1.9   CXR benign   UA not indicative of infection   Negative procalcitonin x 2  Blood cultures with no growth after 24 hours x2  On IV Ancef as above  MRI pending

## 2024-04-29 NOTE — WOUND OSTOMY CARE
Consult Note - Wound   Vamshi Robbins 63 y.o. male MRN: [id number]  Unit/Bed#: -01 Encounter: [id number]        History and Present Illness:  63 year old male admitted with cellulitis of right lower extremity. Alert and oriented x4. PMH: Severe sepsis, Diabetic right heel ulcer, diabetic neuropathy.Type 2 DM , Morbid Obesity, Peripheral neuropathy.Per pt , follows with podiatry and wound center in [place]     Assessment Findings:   Seen for initial assessment. Alert oriented, continent, Seen by podiatry with imaging and IV antibiotics. Wound Care orders place by Podiatry.  1)POA noted in EMR pt has as large heel spur on right photo below, given EHOB boot to offload heel  2)POA right foot plantar distal aspect of foot, at heel oval shaped wound. Per pt wound was recently debrided, seen by podiatry with imaging ordered.  3)POA Right foot great toe, black intact circular shaped , periwound erythema blanches  4)POA top of left foot great toe, dry intact callous center of callous is deep red, brown dry and intact.  No other skin issues.   Preventative Skin Care orders placed,      Skin care plans:  1-Hydraguard to bilateral sacrum, buttock and heels BID and PRN  2-Elevate heels to offload pressure.  3-Ehob cushion in chair when out of bed.  4-Moisturize skin daily with skin nourishing cream.  5-Turn/reposition q2h or when medically stable for pressure re-distribution on skin.     Wounds:  Wound 04/28/24 Diabetic Ulcer Heel Right (Active)   Wound Image   04/29/24 1601   Wound Description Beefy red;Black;Yellow;Swelling 04/29/24 1601   Aerly-wound Assessment Erythema;Edema 04/29/24 1601   Wound Length (cm) 3 cm 04/29/24 1601   Wound Width (cm) 3 cm 04/29/24 1601   Wound Depth (cm) 0 cm 04/29/24 1601   Wound Surface Area (cm^2) 9 cm^2 04/29/24 1601   Wound Volume (cm^3) 0 cm^3 04/29/24 1601   Calculated Wound Volume (cm^3) 0 cm^3 04/29/24 1601   Drainage Amount None 04/29/24 1601   Treatments Site care;Elevated  04/29/24 1601   Dressing Calcium Alginate with Silver;Non adherent 04/29/24 1601   Dressing Changed New 04/29/24 1601   Patient Tolerance Tolerated well 04/29/24 1601   Dressing Status Clean;Dry;Intact 04/29/24 1601       Wound 04/28/24 Diabetic Ulcer Toe D1, great Anterior;Left (Active)   Wound Image   04/29/24 1602   Wound Description Clean;Intact;Brown 04/29/24 1602   Arely-wound Assessment Dry;Callus 04/29/24 1602   Wound Length (cm) 0.1 cm 04/29/24 1602   Wound Width (cm) 0.1 cm 04/29/24 1602   Wound Depth (cm) 0 cm 04/29/24 1602   Wound Surface Area (cm^2) 0.01 cm^2 04/29/24 1602   Wound Volume (cm^3) 0 cm^3 04/29/24 1602   Calculated Wound Volume (cm^3) 0 cm^3 04/29/24 1602   Drainage Amount None 04/29/24 1602   Treatments Elevated 04/29/24 1602   Dressing Open to air 04/29/24 1602       Wound 04/28/24 Diabetic Ulcer Toe D1, great Anterior;Right (Active)   Wound Image   04/29/24 1602   Wound Description Beefy red;Intact;Dry 04/29/24 1602   Arely-wound Assessment Erythema;Edema 04/29/24 1602   Wound Length (cm) 0.1 cm 04/29/24 1602   Wound Width (cm) 0.1 cm 04/29/24 1602   Wound Surface Area (cm^2) 0.01 cm^2 04/29/24 1602   Drainage Amount None 04/29/24 1602   Treatments Site care;Elevated 04/29/24 1602   Dressing Open to air 04/29/24 1602   Dressing Changed Changed 04/29/24 1602       Wound 04/29/24 Foot Right;Plantar;Distal (Active)   Wound Image   04/29/24 1603   Wound Description Dry;Beefy red 04/29/24 1603   Arely-wound Assessment Dry;Callus;Intact 04/29/24 1603   Wound Length (cm) 0.5 cm 04/29/24 1603   Wound Width (cm) 1.8 cm 04/29/24 1603   Wound Depth (cm) 0.2 cm 04/29/24 1603   Wound Surface Area (cm^2) 0.9 cm^2 04/29/24 1603   Wound Volume (cm^3) 0.18 cm^3 04/29/24 1603   Calculated Wound Volume (cm^3) 0.18 cm^3 04/29/24 1603   Drainage Amount None 04/29/24 1603   Non-staged Wound Description Full thickness 04/29/24 1603   Treatments Cleansed;Site care;Elevated 04/29/24 1603   Dressing ABD;Calcium  Alginate with Silver;Non adherent 04/29/24 1603   Dressing Changed Changed 04/29/24 1603   Patient Tolerance Tolerated well 04/29/24 1603   Dressing Status Clean;Dry;Intact 04/29/24 1603     Wound Care will sign off  Call or Tigertext with any questions    Carole Mccann BSN RN [facility]

## 2024-04-29 NOTE — ASSESSMENT & PLAN NOTE
Lab Results   Component Value Date    HGBA1C 6.1 (H) 01/29/2024     Recent Labs     04/28/24  1526 04/28/24  2101 04/29/24  0732 04/29/24  1137   POCGLU 113 89 106 108     Blood Sugar Average: Last 72 hrs:  (P) [id number]  Well controlled based on recent HA1C   Hold oral/injectable diabetic medications  QID sugars and SSI  Diabetic diet

## 2024-04-29 NOTE — OCCUPATIONAL THERAPY NOTE
Occupational Therapy Evaluation     Patient Name: Vamshi Robbins  Today's Date: 4/29/2024  Problem List  Principal Problem:    Cellulitis of right lower extremity  Active Problems:    Morbid obesity (HCC)    Mild depression    Type 2 diabetes mellitus with neurologic complication, without long-term current use of insulin (HCC)    Primary hypertension    HLD (hyperlipidemia)    Severe sepsis (HCC)    Past Medical History  Past Medical History:   Diagnosis Date    Anxiety     Arthritis     Chronic pain disorder     mid back region    Colon polyp     COVID-19     CPAP (continuous positive airway pressure) dependence     Pt uses nightly    Diabetes mellitus (HCC)     Diverticulitis of colon 3 years ago    No issues    Diverticulosis     History of recent hospitalization 06/16/2021    Pt was admitted to [facility] after syncopal episode. Pt saw cardiology- all tests negative. Pt had nl EEG. pt states is was a medication interaction.    Hyperlipidemia     Hypertension     Obesity     Sleep apnea     Spinal stenosis     Wears hearing aid      Past Surgical History  Past Surgical History:   Procedure Laterality Date    ACHILLES TENDON REPAIR      Pt had a rupture in right and left 2 years apart    COLONOSCOPY      EPIDURAL BLOCK INJECTION      Pt had in lumbar region.    EPIDURAL BLOCK INJECTION N/A 04/06/2021    Procedure: T-11-T-12 INTERLAMINAR THORACIC EPIDURAL STEROID INJECTION;  Surgeon: Guzman Harrison MD;  [facility]: [facility];  Service: Pain Management     EPIDURAL BLOCK INJECTION Right 07/20/2021    Procedure: L5 and S1 TRANSFORAMINAL EPIDURAL STEROID INJECTION;  Surgeon: Guzman Harrison MD;  [facility];  Service: Pain Management     FL GUIDED NEEDLE PLAC BX/ASP/INJ  07/14/2022    FL GUIDED NEEDLE PLAC BX/ASP/INJ  08/18/2022    FL GUIDED NEEDLE PLAC BX/ASP/INJ  09/15/2022    FOOT SURGERY Left     Left great toe- had a hammertoe.    HIP SURGERY      Replaced    JOINT REPLACEMENT Left     Total hip, knee     JOINT REPLACEMENT Right     Total hip, knee    KNEE ARTHROSCOPY Bilateral     NERVE BLOCK Bilateral 07/14/2022    Procedure: BLOCK MEDIAL BRANCH T12, L1, L2;  Surgeon: Guzman Harrison MD;  [facility]: [facility];  Service: Pain Management     NERVE BLOCK Bilateral 08/18/2022    Procedure: BLOCK MEDIAL BRANCH T12, L1, L2 #2;  Surgeon: Guzman Harrison MD;  [facility];  Service: Pain Management     NERVE BLOCK Bilateral 01/26/2023    Procedure: BLOCK MEDIAL BRANCH L3, L4, L5 #1;  Surgeon: Guzman Harrison MD;  [facility];  Service: Pain Management     NERVE BLOCK Bilateral 02/16/2023    Procedure: BLOCK MEDIAL BRANCH L3, L4, L5 #2;  Surgeon: Guzman Harrison MD;  [facility];  Service: Pain Management     AR JOE IMPLTJ NSTIM ELTRDS PLATE/PADDLE EDRL Left 10/26/2021    Procedure: Thoracic laminectomies for placement of spinal cord stimulator and internal pulse generator, use of neuromonitoring, left sided pulse generator;  Surgeon: Rob Wong MD;  Location:  [facility];  Service: Neurosurgery    AR PRQ IMPLTJ NSTIM ELECTRODE ARRAY EPIDURAL Bilateral 09/30/2021    Procedure: NEVRO SCS TRIAL;  Surgeon: Guzman Harrison MD;  [facility];  Service: Pain Management     RHIZOTOMY Bilateral 09/15/2022    Procedure: RHIZOTOMY LUMBAR T12, L1, L2 medial branch nerves;  Surgeon: Guzman Harrison MD;  [facility];  Service: Pain Management     RHIZOTOMY Bilateral 03/02/2023    Procedure: RHIZOTOMY LUMBAR L3, L4, L5;  Surgeon: Guzman Harrison MD;  [facility];  Service: Pain Management     RHIZOTOMY Bilateral 03/16/2023    Procedure: RHIZOTOMY LUMBAR T10, T11, T12 medial branch nerves;  Surgeon: Guzman Harrison MD;  [facility];  Service: Pain Management     TOTAL KNEE ARTHROPLASTY Left       04/29/24 1325   Note Type   Note type Evaluation   Pain Assessment   Pain Assessment Tool 0-10   Pain Score No Pain   Restrictions/Precautions   Weight Bearing Precautions Per Order  Yes   RLE Weight Bearing Per Order WBAT  (in R surgical shoe)   Other Precautions Chair Alarm;Bed Alarm;Fall Risk;WBS   Home Living   Type of Home House  (5 MOLLY LHR)   Home Layout Two level  (FF LHR to 2nd floor)   Bathroom Shower/Tub Walk-in shower   Bathroom Toilet Raised   Bathroom Equipment Grab bars in shower;Shower chair;Grab bars around toilet   Home Equipment Walker;Cane;Wheelchair-manual;Long-handled shoehorn;Sock aid  (lift chair recliner)   Additional Comments Pt reports living in a 2SH with his spouse. SPC use at baseline.   Prior Function   Level of [place] Independent with ADLs;Independent with functional mobility;Needs assistance with IADLS   Lives With Spouse   Receives Help From Family   IADLs Independent with meal prep;Independent with medication management;Family/Friend/Other provides transportation   Falls in the last 6 months 0   General   Family/Caregiver Present Yes  (spouse)   ADL   UB Dressing Assistance 5  Supervision/Setup   UB Dressing Deficit Setup;Verbal cueing;Increased time to complete   LB Dressing Assistance 4  Minimal Assistance   LB Dressing Deficit Setup;Requires assistive device for steadying;Verbal cueing;Increased time to complete   Additional Comments UB ADLs @ S/set-up while seated at EOB. LB ADLs @ Min A. Pt able to don socks while seated at EOB, assist to don B surgical shoes, Steadying assist for clothing management while standing.   Bed Mobility   Supine to Sit 7  Independent   Additional Comments Pt supine in bed at beginning of session.Supine to sit @ S.   Transfers   Sit to Stand   (Steadying assist)   Additional items Assist x 1;Increased time required;Verbal cues   Stand to Sit   (SBA)   Additional items Increased time required;Verbal cues   Stand pivot   (Steadying assist)   Additional items Assist x 1;Increased time required;Verbal cues   Additional Comments Initial attempt of STS from EOB with SPC failed as pt unable to get enough momentum, forcing himself  back onto bed. Second STS @ Steadying assist. Pt able to complete short distance functional mobility around room with SPC @ SBA-Steadying assist d/t scissoring of RLE. Declined RW for increased safety and declined toilet transfer. Pt seated at EOB with PT at end of session, all needs met.   Balance   Static Sitting Normal   Dynamic Sitting Fair +   Static Standing Fair -   Dynamic Standing Poor +   Activity Tolerance   Activity Tolerance Patient limited by fatigue   Medical Staff Made Aware Spoke with PT Jamshid   RUE Assessment   RUE Assessment WFL   LUE Assessment   LUE Assessment WFL   Hand Function   Gross Motor Coordination Functional   Fine Motor Coordination Functional   Cognition   Overall Cognitive Status WFL   Arousal/Participation Alert;Responsive;Cooperative   Attention Within functional limits   Orientation Level Oriented X4   Memory Within functional limits   Following Commands Follows one step commands without difficulty   Comments Pt with limited insight to deficits and safety awareness   Assessment   Limitation Decreased ADL status;Decreased Safe judgement during ADL;Decreased endurance;Decreased self-care trans;Decreased high-level ADLs   Prognosis Good   Assessment Pt is a 63 y.o. male, admitted to [facility] [place] 4/27/2024 d/t experiencing fever. Dx: cellulitis of RLE. Pt with PMHx impacting their performance during ADL tasks, including: anxiety, arthritis, chronic back pain, DM, diverticulitis,diverticulosis, hypertension, obesity, spinal stenosis. Prior to admission to the hospital Pt was performing ADLs without physical assistance. IADLs without physical assistance. Functional transfers/ambulation without physical assistance. Cognitive status was PTA was intact. OT order placed to assess Pt's ADLs, cognitive status, and performance during functional tasks in order to maximize safety and independence while making most appropriate d/c recommendations. Pt's clinical presentation is currently  unstable/unpredictable given new onset deficits that effect Pt's occupational performance and ability to safely return to PLOF including decrease activity tolerance, decrease standing balance, decrease performance during ADL tasks, decrease safety awareness , decrease performance during functional transfers, and high fall risk combined with medical complications of abnormal CBC, abnormal blood sugars, edema/swelling, wounds, decreased skin integrity, and need for input for mobility technique/safety. Personal factors affecting Pt at time of initial evaluation include: step(s) to enter environment, multi-level environment, limited insight into impairments, and questionable non-compliance. Pt will benefit from continued skilled OT services to address deficits as defined above and to maximize level independence/participation during ADLs and functional tasks to facilitate return toward PLOF and improved quality of life. From an occupational therapy standpoint, recommendation at time of d/c would be Level III: Minimum Resource Intensity Therapy.   Plan   Treatment Interventions ADL retraining;Visual perceptual retraining;Functional transfer training;UE strengthening/ROM;Endurance training;Cognitive reorientation;Patient/family training;Equipment evaluation/education;Neuromuscular reeducation;Fine motor coordination activities;Compensatory technique education;UE splinting;Continued evaluation;Cardiac education;Energy conservation;Activityengagement   Goal Expiration Date 05/13/24   OT Frequency 2-3x/wk   Discharge Recommendation   Rehab Resource Intensity Level, OT III (Minimum Resource Intensity)   Additional Comments  Pt declining recommendation of home health, agreeable to OPOT with wife encouragement.   AM-PAC Daily Activity Inpatient   Lower Body Dressing 3   Bathing 3   Toileting 3   Upper Body Dressing 4   Grooming 3   Eating 4   Daily Activity Raw Score 20   Daily Activity Standardized Score (Calc for Raw Score  >=11) 42.03   AM-[facility] Applied Cognition Inpatient   Following a Speech/Presentation 4   Understanding Ordinary Conversation 4   Taking Medications 4   Remembering Where Things Are Placed or Put Away 4   Remembering List of 4-5 Errands 4   Taking Care of Complicated Tasks 4   Applied Cognition Raw Score 24   Applied Cognition Standardized Score 62.21     The patient's raw score on the AM-PAC Daily Activity Inpatient Short Form is 20. A raw score of greater than or equal to 19 suggests the patient may benefit from discharge to home. Please refer to the recommendation of the Occupational Therapist for safe discharge planning.    Pt goals to be met by 5/13/2024    Pt will demonstrate ability to complete grooming/hygiene tasks @ Mod I after set-up.  Pt will demonstrate ability to complete UB ADLs including washing/dressing @ Mod I in order to increase performance and participation during meaningful tasks  Pt will demonstrate ability to complete LB dressing @ Mod I in order to increase safety and independence during meaningful tasks.   Pt will demonstrate ability to celina/doff socks/shoes while sitting EOB @ Mod I in order to increase safety and independence during meaningful tasks.   Pt will demonstrate ability to complete toileting tasks including CM and pericare @ Mod I in order to increase safety and independence during meaningful tasks.  Pt will demonstrate ability to complete EOB, chair, toilet/commode transfers @ Mod I in order to increase performance and participation during functional tasks.  Pt will demonstrate ability to stand for 5-8 minutes while maintaining Fair + balance with use of SPC for UB support PRN.  Pt will demonstrate ability to tolerate 30-35 minute OT session with no vc'ing for deep breathing or use of energy conservation techniques in order to increase activity tolerance during functional tasks.   Pt will demonstrate Good carryover of use of energy conservation/compensatory strategies during ADLs  and functional tasks in order to increase safety and reduce risk for falls.   Pt will demonstrate Good attention and participation in continued evaluation of functional ambulation house hold distances in order to assist with safe d/c planning.  Pt will attend to continued cognitive assessments 100% of the time in order to provide most appropriate d/c recommendations.   Pt will follow 100% simple 2-step commands and be A&O x4 consistently with environmental cues to increase participation in functional activities.   Pt will identify 3 areas of interest/hobbies and 1 intervention on how to incorporate into daily life in order to increase interaction with environment and peers as well as increase participation in meaningful tasks.   Pt will demonstrate 100% carryover of BUE HEP in order to increase BUE MS and increase performance during functional tasks upon d/c home.    Madison Templeton, OTR/L

## 2024-04-30 ENCOUNTER — APPOINTMENT (INPATIENT)
Dept: MRI IMAGING | Facility: HOSPITAL | Age: 64
DRG: 872 | End: 2024-04-30
Payer: COMMERCIAL

## 2024-04-30 LAB
ANION GAP SERPL CALCULATED.3IONS-SCNC: 7 MMOL/L (ref 4–13)
BUN SERPL-MCNC: 13 MG/DL (ref 5–25)
CALCIUM SERPL-MCNC: 8.7 MG/DL (ref 8.4–10.2)
CHLORIDE SERPL-SCNC: 105 MMOL/L (ref 96–108)
CO2 SERPL-SCNC: 24 MMOL/L (ref 21–32)
CREAT SERPL-MCNC: 0.83 MG/DL (ref 0.6–1.3)
ERYTHROCYTE [DISTWIDTH] IN BLOOD BY AUTOMATED COUNT: 13.5 % (ref 11.6–15.1)
GFR SERPL CREATININE-BSD FRML MDRD: 94 ML/MIN/1.73SQ M
GLUCOSE SERPL-MCNC: 100 MG/DL (ref 65–140)
GLUCOSE SERPL-MCNC: 103 MG/DL (ref 65–140)
GLUCOSE SERPL-MCNC: 106 MG/DL (ref 65–140)
GLUCOSE SERPL-MCNC: 120 MG/DL (ref 65–140)
GLUCOSE SERPL-MCNC: 132 MG/DL (ref 65–140)
HCT VFR BLD AUTO: 40.7 % (ref 36.5–49.3)
HGB BLD-MCNC: 13.7 G/DL (ref 12–17)
MCH RBC QN AUTO: 30.1 PG (ref 26.8–34.3)
MCHC RBC AUTO-ENTMCNC: 33.7 G/DL (ref 31.4–37.4)
MCV RBC AUTO: 90 FL (ref 82–98)
PLATELET # BLD AUTO: 119 THOUSANDS/UL (ref 149–390)
PMV BLD AUTO: 9.7 FL (ref 8.9–12.7)
POTASSIUM SERPL-SCNC: 3.9 MMOL/L (ref 3.5–5.3)
RBC # BLD AUTO: 4.55 MILLION/UL (ref 3.88–5.62)
SODIUM SERPL-SCNC: 136 MMOL/L (ref 135–147)
WBC # BLD AUTO: 6.86 THOUSAND/UL (ref 4.31–10.16)

## 2024-04-30 PROCEDURE — 73721 MRI JNT OF LWR EXTRE W/O DYE: CPT

## 2024-04-30 PROCEDURE — 80048 BASIC METABOLIC PNL TOTAL CA: CPT | Performed by: PHYSICIAN ASSISTANT

## 2024-04-30 PROCEDURE — 99232 SBSQ HOSP IP/OBS MODERATE 35: CPT | Performed by: PHYSICIAN ASSISTANT

## 2024-04-30 PROCEDURE — 82948 REAGENT STRIP/BLOOD GLUCOSE: CPT

## 2024-04-30 PROCEDURE — 85027 COMPLETE CBC AUTOMATED: CPT | Performed by: PHYSICIAN ASSISTANT

## 2024-04-30 RX ADMIN — ACETAMINOPHEN 325MG 975 MG: 325 TABLET ORAL at 05:32

## 2024-04-30 RX ADMIN — CEFAZOLIN SODIUM 2000 MG: 2 SOLUTION INTRAVENOUS at 13:56

## 2024-04-30 RX ADMIN — ATORVASTATIN CALCIUM 10 MG: 10 TABLET, FILM COATED ORAL at 16:24

## 2024-04-30 RX ADMIN — SERTRALINE HYDROCHLORIDE 75 MG: 50 TABLET ORAL at 21:12

## 2024-04-30 RX ADMIN — ENOXAPARIN SODIUM 40 MG: 40 INJECTION SUBCUTANEOUS at 08:00

## 2024-04-30 RX ADMIN — CEFAZOLIN SODIUM 2000 MG: 2 SOLUTION INTRAVENOUS at 21:13

## 2024-04-30 RX ADMIN — CEFAZOLIN SODIUM 2000 MG: 2 SOLUTION INTRAVENOUS at 05:32

## 2024-04-30 RX ADMIN — ACETAMINOPHEN 325MG 975 MG: 325 TABLET ORAL at 21:12

## 2024-04-30 RX ADMIN — ACETAMINOPHEN 325MG 975 MG: 325 TABLET ORAL at 13:56

## 2024-04-30 RX ADMIN — LOSARTAN POTASSIUM 50 MG: 50 TABLET, FILM COATED ORAL at 08:01

## 2024-04-30 RX ADMIN — NICOTINE 1 PATCH: 14 PATCH, EXTENDED RELEASE TRANSDERMAL at 08:03

## 2024-04-30 RX ADMIN — LORAZEPAM 0.5 MG: 0.5 TABLET ORAL at 21:12

## 2024-04-30 NOTE — PLAN OF CARE
Problem: PAIN - ADULT  Goal: Verbalizes/displays adequate comfort level or baseline comfort level  Description: Interventions:  - Encourage patient to monitor pain and request assistance  - Assess pain using appropriate pain scale  - Administer analgesics based on type and severity of pain and evaluate response  - Implement non-pharmacological measures as appropriate and evaluate response  - Consider cultural and social influences on pain and pain management  - Notify physician/advanced practitioner if interventions unsuccessful or patient reports new pain  Outcome: Progressing     Problem: INFECTION - ADULT  Goal: Absence or prevention of progression during hospitalization  Description: INTERVENTIONS:  - Assess and monitor for signs and symptoms of infection  - Monitor lab/diagnostic results  - Monitor all insertion sites, i.e. indwelling lines, tubes, and drains  - Monitor endotracheal if appropriate and nasal secretions for changes in amount and color  - [place] appropriate cooling/warming therapies per order  - Administer medications as ordered  - Instruct and encourage patient and family to use good hand hygiene technique  - Identify and instruct in appropriate isolation precautions for identified infection/condition  Outcome: Progressing  Goal: Absence of fever/infection during neutropenic period  Description: INTERVENTIONS:  - Monitor WBC    Outcome: Progressing     Problem: SAFETY ADULT  Goal: Patient will remain free of falls  Description: INTERVENTIONS:  - Educate patient/family on patient safety including physical limitations  - Instruct patient to call for assistance with activity   - Consult OT/PT to assist with strengthening/mobility   - Keep Call bell within reach  - Keep bed low and locked with side rails adjusted as appropriate  - Keep care items and personal belongings within reach  - Initiate and maintain comfort rounds  - Make Fall Risk Sign visible to staff  - Offer Toileting every  2 Hours,  in advance of need  - Initiate/Maintain   alarm  - Obtain necessary fall risk management equipment:     - Apply yellow socks and bracelet for high fall risk patients  - Consider moving patient to room near nurses station  Outcome: Progressing  Goal: Maintain or return to baseline ADL function  Description: INTERVENTIONS:  -  Assess patient's ability to carry out ADLs; assess patient's baseline for ADL function and identify physical deficits which impact ability to perform ADLs (bathing, care of mouth/teeth, toileting, grooming, dressing, etc.)  - Assess/evaluate cause of self-care deficits   - Assess range of motion  - Assess patient's mobility; develop plan if impaired  - Assess patient's need for assistive devices and provide as appropriate  - Encourage maximum independence but intervene and supervise when necessary  - Involve family in performance of ADLs  - Assess for home care needs following discharge   - Consider OT consult to assist with ADL evaluation and planning for discharge  - Provide patient education as appropriate  Outcome: Progressing  Goal: Maintains/Returns to pre admission functional level  Description: INTERVENTIONS:  - Perform AM-PAC 6 Click Basic Mobility/ Daily Activity assessment daily.  - Set and communicate daily mobility goal to care team and patient/family/caregiver.   - Collaborate with rehabilitation services on mobility goals if consulted  - Perform Range of Motion    times a day.  - Reposition patient every 2 hours.  - Dangle patient 3 times a day  - Stand patient 3 times a day  - Ambulate patient 3 times a day  - Out of bed to chair 3  times a day   - Out of bed for meals 3 times a day  - Out of bed for toileting  - Record patient progress and toleration of activity level   Outcome: Progressing     Problem: DISCHARGE PLANNING  Goal: Discharge to home or other facility with appropriate resources  Description: INTERVENTIONS:  - Identify barriers to discharge w/patient and caregiver  -  Arrange for needed discharge resources and transportation as appropriate  - Identify discharge learning needs (meds, wound care, etc.)  - Arrange for interpretive services to assist at discharge as needed  - Refer to Case Management Department for coordinating discharge planning if the patient needs post-hospital services based on physician/advanced practitioner order or complex needs related to functional status, cognitive ability, or social support system  Outcome: Progressing     Problem: Knowledge Deficit  Goal: Patient/family/caregiver demonstrates understanding of disease process, treatment plan, medications, and discharge instructions  Description: Complete learning assessment and assess knowledge base.  Interventions:  - Provide teaching at level of understanding  - Provide teaching via preferred learning methods  Outcome: Progressing     Problem: SKIN/TISSUE INTEGRITY - ADULT  Goal: Incision(s), wounds(s) or drain site(s) healing without S/S of infection  Description: INTERVENTIONS  - Assess and document dressing, incision, wound bed, drain sites and surrounding tissue  - Provide patient and family education  - Perform skin care/dressing changes every       Outcome: Progressing     Problem: Prexisting or High Potential for Compromised Skin Integrity  Goal: Skin integrity is maintained or improved  Description: INTERVENTIONS:  - Identify patients at risk for skin breakdown  - Assess and monitor skin integrity  - Assess and monitor nutrition and hydration status  - Monitor labs   - Assess for incontinence   - Turn and reposition patient  - Assist with mobility/ambulation  - Relieve pressure over bony prominences  - Avoid friction and shearing  - Provide appropriate hygiene as needed including keeping skin clean and dry  - Evaluate need for skin moisturizer/barrier cream  - Collaborate with interdisciplinary team   - Patient/family teaching  - Consider wound care consult   Outcome: Progressing

## 2024-04-30 NOTE — PROGRESS NOTES
[facility]  [facility]  Name: Vamshi Robbins I  MRN: [id number]  Unit/Bed#: -01 I Date of Admission: 4/27/2024   Date of Service: 4/30/2024 I Hospital Day: 3    Assessment/Plan   * Cellulitis of right lower extremity  Assessment & Plan  Presented to the ED with fever and rigors with RLE cellulitis   Etiology 2/2 to known diabetic ulceration of right heel with charcot foot deformity   Ulceration debrided on 04/28 w/ podiatry   Podiatry consult appreciated  Continue IV cefazolin   Xray was negative for OM per the results report  MRI pending   Wound care per Podiatry recs      Severe sepsis (HCC)  Assessment & Plan  POA, aeb tachycardia, tachypnea, elevated lactic acid, mild leukocytosis with neutrophilia; presented w/ rigors & fever   2/2 cellulitis from diabetic ulceration   Flu/COVID/RSV negative   LA: 2.3 -> 1.9   CXR benign   UA not indicative of infection   Negative procalcitonin x 2  Blood cultures with no growth after 24 hours x2  On IV Ancef as above  MRI pending       Type 2 diabetes mellitus with neurologic complication, without long-term current use of insulin (HCC)  Assessment & Plan  Lab Results   Component Value Date    HGBA1C 6.1 (H) 01/29/2024     Recent Labs     04/28/24  1526 04/28/24  2101 04/29/24  0732 04/29/24  1137   POCGLU 113 89 106 108     Blood Sugar Average: Last 72 hrs:  (P) 116.1444845998237385  Well controlled based on recent HA1C   Hold oral/injectable diabetic medications  QID sugars and SSI  Diabetic diet     Primary hypertension  Assessment & Plan  Continue valsartan   BP stable    HLD (hyperlipidemia)  Assessment & Plan  Continue lipitor     Morbid obesity (HCC)  Assessment & Plan  BMI 46.25  Recommend diet/lifestyle modification     Mild depression  Assessment & Plan  Continue Zoloft   Mood pleasant and stable             VTE Pharmacologic Prophylaxis: VTE Score: 6 High Risk (Score >/= 5) - Pharmacological DVT Prophylaxis Ordered: enoxaparin  (Lovenox). Sequential Compression Devices Ordered.    Mobility:   Basic Mobility Inpatient Raw Score: 20  JH-HLM Goal: 6: Walk 10 steps or more  JH-HLM Achieved: 7: Walk 25 feet or more  JH-HLM Goal achieved. Continue to encourage appropriate mobility.    Patient Centered Rounds: I performed bedside rounds with nursing staff today.   Discussions with Specialists or Other Care Team Provider:      Education and Discussions with Family / Patient: Patient declined call to .     Total Time Spent on Date of Encounter in care of patient: 32 mins. This time was spent on one or more of the following: performing physical exam; counseling and coordination of care; obtaining or reviewing history; documenting in the medical record; reviewing/ordering tests, medications or procedures; communicating with other healthcare professionals and discussing with patient's family/caregivers.    Current Length of Stay: 3 day(s)  Current Patient Status: Inpatient   Certification Statement: The patient will continue to require additional inpatient hospital stay due to IV abx, MRi pending, blood cultures  Discharge Plan: Anticipate discharge in 24-48 hrs to home with home services.    Code Status: Level 1 - Full Code    Subjective:   Mr. Robbins reports feeling well, denies fever, chills, CP, SOB, abdominal pain. Reports just got back from MRI - results pending     Objective:     Vitals:   Temp (24hrs), Av.5 °F (36.4 °C), Min:97.3 °F (36.3 °C), Max:97.7 °F (36.5 °C)    Temp:  [97.3 °F (36.3 °C)-97.7 °F (36.5 °C)] 97.3 °F (36.3 °C)  HR:  [61-71] 62  Resp:  [18] 18  BP: (118-141)/(76-79) 141/79  SpO2:  [92 %-96 %] 96 %  Body mass index is 46.25 kg/m².     Input and Output Summary (last 24 hours):     Intake/Output Summary (Last 24 hours) at 2024 1248  Last data filed at 2024 1001  Gross per 24 hour   Intake 225 ml   Output 1025 ml   Net -800 ml       Physical Exam:   Physical Exam  Vitals reviewed.    Constitutional:       Appearance: He is obese.      Comments: Patient seen sitting at the side of his bed comfortably resting, NAD   Cardiovascular:      Rate and Rhythm: Normal rate and regular rhythm.   Pulmonary:      Effort: Pulmonary effort is normal. No respiratory distress.      Breath sounds: Normal breath sounds.   Abdominal:      General: Bowel sounds are normal.      Palpations: Abdomen is soft.      Tenderness: There is no abdominal tenderness.   Musculoskeletal:      Right lower leg: Edema present.      Comments: Right foot wrapped   Skin:     General: Skin is warm.      Findings: Erythema present.   Neurological:      Mental Status: He is alert and oriented to person, place, and time.   Psychiatric:         Mood and Affect: Mood normal.         Behavior: Behavior normal.          Additional Data:     Labs:  Results from last 7 days   Lab Units 04/30/24  0523 04/29/24  0445 04/28/24  0545   WBC Thousand/uL 6.86   < > 12.95*   HEMOGLOBIN g/dL 13.7   < > 13.4   HEMATOCRIT % 40.7   < > 40.4   PLATELETS Thousands/uL 119*   < > 116*   SEGS PCT %  --   --  85*   LYMPHO PCT %  --   --  8*   MONO PCT %  --   --  6   EOS PCT %  --   --  0    < > = values in this interval not displayed.     Results from last 7 days   Lab Units 04/30/24  0523 04/28/24  0545 04/27/24  2149   SODIUM mmol/L 136   < > 135   POTASSIUM mmol/L 3.9   < > 3.9   CHLORIDE mmol/L 105   < > 104   CO2 mmol/L 24   < > 21   BUN mg/dL 13   < > 15   CREATININE mg/dL 0.83   < > 1.01   ANION GAP mmol/L 7   < > 10   CALCIUM mg/dL 8.7   < > 9.0   ALBUMIN g/dL  --   --  3.9   TOTAL BILIRUBIN mg/dL  --   --  0.53   ALK PHOS U/L  --   --  112*   ALT U/L  --   --  16   AST U/L  --   --  15   GLUCOSE RANDOM mg/dL 100   < > 183*    < > = values in this interval not displayed.     Results from last 7 days   Lab Units 04/27/24  2149   INR  1.00     Results from last 7 days   Lab Units 04/30/24  1120 04/30/24  0735 04/29/24  2102 04/29/24  1553 04/29/24  1137  04/29/24  0732 04/28/24  2101 04/28/24  1526 04/28/24  1108 04/28/24  0731 04/28/24  0036   POC GLUCOSE mg/dl 120 106 115 134 108 106 89 113 152* 126 124         Results from last 7 days   Lab Units 04/29/24  0445 04/28/24  0048 04/27/24  2149   LACTIC ACID mmol/L  --  1.8  1.9 2.3*   PROCALCITONIN ng/ml 0.20 0.10 0.07       Lines/Drains:  Invasive Devices       Peripheral Intravenous Line  Duration             Peripheral IV 04/27/24 Left Antecubital 2 days    Peripheral IV 04/27/24 Right Antecubital 2 days                          Imaging: Reviewed radiology reports from this admission including: right foot XR    Recent Cultures (last 7 days):   Results from last 7 days   Lab Units 04/27/24  2151 04/27/24  2149   BLOOD CULTURE  No Growth at 24 hrs. No Growth at 24 hrs.       Last 24 Hours Medication List:   Current Facility-Administered Medications   Medication Dose Route Frequency Provider Last Rate    acetaminophen  650 mg Oral Q6H PRN [place] Plummer      acetaminophen  975 mg Oral Q8H WOJCIECH Eliezer Gonzalez Counts, DO      atorvastatin  10 mg Oral Daily With Dinner Lee Mireles PA-C      calcium carbonate  1,000 mg Oral Daily PRN Lee Mireles PA-C      cefazolin  2,000 mg Intravenous Q8H Lee Mireles PA-C 2,000 mg (04/30/24 0532)    enoxaparin  40 mg Subcutaneous Daily Lee Mireles PA-C      insulin lispro  1-5 Units Subcutaneous TID AC Lee Mireles PA-C      insulin lispro  1-5 Units Subcutaneous HS Lee Mireles PA-C      LORazepam  0.5 mg Oral Daily PRN Lee Mireles PA-C      losartan  50 mg Oral Daily Lee Mireles PA-C      nicotine  1 patch Transdermal Daily Lee Mireles PA-C      ondansetron  4 mg Intravenous Q6H PRN Lee Mireles PA-C      oxyCODONE  10 mg Oral Q4H PRN Lee Mireles PA-C      oxyCODONE  5 mg Oral Q4H PRN Lee Mireles PA-C      senna  1 tablet Oral HS PRN Lee VERA  NEL Mireles      sertraline  75 mg Oral HS Lee CHUY Mireles PA-C      sodium chloride  2 spray Each Nare 4x Daily PRN [place] Plummer          Today, Patient Was Seen By: Jesús Traore PA-C    **Please Note: This note may have been constructed using a voice recognition system.**

## 2024-04-30 NOTE — PLAN OF CARE
Problem: SKIN/TISSUE INTEGRITY - ADULT  Goal: Incision(s), wounds(s) or drain site(s) healing without S/S of infection  Description: INTERVENTIONS  - Assess and document dressing, incision, wound bed, drain sites and surrounding tissue  - Provide patient and family education  - Perform skin care/dressing changes every per order  Problem: INFECTION - ADULT  Goal: Absence or prevention of progression during hospitalization  Description: INTERVENTIONS:  - Assess and monitor for signs and symptoms of infection  - Monitor lab/diagnostic results  - Monitor all insertion sites, i.e. indwelling lines, tubes, and drains  - Monitor endotracheal if appropriate and nasal secretions for changes in amount and color  - [place] appropriate cooling/warming therapies per order  - Administer medications as ordered  - Instruct and encourage patient and family to use good hand hygiene technique  - Identify and instruct in appropriate isolation precautions for identified infection/condition  Outcome: Progressing  Goal: Absence of fever/infection during neutropenic period  Description: INTERVENTIONS:  - Monitor WBC    Outcome: Progressing     Outcome: Progressing

## 2024-05-01 ENCOUNTER — TRANSITIONAL CARE MANAGEMENT (OUTPATIENT)
Dept: FAMILY MEDICINE CLINIC | Facility: CLINIC | Age: 64
End: 2024-05-01

## 2024-05-01 VITALS
DIASTOLIC BLOOD PRESSURE: 80 MMHG | OXYGEN SATURATION: 95 % | TEMPERATURE: 97.3 F | BODY MASS INDEX: 39.17 KG/M2 | SYSTOLIC BLOOD PRESSURE: 125 MMHG | HEART RATE: 70 BPM | WEIGHT: 315 LBS | HEIGHT: 75 IN | RESPIRATION RATE: 14 BRPM

## 2024-05-01 LAB
ANION GAP SERPL CALCULATED.3IONS-SCNC: 7 MMOL/L (ref 4–13)
BUN SERPL-MCNC: 11 MG/DL (ref 5–25)
CALCIUM SERPL-MCNC: 8.8 MG/DL (ref 8.4–10.2)
CHLORIDE SERPL-SCNC: 105 MMOL/L (ref 96–108)
CO2 SERPL-SCNC: 25 MMOL/L (ref 21–32)
CREAT SERPL-MCNC: 0.87 MG/DL (ref 0.6–1.3)
ERYTHROCYTE [DISTWIDTH] IN BLOOD BY AUTOMATED COUNT: 13.8 % (ref 11.6–15.1)
GFR SERPL CREATININE-BSD FRML MDRD: 92 ML/MIN/1.73SQ M
GLUCOSE SERPL-MCNC: 107 MG/DL (ref 65–140)
GLUCOSE SERPL-MCNC: 127 MG/DL (ref 65–140)
HCT VFR BLD AUTO: 40 % (ref 36.5–49.3)
HGB BLD-MCNC: 13.8 G/DL (ref 12–17)
MCH RBC QN AUTO: 30.1 PG (ref 26.8–34.3)
MCHC RBC AUTO-ENTMCNC: 34.5 G/DL (ref 31.4–37.4)
MCV RBC AUTO: 87 FL (ref 82–98)
PLATELET # BLD AUTO: 152 THOUSANDS/UL (ref 149–390)
PMV BLD AUTO: 9.9 FL (ref 8.9–12.7)
POTASSIUM SERPL-SCNC: 3.9 MMOL/L (ref 3.5–5.3)
RBC # BLD AUTO: 4.58 MILLION/UL (ref 3.88–5.62)
SODIUM SERPL-SCNC: 137 MMOL/L (ref 135–147)
WBC # BLD AUTO: 6.94 THOUSAND/UL (ref 4.31–10.16)

## 2024-05-01 PROCEDURE — 80048 BASIC METABOLIC PNL TOTAL CA: CPT | Performed by: PHYSICIAN ASSISTANT

## 2024-05-01 PROCEDURE — 82948 REAGENT STRIP/BLOOD GLUCOSE: CPT

## 2024-05-01 PROCEDURE — 85027 COMPLETE CBC AUTOMATED: CPT | Performed by: PHYSICIAN ASSISTANT

## 2024-05-01 PROCEDURE — 99239 HOSP IP/OBS DSCHRG MGMT >30: CPT | Performed by: PHYSICIAN ASSISTANT

## 2024-05-01 RX ORDER — CEPHALEXIN 500 MG/1
500 CAPSULE ORAL EVERY 6 HOURS SCHEDULED
Qty: 20 CAPSULE | Refills: 0 | Status: SHIPPED | OUTPATIENT
Start: 2024-05-01 | End: 2024-05-06

## 2024-05-01 RX ADMIN — CEFAZOLIN SODIUM 2000 MG: 2 SOLUTION INTRAVENOUS at 06:02

## 2024-05-01 RX ADMIN — ACETAMINOPHEN 325MG 975 MG: 325 TABLET ORAL at 06:01

## 2024-05-01 RX ADMIN — LOSARTAN POTASSIUM 50 MG: 50 TABLET, FILM COATED ORAL at 08:28

## 2024-05-01 RX ADMIN — NICOTINE 1 PATCH: 14 PATCH, EXTENDED RELEASE TRANSDERMAL at 08:28

## 2024-05-01 RX ADMIN — ENOXAPARIN SODIUM 40 MG: 40 INJECTION SUBCUTANEOUS at 08:26

## 2024-05-01 NOTE — ASSESSMENT & PLAN NOTE
Presented to the ED with fever and rigors with RLE cellulitis   Etiology 2/2 to known diabetic ulceration of right heel with charcot foot deformity   Ulceration debrided on 04/28 w/ podiatry   Podiatry consult appreciated - they cleared the patient from their standpoint for discharge today and they recommended discharge on Keflex x 5 days and follow up with his outpatient podiatrist  Xray was negative for OM per the results report  MRI revealed no evidence of OM or an abscess per the results report  ABIs within the normal range bilaterally

## 2024-05-01 NOTE — DISCHARGE SUMMARY
[facility]  Discharge- Vamshi Robbins 1960, 63 y.o. male MRN: [id number]  Unit/Bed#: -01 Encounter: [id number]  Primary Care Provider: Cristina Lopez PA-C   Date and time admitted to hospital: 4/27/2024  9:38 PM    * Cellulitis of right lower extremity  Assessment & Plan  Presented to the ED with fever and rigors with RLE cellulitis   Etiology 2/2 to known diabetic ulceration of right heel with charcot foot deformity   Ulceration debrided on 04/28 w/ podiatry   Podiatry consult appreciated - they cleared the patient from their standpoint for discharge today and they recommended discharge on Keflex x 5 days and follow up with his outpatient podiatrist  Xray was negative for OM per the results report  MRI revealed no evidence of OM or an abscess per the results report  ABIs within the normal range bilaterally       Severe sepsis (HCC)  Assessment & Plan  POA, aeb tachycardia, tachypnea, elevated lactic acid, mild leukocytosis with neutrophilia; presented w/ rigors & fever   2/2 cellulitis from diabetic ulceration   Flu/COVID/RSV negative   LA: 2.3 -> 1.9   CXR benign   UA not indicative of infection   Negative procalcitonin x 2  Blood cultures with no growth after 48 hours x2  Abx plan per Podiatry recs as above  MRI without OM or abscess as above      Type 2 diabetes mellitus with neurologic complication, without long-term current use of insulin (HCC)  Assessment & Plan  Lab Results   Component Value Date    HGBA1C 6.1 (H) 01/29/2024     Recent Labs     04/30/24  0735 04/30/24  1120 04/30/24  1556 04/30/24  2118   POCGLU 106 120 103 132     Blood Sugar Average: Last 72 hrs:  (P) 117.6267310441292048  Well controlled based on recent HA1C   Continue home regimen on discharge    Primary hypertension  Assessment & Plan  Continue home regimen on discharge    HLD (hyperlipidemia)  Assessment & Plan  Continue lipitor     Morbid obesity (HCC)  Assessment & Plan  BMI  "46.25  Recommend diet/lifestyle modification     Mild depression  Assessment & Plan  Continue home regimen on discharge   Mood pleasant and stable      Medical Problems       Resolved Problems  Date Reviewed: 5/1/2024   None       Discharging Physician / Practitioner: Jesús Traore PA-C  PCP: Cristina Lopez PA-C  Admission Date:   Admission Orders (From admission, onward)       Ordered        04/27/24 2248  INPATIENT ADMISSION  Once                          Discharge Date: 05/01/24    Consultations During Hospital Stay:  Podistry  PT  OT  Wound Care    Procedures Performed:   CXR  Right foot XR  VICKIE  MRI ankle/heel right  CBC  CMP/BMP  Lactic acid  Procal  Blood cultures  FLU/RSV/COVID  Sed rate    Significant Findings / Test Results:   CXR: \"No acute cardiopulmonary disease. \"  Right foot XR: \"Diffuse degenerative change without acute osseous abnormality. No radiographic evidence of osteomyelitis\"  VICKIE: within normal range bilaterally  MRI ankle/heel right: \"No evidence of osteomyelitis or an abscess. \"  WBC: 10.87, 12.95, 8.70, 6.86, 6.94  Lactic acid: 2.3, 1.9  Procal: 0.07, 0.10  Blood cultures: no growth at 48 hours   FLU/RSV/COVID: negative  Sed rate: 40     Incidental Findings:   None     Test Results Pending at Discharge (will require follow up):   None     Outpatient Tests Requested:  Follow up with PCP and Podiatrist     Complications:  None    Reason for Admission: sepsis    Hospital Course:   Vamshi Robbins is a 63 y.o. male with DM2, HLD, HTN, depression, obesity (Body mass index is 46.25 kg/m².) who originally presented to the hospital on 4/27/2024 due to fever and right heel ulceration. He met severe sepsis criteria with tachycardia, tachypnea, elevated lactic, mild leukocytosis, and neutrophilia with suspected source being cellulitis from diabetic ulceration. He was given cefepime/Vanco in the ED and placed on IV cefazolin on admission. Podiatry was consulted. Right foot XR revealed diffuse " "degenerative change without acute osseous abnormality and no radiographic evidence of OM. ABIs were within the normal range bilaterally. MRI revealed no evidence of OM or an abscess. Blood cultures negative x2 after 48hr. I discussed with Podiatrist, Dr. Bianchi, who cleared the patient from their standpoint for discharge today and they recommended Keflex x 5 days and follow up with his outpatient Podiatrist which patient states he will call to make the appointment for within the hour. Also recommend close follow up with PCP.     Please see above list of diagnoses and related plan for additional information.     Condition at Discharge: stable    Discharge Day Visit / Exam:   Subjective:  Mr. Robbins reports that all of the symptoms that he came in with have gone away and he wants to leave. He reports that the care here was \"10 stars\". Denies fever, chills, CP, SOB, abdominal pain, diarrhea. He states he will call his podiatrist within the hour to schedule close OP f/u.     Vitals: Blood Pressure: 137/85 (04/30/24 2121)  Pulse: 72 (04/30/24 2121)  Temperature: 97.5 °F (36.4 °C) (04/30/24 2121)  Temp Source: Temporal (04/30/24 2121)  Respirations: 18 (04/30/24 2121)  Height: 6' 3\" (190.5 cm) (04/27/24 2337)  Weight - Scale: (!) 168 kg (370 lb) (04/27/24 2337)  SpO2: 96 % (04/30/24 2121)  Exam:   Physical Exam  Vitals reviewed. Exam conducted with a chaperone present.   Constitutional:       Appearance: He is obese.      Comments: Patient seen sitting at the side of his bed, NAD   Cardiovascular:      Rate and Rhythm: Normal rate and regular rhythm.   Pulmonary:      Effort: Pulmonary effort is normal. No respiratory distress.      Breath sounds: Normal breath sounds.   Abdominal:      General: Bowel sounds are normal.      Palpations: Abdomen is soft.      Tenderness: There is no abdominal tenderness.   Musculoskeletal:      Right lower leg: No edema.      Left lower leg: No edema.   Skin:     General: Skin is warm. "   Neurological:      Mental Status: He is alert and oriented to person, place, and time.   Psychiatric:         Mood and Affect: Mood normal.         Behavior: Behavior normal.         Discussion with Family: Patient declined call to .     Discharge instructions/Information to patient and family:   See after visit summary for information provided to patient and family.      Provisions for Follow-Up Care:  See after visit summary for information related to follow-up care and any pertinent home health orders.      Mobility at time of Discharge:   Basic Mobility Inpatient Raw Score: 20  JH-HLM Goal: 6: Walk 10 steps or more  JH-HLM Achieved: 7: Walk 25 feet or more  HLM Goal achieved. Continue to encourage appropriate mobility.     Disposition:   Home    Planned Readmission: None     Discharge Statement:  I spent 40 minutes discharging the patient. This time was spent on the day of discharge. I had direct contact with the patient on the day of discharge. Greater than 50% of the total time was spent examining patient, answering all patient questions, arranging and discussing plan of care with patient as well as directly providing post-discharge instructions.  Additional time then spent on discharge activities.    Discharge Medications:  See after visit summary for reconciled discharge medications provided to patient and/or family.      **Please Note: This note may have been constructed using a voice recognition system**

## 2024-05-01 NOTE — NURSING NOTE
Discharge reviewed with patient and wife. All belongings sent with patient including CPAP and MEDICATIONS.

## 2024-05-01 NOTE — ASSESSMENT & PLAN NOTE
Lab Results   Component Value Date    HGBA1C 6.1 (H) 01/29/2024     Recent Labs     04/30/24  0735 04/30/24  1120 04/30/24  1556 04/30/24 2118   POCGLU 106 120 103 132     Blood Sugar Average: Last 72 hrs:  (P) 117.5942419133212182  Well controlled based on recent HA1C   Continue home regimen on discharge

## 2024-05-01 NOTE — PLAN OF CARE
Problem: PAIN - ADULT  Goal: Verbalizes/displays adequate comfort level or baseline comfort level  Description: Interventions:  - Encourage patient to monitor pain and request assistance  - Assess pain using appropriate pain scale  - Administer analgesics based on type and severity of pain and evaluate response  - Implement non-pharmacological measures as appropriate and evaluate response  - Consider cultural and social influences on pain and pain management  - Notify physician/advanced practitioner if interventions unsuccessful or patient reports new pain  5/1/2024 0932 by Lesia Butler LPN  Outcome: Adequate for Discharge  5/1/2024 0931 by Lesia Butler LPN  Outcome: Progressing     Problem: INFECTION - ADULT  Goal: Absence or prevention of progression during hospitalization  Description: INTERVENTIONS:  - Assess and monitor for signs and symptoms of infection  - Monitor lab/diagnostic results  - Monitor all insertion sites, i.e. indwelling lines, tubes, and drains  - Monitor endotracheal if appropriate and nasal secretions for changes in amount and color  - [place] appropriate cooling/warming therapies per order  - Administer medications as ordered  - Instruct and encourage patient and family to use good hand hygiene technique  - Identify and instruct in appropriate isolation precautions for identified infection/condition  5/1/2024 0932 by Lesia Butler LPN  Outcome: Adequate for Discharge  5/1/2024 0931 by Lesia Butler LPN  Outcome: Progressing  Goal: Absence of fever/infection during neutropenic period  Description: INTERVENTIONS:  - Monitor WBC    5/1/2024 0932 by Lesia Butler LPN  Outcome: Adequate for Discharge  5/1/2024 0931 by Lesia Butler LPN  Outcome: Progressing     Problem: SAFETY ADULT  Goal: Patient will remain free of falls  Description: INTERVENTIONS:  - Educate patient/family on patient safety including physical limitations  - Instruct patient to call for assistance with  activity   - Consult OT/PT to assist with strengthening/mobility   - Keep Call bell within reach  - Keep bed low and locked with side rails adjusted as appropriate  - Keep care items and personal belongings within reach  - Initiate and maintain comfort rounds  - Make Fall Risk Sign visible to staff  - Offer Toileting every 2 Hours, in advance of need  - Initiate/Maintain   alarm  - Obtain necessary fall risk management equipment:     - Apply yellow socks and bracelet for high fall risk patients  - Consider moving patient to room near nurses station  5/1/2024 0932 by Lesia Butler LPN  Outcome: Adequate for Discharge  5/1/2024 0931 by Lesia Butler LPN  Outcome: Progressing  Goal: Maintain or return to baseline ADL function  Description: INTERVENTIONS:  -  Assess patient's ability to carry out ADLs; assess patient's baseline for ADL function and identify physical deficits which impact ability to perform ADLs (bathing, care of mouth/teeth, toileting, grooming, dressing, etc.)  - Assess/evaluate cause of self-care deficits   - Assess range of motion  - Assess patient's mobility; develop plan if impaired  - Assess patient's need for assistive devices and provide as appropriate  - Encourage maximum independence but intervene and supervise when necessary  - Involve family in performance of ADLs  - Assess for home care needs following discharge   - Consider OT consult to assist with ADL evaluation and planning for discharge  - Provide patient education as appropriate  5/1/2024 0932 by Lesia Butler LPN  Outcome: Adequate for Discharge  5/1/2024 0931 by Lesia Butler LPN  Outcome: Progressing  Goal: Maintains/Returns to pre admission functional level  Description: INTERVENTIONS:  - Perform AM-PAC 6 Click Basic Mobility/ Daily Activity assessment daily.  - Set and communicate daily mobility goal to care team and patient/family/caregiver.   - Collaborate with rehabilitation services on mobility goals if  consulted  - Perform Range of Motion 3 times a day.  - Reposition patient every 2 hours.  - Dangle patient 3 times a day  - Stand patient 3 times a day  - Ambulate patient 3 times a day  - Out of bed to chair 3 times a day   - Out of bed for meals 3 times a day  - Out of bed for toileting  - Record patient progress and toleration of activity level   5/1/2024 0932 by Lesia Butler LPN  Outcome: Adequate for Discharge  5/1/2024 0931 by Lesia Butler LPN  Outcome: Progressing     Problem: DISCHARGE PLANNING  Goal: Discharge to home or other facility with appropriate resources  Description: INTERVENTIONS:  - Identify barriers to discharge w/patient and caregiver  - Arrange for needed discharge resources and transportation as appropriate  - Identify discharge learning needs (meds, wound care, etc.)  - Arrange for interpretive services to assist at discharge as needed  - Refer to Case Management Department for coordinating discharge planning if the patient needs post-hospital services based on physician/advanced practitioner order or complex needs related to functional status, cognitive ability, or social support system  5/1/2024 0932 by Lesia Butler LPN  Outcome: Adequate for Discharge  5/1/2024 0931 by Lesia Butler LPN  Outcome: Progressing     Problem: Knowledge Deficit  Goal: Patient/family/caregiver demonstrates understanding of disease process, treatment plan, medications, and discharge instructions  Description: Complete learning assessment and assess knowledge base.  Interventions:  - Provide teaching at level of understanding  - Provide teaching via preferred learning methods  5/1/2024 0932 by Lesia Butler LPN  Outcome: Adequate for Discharge  5/1/2024 0931 by Lesia Butler LPN  Outcome: Progressing     Problem: SKIN/TISSUE INTEGRITY - ADULT  Goal: Incision(s), wounds(s) or drain site(s) healing without S/S of infection  Description: INTERVENTIONS  - Assess and document dressing, incision,  wound bed, drain sites and surrounding tissue  - Provide patient and family education  - Perform skin care/dressing changes every     5/1/2024 0932 by Lesia Butler LPN  Outcome: Adequate for Discharge  5/1/2024 0931 by Lesia Butler LPN  Outcome: Progressing     Problem: Prexisting or High Potential for Compromised Skin Integrity  Goal: Skin integrity is maintained or improved  Description: INTERVENTIONS:  - Identify patients at risk for skin breakdown  - Assess and monitor skin integrity  - Assess and monitor nutrition and hydration status  - Monitor labs   - Assess for incontinence   - Turn and reposition patient  - Assist with mobility/ambulation  - Relieve pressure over bony prominences  - Avoid friction and shearing  - Provide appropriate hygiene as needed including keeping skin clean and dry  - Evaluate need for skin moisturizer/barrier cream  - Collaborate with interdisciplinary team   - Patient/family teaching  - Consider wound care consult   5/1/2024 0932 by Lesia Butler LPN  Outcome: Adequate for Discharge  5/1/2024 0931 by Lesia Butler LPN  Outcome: Progressing

## 2024-05-01 NOTE — CASE MANAGEMENT
Case Management Discharge Planning Note    Patient name Vamshi Robbins  Location /-01 MRN [id number]  : 1960 Date 2024       Current Admission Date: 2024  Current Admission Diagnosis:Cellulitis of right lower extremity   Patient Active Problem List    Diagnosis Date Noted    Type 2 diabetes mellitus with neurologic complication, without long-term current use of insulin (HCC) 2024    Primary hypertension 2024    HLD (hyperlipidemia) 2024    Cellulitis of right lower extremity 2024    Severe sepsis (HCC) 2024    CPAP (continuous positive airway pressure) dependence     Other spondylosis with myelopathy, cervical region 2023    Radiculopathy 2023    Myelomalacia of cervical cord (HCC) 2023    Neurogenic claudication due to lumbar spinal stenosis     Muscular atrophy     Spinal muscular atrophy, unspecified (HCC) 2022    Lumbar spondylosis 2022    Major depressive disorder, single episode, mild ([facility]) 2022    Thoracic spondylosis 2022    Cervical spinal cord compression (HCC) 2021    Mild depression 2021    Morbid obesity ([facility]) 2021    Herniation of intervertebral disc of thoracic region 2021    Diabetic peripheral neuropathy ([facility]) 2021      LOS (days): 4  Geometric Mean LOS (GMLOS) (days): 3.6  Days to GMLOS:0.2     OBJECTIVE:  Risk of Unplanned Readmission Score: 5.53         Current admission status: Inpatient   Preferred Pharmacy:   [id number] - [place] - [address] [facility]  [address] [facility] [address]  Phone: [phone] Fax: [phone]    Primary Care Provider: Cristina Lopez PA-C    Primary Insurance: SHEEBA  REP  Secondary Insurance:     DISCHARGE DETAILS:    Discharge planning discussed with:: patient         CM met with patient to review discharge today. Patient indicated ride will be here around 10:30 AM and patient comfortable  with discharge.     CM spoke to patient at the bedside, reviewed DC IMM with patient and informed that patient can stay an additional 4 hours for reconsidering appealing the discharge as the medicare rights were review on the day of discharge. Pt verbalized understanding and feels ready to go home and does not intend to stay 4 hours to reconsider.  IMM placed in bin for filing,                                                                          IMM Given (Date):: 05/01/24  IMM Given to:: Patient  Family notified:: appeal rights provided to patient

## 2024-05-01 NOTE — PLAN OF CARE
Problem: SKIN/TISSUE INTEGRITY - ADULT  Goal: Incision(s), wounds(s) or drain site(s) healing without S/S of infection  Description: INTERVENTIONS  - Assess and document dressing, incision, wound bed, drain sites and surrounding tissue  - Provide patient and family education  - Perform skin care/dressing changes every per order    Outcome: Progressing     Problem: INFECTION - ADULT  Goal: Absence or prevention of progression during hospitalization  Description: INTERVENTIONS:  - Assess and monitor for signs and symptoms of infection  - Monitor lab/diagnostic results  - Monitor all insertion sites, i.e. indwelling lines, tubes, and drains  - Monitor endotracheal if appropriate and nasal secretions for changes in amount and color  - [place] appropriate cooling/warming therapies per order  - Administer medications as ordered  - Instruct and encourage patient and family to use good hand hygiene technique  - Identify and instruct in appropriate isolation precautions for identified infection/condition  Outcome: Progressing  Goal: Absence of fever/infection during neutropenic period  Description: INTERVENTIONS:  - Monitor WBC    Outcome: Progressing

## 2024-05-01 NOTE — PLAN OF CARE
Problem: PAIN - ADULT  Goal: Verbalizes/displays adequate comfort level or baseline comfort level  Description: Interventions:  - Encourage patient to monitor pain and request assistance  - Assess pain using appropriate pain scale  - Administer analgesics based on type and severity of pain and evaluate response  - Implement non-pharmacological measures as appropriate and evaluate response  - Consider cultural and social influences on pain and pain management  - Notify physician/advanced practitioner if interventions unsuccessful or patient reports new pain  Outcome: Progressing     Problem: INFECTION - ADULT  Goal: Absence or prevention of progression during hospitalization  Description: INTERVENTIONS:  - Assess and monitor for signs and symptoms of infection  - Monitor lab/diagnostic results  - Monitor all insertion sites, i.e. indwelling lines, tubes, and drains  - Monitor endotracheal if appropriate and nasal secretions for changes in amount and color  - [place] appropriate cooling/warming therapies per order  - Administer medications as ordered  - Instruct and encourage patient and family to use good hand hygiene technique  - Identify and instruct in appropriate isolation precautions for identified infection/condition  Outcome: Progressing  Goal: Absence of fever/infection during neutropenic period  Description: INTERVENTIONS:  - Monitor WBC    Outcome: Progressing     Problem: SAFETY ADULT  Goal: Patient will remain free of falls  Description: INTERVENTIONS:  - Educate patient/family on patient safety including physical limitations  - Instruct patient to call for assistance with activity   - Consult OT/PT to assist with strengthening/mobility   - Keep Call bell within reach  - Keep bed low and locked with side rails adjusted as appropriate  - Keep care items and personal belongings within reach  - Initiate and maintain comfort rounds  - Make Fall Risk Sign visible to staff  - Offer Toileting every 2  Hours,  in advance of need  - Initiate/Maintain   alarm  - Obtain necessary fall risk management equipment:     - Apply yellow socks and bracelet for high fall risk patients  - Consider moving patient to room near nurses station  Outcome: Progressing  Goal: Maintain or return to baseline ADL function  Description: INTERVENTIONS:  -  Assess patient's ability to carry out ADLs; assess patient's baseline for ADL function and identify physical deficits which impact ability to perform ADLs (bathing, care of mouth/teeth, toileting, grooming, dressing, etc.)  - Assess/evaluate cause of self-care deficits   - Assess range of motion  - Assess patient's mobility; develop plan if impaired  - Assess patient's need for assistive devices and provide as appropriate  - Encourage maximum independence but intervene and supervise when necessary  - Involve family in performance of ADLs  - Assess for home care needs following discharge   - Consider OT consult to assist with ADL evaluation and planning for discharge  - Provide patient education as appropriate  Outcome: Progressing  Goal: Maintains/Returns to pre admission functional level  Description: INTERVENTIONS:  - Perform AM-PAC 6 Click Basic Mobility/ Daily Activity assessment daily.  - Set and communicate daily mobility goal to care team and patient/family/caregiver.   - Collaborate with rehabilitation services on mobility goals if consulted  - Perform Range of Motion 3 times a day.  - Reposition patient every 2 hours.  - Dangle patient 3 times a day  - Stand patient 3 times a day  - Ambulate patient 3 times a day  - Out of bed to chair 3 times a day   - Out of bed for meals 3 times a day  - Out of bed for toileting  - Record patient progress and toleration of activity level   Outcome: Progressing     Problem: DISCHARGE PLANNING  Goal: Discharge to home or other facility with appropriate resources  Description: INTERVENTIONS:  - Identify barriers to discharge w/patient and caregiver  -  Arrange for needed discharge resources and transportation as appropriate  - Identify discharge learning needs (meds, wound care, etc.)  - Arrange for interpretive services to assist at discharge as needed  - Refer to Case Management Department for coordinating discharge planning if the patient needs post-hospital services based on physician/advanced practitioner order or complex needs related to functional status, cognitive ability, or social support system  Outcome: Progressing     Problem: Knowledge Deficit  Goal: Patient/family/caregiver demonstrates understanding of disease process, treatment plan, medications, and discharge instructions  Description: Complete learning assessment and assess knowledge base.  Interventions:  - Provide teaching at level of understanding  - Provide teaching via preferred learning methods  Outcome: Progressing     Problem: SKIN/TISSUE INTEGRITY - ADULT  Goal: Incision(s), wounds(s) or drain site(s) healing without S/S of infection  Description: INTERVENTIONS  - Assess and document dressing, incision, wound bed, drain sites and surrounding tissue  - Provide patient and family education  - Perform skin care/dressing changes every     Outcome: Progressing     Problem: Prexisting or High Potential for Compromised Skin Integrity  Goal: Skin integrity is maintained or improved  Description: INTERVENTIONS:  - Identify patients at risk for skin breakdown  - Assess and monitor skin integrity  - Assess and monitor nutrition and hydration status  - Monitor labs   - Assess for incontinence   - Turn and reposition patient  - Assist with mobility/ambulation  - Relieve pressure over bony prominences  - Avoid friction and shearing  - Provide appropriate hygiene as needed including keeping skin clean and dry  - Evaluate need for skin moisturizer/barrier cream  - Collaborate with interdisciplinary team   - Patient/family teaching  - Consider wound care consult   Outcome: Progressing

## 2024-05-01 NOTE — ASSESSMENT & PLAN NOTE
POA, aeb tachycardia, tachypnea, elevated lactic acid, mild leukocytosis with neutrophilia; presented w/ rigors & fever   2/2 cellulitis from diabetic ulceration   Flu/COVID/RSV negative   LA: 2.3 -> 1.9   CXR benign   UA not indicative of infection   Negative procalcitonin x 2  Blood cultures with no growth after 48 hours x2  Abx plan per Podiatry recs as above  MRI without OM or abscess as above

## 2024-05-02 ENCOUNTER — RA CDI HCC (OUTPATIENT)
Dept: OTHER | Facility: HOSPITAL | Age: 64
End: 2024-05-02

## 2024-05-02 NOTE — UTILIZATION REVIEW
NOTIFICATION OF ADMISSION DISCHARGE   This is a Notification of Discharge from [facility]. Please be advised that this patient has been discharge from our facility. Below you will find the admission and discharge date and time including the patient’s disposition.   UTILIZATION REVIEW CONTACT:  Tamanna Bonilla  Utilization   Network Utilization Review Department  Phone: [phone] x carefully listen to the prompts. All voicemails are confidential.  Email: [email or web address]     ADMISSION INFORMATION  PRESENTATION DATE: 4/27/2024  9:38 PM  OBERVATION ADMISSION DATE:   INPATIENT ADMISSION DATE: 4/27/24 10:48 PM   DISCHARGE DATE: 5/1/2024 10:49 AM   DISPOSITION:Home/Self Care    Network Utilization Review Department  ATTENTION: Please call with any questions or concerns to [phone] and carefully listen to the prompts so that you are directed to the right person. All voicemails are confidential.   For Discharge needs, contact Care Management DC Support Team at [phone] opt. 2  Send all requests for admission clinical reviews, approved or denied determinations and any other requests to dedicated fax number below belonging to the campus where the patient is receiving treatment. List of dedicated fax numbers for the Facilities:  FACILITY NAME UR FAX NUMBER   ADMISSION DENIALS (Administrative/Medical Necessity) [phone]   DISCHARGE SUPPORT TEAM ([facility]) [phone]   PARENT CHILD HEALTH (Maternity/NICU/Pediatrics) [phone]   [facility] [phone]   [facility] [phone]   Critical access hospital [phone]   [facility] [phone]   [facility] [phone]   [facility] [phone]   [facility] [phone]   [facility]  [phone]   [facility] [phone]   [facility] [phone]   [facility] [phone]   [facility] [phone]

## 2024-05-03 LAB
BACTERIA BLD CULT: NORMAL
BACTERIA BLD CULT: NORMAL

## 2024-05-07 ENCOUNTER — OFFICE VISIT (OUTPATIENT)
Dept: FAMILY MEDICINE CLINIC | Facility: CLINIC | Age: 64
End: 2024-05-07
Payer: COMMERCIAL

## 2024-05-07 VITALS
HEIGHT: 75 IN | DIASTOLIC BLOOD PRESSURE: 72 MMHG | OXYGEN SATURATION: 98 % | BODY MASS INDEX: 39.17 KG/M2 | HEART RATE: 87 BPM | SYSTOLIC BLOOD PRESSURE: 124 MMHG | TEMPERATURE: 98.2 F | WEIGHT: 315 LBS

## 2024-05-07 DIAGNOSIS — L03.115 CELLULITIS OF RIGHT LOWER EXTREMITY: Primary | ICD-10-CM

## 2024-05-07 DIAGNOSIS — I87.2 VENOUS INSUFFICIENCY: ICD-10-CM

## 2024-05-07 DIAGNOSIS — E11.42 DIABETIC PERIPHERAL NEUROPATHY (HCC): ICD-10-CM

## 2024-05-07 DIAGNOSIS — I10 PRIMARY HYPERTENSION: ICD-10-CM

## 2024-05-07 DIAGNOSIS — M14.671 CHARCOT'S JOINT OF RIGHT FOOT: ICD-10-CM

## 2024-05-07 DIAGNOSIS — L97.519 ULCER OF RIGHT FOOT, UNSPECIFIED ULCER STAGE (HCC): ICD-10-CM

## 2024-05-07 PROCEDURE — 99496 TRANSJ CARE MGMT HIGH F2F 7D: CPT | Performed by: PHYSICIAN ASSISTANT

## 2024-05-07 NOTE — PROGRESS NOTES
Assessment & Plan     1. Cellulitis of right lower extremity    2. Primary hypertension    3. Diabetic peripheral neuropathy (HCC)  -     TSH, 3rd generation with Free T4 reflex; Future    4. Charcot's joint of right foot    5. Ulcer of right foot, unspecified ulcer stage (HCC)    6. Venous insufficiency    This 63-year-old male sees me for his primary care services.  Patient was also being treated for a crack in the heel of his right foot by his podiatrist Dr. Wild.  Patient was seen in Dr. Wild's office on April 27 for debridement.  Later that evening, the patient developed extreme pain redness fever and chills.  His temperature was 103.2.  He went to [facility] where he fit the criteria for sepsis.    He was admitted for IV antibiotics and investigation of his cellulitis of his right foot.  He ultimately had an MRI that did not show any fluid or abscess or any osteomyelitis.  He was given vancomycin with cefepime intravenously.  He also received wound care.  Blood cultures were negative.  Initial white cell count was elevated but came down with treatment.  His procalcitonin level remains normal.  Blood sugars also remain normal.  His preceding hemoglobin A1c at the end of January of this year was 6.1.  He is adherent with all of his diabetic and lipid-lowering medications.    He also had evaluation for blood flow in his ankle-brachial index sees were normal.    In addition to the cellulitis, he had a bilateral venous insufficiency.  He did have venous Doppler studies that failed to show any clot formation.  He was given physical therapy while in the hospital and he does ambulate using a specialized cane.  He continues to receive wound care from Dr. Wild.  He was discharged on 5 days of Keflex with Dr. Wild extended that to include an additional 2 weeks of oral antibiotics.    He was tolerating the antibiotics without difficulty with the exception of having about diarrhea several days  ago which is since cleared.  His wife also developed diarrhea and lower abdominal pain so it was assumed that he had some kind of viral gastroenteritis.  Patient is taking his probiotics with his Keflex.    Patient has a history of hypertension.  He is adherent with his valsartan.  He states that his mood is good and he is not having any depressive symptoms.  He is adherent with his sertraline.    A total of 50 minutes was spent rendering care for this patient.  This time included review of the patient's electronic medical record, performing the history and physical, reviewing appropriate labs and/or images, developing a treatment and assessment plan, answering patient's questions and concerns, and documenting the patient visit.Patient has a follow-up appointment on June 7 for his diabetic follow-up care.  He is also requested a repeat TSH since it has not been done for 2 years.  He is also requesting a testosterone level that she would need to have first thing in the morning.  He is having a BMP, urine for microalbumin, and hemoglobin A1c at that time.    Patient's admission for this hospitalization was April 27 with his discharge on May 1.  He states that he does not need any home nursing services.  Between his wound care appointments, patient's wife is performing the dressing changes very appropriately.     Subjective     Transitional Care Management Review:   Vamshi Robbins is a 63 y.o. male here for TCM follow up.     During the TCM phone call patient stated:  TCM Call       Date and time call was made  5/1/2024 11:19 AM    Hospital care reviewed  Records reviewed    Patient was hospitialized at  [facility]    Date of Admission  04/28/24    Date of discharge  05/01/24    Diagnosis  Cellulitis    Disposition  Home    Were the patients medications reviewed and updated  Yes    Current Symptoms  None          TCM Call       Post hospital issues  None    Should patient be enrolled in antico  "monitoring?  No    Scheduled for follow up?  Yes    Did you obtain your prescribed medications  Yes    Do you need help managing your prescriptions or medications  No    Is transportation to your appointment needed  No    I have advised the patient to call PCP with any new or worsening symptoms  Madison Alejandro LANG    Living Arrangements  Spouse or Significiant other    Support System  Spouse    The type of support provided  Emotional; Financial; Physical    Do you have social support  Yes, as much as I need    Are you recieving any outpatient services  No    Are you recieving home care services  No    Are you using any community resources  No    Current waiver services  No    Have you fallen in the last 12 months  No    Interperter language line needed  No    Counseling  Patient    Counseling topics  Diagnostic results            HPI: Patient denies fever chills or sweats.  He is tolerating his antibiotics and taking probiotics with them.  He is not having any further diarrhea or abdominal pain.  No nausea or vomiting.  No difficulty passing his water.    He is elevating his legs when sitting.  He is trying to increase his physical activity on a daily basis.  He denies pain in his chest heart palpitations dizziness or lightheadedness.  No further fever or chills.  Mentation is very clear.  His mood is described as being very happy.  He states that he had a great experience at the [facility] and received tremendous care at that facility.    When his ulcerated area is healed, he would consider attending physical therapy for deconditioning.  He will let me know when he is ready for that service.  Review of Systems: Has just recovered from a bout of sepsis.  Continues to receive wound care for the right heel ulcer.  He has a history of diabetic peripheral neuropathy and has developed a Charcot's joint of the right foot.    Objective     /72   Pulse 87   Temp 98.2 °F (36.8 °C)   Ht 6' 3\" " (1.905 m)   Wt (!) 163 kg (360 lb)   SpO2 98%   BMI 45.00 kg/m²      Physical Exam  Medications have been reviewed by provider in current encounter    Well-developed well-nourished gregarious 63-year-old male who appropriately answers all questions.  He has a nontoxic appearance.  Reviewed vital signs.  He is normotensive and afebrile.    Patient's heart is regular rate without murmur rub or gallop.  Lungs are clear to auscultation.  Abdomen is obese and soft positive bowel sounds without masses or tenderness.    He has bilateral peripheral edema and brawny edema consistent with chronic venous insufficiency.  Neither leg is warm on palpation.  He does have swelling from the tibial tuberosity all the way through his lower legs and feet.  Patient's shoes and socks were removed.  I was unable to palpate any pulses in his feet because of the swelling which would include his dorsalis pedis and posterior tibialis pulses.    He has a bluish appearance to both lower extremities.  He does have an open area that is scabbed over on the left anterior leg.  He states that he bumped into something while doing physical therapy at the hospital.    On the bottom of his right foot, he has a 3 cm linear ulcer that is exposed to the skin.  Granulation tissue is seen on the ulcer bed.  No surrounding cellulitis or tenderness.  No active drainage is noted.  Wound dressing changed and wife used an offloading technique to protect the open ulcerated area.  Full diabetic foot exam to be done during his June 7 diabetic check in the office.  Cristina Lopez PA-C

## 2024-05-23 ENCOUNTER — HOSPITAL ENCOUNTER (INPATIENT)
Facility: HOSPITAL | Age: 64
LOS: 3 days | Discharge: HOME | DRG: 872 | End: 2024-05-26
Attending: EMERGENCY MEDICINE | Admitting: HOSPITALIST
Payer: COMMERCIAL

## 2024-05-23 ENCOUNTER — APPOINTMENT (EMERGENCY)
Dept: RADIOLOGY | Facility: HOSPITAL | Age: 64
DRG: 872 | End: 2024-05-23
Payer: COMMERCIAL

## 2024-05-23 DIAGNOSIS — L03.115 CELLULITIS OF RIGHT LEG: Primary | ICD-10-CM

## 2024-05-23 DIAGNOSIS — L03.115 CELLULITIS OF RIGHT LOWER EXTREMITY: ICD-10-CM

## 2024-05-23 LAB
ALBUMIN SERPL BCP-MCNC: 4.2 G/DL (ref 3.5–5)
ALP SERPL-CCNC: 127 U/L (ref 34–104)
ALT SERPL W P-5'-P-CCNC: 20 U/L (ref 7–52)
ANION GAP SERPL CALCULATED.3IONS-SCNC: 10 MMOL/L (ref 4–13)
APTT PPP: 24 SECONDS (ref 23–37)
AST SERPL W P-5'-P-CCNC: 19 U/L (ref 13–39)
BACTERIA UR QL AUTO: NORMAL /HPF
BASOPHILS # BLD MANUAL: 0 THOUSAND/UL (ref 0–0.1)
BASOPHILS NFR MAR MANUAL: 0 % (ref 0–1)
BILIRUB SERPL-MCNC: 0.71 MG/DL (ref 0.2–1)
BILIRUB UR QL STRIP: NEGATIVE
BUN SERPL-MCNC: 11 MG/DL (ref 5–25)
CALCIUM SERPL-MCNC: 9.2 MG/DL (ref 8.4–10.2)
CHLORIDE SERPL-SCNC: 104 MMOL/L (ref 96–108)
CLARITY UR: CLEAR
CO2 SERPL-SCNC: 22 MMOL/L (ref 21–32)
COLOR UR: YELLOW
CREAT SERPL-MCNC: 1.08 MG/DL (ref 0.6–1.3)
CRP SERPL QL: 12.1 MG/L
EOSINOPHIL # BLD MANUAL: 0.18 THOUSAND/UL (ref 0–0.4)
EOSINOPHIL NFR BLD MANUAL: 1 % (ref 0–6)
ERYTHROCYTE [DISTWIDTH] IN BLOOD BY AUTOMATED COUNT: 14.1 % (ref 11.6–15.1)
ERYTHROCYTE [SEDIMENTATION RATE] IN BLOOD: 30 MM/HOUR (ref 0–19)
FLUAV RNA RESP QL NAA+PROBE: NEGATIVE
FLUBV RNA RESP QL NAA+PROBE: NEGATIVE
GFR SERPL CREATININE-BSD FRML MDRD: 73 ML/MIN/1.73SQ M
GLUCOSE SERPL-MCNC: 116 MG/DL (ref 65–140)
GLUCOSE SERPL-MCNC: 129 MG/DL (ref 65–140)
GLUCOSE UR STRIP-MCNC: ABNORMAL MG/DL
HCT VFR BLD AUTO: 46.9 % (ref 36.5–49.3)
HGB BLD-MCNC: 15.8 G/DL (ref 12–17)
HGB UR QL STRIP.AUTO: ABNORMAL
INR PPP: 0.95 (ref 0.84–1.19)
KETONES UR STRIP-MCNC: NEGATIVE MG/DL
LACTATE SERPL-SCNC: 1.8 MMOL/L (ref 0.5–2)
LEUKOCYTE ESTERASE UR QL STRIP: ABNORMAL
LG PLATELETS BLD QL SMEAR: PRESENT
LIPASE SERPL-CCNC: 32 U/L (ref 11–82)
LYMPHOCYTES # BLD AUTO: 0.88 THOUSAND/UL (ref 0.6–4.47)
LYMPHOCYTES # BLD AUTO: 5 % (ref 14–44)
MAGNESIUM SERPL-MCNC: 1.7 MG/DL (ref 1.9–2.7)
MCH RBC QN AUTO: 30.1 PG (ref 26.8–34.3)
MCHC RBC AUTO-ENTMCNC: 33.7 G/DL (ref 31.4–37.4)
MCV RBC AUTO: 89 FL (ref 82–98)
MONOCYTES # BLD AUTO: 0.53 THOUSAND/UL (ref 0–1.22)
MONOCYTES NFR BLD: 3 % (ref 4–12)
NEUTROPHILS # BLD MANUAL: 16.1 THOUSAND/UL (ref 1.85–7.62)
NEUTS BAND NFR BLD MANUAL: 5 % (ref 0–8)
NEUTS SEG NFR BLD AUTO: 86 % (ref 43–75)
NITRITE UR QL STRIP: NEGATIVE
NON-SQ EPI CELLS URNS QL MICRO: NORMAL /HPF
PH UR STRIP.AUTO: 6 [PH]
PLATELET # BLD AUTO: 159 THOUSANDS/UL (ref 149–390)
PLATELET BLD QL SMEAR: ADEQUATE
PMV BLD AUTO: 9.8 FL (ref 8.9–12.7)
POTASSIUM SERPL-SCNC: 4.1 MMOL/L (ref 3.5–5.3)
PROT SERPL-MCNC: 8 G/DL (ref 6.4–8.4)
PROT UR STRIP-MCNC: NEGATIVE MG/DL
PROTHROMBIN TIME: 13 SECONDS (ref 11.6–14.5)
RBC # BLD AUTO: 5.25 MILLION/UL (ref 3.88–5.62)
RBC #/AREA URNS AUTO: NORMAL /HPF
RBC MORPH BLD: NORMAL
RSV RNA RESP QL NAA+PROBE: NEGATIVE
SARS-COV-2 RNA RESP QL NAA+PROBE: NEGATIVE
SODIUM SERPL-SCNC: 136 MMOL/L (ref 135–147)
SP GR UR STRIP.AUTO: 1.02 (ref 1–1.03)
UROBILINOGEN UR QL STRIP.AUTO: 0.2 E.U./DL
WBC # BLD AUTO: 17.69 THOUSAND/UL (ref 4.31–10.16)
WBC #/AREA URNS AUTO: NORMAL /HPF

## 2024-05-23 PROCEDURE — 85730 THROMBOPLASTIN TIME PARTIAL: CPT | Performed by: EMERGENCY MEDICINE

## 2024-05-23 PROCEDURE — 73630 X-RAY EXAM OF FOOT: CPT

## 2024-05-23 PROCEDURE — 99285 EMERGENCY DEPT VISIT HI MDM: CPT

## 2024-05-23 PROCEDURE — 96367 TX/PROPH/DG ADDL SEQ IV INF: CPT

## 2024-05-23 PROCEDURE — 96375 TX/PRO/DX INJ NEW DRUG ADDON: CPT

## 2024-05-23 PROCEDURE — 99285 EMERGENCY DEPT VISIT HI MDM: CPT | Performed by: EMERGENCY MEDICINE

## 2024-05-23 PROCEDURE — 82948 REAGENT STRIP/BLOOD GLUCOSE: CPT

## 2024-05-23 PROCEDURE — 87040 BLOOD CULTURE FOR BACTERIA: CPT | Performed by: EMERGENCY MEDICINE

## 2024-05-23 PROCEDURE — 85007 BL SMEAR W/DIFF WBC COUNT: CPT | Performed by: EMERGENCY MEDICINE

## 2024-05-23 PROCEDURE — 96365 THER/PROPH/DIAG IV INF INIT: CPT

## 2024-05-23 PROCEDURE — 99223 1ST HOSP IP/OBS HIGH 75: CPT | Performed by: NURSE PRACTITIONER

## 2024-05-23 PROCEDURE — 85652 RBC SED RATE AUTOMATED: CPT | Performed by: EMERGENCY MEDICINE

## 2024-05-23 PROCEDURE — 85027 COMPLETE CBC AUTOMATED: CPT | Performed by: EMERGENCY MEDICINE

## 2024-05-23 PROCEDURE — 85610 PROTHROMBIN TIME: CPT | Performed by: EMERGENCY MEDICINE

## 2024-05-23 PROCEDURE — 0241U HB NFCT DS VIR RESP RNA 4 TRGT: CPT | Performed by: EMERGENCY MEDICINE

## 2024-05-23 PROCEDURE — 71045 X-RAY EXAM CHEST 1 VIEW: CPT

## 2024-05-23 PROCEDURE — 36415 COLL VENOUS BLD VENIPUNCTURE: CPT | Performed by: EMERGENCY MEDICINE

## 2024-05-23 PROCEDURE — 83605 ASSAY OF LACTIC ACID: CPT | Performed by: EMERGENCY MEDICINE

## 2024-05-23 PROCEDURE — 96368 THER/DIAG CONCURRENT INF: CPT

## 2024-05-23 PROCEDURE — 83735 ASSAY OF MAGNESIUM: CPT | Performed by: EMERGENCY MEDICINE

## 2024-05-23 PROCEDURE — 80053 COMPREHEN METABOLIC PANEL: CPT | Performed by: EMERGENCY MEDICINE

## 2024-05-23 PROCEDURE — 81001 URINALYSIS AUTO W/SCOPE: CPT | Performed by: EMERGENCY MEDICINE

## 2024-05-23 PROCEDURE — 83690 ASSAY OF LIPASE: CPT | Performed by: EMERGENCY MEDICINE

## 2024-05-23 PROCEDURE — 86140 C-REACTIVE PROTEIN: CPT | Performed by: EMERGENCY MEDICINE

## 2024-05-23 RX ORDER — LOSARTAN POTASSIUM 50 MG/1
50 TABLET ORAL DAILY
Status: DISCONTINUED | OUTPATIENT
Start: 2024-05-24 | End: 2024-05-26 | Stop reason: HOSPADM

## 2024-05-23 RX ORDER — METRONIDAZOLE 500 MG/100ML
500 INJECTION, SOLUTION INTRAVENOUS EVERY 8 HOURS
Status: DISCONTINUED | OUTPATIENT
Start: 2024-05-23 | End: 2024-05-24

## 2024-05-23 RX ORDER — ACETAMINOPHEN 325 MG/1
975 TABLET ORAL EVERY 8 HOURS PRN
Status: DISCONTINUED | OUTPATIENT
Start: 2024-05-23 | End: 2024-05-26 | Stop reason: HOSPADM

## 2024-05-23 RX ORDER — INSULIN LISPRO 100 [IU]/ML
2-12 INJECTION, SOLUTION INTRAVENOUS; SUBCUTANEOUS
Status: DISCONTINUED | OUTPATIENT
Start: 2024-05-23 | End: 2024-05-26 | Stop reason: HOSPADM

## 2024-05-23 RX ORDER — ENOXAPARIN SODIUM 100 MG/ML
40 INJECTION SUBCUTANEOUS EVERY 12 HOURS SCHEDULED
Status: DISCONTINUED | OUTPATIENT
Start: 2024-05-23 | End: 2024-05-26 | Stop reason: HOSPADM

## 2024-05-23 RX ORDER — ATORVASTATIN CALCIUM 20 MG/1
20 TABLET, FILM COATED ORAL
Status: DISCONTINUED | OUTPATIENT
Start: 2024-05-24 | End: 2024-05-26 | Stop reason: HOSPADM

## 2024-05-23 RX ORDER — KETOROLAC TROMETHAMINE 30 MG/ML
15 INJECTION, SOLUTION INTRAMUSCULAR; INTRAVENOUS ONCE
Status: COMPLETED | OUTPATIENT
Start: 2024-05-23 | End: 2024-05-23

## 2024-05-23 RX ORDER — ACETAMINOPHEN 10 MG/ML
1000 INJECTION, SOLUTION INTRAVENOUS ONCE
Status: COMPLETED | OUTPATIENT
Start: 2024-05-23 | End: 2024-05-23

## 2024-05-23 RX ORDER — ENOXAPARIN SODIUM 100 MG/ML
40 INJECTION SUBCUTANEOUS DAILY
Status: DISCONTINUED | OUTPATIENT
Start: 2024-05-24 | End: 2024-05-23

## 2024-05-23 RX ORDER — CEFAZOLIN SODIUM 2 G/50ML
2000 SOLUTION INTRAVENOUS EVERY 8 HOURS
Status: DISCONTINUED | OUTPATIENT
Start: 2024-05-23 | End: 2024-05-26 | Stop reason: HOSPADM

## 2024-05-23 RX ORDER — INSULIN LISPRO 100 [IU]/ML
2-12 INJECTION, SOLUTION INTRAVENOUS; SUBCUTANEOUS
Status: DISCONTINUED | OUTPATIENT
Start: 2024-05-24 | End: 2024-05-26 | Stop reason: HOSPADM

## 2024-05-23 RX ORDER — LORAZEPAM 0.5 MG/1
0.5 TABLET ORAL DAILY PRN
Status: DISCONTINUED | OUTPATIENT
Start: 2024-05-23 | End: 2024-05-24

## 2024-05-23 RX ADMIN — PIPERACILLIN AND TAZOBACTAM 4.5 G: 36; 4.5 INJECTION, POWDER, FOR SOLUTION INTRAVENOUS at 18:20

## 2024-05-23 RX ADMIN — CEFAZOLIN SODIUM 2000 MG: 2 SOLUTION INTRAVENOUS at 22:45

## 2024-05-23 RX ADMIN — ENOXAPARIN SODIUM 40 MG: 40 INJECTION SUBCUTANEOUS at 22:46

## 2024-05-23 RX ADMIN — METRONIDAZOLE 500 MG: 500 INJECTION, SOLUTION INTRAVENOUS at 23:13

## 2024-05-23 RX ADMIN — LORAZEPAM 0.5 MG: 0.5 TABLET ORAL at 23:00

## 2024-05-23 RX ADMIN — SERTRALINE HYDROCHLORIDE 75 MG: 50 TABLET ORAL at 22:46

## 2024-05-23 RX ADMIN — ACETAMINOPHEN 1000 MG: 10 INJECTION INTRAVENOUS at 18:00

## 2024-05-23 RX ADMIN — KETOROLAC TROMETHAMINE 15 MG: 30 INJECTION, SOLUTION INTRAMUSCULAR; INTRAVENOUS at 17:59

## 2024-05-23 RX ADMIN — SODIUM CHLORIDE 1000 ML: 0.9 INJECTION, SOLUTION INTRAVENOUS at 17:55

## 2024-05-23 RX ADMIN — SODIUM CHLORIDE, SODIUM LACTATE, POTASSIUM CHLORIDE, AND CALCIUM CHLORIDE 2500 ML: .6; .31; .03; .02 INJECTION, SOLUTION INTRAVENOUS at 18:22

## 2024-05-23 RX ADMIN — ACETAMINOPHEN 325MG 975 MG: 325 TABLET ORAL at 23:01

## 2024-05-23 NOTE — ED PROVIDER NOTES
History  Chief Complaint   Patient presents with    Fever     Pt presents from home with fever and chills. Recently dc for cellulitis, finished po abx.      Patient with diabetes.  States he has had sudden onset of fevers chills and shakes starting at 3 PM today.  Tried to take Advil but vomited immediately.  Was recently in this hospital last month for right leg cellulitis.  MRI at that time showed no osteo of the foot.  No cough or cold symptoms.  Had nausea.  1 episode of vomiting.  That seems to have resolved after taking Ativan.  No chest pain.  No abdominal pain or back pain.  Patient complains of increased redness and swelling to the right leg.  No chest pain.      History provided by:  Patient   used: No    Fever  Location:  Fever started this afternoon  Quality:  Shaky body aches  Severity:  Moderate  Onset quality:  Sudden  Duration:  3 hours  Timing:  Constant  Progression:  Waxing and waning  Chronicity:  New  Context:  Sudden onset of fevers chills and shakes  Relieved by:  Nothing  Worsened by:  Nothing  Ineffective treatments:  Advil  Associated symptoms: fever    Associated symptoms: no abdominal pain, no chest pain, no cough, no diarrhea, no ear pain, no headaches, no nausea, no rash, no rhinorrhea, no shortness of breath, no sore throat, no vomiting and no wheezing        Prior to Admission Medications   Prescriptions Last Dose Informant Patient Reported? Taking?   Empagliflozin (Jardiance) 25 MG TABS   No No   Sig: Take 1 tablet (25 mg total) by mouth daily   LORazepam (ATIVAN) 0.5 mg tablet   No No   Sig: Take 1 tablet (0.5 mg total) by mouth daily as needed for anxiety   atorvastatin (LIPITOR) 10 mg tablet   No No   Sig: Take 10 mg tablet every other day   atorvastatin (LIPITOR) 20 mg tablet   No No   Sig: Take 1 tablet every other day.  This should be alternating with the other prescription for 10 mg.   celecoxib (CeleBREX) 100 mg capsule   No No   Sig: Take 1 capsule  (100 mg total) by mouth 2 (two) times a day   semaglutide, 0.25 or 0.5 mg/dose, (Ozempic, 0.25 or 0.5 MG/DOSE,) 2 mg/3 mL injection pen   No No   Sig: Inject 0.75 mL (0.5 mg total) under the skin every 7 days   sertraline (ZOLOFT) 50 mg tablet   No No   Sig: Take 1.5 tablets (75 mg total) by mouth daily at bedtime   tadalafil (CIALIS) 10 MG tablet   No No   Sig: Take one tablet as needed for ED.   valsartan (DIOVAN) 80 mg tablet   No No   Sig: Take 1 tablet (80 mg total) by mouth daily      Facility-Administered Medications: None       Past Medical History:   Diagnosis Date    Anxiety     Arthritis     Chronic pain disorder     mid back region    Colon polyp     COVID-19     CPAP (continuous positive airway pressure) dependence     Pt uses nightly    Diabetes mellitus (HCC)     Diverticulitis of colon 3 years ago    No issues    Diverticulosis     History of recent hospitalization 06/16/2021    Pt was admitted to [facility] after syncopal episode. Pt saw cardiology- all tests negative. Pt had nl EEG. pt states is was a medication interaction.    Hyperlipidemia     Hypertension     Obesity     Sleep apnea     Spinal stenosis     Wears hearing aid        Past Surgical History:   Procedure Laterality Date    ACHILLES TENDON REPAIR      Pt had a rupture in right and left 2 years apart    COLONOSCOPY      EPIDURAL BLOCK INJECTION      Pt had in lumbar region.    EPIDURAL BLOCK INJECTION N/A 04/06/2021    Procedure: T-11-T-12 INTERLAMINAR THORACIC EPIDURAL STEROID INJECTION;  Surgeon: Guzman Harrison MD;  Location: [facility];  Service: Pain Management     EPIDURAL BLOCK INJECTION Right 07/20/2021    Procedure: L5 and S1 TRANSFORAMINAL EPIDURAL STEROID INJECTION;  Surgeon: Guzman Harrison MD;  Location: [facility];  Service: Pain Management     FL GUIDED NEEDLE PLAC BX/ASP/INJ  07/14/2022    FL GUIDED NEEDLE PLAC BX/ASP/INJ  08/18/2022    FL GUIDED NEEDLE PLAC BX/ASP/INJ  09/15/2022    FOOT SURGERY Left     Left great toe- had a  shyanne.    HIP SURGERY      Replaced    JOINT REPLACEMENT Left     Total hip, knee    JOINT REPLACEMENT Right     Total hip, knee    KNEE ARTHROSCOPY Bilateral     NERVE BLOCK Bilateral 07/14/2022    Procedure: BLOCK MEDIAL BRANCH T12, L1, L2;  Surgeon: Guzman Harrison MD;  Location: [facility];  Service: Pain Management     NERVE BLOCK Bilateral 08/18/2022    Procedure: BLOCK MEDIAL BRANCH T12, L1, L2 #2;  Surgeon: Guzman Harrison MD;  [facility]: [facility];  Service: Pain Management     NERVE BLOCK Bilateral 01/26/2023    Procedure: BLOCK MEDIAL BRANCH L3, L4, L5 #1;  Surgeon: Guzman Harrison MD;  [facility]: [facility];  Service: Pain Management     NERVE BLOCK Bilateral 02/16/2023    Procedure: BLOCK MEDIAL BRANCH L3, L4, L5 #2;  Surgeon: Guzman Harrison MD;  [facility]: [facility];  Service: Pain Management     OH JOE IMPLTJ NSTIM ELTRDS PLATE/PADDLE EDRL Left 10/26/2021    Procedure: Thoracic laminectomies for placement of spinal cord stimulator and internal pulse generator, use of neuromonitoring, left sided pulse generator;  Surgeon: Rob Wong MD;  Location:  [facility];  Service: Neurosurgery    OH PRQ IMPLTJ NSTIM ELECTRODE ARRAY EPIDURAL Bilateral 09/30/2021    Procedure: NEVRO SCS TRIAL;  Surgeon: Guzman Harrison MD;  [facility]: [facility];  Service: Pain Management     RHIZOTOMY Bilateral 09/15/2022    Procedure: RHIZOTOMY LUMBAR T12, L1, L2 medial branch nerves;  Surgeon: Guzman Harrison MD;  [facility];  Service: Pain Management     RHIZOTOMY Bilateral 03/02/2023    Procedure: RHIZOTOMY LUMBAR L3, L4, L5;  Surgeon: Guzman Harrison MD;  [facility];  Service: Pain Management     RHIZOTOMY Bilateral 03/16/2023    Procedure: RHIZOTOMY LUMBAR T10, T11, T12 medial branch nerves;  Surgeon: Guzman Harrison MD;  [facility];  Service: Pain Management     TOTAL KNEE ARTHROPLASTY Left        Family History   Problem Relation Age of Onset    Arthritis Mother     Depression Mother      Hypertension Father     Alcohol abuse Father     Diabetes Son         Aged 14     I have reviewed and agree with the history as documented.    E-Cigarette/Vaping    E-Cigarette Use Never User      E-Cigarette/Vaping Substances    Nicotine No     THC No     CBD No     Flavoring No     Other No     Unknown No      Social History     Tobacco Use    Smoking status: Never    Smokeless tobacco: Current     Types: Snuff    Tobacco comments:     still using snuff   Vaping Use    Vaping status: Never Used   Substance Use Topics    Alcohol use: Yes     Alcohol/week: 2.0 standard drinks of alcohol     Types: 2 Cans of beer per week     Comment: social, weekends    Drug use: No       Review of Systems   Constitutional:  Positive for chills and fever.   HENT:  Negative for ear pain, hearing loss, rhinorrhea, sore throat, trouble swallowing and voice change.    Eyes:  Negative for pain and discharge.   Respiratory:  Negative for cough, shortness of breath and wheezing.    Cardiovascular:  Negative for chest pain and palpitations.   Gastrointestinal:  Negative for abdominal pain, blood in stool, constipation, diarrhea, nausea and vomiting.   Genitourinary:  Negative for dysuria, flank pain, frequency and hematuria.   Musculoskeletal:  Negative for joint swelling, neck pain and neck stiffness.   Skin:  Negative for rash and wound.   Neurological:  Negative for dizziness, seizures, syncope, facial asymmetry and headaches.   Psychiatric/Behavioral:  Negative for hallucinations, self-injury and suicidal ideas.    All other systems reviewed and are negative.      Physical Exam  Physical Exam  Vitals and nursing note reviewed.   Constitutional:       General: He is not in acute distress.     Appearance: He is well-developed.   HENT:      Head: Normocephalic and atraumatic.      Right Ear: External ear normal.      Left Ear: External ear normal.   Eyes:      General: No scleral icterus.        Right eye: No discharge.         Left eye:  No discharge.      Extraocular Movements: Extraocular movements intact.      Conjunctiva/sclera: Conjunctivae normal.   Cardiovascular:      Rate and Rhythm: Normal rate and regular rhythm.      Heart sounds: Normal heart sounds. No murmur heard.  Pulmonary:      Effort: Pulmonary effort is normal.      Breath sounds: Normal breath sounds. No wheezing or rales.   Abdominal:      General: Bowel sounds are normal. There is no distension.      Palpations: Abdomen is soft.      Tenderness: There is no abdominal tenderness. There is no guarding or rebound.   Musculoskeletal:         General: No deformity. Normal range of motion.      Cervical back: Normal range of motion and neck supple.      Comments: Swelling to the right lower leg with erythema and warmth.  No fluctuance.  Please see attached pictures.    Heel wounds without any fluctuance or discharge.   Skin:     General: Skin is warm and dry.      Findings: No rash.   Neurological:      General: No focal deficit present.      Mental Status: He is alert and oriented to person, place, and time.      Cranial Nerves: No cranial nerve deficit.   Psychiatric:         Mood and Affect: Mood normal.         Behavior: Behavior normal.         Thought Content: Thought content normal.         Judgment: Judgment normal.         Vital Signs  ED Triage Vitals   Temperature Pulse Respirations Blood Pressure SpO2   05/23/24 1749 05/23/24 1749 05/23/24 1749 05/23/24 1749 05/23/24 1749   100.4 °F (38 °C) 101 18 159/66 98 %      Temp Source Heart Rate Source Patient Position - Orthostatic VS BP Location FiO2 (%)   05/23/24 1749 05/23/24 1749 05/23/24 1749 05/23/24 1749 --   Oral Monitor Lying Right arm       Pain Score       05/23/24 1815       6           Vitals:    05/23/24 1749 05/23/24 1800   BP: 159/66 159/66   Pulse: 101 93   Patient Position - Orthostatic VS: Lying          Visual Acuity      ED Medications  Medications   sodium chloride 0.9 % bolus 1,000 mL (1,000 mL  Intravenous New Bag 5/23/24 1755)   lactated ringers bolus 2,500 mL (2,500 mL Intravenous New Bag 5/23/24 1822)   ketorolac (TORADOL) injection 15 mg (15 mg Intravenous Given 5/23/24 1759)   acetaminophen (Ofirmev) injection 1,000 mg (0 mg Intravenous Stopped 5/23/24 1823)   piperacillin-tazobactam (ZOSYN) 4.5 g in sodium chloride 0.9 % 100 mL IVPB (4.5 g Intravenous New Bag 5/23/24 1820)       Diagnostic Studies  Results Reviewed       Procedure Component Value Units Date/Time    FLU/RSV/COVID - if FLU/RSV clinically relevant [[id number]]  (Normal) Collected: 05/23/24 1755    Lab Status: Final result Specimen: Nares from Nose Updated: 05/23/24 1844     SARS-CoV-2 Negative     INFLUENZA A PCR Negative     INFLUENZA B PCR Negative     RSV PCR Negative    Narrative:      FOR PEDIATRIC PATIENTS - copy/paste COVID Guidelines URL to browser: [email or web address]    SARS-CoV-2 assay is a Nucleic Acid Amplification assay intended for the  qualitative detection of nucleic acid from SARS-CoV-2 in nasopharyngeal  swabs. Results are for the presumptive identification of SARS-CoV-2 RNA.    Positive results are indicative of infection with SARS-CoV-2, the virus  causing COVID-19, but do not rule out bacterial infection or co-infection  with other viruses. Laboratories within the [place] and its  territories are required to report all positive results to the appropriate  public health authorities. Negative results do not preclude SARS-CoV-2  infection and should not be used as the sole basis for treatment or other  patient management decisions. Negative results must be combined with  clinical observations, patient history, and epidemiological information.  This test has not been FDA cleared or approved.    This test has been authorized by FDA under an Emergency Use Authorization  (EUA). This test is only authorized for the duration of time the  declaration that  circumstances exist justifying the authorization of the  emergency use of an in vitro diagnostic tests for detection of SARS-CoV-2  virus and/or diagnosis of COVID-19 infection under section 564(b)(1) of  the Act, 21 U.S.C. 360bbb-3(b)(1), unless the authorization is terminated  or revoked sooner. The test has been validated but independent review by FDA  and CLIA is pending.    Test performed using [employer] Gene[employer]pert: This RT-PCR assay targets N2,  a region unique to SARS-CoV-2. A conserved region in the E-gene was chosen  for pan-Sarbecovirus detection which includes SARS-CoV-2.    According to CMS-2020-01-R, this platform meets the definition of high-throughput technology.    Urine Microscopic [[id number]]  (Normal) Collected: 05/23/24 1816    Lab Status: Final result Specimen: Urine, Clean Catch Updated: 05/23/24 1837     RBC, UA 0-1 /hpf      WBC, UA None Seen /hpf      Epithelial Cells Occasional /hpf      Bacteria, UA Occasional /hpf     Manual Differential(PHLEBS Do Not Order) [[id number]]  (Abnormal) Collected: 05/23/24 1755    Lab Status: Final result Specimen: Blood from Arm, Right Updated: 05/23/24 1834     Segmented % 86 %      Bands % 5 %      Lymphocytes % 5 %      Monocytes % 3 %      Eosinophils % 1 %      Basophils % 0 %      Absolute Neutrophils 16.10 Thousand/uL      Absolute Lymphocytes 0.88 Thousand/uL      Absolute Monocytes 0.53 Thousand/uL      Absolute Eosinophils 0.18 Thousand/uL      Absolute Basophils 0.00 Thousand/uL      Total Counted --     RBC Morphology Normal     Platelet Estimate Adequate     Large Platelet Present    Lactic acid, plasma (w/reflex if result > 2.0) [[id number]]  (Normal) Collected: 05/23/24 1755    Lab Status: Final result Specimen: Blood from Arm, Right Updated: 05/23/24 1823     LACTIC ACID 1.8 mmol/L     Narrative:      Result may be elevated if tourniquet was used during collection.    C-reactive protein [[id number]]  (Abnormal) Collected: 05/23/24 1755    Lab  Status: Final result Specimen: Blood from Arm, Right Updated: 05/23/24 1822     CRP 12.1 mg/L     Comprehensive metabolic panel [[id number]]  (Abnormal) Collected: 05/23/24 1755    Lab Status: Final result Specimen: Blood from Arm, Right Updated: 05/23/24 1822     Sodium 136 mmol/L      Potassium 4.1 mmol/L      Chloride 104 mmol/L      CO2 22 mmol/L      ANION GAP 10 mmol/L      BUN 11 mg/dL      Creatinine 1.08 mg/dL      Glucose 116 mg/dL      Calcium 9.2 mg/dL      AST 19 U/L      ALT 20 U/L      Alkaline Phosphatase 127 U/L      Total Protein 8.0 g/dL      Albumin 4.2 g/dL      Total Bilirubin 0.71 mg/dL      eGFR 73 ml/min/1.73sq m     Narrative:      National Kidney Disease Foundation guidelines for Chronic Kidney Disease (CKD):     Stage 1 with normal or high GFR (GFR > 90 mL/min/1.73 square meters)    Stage 2 Mild CKD (GFR = 60-89 mL/min/1.73 square meters)    Stage 3A Moderate CKD (GFR = 45-59 mL/min/1.73 square meters)    Stage 3B Moderate CKD (GFR = 30-44 mL/min/1.73 square meters)    Stage 4 Severe CKD (GFR = 15-29 mL/min/1.73 square meters)    Stage 5 End Stage CKD (GFR <15 mL/min/1.73 square meters)  Note: GFR calculation is accurate only with a steady state creatinine    Magnesium [[id number]]  (Abnormal) Collected: 05/23/24 1755    Lab Status: Final result Specimen: Blood from Arm, Right Updated: 05/23/24 1822     Magnesium 1.7 mg/dL     Lipase [[id number]]  (Normal) Collected: 05/23/24 1755    Lab Status: Final result Specimen: Blood from Arm, Right Updated: 05/23/24 1822     Lipase 32 u/L     Protime-INR [[id number]]  (Normal) Collected: 05/23/24 1755    Lab Status: Final result Specimen: Blood from Arm, Right Updated: 05/23/24 1821     Protime 13.0 seconds      INR 0.95    APTT [[id number]]  (Normal) Collected: 05/23/24 1755    Lab Status: Final result Specimen: Blood from Arm, Right Updated: 05/23/24 1821     PTT 24 seconds     UA w Reflex to Microscopic w Reflex to Culture [[id number]]  (Abnormal)  Collected: 05/23/24 1816    Lab Status: Final result Specimen: Urine, Clean Catch Updated: 05/23/24 1821     Color, UA Yellow     Clarity, UA Clear     Specific Gravity, UA 1.025     pH, UA 6.0     Leukocytes, UA Elevated glucose may cause decreased leukocyte values. See urine microscopic for UWBC result     Nitrite, UA Negative     Protein, UA Negative mg/dl      Glucose, UA >=1000 (1%) mg/dl      Ketones, UA Negative mg/dl      Urobilinogen, UA 0.2 E.U./dl      Bilirubin, UA Negative     Occult Blood, UA Trace-Intact    CBC and differential [[id number]]  (Abnormal) Collected: 05/23/24 1755    Lab Status: Final result Specimen: Blood from Arm, Right Updated: 05/23/24 1815     WBC 17.69 Thousand/uL      RBC 5.25 Million/uL      Hemoglobin 15.8 g/dL      Hematocrit 46.9 %      MCV 89 fL      MCH 30.1 pg      MCHC 33.7 g/dL      RDW 14.1 %      MPV 9.8 fL      Platelets 159 Thousands/uL     Sedimentation rate, automated [[id number]]  (Abnormal) Collected: 05/23/24 1755    Lab Status: Final result Specimen: Blood from Arm, Right Updated: 05/23/24 1813     Sed Rate 30 mm/hour     Blood culture #1 [[id number]] Collected: 05/23/24 1758    Lab Status: In process Specimen: Blood from Arm, Left Updated: 05/23/24 1803    Blood culture #2 [[id number]] Collected: 05/23/24 1755    Lab Status: In process Specimen: Blood from Arm, Right Updated: 05/23/24 1803                   XR chest 1 view portable   ED Interpretation by Panfilo Ortiz MD (05/23 1824)   NAD      XR foot 3+ views RIGHT   ED Interpretation by Panfilo Ortiz MD (05/23 1824)   No fracture or osteo                 Procedures  Procedures         ED Course  ED Course as of 05/23/24 1850   Thu May 23, 2024   1811 BMI greater than 30%.  We use the ideal body weight for the IV fluid bolus.                               SBIRT 22yo+      Flowsheet Row Most Recent Value   Initial Alcohol Screen: US AUDIT-C     1. How often do you have a drink containing alcohol? 0 Filed  at: 05/23/2024 1750   2. How many drinks containing alcohol do you have on a typical day you are drinking?  0 Filed at: 05/23/2024 1750   3a. Male UNDER 65: How often do you have five or more drinks on one occasion? 0 Filed at: 05/23/2024 1750   Audit-C Score 0 Filed at: 05/23/2024 1750   AGATHA: How many times in the past year have you...    Used an illegal drug or used a prescription medication for non-medical reasons? Never Filed at: 05/23/2024 1750                      Medical Decision Making  Amount and/or Complexity of Data Reviewed  Labs: ordered.  Radiology: ordered and independent interpretation performed.    Risk  Prescription drug management.  Decision regarding hospitalization.             Disposition  Final diagnoses:   Cellulitis of right leg     Time reflects when diagnosis was documented in both MDM as applicable and the Disposition within this note       Time User Action Codes Description Comment    5/23/2024  5:58 PM Panfilo Ortiz Add [L03.115] Cellulitis of right leg           ED Disposition       ED Disposition   Admit    Condition   Stable    Date/Time   Thu May 23, 2024 9346    Comment   Case was discussed with Dr. Munson and the patient's admission status was agreed to be Admission Status: inpatient status to the service of Dr. Munson.               Follow-up Information    None         Patient's Medications   Discharge Prescriptions    No medications on file       No discharge procedures on file.    PDMP Review         Value Time User    PDMP Reviewed  Yes 1/31/2024  8:54 AM Cristina Lopez PA-C            ED Provider  Electronically Signed by             Panfilo Ortiz MD  05/23/24 9229

## 2024-05-24 PROBLEM — I10 PRIMARY HYPERTENSION: Status: RESOLVED | Noted: 2024-04-27 | Resolved: 2024-05-24

## 2024-05-24 PROBLEM — L97.519 ULCER OF RIGHT FOOT (HCC): Status: ACTIVE | Noted: 2024-05-24

## 2024-05-24 PROBLEM — E11.628 TYPE 2 DIABETES MELLITUS WITH SKIN COMPLICATION, WITHOUT LONG-TERM CURRENT USE OF INSULIN (HCC): Status: ACTIVE | Noted: 2024-04-27

## 2024-05-24 LAB
ANION GAP SERPL CALCULATED.3IONS-SCNC: 7 MMOL/L (ref 4–13)
BASOPHILS # BLD AUTO: 0.03 THOUSANDS/ÂΜL (ref 0–0.1)
BASOPHILS NFR BLD AUTO: 0 % (ref 0–1)
BUN SERPL-MCNC: 15 MG/DL (ref 5–25)
CALCIUM SERPL-MCNC: 8.4 MG/DL (ref 8.4–10.2)
CHLORIDE SERPL-SCNC: 108 MMOL/L (ref 96–108)
CO2 SERPL-SCNC: 22 MMOL/L (ref 21–32)
CREAT SERPL-MCNC: 1.36 MG/DL (ref 0.6–1.3)
EOSINOPHIL # BLD AUTO: 0.03 THOUSAND/ÂΜL (ref 0–0.61)
EOSINOPHIL NFR BLD AUTO: 0 % (ref 0–6)
ERYTHROCYTE [DISTWIDTH] IN BLOOD BY AUTOMATED COUNT: 14.2 % (ref 11.6–15.1)
ERYTHROCYTE [DISTWIDTH] IN BLOOD BY AUTOMATED COUNT: 14.4 % (ref 11.6–15.1)
GFR SERPL CREATININE-BSD FRML MDRD: 55 ML/MIN/1.73SQ M
GLUCOSE SERPL-MCNC: 101 MG/DL (ref 65–140)
GLUCOSE SERPL-MCNC: 103 MG/DL (ref 65–140)
GLUCOSE SERPL-MCNC: 104 MG/DL (ref 65–140)
GLUCOSE SERPL-MCNC: 112 MG/DL (ref 65–140)
GLUCOSE SERPL-MCNC: 124 MG/DL (ref 65–140)
GLUCOSE SERPL-MCNC: 131 MG/DL (ref 65–140)
HCT VFR BLD AUTO: 39.4 % (ref 36.5–49.3)
HCT VFR BLD AUTO: 42 % (ref 36.5–49.3)
HGB BLD-MCNC: 13.5 G/DL (ref 12–17)
HGB BLD-MCNC: 13.9 G/DL (ref 12–17)
IMM GRANULOCYTES # BLD AUTO: 0.09 THOUSAND/UL (ref 0–0.2)
IMM GRANULOCYTES NFR BLD AUTO: 1 % (ref 0–2)
LYMPHOCYTES # BLD AUTO: 1.46 THOUSANDS/ÂΜL (ref 0.6–4.47)
LYMPHOCYTES NFR BLD AUTO: 10 % (ref 14–44)
MCH RBC QN AUTO: 30.1 PG (ref 26.8–34.3)
MCH RBC QN AUTO: 30.7 PG (ref 26.8–34.3)
MCHC RBC AUTO-ENTMCNC: 33.1 G/DL (ref 31.4–37.4)
MCHC RBC AUTO-ENTMCNC: 34.3 G/DL (ref 31.4–37.4)
MCV RBC AUTO: 90 FL (ref 82–98)
MCV RBC AUTO: 91 FL (ref 82–98)
MONOCYTES # BLD AUTO: 0.91 THOUSAND/ÂΜL (ref 0.17–1.22)
MONOCYTES NFR BLD AUTO: 6 % (ref 4–12)
NEUTROPHILS # BLD AUTO: 12.21 THOUSANDS/ÂΜL (ref 1.85–7.62)
NEUTS SEG NFR BLD AUTO: 83 % (ref 43–75)
NRBC BLD AUTO-RTO: 0 /100 WBCS
PLATELET # BLD AUTO: 125 THOUSANDS/UL (ref 149–390)
PLATELET # BLD AUTO: 132 THOUSANDS/UL (ref 149–390)
PMV BLD AUTO: 9.6 FL (ref 8.9–12.7)
PMV BLD AUTO: 9.7 FL (ref 8.9–12.7)
POTASSIUM SERPL-SCNC: 4 MMOL/L (ref 3.5–5.3)
RBC # BLD AUTO: 4.4 MILLION/UL (ref 3.88–5.62)
RBC # BLD AUTO: 4.62 MILLION/UL (ref 3.88–5.62)
SODIUM SERPL-SCNC: 137 MMOL/L (ref 135–147)
WBC # BLD AUTO: 14.73 THOUSAND/UL (ref 4.31–10.16)
WBC # BLD AUTO: 19.24 THOUSAND/UL (ref 4.31–10.16)

## 2024-05-24 PROCEDURE — 82948 REAGENT STRIP/BLOOD GLUCOSE: CPT

## 2024-05-24 PROCEDURE — 99232 SBSQ HOSP IP/OBS MODERATE 35: CPT | Performed by: FAMILY MEDICINE

## 2024-05-24 PROCEDURE — 80048 BASIC METABOLIC PNL TOTAL CA: CPT | Performed by: NURSE PRACTITIONER

## 2024-05-24 PROCEDURE — 97760 ORTHOTIC MGMT&TRAING 1ST ENC: CPT

## 2024-05-24 PROCEDURE — 85027 COMPLETE CBC AUTOMATED: CPT | Performed by: NURSE PRACTITIONER

## 2024-05-24 PROCEDURE — 99222 1ST HOSP IP/OBS MODERATE 55: CPT | Performed by: STUDENT IN AN ORGANIZED HEALTH CARE EDUCATION/TRAINING PROGRAM

## 2024-05-24 PROCEDURE — 85027 COMPLETE CBC AUTOMATED: CPT | Performed by: FAMILY MEDICINE

## 2024-05-24 PROCEDURE — 97116 GAIT TRAINING THERAPY: CPT

## 2024-05-24 PROCEDURE — 97163 PT EVAL HIGH COMPLEX 45 MIN: CPT

## 2024-05-24 RX ORDER — ACETAMINOPHEN 325 MG/1
650 TABLET ORAL ONCE
Status: COMPLETED | OUTPATIENT
Start: 2024-05-24 | End: 2024-05-24

## 2024-05-24 RX ORDER — LORAZEPAM 2 MG/ML
1 INJECTION INTRAMUSCULAR EVERY 6 HOURS PRN
Status: DISCONTINUED | OUTPATIENT
Start: 2024-05-24 | End: 2024-05-26 | Stop reason: HOSPADM

## 2024-05-24 RX ORDER — MAGNESIUM SULFATE HEPTAHYDRATE 40 MG/ML
2 INJECTION, SOLUTION INTRAVENOUS ONCE
Status: COMPLETED | OUTPATIENT
Start: 2024-05-24 | End: 2024-05-24

## 2024-05-24 RX ADMIN — CEFAZOLIN SODIUM 2000 MG: 2 SOLUTION INTRAVENOUS at 14:01

## 2024-05-24 RX ADMIN — LOSARTAN POTASSIUM 50 MG: 50 TABLET, FILM COATED ORAL at 09:09

## 2024-05-24 RX ADMIN — ACETAMINOPHEN 325MG 975 MG: 325 TABLET ORAL at 15:06

## 2024-05-24 RX ADMIN — ENOXAPARIN SODIUM 40 MG: 40 INJECTION SUBCUTANEOUS at 21:01

## 2024-05-24 RX ADMIN — METRONIDAZOLE 500 MG: 500 INJECTION, SOLUTION INTRAVENOUS at 05:22

## 2024-05-24 RX ADMIN — LORAZEPAM 1 MG: 2 INJECTION INTRAMUSCULAR; INTRAVENOUS at 16:29

## 2024-05-24 RX ADMIN — ACETAMINOPHEN 325MG 650 MG: 325 TABLET ORAL at 21:01

## 2024-05-24 RX ADMIN — SERTRALINE HYDROCHLORIDE 75 MG: 50 TABLET ORAL at 21:01

## 2024-05-24 RX ADMIN — ENOXAPARIN SODIUM 40 MG: 40 INJECTION SUBCUTANEOUS at 09:09

## 2024-05-24 RX ADMIN — CEFAZOLIN SODIUM 2000 MG: 2 SOLUTION INTRAVENOUS at 21:59

## 2024-05-24 RX ADMIN — MAGNESIUM SULFATE HEPTAHYDRATE 2 G: 40 INJECTION, SOLUTION INTRAVENOUS at 05:31

## 2024-05-24 RX ADMIN — ATORVASTATIN CALCIUM 20 MG: 20 TABLET, FILM COATED ORAL at 16:29

## 2024-05-24 RX ADMIN — CEFAZOLIN SODIUM 2000 MG: 2 SOLUTION INTRAVENOUS at 05:59

## 2024-05-24 RX ADMIN — ACETAMINOPHEN 325MG 975 MG: 325 TABLET ORAL at 07:29

## 2024-05-24 NOTE — TELEMEDICINE
Consultation - [facility] Infectious Disease   Vamshi Robbins 63 y.o. male MRN: [id number]  Unit/Bed#: -01 Encounter: [id number]      Inpatient consult to Infectious Diseases  Consult performed by: Lee Mireles PA-C  Consult ordered by: [place] Louie        VIRTUAL CARE DOCUMENTATION:     1. This service was provided via Telemedicine using [facility]     2. Parties in the room with patient during teleconsult Family member: children      3. Confidentiality My office door was closed     4. Participants No one else was in the room    5. Patient acknowledged consent and understanding of privacy and security of the  Telemedicine consult. I informed the patient that I have reviewed their record in Epic and presented the opportunity for them to ask any questions regarding the visit today.  The patient agreed to participate.    6. Time spent 14 minutes communication with the patient via telehealth.     IMPRESSION & RECOMMENDATIONS:   1.  Sepsis, e/b tachycardia, tachypnea, leukocytosis.  Most likely due to #2.  Blood cultures are pending.  Patient is already clinically improved.  He is afebrile though leukocytosis is persistent.  -Continue antibiotics as below  -Follow-up blood cultures x 2  -Monitor CBC differential and BMP for response to treatment  -Monitor temps and hemodynamics  -Supportive care per primary team     2. Recurrent right lower extremity cellulitis.  Patient presented with systemic complaints and right lower extremity erythema.  This is in the setting of a chronic right heel wound and suspected uncontrolled venous stasis.  Wound does not appear actively infected.  Unfortunately this is patient's third episode of cellulitis and second admission in the last 2 months.  He reports recurrence of symptoms now just 2 days after completing 3-week course of Keflex.  No wound cultures available however suspect a streptococcal etiology.  He is already clinically improved with IV  antibiotics.  -Continue IV cefazolin 2 g every 8 hours  -Discontinue Flagyl  -On discharge can transition to Keflex 1 g 4 times daily for 7-day course  -Encourage lower extremity elevation and compression with follow-up in lymphedema clinic  -Outpatient follow-up with podiatry  -Following acute treatment of cellulitis, would recommend transition to prophylactic penicillin  mg twice daily  -Anticipate prophylaxis antibiotics until heel ulceration resolves  -Will need outpatient ID follow-up in 4 weeks, office notified of outpatient needs  -Recommend outpatient CBC differential and BMP prior to ID follow-up     3.  Chronic right heel diabetic foot ulceration.  Does not appear acutely infected but likely portal of entry for #2.  This is complicated by venous stasis.  Recent MRI was negative and ABIs within normal range for healing.  -Close follow-up with podiatry upon discharge  -Local wound care and serial skin exams     4.  Type 2 diabetes mellitus, A1c 6.1 with obesity, BMI 45.44.  Remains a risk factor for venous stasis, poor wound healing and infection.  -Lifestyle modifications encouraged    Antibiotics:  Day 2 IV cefazolin and metronidazole    I have discussed the above management plan in detail with patient.     Above plan discussed in detail with Dr. Kearns who is in agreement with adjustment in antibiotics as above..     ID consult service will continue to follow.    HISTORY OF PRESENT ILLNESS:  Reason for Consult: Recurrent cellulitis.    HPI: Vamshi Robbins is a 63 y.o. year old male with obesity, type 2 diabetes, venous stasis and chronic right heel ulceration who presented again with right lower extremity erythema and fever/chills.  Patient was recently admitted at the end of April to 5/1 with right lower extremity cellulitis and sepsis.  He was discharged with 5-day course of Keflex 500 mg 4 times daily.  Patient followed up with his podiatrist and had continued wound care on right heel wound which  seems to be slowly improving.  Patient did develop a second episode of cellulitis treated with Keflex in the outpatient setting by his podiatrist since discharge in early May.  Patient states he was subsequently put on Keflex for 3 weeks for prophylaxis.  States he finished these antibiotics 2 to 3 days ago and shortly developed recurrent symptoms thereafter.  Patient is upset by frequent episodes of cellulitis as he has never had issues with this in the past.  He states he already feels improved without  fevers or chills and lower extremity is improving since admission on IV antibiotics.  We discussed importance of elevating his right lower extremity and potentially wearing compression stockings which he expressed understanding and agreed to pursue.  He is following podiatrist Alex Wild at outside hospital.    REVIEW OF SYSTEMS:  A complete review of systems is negative other than that noted in the HPI.    PAST MEDICAL HISTORY:  Past Medical History:   Diagnosis Date    Anxiety     Arthritis     Chronic pain disorder     mid back region    Colon polyp     COVID-19     CPAP (continuous positive airway pressure) dependence     Pt uses nightly    Diabetes mellitus (HCC)     Diverticulitis of colon 3 years ago    No issues    Diverticulosis     History of recent hospitalization 06/16/2021    Pt was admitted to [facility] after syncopal episode. Pt saw cardiology- all tests negative. Pt had nl EEG. pt states is was a medication interaction.    Hyperlipidemia     Hypertension     Obesity     Sleep apnea     Spinal stenosis     Wears hearing aid      Past Surgical History:   Procedure Laterality Date    ACHILLES TENDON REPAIR      Pt had a rupture in right and left 2 years apart    COLONOSCOPY      EPIDURAL BLOCK INJECTION      Pt had in lumbar region.    EPIDURAL BLOCK INJECTION N/A 04/06/2021    Procedure: T-11-T-12 INTERLAMINAR THORACIC EPIDURAL STEROID INJECTION;  Surgeon: Guzman Harrison MD;  [facility];  Service:  Pain Management     EPIDURAL BLOCK INJECTION Right 07/20/2021    Procedure: L5 and S1 TRANSFORAMINAL EPIDURAL STEROID INJECTION;  Surgeon: Guzman Harrison MD;  [facility];  Service: Pain Management     FL GUIDED NEEDLE PLAC BX/ASP/INJ  07/14/2022    FL GUIDED NEEDLE PLAC BX/ASP/INJ  08/18/2022    FL GUIDED NEEDLE PLAC BX/ASP/INJ  09/15/2022    FOOT SURGERY Left     Left great toe- had a hammertoe.    HIP SURGERY      Replaced    JOINT REPLACEMENT Left     Total hip, knee    JOINT REPLACEMENT Right     Total hip, knee    KNEE ARTHROSCOPY Bilateral     NERVE BLOCK Bilateral 07/14/2022    Procedure: BLOCK MEDIAL BRANCH T12, L1, L2;  Surgeon: Guzman Harrison MD;  [facility];  Service: Pain Management     NERVE BLOCK Bilateral 08/18/2022    Procedure: BLOCK MEDIAL BRANCH T12, L1, L2 #2;  Surgeon: Guzman Harrison MD;  [facility];  Service: Pain Management     NERVE BLOCK Bilateral 01/26/2023    Procedure: BLOCK MEDIAL BRANCH L3, L4, L5 #1;  Surgeon: Guzman Harrison MD;  [facility];  Service: Pain Management     NERVE BLOCK Bilateral 02/16/2023    Procedure: BLOCK MEDIAL BRANCH L3, L4, L5 #2;  Surgeon: Guzman Harrison MD;  [facility];  Service: Pain Management     VT JOE IMPLTJ NSTIM ELTRDS PLATE/PADDLE EDRL Left 10/26/2021    Procedure: Thoracic laminectomies for placement of spinal cord stimulator and internal pulse generator, use of neuromonitoring, left sided pulse generator;  Surgeon: Rob Wong MD;  Location:  [facility];  Service: Neurosurgery    VT PRQ IMPLTJ NSTIM ELECTRODE ARRAY EPIDURAL Bilateral 09/30/2021    Procedure: NEVRO SCS TRIAL;  Surgeon: Guzman Harrison MD;  [facility];  Service: Pain Management     RHIZOTOMY Bilateral 09/15/2022    Procedure: RHIZOTOMY LUMBAR T12, L1, L2 medial branch nerves;  Surgeon: Guzman Harrison MD;  [facility];  Service: Pain Management     RHIZOTOMY Bilateral 03/02/2023    Procedure: RHIZOTOMY LUMBAR L3,  L4, L5;  Surgeon: Guzman Harrison MD;  [facility];  Service: Pain Management     RHIZOTOMY Bilateral 2023    Procedure: RHIZOTOMY LUMBAR T10, T11, T12 medial branch nerves;  Surgeon: Guzman Harrison MD;  [facility];  Service: Pain Management     TOTAL KNEE ARTHROPLASTY Left        FAMILY HISTORY:  Non-contributory    SOCIAL HISTORY:  Social History   Social History     Substance and Sexual Activity   Alcohol Use Yes    Alcohol/week: 2.0 standard drinks of alcohol    Types: 2 Cans of beer per week    Comment: social, weekends     Social History     Substance and Sexual Activity   Drug Use No     Social History     Tobacco Use   Smoking Status Never   Smokeless Tobacco Current    Types: Snuff   Tobacco Comments    still using snuff       ALLERGIES:  Allergies   Allergen Reactions    Doxycycline Photosensitivity       MEDICATIONS:  All current active medications have been reviewed.    PHYSICAL EXAM:  Temp:  [97.3 °F (36.3 °C)-100.4 °F (38 °C)] 97.3 °F (36.3 °C)  HR:  [] 68  Resp:  [16-20] 16  BP: (116-159)/(60-90) 118/61  SpO2:  [92 %-99 %] 99 %  Temp (24hrs), Av °F (37.2 °C), Min:97.3 °F (36.3 °C), Max:100.4 °F (38 °C)  Current: Temperature: (!) 97.3 °F (36.3 °C)    Intake/Output Summary (Last 24 hours) at 2024 1453  Last data filed at 2024 1443  Gross per 24 hour   Intake 4900 ml   Output 1000 ml   Net 3900 ml       Physical exam findings reported by bedside and primary medical team staff.    General Appearance:  Appearing well, nontoxic, and in no distress   Head:  Normocephalic, without obvious abnormality, atraumatic   Eyes:  Conjunctiva pink and sclera anicteric, both eyes   Nose: Nares normal, mucosa normal, no drainage   Throat: Oropharynx moist without lesions   Neck: Supple, symmetrical, no adenopathy, no tenderness/mass/nodules   Back:   Symmetric, no curvature, ROM normal.   Lungs:   Clear to auscultation bilaterally, respirations unlabored   Chest Wall:  No  tenderness or deformity   Heart:  RRR; no murmur, rub or gallop   Abdomen:   Soft, morbidly obese, non-tender, non-distended, positive bowel sounds    Extremities: Bilateral lower extremity edema   Skin: Right heel wound reviewed clinical media without evidence of infection or drainage.  Right pretibial region of lower extremity with erythema, warmth and swelling.   Lymph nodes: Cervical, supraclavicular nodes normal   : No CVA or suprapubic tenderness.   Neurologic: Alert and oriented times 3, extremity strength 5/5 and symmetric       LABS, IMAGING, & OTHER STUDIES:  Extensive review of the medical records in epic including review of the notes, radiographs, and laboratory results were reviewed personally as below.     Lab Results:  I have personally reviewed pertinent labs.  Results from last 7 days   Lab Units 05/24/24  0526 05/23/24  1755   WBC Thousand/uL 19.24* 17.69*   HEMOGLOBIN g/dL 13.5 15.8   PLATELETS Thousands/uL 125* 159     Results from last 7 days   Lab Units 05/24/24  0526 05/23/24  1755   SODIUM mmol/L 137 136   POTASSIUM mmol/L 4.0 4.1   CHLORIDE mmol/L 108 104   CO2 mmol/L 22 22   BUN mg/dL 15 11   CREATININE mg/dL 1.36* 1.08   EGFR ml/min/1.73sq m 55 73   CALCIUM mg/dL 8.4 9.2   AST U/L  --  19   ALT U/L  --  20   ALK PHOS U/L  --  127*     Results from last 7 days   Lab Units 05/23/24  1758 05/23/24  1755   BLOOD CULTURE  Received in Microbiology Lab. Culture in Progress. Received in Microbiology Lab. Culture in Progress.         Results from last 7 days   Lab Units 05/23/24  1755   CRP mg/L 12.1*               Lab interpretation/comments: Slight worsening of WBC, 19    Culture data: Blood cultures are pending    Imaging Studies:   Imaging interpretation/comments: Foot x-ray without radiographic evidence of osteomyelitis, recent MRI negative for osteo

## 2024-05-24 NOTE — ASSESSMENT & PLAN NOTE
Right heel diabetic foot ulcer-appears to be well-healing from previous photographs  Following with outpatient podiatry  No evidence of erythema or wound discharge  Appreciate podiatry consultation  There is cellulitis in the right pretibial area, unknown if source is diabetic foot wound  Empiric cefazolin/metronidazole  Right foot x-ray without obvious osteomyelitis/fracture/foreign body  Podiatry recommendation appreciated: - offload heel at all times with prevalon boot while in bed and heel unloading shoe with ambulation  - elevate LE  - Wound care: R heel dermagran dsd. B/L LE ACE from toes to tibial tuberosity.  - wound benefit from lymphadema pumps outpt

## 2024-05-24 NOTE — CONSULTS
Consult - Podiatry   Vamshi Robbins 63 y.o. male MRN: [id number]  Unit/Bed#: -01 Encounter: [id number]    Assessment & Plan     Assessment:  Recurrent RLE cellulitis  Chronic right heel DFU  DM, A1c 6.1% 1/29/24  Venous stasis   Obesity  Sepsis due to #1/2    Plan:  - Recurrent RLE cellulitis in the setting of chronic DFU. Ulcerations to heel do not appear acutely infected however this is portal of entry for infection. He also has venous stasis with edema which makes his leg more susceptible to recurrent infections. XR right foot negative. Recent MRI was negative and ABIs wnl for healing thus no further advanced imaging recommended. C/w LWC and abx at this time.   - VICKIE 4/29/24: RLE 1.15/89/77. LLE 1.13/128/65.   - MRI right heel 4/30/24: negative for OM  - XR right foot 5/23/24: negative for MOLLY and osseous erosion  - offload heel at all times with prevalon boot while in bed and heel unloading shoe with ambulation  - elevate LE  - Wound care: R heel dermagran dsd. B/L LE ACE from toes to tibial tuberosity.  - wound benefit from lymphadema pumps outpt  - Rest of care per primary team    History of Present Illness     HPI:  Vamshi Robbins is a 63 y.o. male w/ pmh sig for DM, PVD, obseity, multiple spinal surgeries, B/L TKR who presents with recurrent RLE cellulitis with DFU. Has been seeing Dr. Wild outpt for these and recently on cephalexin for 3wks. He was recently admitted for the same issue in April of this year. Denies calf pain    Inpatient consult to Podiatry  Consult performed by: Beverley Slater DPM  Consult ordered by: [place] Louie        Review of Systems   Constitutional: Negative.    HENT: Negative.    Eyes: Negative.    Respiratory: Negative.    Cardiovascular: Negative.    Gastrointestinal: Negative.    Musculoskeletal: RLE edema   Skin:RLE erythema, ulcer   Neurological: PN   Psych: negative.       Historical Information   Past Medical History:   Diagnosis Date    Anxiety     Arthritis      Chronic pain disorder     mid back region    Colon polyp     COVID-19     CPAP (continuous positive airway pressure) dependence     Pt uses nightly    Diabetes mellitus (HCC)     Diverticulitis of colon 3 years ago    No issues    Diverticulosis     History of recent hospitalization 06/16/2021    Pt was admitted to [facility] after syncopal episode. Pt saw cardiology- all tests negative. Pt had nl EEG. pt states is was a medication interaction.    Hyperlipidemia     Hypertension     Obesity     Sleep apnea     Spinal stenosis     Wears hearing aid      Past Surgical History:   Procedure Laterality Date    ACHILLES TENDON REPAIR      Pt had a rupture in right and left 2 years apart    COLONOSCOPY      EPIDURAL BLOCK INJECTION      Pt had in lumbar region.    EPIDURAL BLOCK INJECTION N/A 04/06/2021    Procedure: T-11-T-12 INTERLAMINAR THORACIC EPIDURAL STEROID INJECTION;  Surgeon: Guzman Harrison MD;  [facility];  Service: Pain Management     EPIDURAL BLOCK INJECTION Right 07/20/2021    Procedure: L5 and S1 TRANSFORAMINAL EPIDURAL STEROID INJECTION;  Surgeon: Guzman Harrisno MD;  [facility];  Service: Pain Management     FL GUIDED NEEDLE PLAC BX/ASP/INJ  07/14/2022    FL GUIDED NEEDLE PLAC BX/ASP/INJ  08/18/2022    FL GUIDED NEEDLE PLAC BX/ASP/INJ  09/15/2022    FOOT SURGERY Left     Left great toe- had a hammertoe.    HIP SURGERY      Replaced    JOINT REPLACEMENT Left     Total hip, knee    JOINT REPLACEMENT Right     Total hip, knee    KNEE ARTHROSCOPY Bilateral     NERVE BLOCK Bilateral 07/14/2022    Procedure: BLOCK MEDIAL BRANCH T12, L1, L2;  Surgeon: Guzman Harrison MD;  [facility];  Service: Pain Management     NERVE BLOCK Bilateral 08/18/2022    Procedure: BLOCK MEDIAL BRANCH T12, L1, L2 #2;  Surgeon: Guzman Harrison MD;  [facility];  Service: Pain Management     NERVE BLOCK Bilateral 01/26/2023    Procedure: BLOCK MEDIAL BRANCH L3, L4, L5 #1;  Surgeon: Guzman Harrison MD;   [facility];  Service: Pain Management     NERVE BLOCK Bilateral 02/16/2023    Procedure: BLOCK MEDIAL BRANCH L3, L4, L5 #2;  Surgeon: Guzman Harrison MD;  [facility];  Service: Pain Management     PA JOE IMPLTJ NSTIM ELTRDS PLATE/PADDLE EDRL Left 10/26/2021    Procedure: Thoracic laminectomies for placement of spinal cord stimulator and internal pulse generator, use of neuromonitoring, left sided pulse generator;  Surgeon: Rob Wong MD;  Location: [facility];  Service: Neurosurgery    PA PRQ IMPLTJ NSTIM ELECTRODE ARRAY EPIDURAL Bilateral 09/30/2021    Procedure: NEVRO SCS TRIAL;  Surgeon: Guzman Harrison MD;  [facility];  Service: Pain Management     RHIZOTOMY Bilateral 09/15/2022    Procedure: RHIZOTOMY LUMBAR T12, L1, L2 medial branch nerves;  Surgeon: Guzman Harrison MD;  [facility];  Service: Pain Management     RHIZOTOMY Bilateral 03/02/2023    Procedure: RHIZOTOMY LUMBAR L3, L4, L5;  Surgeon: Guzman Harrison MD;  [facility];  Service: Pain Management     RHIZOTOMY Bilateral 03/16/2023    Procedure: RHIZOTOMY LUMBAR T10, T11, T12 medial branch nerves;  Surgeon: Guzman Harrison MD;  [facility];  Service: Pain Management     TOTAL KNEE ARTHROPLASTY Left      Social History   Social History     Substance and Sexual Activity   Alcohol Use Yes    Alcohol/week: 2.0 standard drinks of alcohol    Types: 2 Cans of beer per week    Comment: social, weekends     Social History     Substance and Sexual Activity   Drug Use No     Social History     Tobacco Use   Smoking Status Never   Smokeless Tobacco Current    Types: Snuff   Tobacco Comments    still using snuff     Family History:   Family History   Problem Relation Age of Onset    Arthritis Mother     Depression Mother     Hypertension Father     Alcohol abuse Father     Diabetes Son         Aged 14       Meds/Allergies     Medications Prior to Admission:     atorvastatin (LIPITOR) 10 mg tablet     atorvastatin (LIPITOR) 20 mg tablet    celecoxib (CeleBREX) 100 mg capsule    Empagliflozin (Jardiance) 25 MG TABS    LORazepam (ATIVAN) 0.5 mg tablet    semaglutide, 0.25 or 0.5 mg/dose, (Ozempic, 0.25 or 0.5 MG/DOSE,) 2 mg/3 mL injection pen    sertraline (ZOLOFT) 50 mg tablet    tadalafil (CIALIS) 10 MG tablet    valsartan (DIOVAN) 80 mg tablet  Allergies   Allergen Reactions    Doxycycline Photosensitivity       Objective   First Vitals:   Blood Pressure: 159/66 (05/23/24 1749)  Pulse: 101 (05/23/24 1749)  Temperature: 100.4 °F (38 °C) (05/23/24 1749)  Temp Source: Oral (05/23/24 1749)  Respirations: 18 (05/23/24 1749)  Weight - Scale: (!) 165 kg (363 lb 8.6 oz) (05/23/24 1749)  SpO2: 98 % (05/23/24 1749)    Current Vitals:   Blood Pressure: 116/76 (05/24/24 0704)  Pulse: 94 (05/24/24 0704)  Temperature: 98.7 °F (37.1 °C) (05/24/24 0704)  Temp Source: Oral (05/24/24 0704)  Respirations: 19 (05/24/24 0704)  Weight - Scale: (!) 165 kg (363 lb 8.6 oz) (05/23/24 1749)  SpO2: 92 % (05/24/24 0704)        /76 (BP Location: Right arm)   Pulse 94   Temp 98.7 °F (37.1 °C) (Oral)   Resp 19   Wt (!) 165 kg (363 lb 8.6 oz)   SpO2 92%   BMI 45.44 kg/m²      General Appearance:    Alert, cooperative, no distress   Head:    Normocephalic, without obvious abnormality, atraumatic   Eyes:    PERRL, conjunctiva/corneas clear, EOM's intact        Nose:   Moist mucous membranes   Neck:   Supple, symmetrical, trachea midline   Back:     Symmetric   Lungs:     Respirations unlabored   Heart:    Regular rate and rhythm, S1 and S2 normal, no murmur, rub   or gallop   Abdomen:     Soft, non-tender    B/L LE EXAM   Extremities:   B/L LE edema. No pain. No right calf pain   Pulses:   DP 1/4, PT nonpalpable due to edema. Absent pedal hair. Legs to toes warm to warm.    Skin:   RLE cellulitic erythema from below knee distal to heel.   Plantar and posterior right heel superficial granular ulcerations without purulence, crepitus or  fluctuance.   B/L LE venous stasis skin changes   Neurologic:   Gross sensation is intact. Protective sensation is dimished.       RLE    Right heel    Right heel      Lab Results:   Admission on 05/23/2024   Component Date Value    WBC 05/23/2024 17.69 (H)     RBC 05/23/2024 5.25     Hemoglobin 05/23/2024 15.8     Hematocrit 05/23/2024 46.9     MCV 05/23/2024 89     MCH 05/23/2024 30.1     MCHC 05/23/2024 33.7     RDW 05/23/2024 14.1     MPV 05/23/2024 9.8     Platelets 05/23/2024 159     Protime 05/23/2024 13.0     INR 05/23/2024 0.95     PTT 05/23/2024 24     Sodium 05/23/2024 136     Potassium 05/23/2024 4.1     Chloride 05/23/2024 104     CO2 05/23/2024 22     ANION GAP 05/23/2024 10     BUN 05/23/2024 11     Creatinine 05/23/2024 1.08     Glucose 05/23/2024 116     Calcium 05/23/2024 9.2     AST 05/23/2024 19     ALT 05/23/2024 20     Alkaline Phosphatase 05/23/2024 127 (H)     Total Protein 05/23/2024 8.0     Albumin 05/23/2024 4.2     Total Bilirubin 05/23/2024 0.71     eGFR 05/23/2024 73     Blood Culture 05/23/2024 Received in Microbiology Lab. Culture in Progress.     Blood Culture 05/23/2024 Received in Microbiology Lab. Culture in Progress.     Color, UA 05/23/2024 Yellow     Clarity, UA 05/23/2024 Clear     Specific Gravity, UA 05/23/2024 1.025     pH, UA 05/23/2024 6.0     Leukocytes, UA 05/23/2024 Elevated glucose may cause decreased leukocyte values. See urine microscopic for UWBC result (A)     Nitrite, UA 05/23/2024 Negative     Protein, UA 05/23/2024 Negative     Glucose, UA 05/23/2024 >=1000 (1%) (A)     Ketones, UA 05/23/2024 Negative     Urobilinogen, UA 05/23/2024 0.2     Bilirubin, UA 05/23/2024 Negative     Occult Blood, UA 05/23/2024 Trace-Intact (A)     SARS-CoV-2 05/23/2024 Negative     INFLUENZA A PCR 05/23/2024 Negative     INFLUENZA B PCR 05/23/2024 Negative     RSV PCR 05/23/2024 Negative     Magnesium 05/23/2024 1.7 (L)     Lipase 05/23/2024 32     LACTIC ACID 05/23/2024 1.8     Sed  "Rate 05/23/2024 30 (H)     CRP 05/23/2024 12.1 (H)     Segmented % 05/23/2024 86 (H)     Bands % 05/23/2024 5     Lymphocytes % 05/23/2024 5 (L)     Monocytes % 05/23/2024 3 (L)     Eosinophils % 05/23/2024 1     Basophils % 05/23/2024 0     Absolute Neutrophils 05/23/2024 16.10 (H)     Absolute Lymphocytes 05/23/2024 0.88     Absolute Monocytes 05/23/2024 0.53     Absolute Eosinophils 05/23/2024 0.18     Absolute Basophils 05/23/2024 0.00     RBC Morphology 05/23/2024 Normal     Platelet Estimate 05/23/2024 Adequate     Large Platelet 05/23/2024 Present     RBC, UA 05/23/2024 0-1     WBC, UA 05/23/2024 None Seen     Epithelial Cells 05/23/2024 Occasional     Bacteria, UA 05/23/2024 Occasional     POC Glucose 05/23/2024 129     WBC 05/24/2024 19.24 (H)     RBC 05/24/2024 4.40     Hemoglobin 05/24/2024 13.5     Hematocrit 05/24/2024 39.4     MCV 05/24/2024 90     MCH 05/24/2024 30.7     MCHC 05/24/2024 34.3     RDW 05/24/2024 14.2     Platelets 05/24/2024 125 (L)     MPV 05/24/2024 9.6     Sodium 05/24/2024 137     Potassium 05/24/2024 4.0     Chloride 05/24/2024 108     CO2 05/24/2024 22     ANION GAP 05/24/2024 7     BUN 05/24/2024 15     Creatinine 05/24/2024 1.36 (H)     Glucose 05/24/2024 131     Calcium 05/24/2024 8.4     eGFR 05/24/2024 55     POC Glucose 05/24/2024 112              Results from last 7 days   Lab Units 05/23/24  1758 05/23/24  1755   BLOOD CULTURE  Received in Microbiology Lab. Culture in Progress. Received in Microbiology Lab. Culture in Progress.       Invalid input(s): \"LABAEARO\"            Imaging: I have personally reviewed pertinent films in PACS  EKG, Pathology, and Other Studies: I have personally reviewed pertinent reports.      Code Status: Level 1 - Full Code  Advance Directive and Living Will:      Power of :    POLST:          "

## 2024-05-24 NOTE — ASSESSMENT & PLAN NOTE
Lab Results   Component Value Date    HGBA1C 6.1 (H) 01/29/2024       Recent Labs     05/23/24 2054   POCGLU 129       Blood Sugar Average: Last 72 hrs:  (P) 129  Seemingly well-controlled diabetes mellitus  SSI with Accu-Cheks  PTA Jardiance and Ozempic on hold  Hypoglycemia protocol  Carbohydrate controlled diet

## 2024-05-24 NOTE — ASSESSMENT & PLAN NOTE
Right lower extremity cellulitis  Recently hospitalized treated with IV antibiotics and discharged on Keflex.  Was seen by outpatient podiatrist, Keflex extended and last dose was 5/21.  48 hours post cessation of oral antibiotic therapy, RLE cellulitis is flaring.  Significant erythema/warmth/tenderness and edema over the anterior lateral aspect RLE.  Meeting sepsis criteria, normal lactic acid  Initiated empiric cefazolin, metronidazole  Blood cultures pending  Appreciate infectious disease consultation for evaluation of lifelong suppressive antibiotic therapy for recurrent cellulitis  ID recommendation appreciated, continue cefazolin can be discharged--On discharge can transition to Keflex 1 g 4 times daily for 7-day course ,Following acute treatment of cellulitis, would recommend transition to prophylactic penicillin  mg twice daily

## 2024-05-24 NOTE — ASSESSMENT & PLAN NOTE
Meets criteria with tachycardia, tachypnea, leukocytosis  Lactic acid normal  Source is right lower extremity cellulitis  Blood cultures pending  Continue to treatment as per ID recommendation with IV cefazolin.  Can be discharged with p.o. Keflex for 7 days after that patient will need Pen-Vee K 2 times daily.

## 2024-05-24 NOTE — PHYSICAL THERAPY NOTE
PHYSICAL THERAPY EVALUATION  NAME:  Vamshi Robbins  DATE: 05/24/24    AGE:   63 y.o.  Mrn:   [id number]  ADMIT DX:  Fever [R50.9]  Cellulitis of right leg [L03.115]    Past Medical History:   Diagnosis Date    Anxiety     Arthritis     Chronic pain disorder     mid back region    Colon polyp     COVID-19     CPAP (continuous positive airway pressure) dependence     Pt uses nightly    Diabetes mellitus (HCC)     Diverticulitis of colon 3 years ago    No issues    Diverticulosis     History of recent hospitalization 06/16/2021    Pt was admitted to [facility] after syncopal episode. Pt saw cardiology- all tests negative. Pt had nl EEG. pt states is was a medication interaction.    Hyperlipidemia     Hypertension     Obesity     Sleep apnea     Spinal stenosis     Wears hearing aid      Length Of Stay: 1  Performed at least 2 patient identifiers during session: Name and Birthday  PHYSICAL THERAPY EVALUATION :    05/24/24 1237   PT Last Visit   PT Visit Date 05/24/24   Note Type   Note type Evaluation;Orthotic Management/Training   Type of Brace   Brace Applied Globoped Shoe (Heel Unloading)   Additional Brace Ordered No   Patient Position When Brace Applied Seated   Education   Education Provided Yes   End of Consult   Patient Position at End of Consult Supine;All needs within reach   Pain Assessment   Pain Assessment Tool 0-10   Pain Score No Pain   Restrictions/Precautions   Weight Bearing Precautions Per Order No   RLE Weight Bearing Per Order   (offload heel at all times with prevalon boot while in bed and heel unloading shoe with ambulation)   Braces or Orthoses Other (Comment)  (Globoped, heel offloading shoe)   Other Precautions Fall Risk   Home Living   Type of Home House  (5 MOLLY L HR)   Home Layout Two level;Bed/bath upstairs  (full bathroom on 1st floor, FF L HR)   Bathroom Shower/Tub Walk-in shower   Bathroom Toilet Raised   Bathroom Equipment Shower chair;Grab bars in shower;Grab bars around toilet   Home  "Equipment Walker;Other (Comment);Wheelchair-manual;Sock aid;Long-handled shoehorn  (strong arm cane, recliner lift chair)   Additional Comments Pt reports living in a 2SH with 5 MOLLY L HR and FF L HR to 2nd floor bedroom and bathroom. Has full bathroom on 1st floor if needed to remain on 1st floor. Was using strong arm cane for mobility PTA.   Prior Function   Level of [place] Independent with ADLs;Independent with functional mobility;Independent with IADLS   Lives With Spouse   Receives Help From Family   IADLs Independent with driving;Family/Friend/Other provides meals;Independent with medication management   Falls in the last 6 months 0   Comments Reports being independent with mobility, ADls and IADls.   General   Additional Pertinent History Pt wiht impaired skin integrity right heel, inc B bilateral LE edema. Recent admission 4/27/24 to 5/1/24 with cellulitis right LE and sepsis. pt wiht h/o Cervical spinal cord compression affecting right side.   Cognition   Orientation Level Oriented X4   Following Commands Follows one step commands without difficulty   Subjective   Subjective \"I feel old when I use a walker.\"   RLE Assessment   RLE Assessment   (hip 3-/5, knee ext 3+/5, ankle DF 3-/5)   LLE Assessment   LLE Assessment   (3+/5 hip , 4/5 knee and 3+/5 ankle)   Coordination   Movements are Fluid and Coordinated 0   Coordination and Movement Description impaired right LE with swing through and AROM. ataxia noted.   Rapid Alternating Movements Impaired   Light Touch   RLE Light Touch Impaired   RLE Light Touch Comments absent distally   LLE Light Touch Impaired   LLE Light Touch Comments absent distally   Proprioception   RLE Proprioception Impaired   LLE Proprioception Impaired   Bed Mobility   Supine to Sit 6  Modified independent   Additional items Increased time required;Bedrails;HOB elevated   Additional Comments HOB elevated < 30 degrees. inc time to complete with use of bedrails. sitting EOB, pt " fitted for Globoped heel offloading shoe. XL large shoe not providing adequate offloading relief of heel, fit for large instead. educated patient on donning, doffing, not adjusting heel strap and importance of use of heel offloading shoe to facilitate healing of left heel wound as well as prevalon boot to offload heel supine. pt educated on pupose of shoe and having left shoe on as well with mobility to facilitate balance. pt donned left sock and shoe sitting on EOB with inc time. pt verbalized understanding of use of right heel offloading shoe. no concerns with donning or doffing.   Transfers   Sit to Stand   (SBA)   Additional items Increased time required;Verbal cues   Stand to Sit   (SBA)   Additional items Increased time required;Verbal cues   Stand pivot   (steadying assistance)   Additional items Assist x 1;Increased time required;Verbal cues   Additional Comments use of strong arm cane. inc time and effort required to achieve standing. SBA for balance. pt visibly unsteady. spt with steadying asisstance reaching for external support.   Ambulation/Elevation   Gait pattern Wide DOE;Decreased foot clearance;Decreased L stance;Short stride;Ataxia;Excessively slow;Foward flexed   Gait Assistance   (steadying to minAx1)   Additional items Assist x 1;Verbal cues   Assistive Device   (strong arm cane)   Distance ambulated 50'x1 with strong arm cane in left arm with steadying to minAx1 wiht pt reaching for external support, cues for upright posture, safety with heel offloading shoe.   Balance   Static Sitting Normal   Dynamic Sitting Good   Static Standing Fair -   Dynamic Standing Poor +   Ambulatory Poor +   Activity Tolerance   Activity Tolerance Patient limited by fatigue   Medical Staff Made Aware Dr. Kearns made aware of need for therapy consult given instability with heel offloading shoe.   Nurse Made Aware RNElle aware of patient being fit for heel offloading shoe   Assessment   Prognosis Good   Problem List  "Decreased strength;Decreased endurance;Impaired balance;Decreased mobility;Decreased coordination;Impaired judgement;Decreased safety awareness;Impaired sensation;Obesity;Decreased skin integrity;Orthopedic restrictions   Barriers to Discharge Inaccessible home environment   Barriers to Discharge Comments steps to enter home and access [address] [place] [address] bedroom   Goals   Patient Goals \"Not use a walker.\" (pt agreeable at this time for safety)   STG Expiration Date 06/07/24   PT Treatment Day 1   Plan   Treatment/Interventions ADL retraining;Functional transfer training;LE strengthening/ROM;Elevations;Therapeutic exercise;Endurance training;Patient/family training;Equipment eval/education;Gait training;Bed mobility;Compensatory technique education;Spoke to nursing;Spoke to case management;Spoke to MD   PT Frequency 2-3x/wk   Discharge Recommendation   Rehab Resource Intensity Level, PT III (Minimum Resource Intensity)  (pt agreeable to OPPT services upon discharge)   Equipment Recommended   (walker-pt reports having)   Additional Comments assistance prn from family. use of RW upon return to home. pt verbalized understanding.   AM-PAC Basic Mobility Inpatient   Turning in Flat Bed Without Bedrails 4   Lying on Back to Sitting on Edge of Flat Bed Without Bedrails 3   Moving Bed to Chair 3   Standing Up From Chair Using Arms 3   Walk in Room 3   Climb 3-5 Stairs With Railing 3   Basic Mobility Inpatient Raw Score 19   Basic Mobility Standardized Score 42.48   [facility] Highest Level Of Mobility   -HLM Goal 6: Walk 10 steps or more   -HLM Achieved 7: Walk 25 feet or more   Additional Treatment Session   Start Time 1300   End Time 1313   Treatment Assessment Pt tolerated session well. He was able to ambulate and transfer with decreased assistance with use of RW compared to strong arm cane. he requires icnreased time to complete mobility and cues for improved gait pattern. He verbalized feeling more stable with use of RW " compared to strong arm cane. he is limtied by decreased strength, balance, endurance coordination and sensation. He will continue to benefit from PT services to maximize LOF.   Equipment Use initiated use of RW. min cues for hand placement for safety with RW. inc time to ahcieve standing. bed elevated. supervision for sit<>stand and spt with RW with inc time wiht min cues for turnign completely. ambulated 110'x1 with RW and right heel offloading shoe with supervision with min cues to stay wihtin DOE of RW. adjusted shoe in sitting. pt then completed 20' ambulation with supervision with RW. verbalize dunderstanding of importance of shoe and doffed sitting on EOB without diffiuculty. verbalized understanding of use of RW for balance and safe ambulation at this time. sit to supine with supervision with inc difficulty elevating right LE into bed. prevalon boot in place at end of sesison.   End of Consult   Patient Position at End of Consult Supine;All needs within reach       (Please find full objective findings from PT assessment regarding body systems outlined above).     Assessment: Pt is a 63 y.o. male seen for PT evaluation s/p admission to [facility] on 5/23/2024 with Sepsis (HCC).  Order placed for PT services.  Upon evaluation: Pt is presenting with impaired functional mobility due to decreased strength, decreased endurance, impaired balance, impaired coordination, impaired proprioception, gait deviations, altered sensation, decreased safety awareness, orthopedic restrictions, fall risk, LE edema, and impaired skin integrity requiring  stand by to steadying assistance for transfers and steadying assistance for ambulation with strong arm cane . Pt's clinical presentation is currently unpredictable given the functional mobility deficits above, especially weakness, edema of extremities, decreased skin integrity, decreased endurance, impaired coordination, impaired balance, impaired  proprioception, gait deviations, decreased activity tolerance, decreased functional mobility tolerance, and decreased safety awareness, coupled with fall risks as indicated by AM-PAC 6-Clicks: 19/24 as well as impaired balance, polypharmacy, decreased safety awareness, limited sensation/neuropathy, and obesity and combined with medical complications of abnormal WBCs, readmission to hospital, need for input for mobility technique/safety, and abnormal CRP, right LE cellulitis-recurrent, diabetic right foot ulcer, sepsis .  Pt's PMHx and comorbidities that may affect physical performance and progress include: anxiety, DM, HTN, obesity, neuropathy, and arthritis, anxiety, chronic pain disorder, multiple spinal surgeries, h/o Covid-19, spinal stenosis, cervical spinal cord compression . Personal factors affecting pt at time of IE include: step(s) to enter environment, multi-level environment, inability to perform IADLs, inability to perform ADLs, inability to navigate level surfaces without external assistance, and inability to navigate community distances. Pt will benefit from continued skilled PT services to address deficits as defined above and to maximize level of functional mobility to facilitate return toward PLOF and improved QOL. From PT/mobility standpoint, recommendation at time of d/c would be Level III (Minimum Resource Intensity), with family and/or caregiver support, and with walker in order to reduce fall risk and maximize pt's functional independence and consistency with mobility. Recommend trial with walker next 1-2 sessions and ther ex next 1-2 sessions.       The patient's AM-PAC Basic Mobility Inpatient Short Form Raw Score is 19. A Raw score of greater than 16 suggests the patient may benefit from discharge to home. Please also refer to the recommendation of the Physical Therapist for safe discharge planning.       Goals: Pt will: Perform bed mobility tasks with modified Independent to reposition in  bed and prepare for transfers. Pt will perform transfers with modified Independent to decrease burden of care, decrease risk for falls, and improve activity tolerance and prepare for ambulation. Pt will ambulate with LRAD for >/= 200' with  modified Independent  to decrease burden of care, decrease risk for falls, improve activity tolerance, and improve gait quality and to access home environment. Pt will complete 1 step with LRAD and >/= 12 steps with unilateral handrail with supervision to decrease burden of care, return to home with MOLLY, return to [facility] home, decrease risk for falls, and improve activity tolerance. Pt will participate in objective balance assessment to determine baseline fall risk. Pt will participate in SSWS assessment to determine level of mobility. Pt will increase B LE strength >/= 1/2 MMT grade to facilitate functional mobility.      Natalie Dalton, PT,DPT

## 2024-05-24 NOTE — PROGRESS NOTES
[facility]  [facility]  Name: Vamshi Robbins I  MRN: [id number]  Unit/Bed#: -01 I Date of Admission: 5/23/2024   Date of Service: 5/24/2024 I Hospital Day: 1    Assessment & Plan   Cellulitis of right lower extremity  Assessment & Plan  Right lower extremity cellulitis  Recently hospitalized treated with IV antibiotics and discharged on Keflex.  Was seen by outpatient podiatrist, Keflex extended and last dose was 5/21.  48 hours post cessation of oral antibiotic therapy, RLE cellulitis is flaring.  Significant erythema/warmth/tenderness and edema over the anterior lateral aspect RLE.  Meeting sepsis criteria, normal lactic acid  Initiated empiric cefazolin, metronidazole  Blood cultures pending  Appreciate infectious disease consultation for evaluation of lifelong suppressive antibiotic therapy for recurrent cellulitis  ID recommendation appreciated, continue cefazolin can be discharged--On discharge can transition to Keflex 1 g 4 times daily for 7-day course ,Following acute treatment of cellulitis, would recommend transition to prophylactic penicillin  mg twice daily       * Sepsis (HCC)  Assessment & Plan  Meets criteria with tachycardia, tachypnea, leukocytosis  Lactic acid normal  Source is right lower extremity cellulitis  Blood cultures pending  Continue to treatment as per ID recommendation with IV cefazolin.  Can be discharged with p.o. Keflex for 7 days after that patient will need Pen-Vee K 2 times daily.    Type 2 diabetes mellitus with skin complication, without long-term current use of insulin (HCC)  Assessment & Plan  Lab Results   Component Value Date    HGBA1C 6.1 (H) 01/29/2024       Recent Labs     05/23/24  2054 05/24/24  0727 05/24/24  1054 05/24/24  1607   POCGLU 129 112 101 124         Blood Sugar Average: Last 72 hrs:  (P) 116.5  Seemingly well-controlled diabetes mellitus  SSI with Accu-Cheks  PTA Jardiance and Ozempic on hold  Hypoglycemia  protocol  Carbohydrate controlled diet    Diabetic right foot ulcer  Assessment & Plan  Right heel diabetic foot ulcer-appears to be well-healing from previous photographs  Following with outpatient podiatry  No evidence of erythema or wound discharge  Appreciate podiatry consultation  There is cellulitis in the right pretibial area, unknown if source is diabetic foot wound  Empiric cefazolin/metronidazole  Right foot x-ray without obvious osteomyelitis/fracture/foreign body  Podiatry recommendation appreciated: - offload heel at all times with prevalon boot while in bed and heel unloading shoe with ambulation  - elevate LE  - Wound care: R heel dermagran dsd. B/L LE ACE from toes to tibial tuberosity.  - wound benefit from lymphadema pumps outpt      CPAP (continuous positive airway pressure) dependence  Assessment & Plan  CPAP from home    Morbid obesity (HCC)  Assessment & Plan  BMI 45.44  Outpatient follow-up for intensive lifestyle modifications             VTE Pharmacologic Prophylaxis:   Moderate Risk (Score 3-4) - Pharmacological DVT Prophylaxis Ordered: enoxaparin (Lovenox).    Mobility:   Basic Mobility Inpatient Raw Score: 20  JH-HLM Goal: 6: Walk 10 steps or more  JH-HLM Achieved: 3: Sit at edge of bed  JH-HLM Goal NOT achieved. Continue with multidisciplinary rounding and encourage appropriate mobility to improve upon JH-HLM goals.    Patient Centered Rounds: I performed bedside rounds with nursing staff today.   Discussions with Specialists or Other Care Team Provider: Podiatry, infectious disease    Education and Discussions with Family / Patient:  With patient.     Current Length of Stay: 1 day(s)  Current Patient Status: Inpatient   Certification Statement: The patient will continue to require additional inpatient hospital stay due to monitorable conditions  Discharge Plan: Anticipate discharge in 24-48 hrs to home.    Code Status: Level 1 - Full Code    Subjective:   Seen and evaluated during the  event.  Laying on the bed, denies any nausea, vomiting.  But having some pain in the foot.  Patient reports he just finished antibiotic, and remain off of antibiotic for 2 days and then started having infection again.    Objective:     Vitals:   Temp (24hrs), Av °F (37.2 °C), Min:97.3 °F (36.3 °C), Max:100.4 °F (38 °C)    Temp:  [97.3 °F (36.3 °C)-100.4 °F (38 °C)] 100.3 °F (37.9 °C)  HR:  [] 69  Resp:  [16-20] 16  BP: (116-159)/(60-90) 118/61  SpO2:  [92 %-99 %] 98 %  Body mass index is 45.44 kg/m².     Input and Output Summary (last 24 hours):     Intake/Output Summary (Last 24 hours) at 2024 1616  Last data filed at 2024 1608  Gross per 24 hour   Intake 4900 ml   Output 1150 ml   Net 3750 ml       Physical Exam:   Physical Exam  Vitals and nursing note reviewed.   Constitutional:       Appearance: Normal appearance. He is obese. He is not ill-appearing or diaphoretic.   Eyes:      General: No scleral icterus.        Left eye: No discharge.      Extraocular Movements: Extraocular movements intact.      Conjunctiva/sclera: Conjunctivae normal.      Pupils: Pupils are equal, round, and reactive to light.   Cardiovascular:      Rate and Rhythm: Normal rate.      Heart sounds:      No friction rub. No gallop.   Pulmonary:      Effort: Pulmonary effort is normal. No respiratory distress.      Breath sounds: No stridor. No wheezing or rhonchi.   Skin:     Comments: Unable to examine thigh skin since patient food already wrapped up with clean, dry, intact dressing on top of that is compression bandage.  Good capillary refill pressure   Neurological:      Mental Status: He is alert and oriented to person, place, and time.      Cranial Nerves: No cranial nerve deficit.      Sensory: No sensory deficit.      Motor: No weakness.   Psychiatric:         Mood and Affect: Mood is anxious.          Additional Data:     Labs:  Results from last 7 days   Lab Units 24  1549 24  0526 24  1422    WBC Thousand/uL 14.73*   < > 17.69*   HEMOGLOBIN g/dL 13.9   < > 15.8   HEMATOCRIT % 42.0   < > 46.9   PLATELETS Thousands/uL 132*   < > 159   BANDS PCT %  --   --  5   SEGS PCT % 83*  --   --    LYMPHO PCT % 10*  --  5*   MONO PCT % 6  --  3*   EOS PCT % 0  --  1    < > = values in this interval not displayed.     Results from last 7 days   Lab Units 05/24/24  0526 05/23/24  1755   SODIUM mmol/L 137 136   POTASSIUM mmol/L 4.0 4.1   CHLORIDE mmol/L 108 104   CO2 mmol/L 22 22   BUN mg/dL 15 11   CREATININE mg/dL 1.36* 1.08   ANION GAP mmol/L 7 10   CALCIUM mg/dL 8.4 9.2   ALBUMIN g/dL  --  4.2   TOTAL BILIRUBIN mg/dL  --  0.71   ALK PHOS U/L  --  127*   ALT U/L  --  20   AST U/L  --  19   GLUCOSE RANDOM mg/dL 131 116     Results from last 7 days   Lab Units 05/23/24  1755   INR  0.95     Results from last 7 days   Lab Units 05/24/24  1607 05/24/24  1054 05/24/24  0727 05/23/24  2054   POC GLUCOSE mg/dl 124 101 112 129         Results from last 7 days   Lab Units 05/23/24  1755   LACTIC ACID mmol/L 1.8       Lines/Drains:  Invasive Devices       Peripheral Intravenous Line  Duration             Peripheral IV 05/23/24 Left Antecubital <1 day    Peripheral IV 05/23/24 Right Antecubital <1 day                          Imaging: No pertinent imaging reviewed.    Recent Cultures (last 7 days):   Results from last 7 days   Lab Units 05/23/24  1758 05/23/24  1755   BLOOD CULTURE  Received in Microbiology Lab. Culture in Progress. Received in Microbiology Lab. Culture in Progress.       Last 24 Hours Medication List:   Current Facility-Administered Medications   Medication Dose Route Frequency Provider Last Rate    acetaminophen  975 mg Oral Q8H PRN Dolores S Jolene, CRNP      atorvastatin  20 mg Oral Daily With Dinner Dolores S Jolene, CRNP      cefazolin  2,000 mg Intravenous Q8H Dolores S Jolene, CRNP 2,000 mg (05/24/24 1401)    enoxaparin  40 mg Subcutaneous Q12H [place] Reddy      insulin lispro  2-12 Units  Subcutaneous TID AC Dolores S Jolene, CRNP      insulin lispro  2-12 Units Subcutaneous HS Dolores S Jolene, CRNP      LORazepam  0.5 mg Oral Daily PRN Dolores S Jolene, CRNP      losartan  50 mg Oral Daily Dolores S Jolene, CRNP      sertraline  75 mg Oral HS Dolores S Jolene, CRNP          Today, Patient Was Seen By: Rory Kearns MD    **Please Note: This note may have been constructed using a voice recognition system.**

## 2024-05-24 NOTE — UTILIZATION REVIEW
Initial Clinical Review    Admission: Date/Time/Statement:   Admission Orders (From admission, onward)       Ordered        05/23/24 1850  INPATIENT ADMISSION  Once                          Orders Placed This Encounter   Procedures    INPATIENT ADMISSION     Standing Status:   Standing     Number of Occurrences:   1     Order Specific Question:   Level of Care     Answer:   Med Surg [16]     Order Specific Question:   Estimated length of stay     Answer:   More than 2 Midnights     Order Specific Question:   Certification     Answer:   I certify that inpatient services are medically necessary for this patient for a duration of greater than two midnights. See H&P and MD Progress Notes for additional information about the patient's course of treatment.     ED Arrival Information       Expected   -    Arrival   5/23/2024 17:46    Acuity   Urgent              Means of arrival   Ambulance    Escorted by   [facility]   Hospitalist    Admission type   Emergency              Arrival complaint   Fever/chills             Chief Complaint   Patient presents with    Fever     Pt presents from home with fever and chills. Recently dc for cellulitis, finished po abx.        Initial Presentation: 63 y.o. male to ED via EMS from home  Present to ED with Right lower extremity cellulitis. Recently hospitalized treated with IV antibiotics and discharged on Keflex.  Was seen by outpatient podiatrist, Keflex extended and last dose was 5/21. 48 hours post cessation of oral antibiotic therapy, RLE cellulitis is flaring.  Significant erythema/warmth/tenderness and edema over the anterior lateral aspect RLE.  PMHX: diabetes mellitus, anxiety, chronic pain disorder, multiple spinal surgeries, SENG on CPAP, obesity, right total hip, bilateral total knee replacements    Admitted to [facility] with DX: Cellulitis of right lower extremity   on exam: T 100.4; tachy; Right foot with healing noninfected wounds over plantar and heel. Right  lower extremity anterior lateral tibial with erythema/edema/warmth/tenderness does not cellulitis; WBC 17.69; Pain 6/10   PLAN: cont iv abx; Accuchecks with ssic; pain control (see below); monitor labs; f/u blood cx      Anticipated Length of Stay/Certification Statement: Patient will be admitted on an inpatient basis with an anticipated length of stay of greater than 2 midnights secondary to cellulitis, sepsis.       Date: 5/24/24       Day 2  having some pain in the foot. Pain 5/10; Mg 1.7; Continue to treatment as per ID recommendation with IV cefazolin.   Per Podiatry: offload heel at all times with prevalon boot while in bed and heel unloading shoe with ambulation; elevate LE;  Wound care: R heel dermagran dsd. B/L LE ACE from toes to tibial tuberosity.  wound benefit from lymphadema pumps outpt  Plan: cont iv abx; recd Mg Sulf iv x1; Accuchecks with ssic; pain control (see below); monitor labs; f/u blood cx                  ED Triage Vitals   Temperature Pulse Respirations Blood Pressure SpO2   05/23/24 1749 05/23/24 1749 05/23/24 1749 05/23/24 1749 05/23/24 1749   100.4 °F (38 °C) 101 18 159/66 98 %      Temp Source Heart Rate Source Patient Position - Orthostatic VS BP Location FiO2 (%)   05/23/24 1749 05/23/24 1749 05/23/24 1749 05/23/24 1749 --   Oral Monitor Lying Right arm       Pain Score       05/23/24 1815       6          Wt Readings from Last 1 Encounters:   05/23/24 (!) 165 kg (363 lb 8.6 oz)     Additional Vital Signs:   Date/Time Temp Pulse Resp BP MAP (mmHg) SpO2 O2 Device Patient Position - Orthostatic VS   05/24/24 14:42:28 97.3 °F (36.3 °C) Abnormal  68 16 118/61 80 99 % -- --   05/24/24 0900 -- -- -- -- -- -- None (Room air) --   05/24/24 0704 98.7 °F (37.1 °C) 94 19 116/76 83 92 % -- Lying   05/23/24 2008 98.4 °F (36.9 °C) 103 20 145/90 -- -- None (Room air) Lying   05/23/24 1915 -- 94 20 121/60 86 98 % None (Room air) --   05/23/24 1902 100.3 °F (37.9 °C) -- -- -- -- -- -- --   05/23/24  1800 -- 93 19 159/66 95 -- -- --   05/23/24 1749 100.4 °F (38 °C) 101 18 159/66 -- 98 % None (Room air) Lying         EKG: None obtained      Pertinent Labs/Diagnostic Test Results:   XR chest 1 view portable   ED Interpretation by Panfilo Ortiz MD (05/23 1824)   NAD      Final Result by Margarita Churchill MD (05/24 0912)      No acute cardiopulmonary disease.            Workstation performed: [id number]         XR foot 3+ views RIGHT   ED Interpretation by Panfilo Ortiz MD (05/23 1824)   No fracture or osteo      Final Result by Dexter Longo MD (05/24 0907)      No radiographic evidence of osteomyelitis.      Workstation performed: [id number]           Results from last 7 days   Lab Units 05/23/24  1755   SARS-COV-2  Negative     Results from last 7 days   Lab Units 05/24/24  1549 05/24/24  0526 05/23/24  1755   WBC Thousand/uL 14.73* 19.24* 17.69*   HEMOGLOBIN g/dL 13.9 13.5 15.8   HEMATOCRIT % 42.0 39.4 46.9   PLATELETS Thousands/uL 132* 125* 159   TOTAL NEUT ABS Thousands/µL 12.21*  --   --    BANDS PCT %  --   --  5        Results from last 7 days   Lab Units 05/24/24  0526 05/23/24  1755   SODIUM mmol/L 137 136   POTASSIUM mmol/L 4.0 4.1   CHLORIDE mmol/L 108 104   CO2 mmol/L 22 22   ANION GAP mmol/L 7 10   BUN mg/dL 15 11   CREATININE mg/dL 1.36* 1.08   EGFR ml/min/1.73sq m 55 73   CALCIUM mg/dL 8.4 9.2   MAGNESIUM mg/dL  --  1.7*     Results from last 7 days   Lab Units 05/23/24  1755   AST U/L 19   ALT U/L 20   ALK PHOS U/L 127*   TOTAL PROTEIN g/dL 8.0   ALBUMIN g/dL 4.2   TOTAL BILIRUBIN mg/dL 0.71     Results from last 7 days   Lab Units 05/24/24  1607 05/24/24  1054 05/24/24  0727 05/23/24  2054   POC GLUCOSE mg/dl 124 101 112 129     Results from last 7 days   Lab Units 05/24/24  0526 05/23/24  1755   GLUCOSE RANDOM mg/dL 131 116        Results from last 7 days   Lab Units 05/23/24  1755   PROTIME seconds 13.0   INR  0.95   PTT seconds 24           Results from last 7 days   Lab Units  05/23/24  1755   LACTIC ACID mmol/L 1.8      Results from last 7 days   Lab Units 05/23/24  1755   LIPASE u/L 32     Results from last 7 days   Lab Units 05/23/24  1755   CRP mg/L 12.1*   SED RATE mm/hour 30*        Results from last 7 days   Lab Units 05/23/24  1816   CLARITY UA  Clear   COLOR UA  Yellow   SPEC GRAV UA  1.025   PH UA  6.0   GLUCOSE UA mg/dl >=1000 (1%)*   KETONES UA mg/dl Negative   BLOOD UA  Trace-Intact*   PROTEIN UA mg/dl Negative   NITRITE UA  Negative   BILIRUBIN UA  Negative   UROBILINOGEN UA E.U./dl 0.2   LEUKOCYTES UA  Elevated glucose may cause decreased leukocyte values. See urine microscopic for UWBC result*   WBC UA /hpf None Seen   RBC UA /hpf 0-1   BACTERIA UA /hpf Occasional   EPITHELIAL CELLS WET PREP /hpf Occasional     Results from last 7 days   Lab Units 05/23/24  1755   INFLUENZA A PCR  Negative   INFLUENZA B PCR  Negative   RSV PCR  Negative        Results from last 7 days   Lab Units 05/23/24  1758 05/23/24  1755   BLOOD CULTURE  Received in Microbiology Lab. Culture in Progress. Received in Microbiology Lab. Culture in Progress.          ED Treatment:   Medication Administration from 05/23/2024 1746 to 05/23/2024 1949         Date/Time Order Dose Route Action     05/23/2024 1755 EDT sodium chloride 0.9 % bolus 1,000 mL 1,000 mL Intravenous New Bag     05/23/2024 1759 EDT ketorolac (TORADOL) injection 15 mg 15 mg Intravenous Given     05/23/2024 1800 EDT acetaminophen (Ofirmev) injection 1,000 mg 1,000 mg Intravenous New Bag     05/23/2024 1820 EDT piperacillin-tazobactam (ZOSYN) 4.5 g in sodium chloride 0.9 % 100 mL IVPB 4.5 g Intravenous New Bag     05/23/2024 1822 EDT lactated ringers bolus 2,500 mL 2,500 mL Intravenous New Bag            Present on Admission:   Cellulitis of right lower extremity   Type 2 diabetes mellitus with skin complication, without long-term current use of insulin (HCC)   Sepsis (HCC)   (Resolved) Primary hypertension   Morbid obesity  ([facility])      Admitting Diagnosis: Fever [R50.9]  Cellulitis of right leg [L03.115]    Age/Sex: 63 y.o. male    Admission Orders: SCDs; wound care; I/O; Consistent Carbohydrate Diet. Blood glucose checks ACHS.      Scheduled Medications:  atorvastatin, 20 mg, Oral, Daily With Dinner  cefazolin, 2,000 mg, Intravenous, Q8H  enoxaparin, 40 mg, Subcutaneous, Q12H WOJCIECH  insulin lispro, 2-12 Units, Subcutaneous, TID AC  insulin lispro, 2-12 Units, Subcutaneous, HS  losartan, 50 mg, Oral, Daily  metroNIDAZOLE, 500 mg, Intravenous, Q8H  sertraline, 75 mg, Oral, HS    magnesium sulfate 2 g/50 mL IVPB (premix) 2 g  Dose: 2 g  Freq: Once Route: IV  Last Dose: Stopped (05/24/24 0730)  Start: 05/24/24 0515 End: 05/24/24 0730       Continuous IV Infusions: None       PRN Meds:  acetaminophen, 975 mg, Oral, Q8H PRN  (5/23 recd x1)  (5/24 recd x1 so far today)   LORazepam, 0.5 mg, Oral, Daily PRN  (5/23 recd x1)         IP CONSULT TO PODIATRY  IP CONSULT TO INFECTIOUS DISEASES    Network Utilization Review Department  ATTENTION: Please call with any questions or concerns to [phone] and carefully listen to the prompts so that you are directed to the right person. All voicemails are confidential.   For Discharge needs, contact Care Management DC Support Team at [phone] opt. [phone]  Send all requests for admission clinical reviews, approved or denied determinations and any other requests to dedicated fax number below belonging to the campus where the patient is receiving treatment. List of dedicated fax numbers for the Facilities:  FACILITY NAME UR FAX NUMBER   ADMISSION DENIALS (Administrative/Medical Necessity) [phone]   DISCHARGE SUPPORT TEAM (NETWORK) [phone]   PARENT CHILD HEALTH (Maternity/NICU/Pediatrics) [phone]   [facility] [phone]   [facility] [phone]   Pending sale to [facility] [phone]   [facility]  [phone]   [facility] [phone]   [facility] [phone]   [facility] [phone]   [facility] [phone]   [facility] [phone]   [facility] [phone]   [facility] [phone]   [facility] [phone]

## 2024-05-24 NOTE — H&P
[facility]  H&P  Name: Vamshi Robbins 63 y.o. male I MRN: [id number]  Unit/Bed#: -01 I Date of Admission: 5/23/2024   Date of Service: 5/24/2024 I Hospital Day: 1      Assessment & Plan   * Cellulitis of right lower extremity  Assessment & Plan  Right lower extremity cellulitis  Recently hospitalized treated with IV antibiotics and discharged on Keflex.  Was seen by outpatient podiatrist, Keflex extended and last dose was 5/21.  48 hours post cessation of oral antibiotic therapy, RLE cellulitis is flaring.  Significant erythema/warmth/tenderness and edema over the anterior lateral aspect RLE.  Meeting sepsis criteria, normal lactic acid  Initiated empiric cefazolin, metronidazole  Blood cultures pending  Appreciate infectious disease consultation for evaluation of lifelong suppressive antibiotic therapy for recurrent cellulitis      Diabetic right foot ulcer  Assessment & Plan  Right heel diabetic foot ulcer-appears to be well-healing from previous photographs  Following with outpatient podiatry  No evidence of erythema or wound discharge  Appreciate podiatry consultation  There is cellulitis in the right pretibial area, unknown if source is diabetic foot wound  Empiric cefazolin/metronidazole  Right foot x-ray without obvious osteomyelitis/fracture/foreign body      Sepsis (HCC)  Assessment & Plan  Meets criteria with tachycardia, tachypnea, leukocytosis  Lactic acid normal  Source is right lower extremity cellulitis  Blood cultures pending  Empiric cefazolin/metronidazole  Trend fever curve, leukocytosis    Primary hypertension  Assessment & Plan  Continue ARB  Trend blood pressures    Type 2 diabetes mellitus with skin complication, without long-term current use of insulin (HCC)  Assessment & Plan  Lab Results   Component Value Date    HGBA1C 6.1 (H) 01/29/2024       Recent Labs     05/23/24 2054   POCGLU 129       Blood Sugar Average: Last 72 hrs:  (P) 129  Seemingly  well-controlled diabetes mellitus  SSI with Accu-Cheks  PTA Jardiance and Ozempic on hold  Hypoglycemia protocol  Carbohydrate controlled diet    CPAP (continuous positive airway pressure) dependence  Assessment & Plan  CPAP from home    Morbid obesity (HCC)  Assessment & Plan  BMI 45.44  Outpatient follow-up for intensive lifestyle modifications           VTE Pharmacologic Prophylaxis:   High Risk (Score >/= 5) - Pharmacological DVT Prophylaxis Ordered: enoxaparin (Lovenox). Sequential Compression Devices Ordered.  Code Status: Level 1 - Full Code     Anticipated Length of Stay: Patient will be admitted on an inpatient basis with an anticipated length of stay of greater than 2 midnights secondary to cellulitis, sepsis.    Total Time Spent on Date of Encounter in care of patient: 45 mins. This time was spent on one or more of the following: performing physical exam; counseling and coordination of care; obtaining or reviewing history; documenting in the medical record; reviewing/ordering tests, medications or procedures; communicating with other healthcare professionals and discussing with patient's family/caregivers.    Chief Complaint: Right leg erythema    History of Present Illness:  Vamshi Robbins is a 63 y.o. male with a PMH of diabetes mellitus, anxiety, chronic pain disorder, multiple spinal surgeries, SENG on CPAP, obesity, right total hip, bilateral total knee replacements who presents with recurrent cellulitis.  Recent hospital admission discharged 5/1 for right lower extremity cellulitis and diabetic ulceration of right heel with Charcot foot deformity.  Wound was debrided by podiatry on 4/28, MRI revealed no evidence of osteomyelitis and ABIs were within the normal healing range bilaterally. Patient was discharged home on Keflex and followed up with his primary podiatrist who extended the length of the Keflex prescription.  Keflex ended 5/21 at which time patient states the leg was back to normal,  unfortunately 48 hours later developed return of erythema/edema/tenderness to right lateral anterior tibial aspect.  In the emergency department meeting sepsis criteria and presented to the medical service for further evaluation and treatment.    Review of Systems:  Review of Systems   Constitutional:  Positive for chills and fever.   HENT:  Negative for ear pain and sore throat.    Eyes:  Negative for pain and visual disturbance.   Respiratory:  Negative for cough and shortness of breath.    Cardiovascular:  Negative for chest pain and palpitations.   Gastrointestinal:  Negative for abdominal pain and vomiting.   Genitourinary:  Negative for dysuria and hematuria.   Musculoskeletal:  Positive for arthralgias and myalgias. Negative for back pain.   Skin:  Positive for rash and wound. Negative for color change.   Neurological:  Negative for seizures and syncope.   Psychiatric/Behavioral:  The patient is nervous/anxious.    All other systems reviewed and are negative.      Past Medical and Surgical History:   Past Medical History:   Diagnosis Date    Anxiety     Arthritis     Chronic pain disorder     mid back region    Colon polyp     COVID-19     CPAP (continuous positive airway pressure) dependence     Pt uses nightly    Diabetes mellitus (HCC)     Diverticulitis of colon 3 years ago    No issues    Diverticulosis     History of recent hospitalization 06/16/2021    Pt was admitted to [facility] after syncopal episode. Pt saw cardiology- all tests negative. Pt had nl EEG. pt states is was a medication interaction.    Hyperlipidemia     Hypertension     Obesity     Sleep apnea     Spinal stenosis     Wears hearing aid        Past Surgical History:   Procedure Laterality Date    ACHILLES TENDON REPAIR      Pt had a rupture in right and left 2 years apart    COLONOSCOPY      EPIDURAL BLOCK INJECTION      Pt had in lumbar region.    EPIDURAL BLOCK INJECTION N/A 04/06/2021    Procedure: T-11-T-12 INTERLAMINAR THORACIC EPIDURAL  STEROID INJECTION;  Surgeon: Guzman Harrison MD;  [facility]: [facility];  Service: Pain Management     EPIDURAL BLOCK INJECTION Right 07/20/2021    Procedure: L5 and S1 TRANSFORAMINAL EPIDURAL STEROID INJECTION;  Surgeon: Guzman Harrison MD;  [facility]: [facility];  Service: Pain Management     FL GUIDED NEEDLE PLAC BX/ASP/INJ  07/14/2022    FL GUIDED NEEDLE PLAC BX/ASP/INJ  08/18/2022    FL GUIDED NEEDLE PLAC BX/ASP/INJ  09/15/2022    FOOT SURGERY Left     Left great toe- had a hammertoe.    HIP SURGERY      Replaced    JOINT REPLACEMENT Left     Total hip, knee    JOINT REPLACEMENT Right     Total hip, knee    KNEE ARTHROSCOPY Bilateral     NERVE BLOCK Bilateral 07/14/2022    Procedure: BLOCK MEDIAL BRANCH T12, L1, L2;  Surgeon: Guzman Harrison MD;  [facility]: [facility];  Service: Pain Management     NERVE BLOCK Bilateral 08/18/2022    Procedure: BLOCK MEDIAL BRANCH T12, L1, L2 #2;  Surgeon: Guzman Harrison MD;  [facility]: [facility];  Service: Pain Management     NERVE BLOCK Bilateral 01/26/2023    Procedure: BLOCK MEDIAL BRANCH L3, L4, L5 #1;  Surgeon: Guzman Harrison MD;  [facility]: [facility];  Service: Pain Management     NERVE BLOCK Bilateral 02/16/2023    Procedure: BLOCK MEDIAL BRANCH L3, L4, L5 #2;  Surgeon: Guzman Harrison MD;  [facility]: [facility];  Service: Pain Management     WV JOE IMPLTJ NSTIM ELTRDS PLATE/PADDLE EDRL Left 10/26/2021    Procedure: Thoracic laminectomies for placement of spinal cord stimulator and internal pulse generator, use of neuromonitoring, left sided pulse generator;  Surgeon: Rob Wong MD;  Location:  MAIN OR;  Service: Neurosurgery    WV PRQ IMPLTJ NSTIM ELECTRODE ARRAY EPIDURAL Bilateral 09/30/2021    Procedure: NEVRO SCS TRIAL;  Surgeon: Guzman Harrison MD;  [facility];  Service: Pain Management     RHIZOTOMY Bilateral 09/15/2022    Procedure: RHIZOTOMY LUMBAR T12, L1, L2 medial branch nerves;  Surgeon: Guzman Harrison MD;  [facility];  Service: Pain  Management     RHIZOTOMY Bilateral 03/02/2023    Procedure: RHIZOTOMY LUMBAR L3, L4, L5;  Surgeon: Guzman Harrison MD;  [facility];  Service: Pain Management     RHIZOTOMY Bilateral 03/16/2023    Procedure: RHIZOTOMY LUMBAR T10, T11, T12 medial branch nerves;  Surgeon: Guzman Harrison MD;  [facility];  Service: Pain Management     TOTAL KNEE ARTHROPLASTY Left        Meds/Allergies:  Prior to Admission medications    Medication Sig Start Date End Date Taking? Authorizing Provider   atorvastatin (LIPITOR) 10 mg tablet Take 10 mg tablet every other day 1/4/24  Yes Cristina Lopez PA-C   atorvastatin (LIPITOR) 20 mg tablet Take 1 tablet every other day.  This should be alternating with the other prescription for 10 mg. 1/4/24  Yes Cristina Lopez PA-C   celecoxib (CeleBREX) 100 mg capsule Take 1 capsule (100 mg total) by mouth 2 (two) times a day 1/4/24  Yes Cristina Lopez PA-C   Empagliflozin (Jardiance) 25 MG TABS Take 1 tablet (25 mg total) by mouth daily 1/4/24 12/29/24 Yes Cristina Lopez PA-C   LORazepam (ATIVAN) 0.5 mg tablet Take 1 tablet (0.5 mg total) by mouth daily as needed for anxiety 1/31/24  Yes Cristina Lopez PA-C   semaglutide, 0.25 or 0.5 mg/dose, (Ozempic, 0.25 or 0.5 MG/DOSE,) 2 mg/3 mL injection pen Inject 0.75 mL (0.5 mg total) under the skin every 7 days 1/4/24 3/27/25 Yes Cristina Lopez PA-C   sertraline (ZOLOFT) 50 mg tablet Take 1.5 tablets (75 mg total) by mouth daily at bedtime 1/4/24  Yes Cristina Lopez PA-C   tadalafil (CIALIS) 10 MG tablet Take one tablet as needed for ED. 1/4/24  Yes Cristina Lopez PA-C   valsartan (DIOVAN) 80 mg tablet Take 1 tablet (80 mg total) by mouth daily 1/4/24 12/29/24 Yes Cristina Lopez PA-C     I have reviewed home medications with patient personally.    Allergies:   Allergies   Allergen Reactions    Doxycycline Photosensitivity       Social History:  Marital Status:  /Civil Union   Patient Pre-hospital Living Situation: Home  Patient Pre-hospital Level of Mobility: walks with cane  Patient Pre-hospital Diet Restrictions: None  Substance Use History:   Social History     Substance and Sexual Activity   Alcohol Use Yes    Alcohol/week: 2.0 standard drinks of alcohol    Types: 2 Cans of beer per week    Comment: social, weekends     Social History     Tobacco Use   Smoking Status Never   Smokeless Tobacco Current    Types: Snuff   Tobacco Comments    still using snuff     Social History     Substance and Sexual Activity   Drug Use No       Family History:  Family History   Problem Relation Age of Onset    Arthritis Mother     Depression Mother     Hypertension Father     Alcohol abuse Father     Diabetes Son         Aged 14       Physical Exam:     Vitals:   Blood Pressure: 145/90 (05/23/24 2008)  Pulse: 103 (05/23/24 2008)  Temperature: 98.4 °F (36.9 °C) (05/23/24 2008)  Temp Source: Oral (05/23/24 2008)  Respirations: 20 (05/23/24 2008)  Weight - Scale: (!) 165 kg (363 lb 8.6 oz) (05/23/24 9269)  SpO2: 98 % (05/23/24 1915)    Physical Exam  Vitals and nursing note reviewed.   Constitutional:       General: He is not in acute distress.     Appearance: He is well-developed. He is ill-appearing.   HENT:      Head: Normocephalic and atraumatic.      Mouth/Throat:      Mouth: Mucous membranes are dry.   Eyes:      Conjunctiva/sclera: Conjunctivae normal.   Cardiovascular:      Rate and Rhythm: Regular rhythm. Tachycardia present.      Heart sounds: No murmur heard.  Pulmonary:      Effort: Pulmonary effort is normal. No respiratory distress.      Breath sounds: Normal breath sounds.   Abdominal:      Palpations: Abdomen is soft.      Tenderness: There is no abdominal tenderness.   Musculoskeletal:         General: Tenderness present. No swelling.      Cervical back: Neck supple.   Skin:     General: Skin is warm and dry.      Capillary Refill: Capillary refill takes less than 2  seconds.      Findings: Erythema present.      Comments: Right foot with healing noninfected wounds over plantar and heel  Right lower extremity anterior lateral tibial with erythema/edema/warmth/tenderness does not cellulitis   Neurological:      General: No focal deficit present.      Mental Status: He is alert and oriented to person, place, and time.   Psychiatric:         Mood and Affect: Mood normal.         Behavior: Behavior normal.                Additional Data:     Lab Results:  Results from last 7 days   Lab Units 05/23/24  1755   WBC Thousand/uL 17.69*   HEMOGLOBIN g/dL 15.8   HEMATOCRIT % 46.9   PLATELETS Thousands/uL 159   BANDS PCT % 5   LYMPHO PCT % 5*   MONO PCT % 3*   EOS PCT % 1     Results from last 7 days   Lab Units 05/23/24  1755   SODIUM mmol/L 136   POTASSIUM mmol/L 4.1   CHLORIDE mmol/L 104   CO2 mmol/L 22   BUN mg/dL 11   CREATININE mg/dL 1.08   ANION GAP mmol/L 10   CALCIUM mg/dL 9.2   ALBUMIN g/dL 4.2   TOTAL BILIRUBIN mg/dL 0.71   ALK PHOS U/L 127*   ALT U/L 20   AST U/L 19   GLUCOSE RANDOM mg/dL 116     Results from last 7 days   Lab Units 05/23/24  1755   INR  0.95     Results from last 7 days   Lab Units 05/23/24  2054   POC GLUCOSE mg/dl 129         Results from last 7 days   Lab Units 05/23/24  1755   LACTIC ACID mmol/L 1.8       Lines/Drains:  Invasive Devices       Peripheral Intravenous Line  Duration             Peripheral IV 05/23/24 Left Antecubital <1 day    Peripheral IV 05/23/24 Right Antecubital <1 day                        Imaging: Reviewed radiology reports from this admission including: xray(s)  XR chest 1 view portable   ED Interpretation by Panfilo Ortiz MD (05/23 1824)   NAD      XR foot 3+ views RIGHT   ED Interpretation by Panfilo Ortiz MD (05/23 1824)   No fracture or osteo          ** Please Note: This note has been constructed using a voice recognition system. **

## 2024-05-24 NOTE — PROGRESS NOTES
Patient temp 100.3 orally. Patient noted to have increased anxiety secondary to same, support provided. Patient requested repeat blood work to check WBC count, MD aware.

## 2024-05-24 NOTE — PROGRESS NOTES
Patient temp 101.4. Patient complaining of increased anxiety at this time and stated he felt like he was having a panic attack. Support provided and MD aware of temp and anxiety.

## 2024-05-24 NOTE — CASE MANAGEMENT
Case Management Assessment & Discharge Planning Note    Patient name Vamshi Robbins  Location /-01 MRN [id number]  : 1960 Date 2024       Current Admission Date: 2024  Current Admission Diagnosis:Cellulitis of right lower extremity   Patient Active Problem List    Diagnosis Date Noted Date Diagnosed    Diabetic right foot ulcer 2024     Type 2 diabetes mellitus with skin complication, without long-term current use of insulin (HCC) 2024     Primary hypertension 2024     HLD (hyperlipidemia) 2024     Cellulitis of right lower extremity 2024     Sepsis (HCC) 2024     CPAP (continuous positive airway pressure) dependence      Other spondylosis with myelopathy, cervical region 2023     Radiculopathy 2023     Myelomalacia of cervical cord (HCC) 2023     Neurogenic claudication due to lumbar spinal stenosis      Muscular atrophy      Spinal muscular atrophy, unspecified (HCC) 2022     Lumbar spondylosis 2022     Major depressive disorder, single episode, mild (HCC) 2022     Thoracic spondylosis 2022     Cervical spinal cord compression (HCC) 2021     Mild depression 2021     Morbid obesity ([facility]) 2021     Herniation of intervertebral disc of thoracic region 2021     Diabetic peripheral neuropathy (HCC) 2021       LOS (days): 1  Geometric Mean LOS (GMLOS) (days): 3.6  Days to GMLOS:2.7     OBJECTIVE:  PATIENT READMITTED TO HOSPITAL  Risk of Unplanned Readmission Score: 6.78         Current admission status: Inpatient  Referral Reason: Other (readmission)    Preferred Pharmacy:   [facility]pharm[facility][id number] - [place] - [address]  [address]  [place] [address]  Phone: [phone] Fax: [phone]    Primary Care Provider: Cristina Lopez PA-C    Primary Insurance: [facility] REP  Secondary Insurance:     ASSESSMENT:  Active Health Care Proxies     There are no active Health Care Proxies on file.       Advance Directives  Does patient have a Health Care POA?: Yes  Does patient have Advance Directives?: Yes  Primary Contact: Cailin (Spouse)         Readmission Root Cause  30 Day Readmission: Yes  Who directed you to return to the hospital?: Self  Did you understand whom to contact if you had questions or problems?: Yes  Did you get your prescriptions before you left the hospital?: No  Reason:: Preference for own pharmacy  Were you able to get your prescriptions filled when you left the hospital?: Yes  Did you take your medications as prescribed?: Yes  Were you able to get to your follow-up appointments?: Yes  During previous admission, was a post-acute recommendation made?: No  Patient was readmitted due to: cellulitis, infectoin  Action Plan: ID/Podiatry/Wound care consulted, abx    Patient Information  Admitted from:: Home  Mental Status: Alert  During Assessment patient was accompanied by: Not accompanied during assessment  Assessment information provided by:: Patient  Primary Caregiver: Self  Support Systems: Spouse/significant other, Children, Family members  [place] Residence[place] [facility]  [place] [facility] do you live in[place]  Home entry access options. Select all that apply.: Stairs  Number of steps to enter home.: 5  Do the steps have railings?: Yes  Type of Current Residence: 2 story home  Upon entering residence, is there a bedroom on the main floor (no further steps)?: No  A bedroom is located on the following floor levels of residence (select all that apply):: 2nd Floor  Upon entering residence, is there a bathroom on the main floor (no further steps)?: Yes  Number of steps to 2nd floor from main floor: One Flight  Living Arrangements: Lives w/ Spouse/significant other  Is patient a ?: No    Activities of Daily Living Prior to Admission  Functional Status: Independent  Completes ADLs independently?: Yes  Ambulates independently?:  Yes  Does patient use assisted devices?: Yes  Assisted Devices (DME) used: CPAP, Straight Cane, Shower Chair  DME Company Name (respiratory supplies): [employer]  Does patient currently own DME?: Yes  What DME does the patient currently own?: Shower Chair, CPAP, Straight Cane  Does patient have a history of Outpatient Therapy (PT/OT)?: Yes ([facility])  Does the patient have a history of Short-Term Rehab?: Yes ([facility])  Does patient have a history of HHC?: Yes ([facility])  Does patient currently have HHC?: No         Patient Information Continued  Income Source: SSI/SSD  Does patient have prescription coverage?: Yes  Does patient receive dialysis treatments?: No  Does patient have a history of substance abuse?: No  Does patient have a history of Mental Health Diagnosis?: Yes (depression, anxiety, OCD)  Is patient receiving treatment for mental health?: Yes (med mgt - PCP)  Has patient received inpatient treatment related to mental health in the last 2 years?: No         Means of Transportation  Means of Transport to [facility]:: Drives Self      Social Determinants of Health (SDOH)      Flowsheet Row Most Recent Value   Housing Stability    In the last 12 months, was there a time when you were not able to pay the mortgage or rent on time? N   In the past 12 months, how many times have you moved where you were living? 1   At any time in the past 12 months, were you homeless or living in a shelter (including now)? N   Transportation Needs    In the past 12 months, has lack of transportation kept you from medical appointments or from getting medications? no   In the past 12 months, has lack of transportation kept you from meetings, work, or from getting things needed for daily living? No   Food Insecurity    Within the past 12 months, you worried that your food would run out before you got the money to buy more. Never true   Within the past 12 months, the food you bought just didn't last and you didn't have money to  get more. Never true   Utilities    In the past 12 months has the electric, gas, oil, or water company threatened to shut off services in your home? No            DISCHARGE DETAILS:    Discharge planning discussed with:: Patient  Freedom of Choice: Yes  Comments - Freedom of Choice: Patient is okay with HHC for IV abx if recommended with blanket referral and [facility] for infusion.  CM contacted family/caregiver?: Yes (Cailin (Spouse))  Were Treatment Team discharge recommendations reviewed with patient/caregiver?: Yes  Did patient/caregiver verbalize understanding of patient care needs?: Yes  Were patient/caregiver advised of the risks associated with not following Treatment Team discharge recommendations?: Yes    Contacts  Patient Contacts: spouse  Relationship to Patient:: Family  Contact Method: Phone  Phone Number: [phone]  Reason/Outcome: Continuity of Care, Discharge Planning                   Would you like to participate in our [facility] service program?  : No - Declined    CM met with patient at the bedside,baseline information  was obtained. CM discussed the role of CM in helping the patient develop a discharge plan and assist the patient in carry out their plan.  Patient lives with spouse at home - 5 stairs to enter and a 2 story home with bed on the 2nd floor and bathroom on the 1st floor.  Patient was IPTA and uses a cane for ambulation. Patient has hx of OP PT with [facility], [facility] for HHC.   CM discussed discharge planning with patient. Patient is agreeable to HHC with infusion if needed. Patient mentioned a new compression boot they are agreeable to.     CM will follow patient's care and CM needs.

## 2024-05-24 NOTE — DISCHARGE INSTR - OTHER ORDERS
- offload heel at all times with prevalon boot while in bed and heel unloading shoe with ambulation  - elevate LE  - Wound care: R heel dermagran dsd. B/L LE ACE from toes to tibial tuberosity.  - wound benefit from lymphedema pumps outpt

## 2024-05-24 NOTE — ASSESSMENT & PLAN NOTE
Meets criteria with tachycardia, tachypnea, leukocytosis  Lactic acid normal  Source is right lower extremity cellulitis  Blood cultures pending  Empiric cefazolin/metronidazole  Trend fever curve, leukocytosis

## 2024-05-24 NOTE — ASSESSMENT & PLAN NOTE
Right heel diabetic foot ulcer-appears to be well-healing from previous photographs  Following with outpatient podiatry  No evidence of erythema or wound discharge  Appreciate podiatry consultation  There is cellulitis in the right pretibial area, unknown if source is diabetic foot wound  Empiric cefazolin/metronidazole  Right foot x-ray without obvious osteomyelitis/fracture/foreign body

## 2024-05-24 NOTE — ASSESSMENT & PLAN NOTE
Lab Results   Component Value Date    HGBA1C 6.1 (H) 01/29/2024       Recent Labs     05/23/24  2054 05/24/24  0727 05/24/24  1054 05/24/24  1607   POCGLU 129 112 101 124         Blood Sugar Average: Last 72 hrs:  (P) 116.5  Seemingly well-controlled diabetes mellitus  SSI with Accu-Cheks  PTA Jardiance and Ozempic on hold  Hypoglycemia protocol  Carbohydrate controlled diet

## 2024-05-24 NOTE — ASSESSMENT & PLAN NOTE
Right lower extremity cellulitis  Recently hospitalized treated with IV antibiotics and discharged on Keflex.  Was seen by outpatient podiatrist, Keflex extended and last dose was 5/21.  48 hours post cessation of oral antibiotic therapy, RLE cellulitis is flaring.  Significant erythema/warmth/tenderness and edema over the anterior lateral aspect RLE.  Meeting sepsis criteria, normal lactic acid  Initiated empiric cefazolin, metronidazole  Blood cultures pending  Appreciate infectious disease consultation for evaluation of lifelong suppressive antibiotic therapy for recurrent cellulitis

## 2024-05-24 NOTE — PLAN OF CARE
Problem: PHYSICAL THERAPY ADULT  Goal: Performs mobility at highest level of function for planned discharge setting.  See evaluation for individualized goals.  Description: Treatment/Interventions: ADL retraining, Functional transfer training, LE strengthening/ROM, Elevations, Therapeutic exercise, Endurance training, Patient/family training, Equipment eval/education, Gait training, Bed mobility, Compensatory technique education, Spoke to nursing, Spoke to case management, Spoke to MD  Equipment Recommended:  (walker-pt reports having)       See flowsheet documentation for full assessment, interventions and recommendations.  Note: Prognosis: Good  Problem List: Decreased strength, Decreased endurance, Impaired balance, Decreased mobility, Decreased coordination, Impaired judgement, Decreased safety awareness, Impaired sensation, Obesity, Decreased skin integrity, Orthopedic restrictions  Assessment: Pt is a 63 y.o. male seen for PT evaluation s/p admission to [facility] on 5/23/2024 with Sepsis (HCC).  Order placed for PT services.  Upon evaluation: Pt is presenting with impaired functional mobility due to decreased strength, decreased endurance, impaired balance, impaired coordination, impaired proprioception, gait deviations, altered sensation, decreased safety awareness, orthopedic restrictions, fall risk, LE edema, and impaired skin integrity requiring  stand by to steadying assistance for transfers and steadying assistance for ambulation with strong arm cane . Pt's clinical presentation is currently unpredictable given the functional mobility deficits above, especially weakness, edema of extremities, decreased skin integrity, decreased endurance, impaired coordination, impaired balance, impaired proprioception, gait deviations, decreased activity tolerance, decreased functional mobility tolerance, and decreased safety awareness, coupled with fall risks as indicated by AM-PAC 6-Clicks: 19/24  as well as impaired balance, polypharmacy, decreased safety awareness, limited sensation/neuropathy, and obesity and combined with medical complications of abnormal WBCs, readmission to hospital, need for input for mobility technique/safety, and abnormal CRP, right LE cellulitis-recurrent, diabetic right foot ulcer, sepsis .  Pt's PMHx and comorbidities that may affect physical performance and progress include: anxiety, DM, HTN, obesity, neuropathy, and arthritis, anxiety, chronic pain disorder, multiple spinal surgeries, h/o Covid-19, spinal stenosis, cervical spinal cord compression . Personal factors affecting pt at time of IE include: step(s) to enter environment, multi-level environment, inability to perform IADLs, inability to perform ADLs, inability to navigate level surfaces without external assistance, and inability to navigate community distances. Pt will benefit from continued skilled PT services to address deficits as defined above and to maximize level of functional mobility to facilitate return toward PLOF and improved QOL. From PT/mobility standpoint, recommendation at time of d/c would be Level III (Minimum Resource Intensity), with family and/or caregiver support, and with walker in order to reduce fall risk and maximize pt's functional independence and consistency with mobility. Recommend trial with walker next 1-2 sessions and ther ex next 1-2 sessions.  Barriers to Discharge: Inaccessible home environment  Barriers to Discharge Comments: steps to enter home and access 2nd [facility] bedroom  Rehab Resource Intensity Level, PT: III (Minimum Resource Intensity) (pt agreeable to OPPT services upon discharge)    See flowsheet documentation for full assessment.

## 2024-05-24 NOTE — WOUND OSTOMY CARE
Consult Note - Wound   Vamshi Robbins 63 y.o. male MRN: [id number]  Unit/Bed#: -01 Encounter: [id number]        History and Present Illness:  Admitted with Diabetic foot ulcer, PMH Cellulitis of right lower extremity, morbid obesity sepsis, Type 2DM.    Assessment Findings:   Seen by Podiatry for foot wound.  No other skin issues,  1)Right lower extremity edema and erythema.   Preventative wound care orders placed  Skin care plans:  1-Hydraguard to bilateral sacrum, buttock  BID and PRN  2-Elevate heels to offload pressure.  3-Ehob cushion in chair when out of bed.  4-Moisturize skin daily with skin nourishing cream.  5-Turn/reposition q2h or when medically stable for pressure re-distribution on skin.     Wound Care will sign off  Call or Tigertext with any questions      Carole MINN RN CWON

## 2024-05-24 NOTE — PLAN OF CARE
Problem: PHYSICAL THERAPY ADULT  Goal: Performs mobility at highest level of function for planned discharge setting.  See evaluation for individualized goals.  Description: Treatment/Interventions: ADL retraining, Functional transfer training, LE strengthening/ROM, Elevations, Therapeutic exercise, Endurance training, Patient/family training, Equipment eval/education, Gait training, Bed mobility, Compensatory technique education, Spoke to nursing, Spoke to case management, Spoke to MD  Equipment Recommended:  (walker-pt reports having)       See flowsheet documentation for full assessment, interventions and recommendations.  5/24/2024 1751 by Natalie Dalton, ANNABELLE  Note: Prognosis: Good  Problem List: Decreased strength, Decreased endurance, Impaired balance, Decreased mobility, Decreased coordination, Impaired judgement, Decreased safety awareness, Impaired sensation, Obesity, Decreased skin integrity, Orthopedic restrictions  Pt tolerated session well. He was able to ambulate and transfer with decreased assistance with use of RW compared to strong arm cane. he requires icnreased time to complete mobility and cues for improved gait pattern. He verbalized feeling more stable with use of RW compared to strong arm cane. he is limtied by decreased strength, balance, endurance coordination and sensation. He will continue to benefit from PT services to maximize LOF.     Barriers to Discharge: Inaccessible home environment  Barriers to Discharge Comments: steps to enter home and access [address] [place] [address] bedroom  Rehab Resource Intensity Level, PT: III (Minimum Resource Intensity) (pt agreeable to OPPT services upon discharge)    See flowsheet documentation for full assessment.

## 2024-05-25 LAB
ALBUMIN SERPL BCP-MCNC: 3.3 G/DL (ref 3.5–5)
ALP SERPL-CCNC: 83 U/L (ref 34–104)
ALT SERPL W P-5'-P-CCNC: 11 U/L (ref 7–52)
ANION GAP SERPL CALCULATED.3IONS-SCNC: 6 MMOL/L (ref 4–13)
AST SERPL W P-5'-P-CCNC: 19 U/L (ref 13–39)
BASOPHILS # BLD AUTO: 0.03 THOUSANDS/ÂΜL (ref 0–0.1)
BASOPHILS NFR BLD AUTO: 0 % (ref 0–1)
BILIRUB SERPL-MCNC: 0.71 MG/DL (ref 0.2–1)
BUN SERPL-MCNC: 11 MG/DL (ref 5–25)
CALCIUM ALBUM COR SERPL-MCNC: 8.9 MG/DL (ref 8.3–10.1)
CALCIUM SERPL-MCNC: 8.3 MG/DL (ref 8.4–10.2)
CHLORIDE SERPL-SCNC: 105 MMOL/L (ref 96–108)
CO2 SERPL-SCNC: 23 MMOL/L (ref 21–32)
CREAT SERPL-MCNC: 0.91 MG/DL (ref 0.6–1.3)
EOSINOPHIL # BLD AUTO: 0.06 THOUSAND/ÂΜL (ref 0–0.61)
EOSINOPHIL NFR BLD AUTO: 1 % (ref 0–6)
ERYTHROCYTE [DISTWIDTH] IN BLOOD BY AUTOMATED COUNT: 14.1 % (ref 11.6–15.1)
GFR SERPL CREATININE-BSD FRML MDRD: 90 ML/MIN/1.73SQ M
GLUCOSE SERPL-MCNC: 101 MG/DL (ref 65–140)
GLUCOSE SERPL-MCNC: 103 MG/DL (ref 65–140)
GLUCOSE SERPL-MCNC: 108 MG/DL (ref 65–140)
GLUCOSE SERPL-MCNC: 95 MG/DL (ref 65–140)
GLUCOSE SERPL-MCNC: 97 MG/DL (ref 65–140)
HCT VFR BLD AUTO: 39.3 % (ref 36.5–49.3)
HGB BLD-MCNC: 13.2 G/DL (ref 12–17)
IMM GRANULOCYTES # BLD AUTO: 0.09 THOUSAND/UL (ref 0–0.2)
IMM GRANULOCYTES NFR BLD AUTO: 1 % (ref 0–2)
LYMPHOCYTES # BLD AUTO: 1.65 THOUSANDS/ÂΜL (ref 0.6–4.47)
LYMPHOCYTES NFR BLD AUTO: 14 % (ref 14–44)
MCH RBC QN AUTO: 30.1 PG (ref 26.8–34.3)
MCHC RBC AUTO-ENTMCNC: 33.6 G/DL (ref 31.4–37.4)
MCV RBC AUTO: 90 FL (ref 82–98)
MONOCYTES # BLD AUTO: 0.94 THOUSAND/ÂΜL (ref 0.17–1.22)
MONOCYTES NFR BLD AUTO: 8 % (ref 4–12)
NEUTROPHILS # BLD AUTO: 8.87 THOUSANDS/ÂΜL (ref 1.85–7.62)
NEUTS SEG NFR BLD AUTO: 76 % (ref 43–75)
NRBC BLD AUTO-RTO: 0 /100 WBCS
PLATELET # BLD AUTO: 122 THOUSANDS/UL (ref 149–390)
PMV BLD AUTO: 9.6 FL (ref 8.9–12.7)
POTASSIUM SERPL-SCNC: 4 MMOL/L (ref 3.5–5.3)
PROT SERPL-MCNC: 6.3 G/DL (ref 6.4–8.4)
RBC # BLD AUTO: 4.38 MILLION/UL (ref 3.88–5.62)
SODIUM SERPL-SCNC: 134 MMOL/L (ref 135–147)
WBC # BLD AUTO: 11.64 THOUSAND/UL (ref 4.31–10.16)

## 2024-05-25 PROCEDURE — 85025 COMPLETE CBC W/AUTO DIFF WBC: CPT | Performed by: FAMILY MEDICINE

## 2024-05-25 PROCEDURE — 80053 COMPREHEN METABOLIC PANEL: CPT | Performed by: FAMILY MEDICINE

## 2024-05-25 PROCEDURE — 99232 SBSQ HOSP IP/OBS MODERATE 35: CPT | Performed by: PHYSICIAN ASSISTANT

## 2024-05-25 PROCEDURE — 82948 REAGENT STRIP/BLOOD GLUCOSE: CPT

## 2024-05-25 RX ADMIN — ENOXAPARIN SODIUM 40 MG: 40 INJECTION SUBCUTANEOUS at 08:23

## 2024-05-25 RX ADMIN — SERTRALINE HYDROCHLORIDE 75 MG: 50 TABLET ORAL at 21:24

## 2024-05-25 RX ADMIN — LOSARTAN POTASSIUM 50 MG: 50 TABLET, FILM COATED ORAL at 08:23

## 2024-05-25 RX ADMIN — ACETAMINOPHEN 325MG 975 MG: 325 TABLET ORAL at 06:40

## 2024-05-25 RX ADMIN — CEFAZOLIN SODIUM 2000 MG: 2 SOLUTION INTRAVENOUS at 05:07

## 2024-05-25 RX ADMIN — CEFAZOLIN SODIUM 2000 MG: 2 SOLUTION INTRAVENOUS at 13:53

## 2024-05-25 RX ADMIN — LORAZEPAM 1 MG: 2 INJECTION INTRAMUSCULAR; INTRAVENOUS at 17:52

## 2024-05-25 RX ADMIN — ATORVASTATIN CALCIUM 20 MG: 20 TABLET, FILM COATED ORAL at 15:58

## 2024-05-25 RX ADMIN — ENOXAPARIN SODIUM 40 MG: 40 INJECTION SUBCUTANEOUS at 21:24

## 2024-05-25 RX ADMIN — CEFAZOLIN SODIUM 2000 MG: 2 SOLUTION INTRAVENOUS at 21:30

## 2024-05-25 NOTE — PROGRESS NOTES
[facility]  Progress Note  Name: Vamshi Robbins I  MRN: [id number]  Unit/Bed#: -01 I Date of Admission: 5/23/2024   Date of Service: 5/25/2024 I Hospital Day: 2    Assessment & Plan   * Cellulitis of right lower extremity  Assessment & Plan  Present w/ 1 day h/o RLE cellulitis PTA; h/o prior symptoms in the past  Recently hospitalized treated w/ IV antibiotics and discharged on Keflex on 05/01/24. Pt seen by outpatient podiatrist & Keflex was extended; last dose was 5/21.  48 hrs post ABX cessation, RLE cellulitis flared flaring  Sepsis criteria POA w/ normal lactic acid; last documented fever 05/24 at 2000; rigors/chills documented this a.m.  Bcx w/o growth x 24 hrs   Initiated on empiric cefazolin & metronidazole  ID following   Continue cefazolin while inpt   Discharged home on Keflex 1 G QID x 7 day total course   Following acute treatment, prophylactic penicillin  mg BID         Sepsis (HCC)  Assessment & Plan  Met criteria with tachycardia, tachypnea, leukocytosis; LA WNL   Source: RLE cellulitis  BCx w/o growth x 24 hrs   ABX treatment as above   Last documented fever 05/24 at 2000; rigors/chills documented this a.m. after Tylenol dose     Recent Labs     05/24/24  0526 05/24/24  1549 05/25/24  0458   WBC 19.24* 14.73* 11.64*       Diabetic right foot ulcer  Assessment & Plan  POA w/ right heel diabetic foot ulcer which appears to be well-healing, no current d/c/ infxn   Known to outpatient podiatry  Xray foot w/o osteomyelitis/fracture/foreign body  Appreciate podiatry consultation  Offload heel at all times w/ prevalon boot while in bed and heel unloading shoe with ambulation  Elevate LE  B/L LE ACE from toes to tibial tuberosity.  Benefit from lymphadema pumps outpt  Wound care: R heel dermagran dsd      Type 2 diabetes mellitus with skin complication, without long-term current use of insulin (HCC)  Assessment & Plan  Lab Results   Component Value Date    HGBA1C 6.1  (H) 01/29/2024     Recent Labs     05/24/24  1607 05/24/24  1635 05/24/24  2100 05/25/24  0733   POCGLU 124 103 104 108     Blood Sugar Average: Last 72 hrs:  (P) [id number]    Seemingly well-controlled diabetes mellitus  SSI w/ Accu-Cheks  PTA Jardiance and Ozempic on hold  Hypoglycemia protocol  Carbohydrate controlled diet    CPAP (continuous positive airway pressure) dependence  Assessment & Plan  CPAP [facility]     Morbid obesity (HCC)  Assessment & Plan  BMI 45.44  Outpatient follow-up for intensive lifestyle modifications         VTE Pharmacologic Prophylaxis:   Moderate Risk (Score 3-4) - Pharmacological DVT Prophylaxis Ordered: enoxaparin (Lovenox).    Mobility:   Basic Mobility Inpatient Raw Score: 19  JH-HLM Goal: 6: Walk 10 steps or more  JH-HLM Achieved: 8: Walk 250 feet ot more  JH-HLM Goal achieved. Continue to encourage appropriate mobility.    Patient Centered Rounds: I performed bedside rounds with nursing staff today.   Discussions with Specialists or Other Care Team Provider:  Reviewed with nursing    Education and Discussions with Family / Patient: Updated  (wife) at bedside.    Total Time Spent on Date of Encounter in care of patient: 45 mins. This time was spent on one or more of the following: performing physical exam; counseling and coordination of care; obtaining or reviewing history; documenting in the medical record; reviewing/ordering tests, medications or procedures; communicating with other healthcare professionals and discussing with patient's family/caregivers.    Current Length of Stay: 2 day(s)  Current Patient Status: Inpatient   Certification Statement: The patient will continue to require additional inpatient hospital stay due to right lower extremity cellulitis and recurrent fever  Discharge Plan: Anticipate discharge tomorrow to home.    Code Status: Level 1 - Full Code    Subjective:   Patient is doing okay.  He admits feeling feverish this a.m.  Patient  notes he took a dose of Tylenol approximately 15-20 minutes prior to rigors/chills.  Patient was afebrile upon nursing's account.  Patient is tolerating p.o. intake.  He had a recent bowel movement.  He denies dysuria.  He has full range of motion of his knee/ankle.    Objective:     Vitals:   Temp (24hrs), Av °F (37.2 °C), Min:97.3 °F (36.3 °C), Max:101.4 °F (38.6 °C)    Temp:  [97.3 °F (36.3 °C)-101.4 °F (38.6 °C)] 98.1 °F (36.7 °C)  HR:  [68-84] 81  Resp:  [16-18] 18  BP: (118-144)/(61-89) 135/80  SpO2:  [89 %-99 %] 89 %  Body mass index is 45.44 kg/m².     Input and Output Summary (last 24 hours):     Intake/Output Summary (Last 24 hours) at 2024 1132  Last data filed at 2024 1017  Gross per 24 hour   Intake 700 ml   Output 1975 ml   Net -1275 ml       Physical Exam:   Physical Exam  Constitutional:       Appearance: He is obese.   HENT:      Head: Normocephalic.      Right Ear: External ear normal.      Left Ear: External ear normal.      Nose: Nose normal.      Mouth/Throat:      Mouth: Mucous membranes are moist.      Pharynx: Oropharynx is clear.   Eyes:      Extraocular Movements: Extraocular movements intact.      Conjunctiva/sclera: Conjunctivae normal.   Cardiovascular:      Rate and Rhythm: Normal rate and regular rhythm.      Heart sounds: Normal heart sounds.   Pulmonary:      Effort: Pulmonary effort is normal. No respiratory distress.      Breath sounds: Normal breath sounds.   Abdominal:      General: Bowel sounds are normal. There is no distension.      Palpations: Abdomen is soft.      Tenderness: There is no abdominal tenderness.      Comments: Obese abdomen   Musculoskeletal:         General: Swelling (1-2+ right lower extremity edema) present.      Cervical back: Normal range of motion.      Comments: Boot on left lower extremity; right knee/ankle with full range of motion; no clinical evidence of septic arthritis   Skin:     Findings: Erythema (Erythema present right lower  extremity; please see attached photo in chart) present.   Neurological:      General: No focal deficit present.      Mental Status: He is alert. Mental status is at baseline.   Psychiatric:         Mood and Affect: Mood normal.         Behavior: Behavior normal.          Additional Data:     Labs:  Results from last 7 days   Lab Units 05/25/24  0458 05/24/24  0526 05/23/24  1755   WBC Thousand/uL 11.64*   < > 17.69*   HEMOGLOBIN g/dL 13.2   < > 15.8   HEMATOCRIT % 39.3   < > 46.9   PLATELETS Thousands/uL 122*   < > 159   BANDS PCT %  --   --  5   SEGS PCT % 76*   < >  --    LYMPHO PCT % 14   < > 5*   MONO PCT % 8   < > 3*   EOS PCT % 1   < > 1    < > = values in this interval not displayed.     Results from last 7 days   Lab Units 05/25/24  0458   SODIUM mmol/L 134*   POTASSIUM mmol/L 4.0   CHLORIDE mmol/L 105   CO2 mmol/L 23   BUN mg/dL 11   CREATININE mg/dL 0.91   ANION GAP mmol/L 6   CALCIUM mg/dL 8.3*   ALBUMIN g/dL 3.3*   TOTAL BILIRUBIN mg/dL 0.71   ALK PHOS U/L 83   ALT U/L 11   AST U/L 19   GLUCOSE RANDOM mg/dL 95     Results from last 7 days   Lab Units 05/23/24  1755   INR  0.95     Results from last 7 days   Lab Units 05/25/24  1129 05/25/24  0733 05/24/24  2100 05/24/24  1635 05/24/24  1607 05/24/24  1054 05/24/24  0727 05/23/24  2054   POC GLUCOSE mg/dl 97 108 104 103 124 101 112 129         Results from last 7 days   Lab Units 05/23/24  1755   LACTIC ACID mmol/L 1.8       Lines/Drains:  Invasive Devices       Peripheral Intravenous Line  Duration             Peripheral IV 05/23/24 Left Antecubital 1 day    Peripheral IV 05/23/24 Right Antecubital 1 day                          Imaging: Reviewed radiology reports from this admission including: xray(s)    Recent Cultures (last 7 days):   Results from last 7 days   Lab Units 05/23/24  1758 05/23/24  1755   BLOOD CULTURE  No Growth at 24 hrs. No Growth at 24 hrs.       Last 24 Hours Medication List:   Current Facility-Administered Medications   Medication  Dose Route Frequency Provider Last Rate    acetaminophen  975 mg Oral Q8H PRN Dolores S Jolene, CRNP      atorvastatin  20 mg Oral Daily With Dinner Dolores S Jolene, CRNP      cefazolin  2,000 mg Intravenous Q8H Dolores S Jolene, CRNP 2,000 mg (05/25/24 0507)    enoxaparin  40 mg Subcutaneous Q12H WOJCIECH Dolores S Jolene, CRNP      insulin lispro  2-12 Units Subcutaneous TID AC Dolores S Jolene, CRNP      insulin lispro  2-12 Units Subcutaneous HS Dolores S Jolene, CRNP      LORazepam  1 mg Intravenous Q6H PRN Rory Kearns MD      losartan  50 mg Oral Daily Dolores S Jolene, CRNP      sertraline  75 mg Oral HS Dolores S Jolene, CRNP          Today, Patient Was Seen By: Kelly Solorzano PA-C    **Please Note: This note may have been constructed using a voice recognition system.**

## 2024-05-25 NOTE — ASSESSMENT & PLAN NOTE
Met criteria with tachycardia, tachypnea, leukocytosis; LA WNL   Source: RLE cellulitis  BCx w/o growth x 24 hrs   ABX treatment as above   Last documented fever 05/24 at 2000; rigors/chills documented this a.m. after Tylenol dose     Recent Labs     05/24/24  0526 05/24/24  1549 05/25/24  0458   WBC 19.24* 14.73* 11.64*

## 2024-05-25 NOTE — PLAN OF CARE
Problem: PAIN - ADULT  Goal: Verbalizes/displays adequate comfort level or baseline comfort level  Description: Interventions:  - Encourage patient to monitor pain and request assistance  - Assess pain using appropriate pain scale  - Administer analgesics based on type and severity of pain and evaluate response  - Implement non-pharmacological measures as appropriate and evaluate response  - Consider cultural and social influences on pain and pain management  - Notify physician/advanced practitioner if interventions unsuccessful or patient reports new pain  Outcome: Progressing     Problem: INFECTION - ADULT  Goal: Absence or prevention of progression during hospitalization  Description: INTERVENTIONS:  - Assess and monitor for signs and symptoms of infection  - Monitor lab/diagnostic results  - Monitor all insertion sites, i.e. indwelling lines, tubes, and drains  - Monitor endotracheal if appropriate and nasal secretions for changes in amount and color  - [place] appropriate cooling/warming therapies per order  - Administer medications as ordered  - Instruct and encourage patient and family to use good hand hygiene technique  - Identify and instruct in appropriate isolation precautions for identified infection/condition  Outcome: Progressing  Goal: Absence of fever/infection during neutropenic period  Description: INTERVENTIONS:  - Monitor WBC    Outcome: Progressing     Problem: DISCHARGE PLANNING  Goal: Discharge to home or other facility with appropriate resources  Description: INTERVENTIONS:  - Identify barriers to discharge w/patient and caregiver  - Arrange for needed discharge resources and transportation as appropriate  - Identify discharge learning needs (meds, wound care, etc.)  - Arrange for interpretive services to assist at discharge as needed  - Refer to Case Management Department for coordinating discharge planning if the patient needs post-hospital services based on physician/advanced  practitioner order or complex needs related to functional status, cognitive ability, or social support system  Outcome: Progressing     Problem: Knowledge Deficit  Goal: Patient/family/caregiver demonstrates understanding of disease process, treatment plan, medications, and discharge instructions  Description: Complete learning assessment and assess knowledge base.  Interventions:  - Provide teaching at level of understanding  - Provide teaching via preferred learning methods  Outcome: Progressing

## 2024-05-25 NOTE — ASSESSMENT & PLAN NOTE
Present w/ 1 day h/o RLE cellulitis PTA; h/o prior symptoms in the past  Recently hospitalized treated w/ IV antibiotics and discharged on Keflex on 05/01/24. Pt seen by outpatient podiatrist & Keflex was extended; last dose was 5/21.  48 hrs post ABX cessation, RLE cellulitis flared flaring  Sepsis criteria POA w/ normal lactic acid; last documented fever 05/24 at 2000; rigors/chills documented this a.m.  Bcx w/o growth x 24 hrs   Initiated on empiric cefazolin & metronidazole  ID following   Continue cefazolin while inpt   Discharged home on Keflex 1 G QID x 7 day total course   Following acute treatment, prophylactic penicillin  mg BID

## 2024-05-25 NOTE — CASE MANAGEMENT
Case Management Discharge Planning Note    Patient name Vamshi Robbins  Location /-01 MRN [id number]  : 1960 Date 2024       Current Admission Date: 2024  Current Admission Diagnosis:Cellulitis of right lower extremity   Patient Active Problem List    Diagnosis Date Noted Date Diagnosed    Diabetic right foot ulcer 2024     Type 2 diabetes mellitus with skin complication, without long-term current use of insulin (HCC) 2024     HLD (hyperlipidemia) 2024     Cellulitis of right lower extremity 2024     Sepsis (HCC) 2024     CPAP (continuous positive airway pressure) dependence      Other spondylosis with myelopathy, cervical region 2023     Radiculopathy 2023     Myelomalacia of cervical cord (HCC) 2023     Neurogenic claudication due to lumbar spinal stenosis      Muscular atrophy      Spinal muscular atrophy, unspecified (HCC) 2022     Lumbar spondylosis 2022     Major depressive disorder, single episode, mild (HCC) 2022     Thoracic spondylosis 2022     Cervical spinal cord compression (HCC) 2021     Mild depression 2021     Morbid obesity (HCC) 2021     Herniation of intervertebral disc of thoracic region 2021     Diabetic peripheral neuropathy (HCC) 2021       LOS (days): 2  Geometric Mean LOS (GMLOS) (days): 3.6  Days to GMLOS:1.9     OBJECTIVE:  Risk of Unplanned Readmission Score: 6.96         Current admission status: Inpatient   Preferred Pharmacy:   [facility]/pharmacy #[id number] - [place] - [address] [facility]  [address] [facility] [address]  Phone: [phone] Fax: [phone]    Primary Care Provider: Cristina Lopez PA-C    Primary Insurance: [facility] REP  Secondary Insurance:     DISCHARGE DETAILS:     Patient requesting number for billing as they got a bill they do not believe they should have. CM provided number to patient.   CM reviewed  discharge plan for OP PT.     CM to follow patient's care and discharge needs.

## 2024-05-25 NOTE — ASSESSMENT & PLAN NOTE
Lab Results   Component Value Date    HGBA1C 6.1 (H) 01/29/2024     Recent Labs     05/24/24  1607 05/24/24  1635 05/24/24  2100 05/25/24  0733   POCGLU 124 103 104 108     Blood Sugar Average: Last 72 hrs:  (P) 111.5781442868506099    Seemingly well-controlled diabetes mellitus  SSI w/ Accu-Cheks  PTA Jardiance and Ozempic on hold  Hypoglycemia protocol  Carbohydrate controlled diet

## 2024-05-25 NOTE — UTILIZATION REVIEW
Initial Clinical Review - Continued    SEE INITIAL REVIEW AT BOTTOM    Date: 05/25/24                          Current Patient Class: IP  Current Level of Care: Med/Surg    HPI:63 y.o. male initially admitted on 5/23 for sepsis.     Assessment/Plan: Reports feeling feverish, but afebrile on vitals. On exam, RLE edema and erythema, boot on LLE. Plan: IV ABX, follow cultures, continue current meds, offload heel at all times w/ prevalon boot in bed/heel unloading shoe w/ ambulation, elevate RLE, b/l LE ACE from toes to tibial tuberosity. R heel dermagran DSD. SSI w/ BG checks ACHS. CPAP qHS.     Vital Signs:   Date/Time Temp Pulse Resp BP MAP (mmHg) SpO2 O2 Device   05/25/24 0825 98.1 °F (36.7 °C) -- -- -- -- -- --   05/25/24 07:38:33 98.2 °F (36.8 °C) 81 18 135/80 98 89 % Abnormal  --   05/25/24 06:43:02 -- 75 18 133/88 103 92 % --   05/25/24 0641 98.2 °F (36.8 °C) -- -- -- -- -- --   05/24/24 2200 -- -- -- -- -- -- None (Room air)   05/24/24 21:59:39 97.7 °F (36.5 °C) 75 16 144/79 101 97 % --   05/24/24 2020 100.7 °F (38.2 °C) Abnormal  -- -- -- -- -- --   05/24/24 1817 100.2 °F (37.9 °C) -- -- -- -- -- --   05/24/24 1735 101.2 °F (38.4 °C) Abnormal  -- -- -- -- -- --   05/24/24 16:35:42 97.7 °F (36.5 °C) 84 17 144/89 107 96 % --   05/24/24 1618 101.4 °F (38.6 °C) Abnormal  -- -- -- -- -- --   05/24/24 1520 100.3 °F (37.9 °C) -- -- -- -- -- --   05/24/24 15:18:14 97.5 °F (36.4 °C) 69 -- -- -- 98 % --       Pertinent Labs/Diagnostic Results:   Results from last 7 days   Lab Units 05/23/24  1755   SARS-COV-2  Negative     Results from last 7 days   Lab Units 05/25/24  0458 05/24/24  1549 05/24/24  0526 05/23/24  1755   WBC Thousand/uL 11.64* 14.73* 19.24* 17.69*   HEMOGLOBIN g/dL 13.2 13.9 13.5 15.8   HEMATOCRIT % 39.3 42.0 39.4 46.9   PLATELETS Thousands/uL 122* 132* 125* 159   TOTAL NEUT ABS Thousands/µL 8.87* 12.21*  --   --    BANDS PCT %  --   --   --  5         Results from last 7 days   Lab Units 05/25/24  0458  05/24/24  0526 05/23/24  1755   SODIUM mmol/L 134* 137 136   POTASSIUM mmol/L 4.0 4.0 4.1   CHLORIDE mmol/L 105 108 104   CO2 mmol/L 23 22 22   ANION GAP mmol/L 6 7 10   BUN mg/dL 11 15 11   CREATININE mg/dL 0.91 1.36* 1.08   EGFR ml/min/1.73sq m 90 55 73   CALCIUM mg/dL 8.3* 8.4 9.2   MAGNESIUM mg/dL  --   --  1.7*     Results from last 7 days   Lab Units 05/25/24  0458 05/23/24  1755   AST U/L 19 19   ALT U/L 11 20   ALK PHOS U/L 83 127*   TOTAL PROTEIN g/dL 6.3* 8.0   ALBUMIN g/dL 3.3* 4.2   TOTAL BILIRUBIN mg/dL 0.71 0.71     Results from last 7 days   Lab Units 05/25/24  0733 05/24/24  2100 05/24/24  1635 05/24/24  1607 05/24/24  1054 05/24/24  0727 05/23/24  2054   POC GLUCOSE mg/dl 108 104 103 124 101 112 129     Results from last 7 days   Lab Units 05/25/24  0458 05/24/24  0526 05/23/24  1755   GLUCOSE RANDOM mg/dL 95 131 116      Results from last 7 days   Lab Units 05/23/24  1755   PROTIME seconds 13.0   INR  0.95   PTT seconds 24     Results from last 7 days   Lab Units 05/23/24  1755   LACTIC ACID mmol/L 1.8     Results from last 7 days   Lab Units 05/23/24  1755   LIPASE u/L 32     Results from last 7 days   Lab Units 05/23/24  1755   CRP mg/L 12.1*   SED RATE mm/hour 30*     Results from last 7 days   Lab Units 05/23/24  1816   CLARITY UA  Clear   COLOR UA  Yellow   SPEC GRAV UA  1.025   PH UA  6.0   GLUCOSE UA mg/dl >=1000 (1%)*   KETONES UA mg/dl Negative   BLOOD UA  Trace-Intact*   PROTEIN UA mg/dl Negative   NITRITE UA  Negative   BILIRUBIN UA  Negative   UROBILINOGEN UA E.U./dl 0.2   LEUKOCYTES UA  Elevated glucose may cause decreased leukocyte values. See urine microscopic for UWBC result*   WBC UA /hpf None Seen   RBC UA /hpf 0-1   BACTERIA UA /hpf Occasional   EPITHELIAL CELLS WET PREP /hpf Occasional     Results from last 7 days   Lab Units 05/23/24  1755   INFLUENZA A PCR  Negative   INFLUENZA B PCR  Negative   RSV PCR  Negative     Results from last 7 days   Lab Units 05/23/24  1756  05/23/24  1755   BLOOD CULTURE  No Growth at 24 hrs. No Growth at 24 hrs.       Medications:   Scheduled Medications:  atorvastatin, 20 mg, Oral, Daily With Dinner  cefazolin, 2,000 mg, Intravenous, Q8H  enoxaparin, 40 mg, Subcutaneous, Q12H WOJCIECH  insulin lispro, 2-12 Units, Subcutaneous, TID AC  insulin lispro, 2-12 Units, Subcutaneous, HS  losartan, 50 mg, Oral, Daily  sertraline, 75 mg, Oral, HS    Continuous IV Infusions: none    PRN Meds:  acetaminophen, 650 mg, Oral, 5/24 x1  acetaminophen, 975 mg, Oral, Q8H PRN; 5/24 x1, 5/25 x1  LORazepam, 1 mg, Intravenous, Q6H PRN; 5/24 x1          Network Utilization Review Department  ATTENTION: Please call with any questions or concerns to [phone] and carefully listen to the prompts so that you are directed to the right person. All voicemails are confidential.   For Discharge needs, contact Care Management DC Support Team at [phone] opt. [phone]  Send all requests for admission clinical reviews, approved or denied determinations and any other requests to dedicated fax number below belonging to the campus where the patient is receiving treatment. List of dedicated fax numbers for the Facilities:  FACILITY NAME UR FAX NUMBER   ADMISSION DENIALS (Administrative/Medical Necessity) [phone]   DISCHARGE SUPPORT TEAM (NETWORK) [phone]   PARENT CHILD HEALTH (Maternity/NICU/Pediatrics) [phone]   [facility] [phone]   [facility] [phone]   Replaced by [facility] [phone]   [facility] [phone]   [facility] [phone]   [facility] [phone]   [facility] [phone]   [facility] [phone]   [facility] [phone]   [facility] [phone]   [facility]  [place] [phone]   [facility] [place] [phone]

## 2024-05-25 NOTE — ASSESSMENT & PLAN NOTE
POA w/ right heel diabetic foot ulcer which appears to be well-healing, no current d/c/ infxn   Known to outpatient podiatry  Xray foot w/o osteomyelitis/fracture/foreign body  Appreciate podiatry consultation  Offload heel at all times w/ prevalon boot while in bed and heel unloading shoe with ambulation  Elevate LE  B/L LE ACE from toes to tibial tuberosity.  Benefit from lymphadema pumps outpt  Wound care: R heel dermagran dsd

## 2024-05-26 VITALS
RESPIRATION RATE: 17 BRPM | BODY MASS INDEX: 45.44 KG/M2 | TEMPERATURE: 97.3 F | DIASTOLIC BLOOD PRESSURE: 84 MMHG | WEIGHT: 315 LBS | SYSTOLIC BLOOD PRESSURE: 139 MMHG | OXYGEN SATURATION: 97 % | HEART RATE: 71 BPM

## 2024-05-26 DIAGNOSIS — M47.12 OTHER SPONDYLOSIS WITH MYELOPATHY, CERVICAL REGION: ICD-10-CM

## 2024-05-26 DIAGNOSIS — M54.16 LUMBAR RADICULOPATHY: ICD-10-CM

## 2024-05-26 DIAGNOSIS — G95.89 MYELOMALACIA OF CERVICAL CORD (HCC): ICD-10-CM

## 2024-05-26 DIAGNOSIS — G12.9 SPINAL MUSCULAR ATROPHY, UNSPECIFIED (HCC): ICD-10-CM

## 2024-05-26 DIAGNOSIS — M51.24 HERNIATION OF INTERVERTEBRAL DISC OF THORACIC REGION: ICD-10-CM

## 2024-05-26 DIAGNOSIS — E11.42 DIABETIC PERIPHERAL NEUROPATHY (HCC): ICD-10-CM

## 2024-05-26 LAB
ANION GAP SERPL CALCULATED.3IONS-SCNC: 7 MMOL/L (ref 4–13)
BASOPHILS # BLD AUTO: 0.04 THOUSANDS/ÂΜL (ref 0–0.1)
BASOPHILS NFR BLD AUTO: 1 % (ref 0–1)
BUN SERPL-MCNC: 11 MG/DL (ref 5–25)
CALCIUM SERPL-MCNC: 8.5 MG/DL (ref 8.4–10.2)
CHLORIDE SERPL-SCNC: 105 MMOL/L (ref 96–108)
CO2 SERPL-SCNC: 23 MMOL/L (ref 21–32)
CREAT SERPL-MCNC: 0.89 MG/DL (ref 0.6–1.3)
EOSINOPHIL # BLD AUTO: 0.12 THOUSAND/ÂΜL (ref 0–0.61)
EOSINOPHIL NFR BLD AUTO: 1 % (ref 0–6)
ERYTHROCYTE [DISTWIDTH] IN BLOOD BY AUTOMATED COUNT: 13.9 % (ref 11.6–15.1)
GFR SERPL CREATININE-BSD FRML MDRD: 91 ML/MIN/1.73SQ M
GLUCOSE SERPL-MCNC: 86 MG/DL (ref 65–140)
GLUCOSE SERPL-MCNC: 97 MG/DL (ref 65–140)
HCT VFR BLD AUTO: 38.2 % (ref 36.5–49.3)
HGB BLD-MCNC: 12.9 G/DL (ref 12–17)
IMM GRANULOCYTES # BLD AUTO: 0.13 THOUSAND/UL (ref 0–0.2)
IMM GRANULOCYTES NFR BLD AUTO: 2 % (ref 0–2)
LYMPHOCYTES # BLD AUTO: 1.75 THOUSANDS/ÂΜL (ref 0.6–4.47)
LYMPHOCYTES NFR BLD AUTO: 20 % (ref 14–44)
MCH RBC QN AUTO: 30.4 PG (ref 26.8–34.3)
MCHC RBC AUTO-ENTMCNC: 33.8 G/DL (ref 31.4–37.4)
MCV RBC AUTO: 90 FL (ref 82–98)
MONOCYTES # BLD AUTO: 0.75 THOUSAND/ÂΜL (ref 0.17–1.22)
MONOCYTES NFR BLD AUTO: 9 % (ref 4–12)
NEUTROPHILS # BLD AUTO: 5.8 THOUSANDS/ÂΜL (ref 1.85–7.62)
NEUTS SEG NFR BLD AUTO: 67 % (ref 43–75)
NRBC BLD AUTO-RTO: 0 /100 WBCS
PLATELET # BLD AUTO: 138 THOUSANDS/UL (ref 149–390)
PMV BLD AUTO: 9.7 FL (ref 8.9–12.7)
POTASSIUM SERPL-SCNC: 4 MMOL/L (ref 3.5–5.3)
RBC # BLD AUTO: 4.24 MILLION/UL (ref 3.88–5.62)
SODIUM SERPL-SCNC: 135 MMOL/L (ref 135–147)
WBC # BLD AUTO: 8.59 THOUSAND/UL (ref 4.31–10.16)

## 2024-05-26 PROCEDURE — 80048 BASIC METABOLIC PNL TOTAL CA: CPT | Performed by: FAMILY MEDICINE

## 2024-05-26 PROCEDURE — 85025 COMPLETE CBC W/AUTO DIFF WBC: CPT | Performed by: FAMILY MEDICINE

## 2024-05-26 PROCEDURE — 99239 HOSP IP/OBS DSCHRG MGMT >30: CPT | Performed by: PHYSICIAN ASSISTANT

## 2024-05-26 PROCEDURE — 82948 REAGENT STRIP/BLOOD GLUCOSE: CPT

## 2024-05-26 RX ORDER — PENICILLIN V POTASSIUM 250 MG/1
250 TABLET ORAL 2 TIMES DAILY
Qty: 60 TABLET | Refills: 0 | Status: SHIPPED | OUTPATIENT
Start: 2024-05-30 | End: 2024-06-29

## 2024-05-26 RX ORDER — CEPHALEXIN 500 MG/1
1000 CAPSULE ORAL EVERY 6 HOURS SCHEDULED
Qty: 32 CAPSULE | Refills: 0 | Status: SHIPPED | OUTPATIENT
Start: 2024-05-26 | End: 2024-05-30

## 2024-05-26 RX ADMIN — LOSARTAN POTASSIUM 50 MG: 50 TABLET, FILM COATED ORAL at 09:06

## 2024-05-26 RX ADMIN — CEFAZOLIN SODIUM 2000 MG: 2 SOLUTION INTRAVENOUS at 06:10

## 2024-05-26 NOTE — ASSESSMENT & PLAN NOTE
Present w/ 1 day h/o RLE cellulitis PTA; h/o prior symptoms in the past  Recently hospitalized treated w/ IV antibiotics and discharged on Keflex on 05/01/24. Pt seen by outpatient podiatrist & Keflex was extended; last dose was 05/21.  48 hrs post ABX cessation, RLE cellulitis flared flaring  Sepsis criteria POA w/ normal lactic acid; last documented fever 05/24 at 2000; rigors/chills documented on 05/25 in AM  Bcx w/o growth x 48 hrs   Initiated on empiric cefazolin & metronidazole  ID consulted during admission; outpt f/u   S/p cefazolin course while inpt   Discharged home on Keflex 1 G QID for a total of a 7 day total course   Following acute treatment, prophylactic penicillin  mg BID  At discharge, cellulitis has greatly improved

## 2024-05-26 NOTE — ASSESSMENT & PLAN NOTE
Met criteria with tachycardia, tachypnea, leukocytosis; LA WNL   Source: RLE cellulitis  BCx w/o growth x 48 hrs   ABX treatment as above   Last documented fever 05/24 at 2000; rigors/chills documented on 05/25 in AM     Recent Labs     05/24/24  1549 05/25/24  0458 05/26/24  0444   WBC 14.73* 11.64* 8.59

## 2024-05-26 NOTE — DISCHARGE SUMMARY
[facility]  Discharge- Vamshi Robbins 1960, 63 y.o. male MRN: [id number]  Unit/Bed#: MS [facility] Encounter: [id number]  Primary Care Provider: Cristina Lopez PA-C   Date and time admitted to hospital: 5/23/2024  5:46 PM    * Cellulitis of right lower extremity  Assessment & Plan  Present w/ 1 day h/o RLE cellulitis PTA; h/o prior symptoms in the past  Recently hospitalized treated w/ IV antibiotics and discharged on Keflex on 05/01/24. Pt seen by outpatient podiatrist & Keflex was extended; last dose was 05/21.  48 hrs post ABX cessation, RLE cellulitis flared flaring  Sepsis criteria POA w/ normal lactic acid; last documented fever 05/24 at 2000; rigors/chills documented on 05/25 in AM  Bcx w/o growth x 48 hrs   Initiated on empiric cefazolin & metronidazole  ID consulted during admission; outpt f/u   S/p cefazolin course while inpt   Discharged home on Keflex 1 G QID for a total of a 7 day total course   Following acute treatment, prophylactic penicillin  mg BID  At discharge, cellulitis has greatly improved        Sepsis (HCC)  Assessment & Plan  Met criteria with tachycardia, tachypnea, leukocytosis; LA WNL   Source: RLE cellulitis  BCx w/o growth x 48 hrs   ABX treatment as above   Last documented fever 05/24 at 2000; rigors/chills documented on 05/25 in AM     Recent Labs     05/24/24  1549 05/25/24  0458 05/26/24  0444   WBC 14.73* 11.64* 8.59         Diabetic right foot ulcer  Assessment & Plan  POA w/ right heel diabetic foot ulcer which appears to be well-healing, no current d/c/ infxn   Known to outpatient podiatry; appt scheduled for this week   Xray foot w/o osteomyelitis/fracture/foreign body  Appreciate podiatry consultation  Offload heel at all times w/ prevalon boot while in bed and heel unloading shoe with ambulation  Elevate LE  B/L LE ACE from toes to tibial tuberosity.  Benefit from lymphadema pumps outpt  Wound care: R heel dermagran dsd      Type  2 diabetes mellitus with skin complication, without long-term current use of insulin (HCC)  Assessment & Plan  Lab Results   Component Value Date    HGBA1C 6.1 (H) 01/29/2024     Recent Labs     05/25/24  1129 05/25/24  1545 05/25/24  2030 05/26/24  0710   POCGLU 97 101 103 86     Blood Sugar Average: Last 72 hrs:  (P) 106.5161149584442255    Seemingly well-controlled diabetes mellitus  SSI w/ Accu-Cheks while hospitalized   PTA Jardiance and Ozempic on hold, resume at d/c   Hypoglycemia protocol  Carbohydrate controlled diet    CPAP (continuous positive airway pressure) dependence  Assessment & Plan  CPAP [facility]     Morbid obesity (HCC)  Assessment & Plan  BMI 45.44  Outpatient follow-up for intensive lifestyle modifications      Medical Problems       Resolved Problems  Date Reviewed: 5/23/2024            Resolved    Primary hypertension 5/24/2024     Resolved by  Rory Kearns MD        Discharging Physician / Practitioner: Kelly Solorzano PA-C  PCP: Cristina Lopez PA-C  Admission Date:   Admission Orders (From admission, onward)       Ordered        05/23/24 1850  INPATIENT ADMISSION  Once                          Discharge Date: 05/26/24    Consultations During Hospital Stay:  ID    Procedures Performed:   None    Significant Findings / Test Results:   See A/P above     Incidental Findings:   None    Test Results Pending at Discharge (will require follow up):   Final blood cultures; blood cultures without growth x 48 hours     Outpatient Tests Requested:  Routine blood work within 2 weeks of discharge; inbox message sent to PCP    Complications:  None    Reason for Admission: RLE cellulitis     Hospital Course:   Vamshi Robbins is a 63 y.o. male patient who originally presented to the hospital on 5/23/2024 due to right lower extremity cellulitis.  [facility]  Patient followed up with podiatry as an outpatient and his p.o. antibiotic course was  extended through 05/21.  Upon 48 hours post antibiotic cessation his right lower extremity cellulitis flared.  Upon admission, patient met sepsis criteria.  Last documented fever was on 05/04.  Patient received stem cells: During inpatient course.  Infectious disease was consulted.  At time of discharge, patient has some residual cellulitis has greatly improved since admission.  Patient will be discharged home on Keflex 1 g 4 times daily for total of 7 course of antibiotics.  Thereafter, patient will start prophylactic penicillin  mg twice daily.  Patient has an appointment to follow-up with podiatry this week.  Patient to follow-up with infectious disease within 4 weeks.  Patient will call PCP at discharge.  Patient will continue to follow-up with podiatry as an outpatient for a chronic right foot wound.      Please see above list of diagnoses and related plan for additional information.     Condition at Discharge: fair    Discharge Day Visit / Exam:   Subjective: Patient is doing well.  He denies any fevers, chills, rigors.  He is tolerating p.o. intake.  He had a recent bowel movement.  He denies chest pain, shortness of breath, nausea, vomiting, or abdominal pain.  He is anxious to be discharged.  Vitals: Blood Pressure: 139/84 (05/26/24 0709)  Pulse: 71 (05/26/24 0709)  Temperature: (!) 97.3 °F (36.3 °C) (05/26/24 0709)  Temp Source: Temporal (05/26/24 0709)  Respirations: 17 (05/26/24 0709)  Weight - Scale: (!) 165 kg (363 lb 8.6 oz) (05/23/24 1749)  SpO2: 97 % (05/26/24 0709)  Exam:   Physical Exam  Constitutional:       Appearance: He is obese.   HENT:      Head: Normocephalic.      Right Ear: External ear normal.      Left Ear: External ear normal.      Nose: Nose normal.      Mouth/Throat:      Mouth: Mucous membranes are moist.      Pharynx: Oropharynx is clear.   Eyes:      Extraocular Movements: Extraocular movements intact.      Conjunctiva/sclera: Conjunctivae normal.   Cardiovascular:      Rate  and Rhythm: Normal rate and regular rhythm.      Pulses: Normal pulses.      Heart sounds: Normal heart sounds.   Pulmonary:      Effort: Pulmonary effort is normal.      Breath sounds: Normal breath sounds.   Abdominal:      General: Bowel sounds are normal. There is no distension.      Palpations: Abdomen is soft.      Tenderness: There is no abdominal tenderness.   Musculoskeletal:         General: No swelling (1-2 + RLE edema). Normal range of motion.      Cervical back: Normal range of motion.   Skin:     Findings: Erythema (Erythematous skin on RLE; see attached photo) present.   Neurological:      General: No focal deficit present.      Mental Status: He is alert. Mental status is at baseline.   Psychiatric:         Mood and Affect: Mood normal.         Behavior: Behavior normal.       Discussion with Family: Patient declined call to . Wife update at bedside.     Discharge instructions/Information to patient and family:   See after visit summary for information provided to patient and family.      Provisions for Follow-Up Care:  See after visit summary for information related to follow-up care and any pertinent home health orders.      Mobility at time of Discharge:   Basic Mobility Inpatient Raw Score: 24  JH-HLM Goal: 8: Walk 250 feet or more  JH-HLM Achieved: 7: Walk 25 feet or more  HLM Goal achieved. Continue to encourage appropriate mobility.     Disposition:   Home    Planned Readmission: None     Discharge Statement:  I spent 65 minutes discharging the patient. This time was spent on the day of discharge. I had direct contact with the patient on the day of discharge. Greater than 50% of the total time was spent examining patient, answering all patient questions, arranging and discussing plan of care with patient as well as directly providing post-discharge instructions.  Additional time then spent on discharge activities.    Discharge Medications:  See after visit summary for reconciled  discharge medications provided to patient and/or family.      **Please Note: This note may have been constructed using a voice recognition system**

## 2024-05-26 NOTE — DISCHARGE INSTR - AVS FIRST PAGE
Return the emergency department with any worsening symptoms including worsening redness or fevers.  Please call your family doctor if loose stool develops.    Please elevate your right lower extremity as much as possible; continue compression boots/ace wraps.  Offload right heel as often as possible with Prevalon boot.

## 2024-05-26 NOTE — ASSESSMENT & PLAN NOTE
POA w/ right heel diabetic foot ulcer which appears to be well-healing, no current d/c/ infxn   Known to outpatient podiatry; appt scheduled for this week   Xray foot w/o osteomyelitis/fracture/foreign body  Appreciate podiatry consultation  Offload heel at all times w/ prevalon boot while in bed and heel unloading shoe with ambulation  Elevate LE  B/L LE ACE from toes to tibial tuberosity.  Benefit from lymphadema pumps outpt  Wound care: R heel dermagran dsd

## 2024-05-26 NOTE — ASSESSMENT & PLAN NOTE
Lab Results   Component Value Date    HGBA1C 6.1 (H) 01/29/2024     Recent Labs     05/25/24  1129 05/25/24  1545 05/25/24  2030 05/26/24  0710   POCGLU 97 101 103 86     Blood Sugar Average: Last 72 hrs:  (P) 106.5664526011635103    Seemingly well-controlled diabetes mellitus  SSI w/ Accu-Cheks while hospitalized   PTA Jardiance and Ozempic on hold, resume at d/c   Hypoglycemia protocol  Carbohydrate controlled diet

## 2024-05-27 RX ORDER — CELECOXIB 100 MG/1
100 CAPSULE ORAL 2 TIMES DAILY
Qty: 180 CAPSULE | Refills: 1 | Status: SHIPPED | OUTPATIENT
Start: 2024-05-27

## 2024-05-28 DIAGNOSIS — L03.115 CELLULITIS OF RIGHT LOWER EXTREMITY: Primary | ICD-10-CM

## 2024-05-28 LAB
BACTERIA BLD CULT: NORMAL
BACTERIA BLD CULT: NORMAL

## 2024-05-28 NOTE — PROGRESS NOTES
OPAT NOTE    Supervising/Discharge physician: Lakisha    Diagnosis:  recurrent RLE cellulitis     Drug: Penicillin VK     Dose/Route/Frequency: 250mgPOBID     Labs/Frequency: CBCD BMP prior to appointment    End Date: no end date    Infusion/VNA contact: N/A    Next appointment:6/24        MA assigned: daily

## 2024-05-28 NOTE — UTILIZATION REVIEW
NOTIFICATION OF ADMISSION DISCHARGE   This is a Notification of Discharge from [facility]. Please be advised that this patient has been discharge from our facility. Below you will find the admission and discharge date and time including the patient’s disposition.   UTILIZATION REVIEW CONTACT:  Tamanna Bonilla  Utilization   Network Utilization Review Department  Phone: [phone] x carefully listen to the prompts. All voicemails are confidential.  Email: [email or web address]     ADMISSION INFORMATION  PRESENTATION DATE: 5/23/2024  5:46 PM  OBERVATION ADMISSION DATE:   INPATIENT ADMISSION DATE: 5/23/24  6:50 PM   DISCHARGE DATE: 5/26/2024 11:20 AM   DISPOSITION:Home/Self Care    Network Utilization Review Department  ATTENTION: Please call with any questions or concerns to [phone] and carefully listen to the prompts so that you are directed to the right person. All voicemails are confidential.   For Discharge needs, contact Care Management DC Support Team at [phone] opt. 2  Send all requests for admission clinical reviews, approved or denied determinations and any other requests to dedicated fax number below belonging to the campus where the patient is receiving treatment. List of dedicated fax numbers for the Facilities:  FACILITY NAME UR FAX NUMBER   ADMISSION DENIALS (Administrative/Medical Necessity) [phone]   DISCHARGE SUPPORT TEAM ([facility]) [phone]   PARENT CHILD HEALTH (Maternity/NICU/Pediatrics) [phone]   [facility] [phone]   [facility] [phone]   [facility] [phone]   [facility] [phone]   [facility] [phone]   [facility] [phone]   [facility] [phone]   [facility]  [phone]   [facility] [phone]   [facility] [phone]   [facility] [phone]   [facility] [phone]

## 2024-05-29 ENCOUNTER — TRANSITIONAL CARE MANAGEMENT (OUTPATIENT)
Dept: FAMILY MEDICINE CLINIC | Facility: CLINIC | Age: 64
End: 2024-05-29

## 2024-05-29 ENCOUNTER — TELEPHONE (OUTPATIENT)
Dept: INFECTIOUS DISEASES | Facility: CLINIC | Age: 64
End: 2024-05-29

## 2024-05-29 NOTE — TELEPHONE ENCOUNTER
Called and spoke with patient today.   Went over antibiotic plan, labs and follow up appointment.   Informed patient if there are any further questions or concerns to call the office   Patient verbalizes understanding at this time.

## 2024-05-30 ENCOUNTER — APPOINTMENT (OUTPATIENT)
Dept: LAB | Facility: CLINIC | Age: 64
End: 2024-05-30
Payer: COMMERCIAL

## 2024-05-30 DIAGNOSIS — N40.1 BENIGN PROSTATIC HYPERPLASIA WITH URINARY HESITANCY: ICD-10-CM

## 2024-05-30 DIAGNOSIS — R39.11 BENIGN PROSTATIC HYPERPLASIA WITH URINARY HESITANCY: ICD-10-CM

## 2024-05-30 DIAGNOSIS — E11.42 DIABETIC PERIPHERAL NEUROPATHY (HCC): ICD-10-CM

## 2024-05-30 DIAGNOSIS — R79.89 LOW TESTOSTERONE IN MALE: ICD-10-CM

## 2024-05-30 DIAGNOSIS — Z12.5 ENCOUNTER FOR SCREENING FOR MALIGNANT NEOPLASM OF PROSTATE: ICD-10-CM

## 2024-05-30 DIAGNOSIS — L03.115 CELLULITIS OF RIGHT LOWER EXTREMITY: ICD-10-CM

## 2024-05-30 LAB
ANION GAP SERPL CALCULATED.3IONS-SCNC: 8 MMOL/L (ref 4–13)
BUN SERPL-MCNC: 15 MG/DL (ref 5–25)
CALCIUM SERPL-MCNC: 9 MG/DL (ref 8.4–10.2)
CHLORIDE SERPL-SCNC: 104 MMOL/L (ref 96–108)
CO2 SERPL-SCNC: 23 MMOL/L (ref 21–32)
CREAT SERPL-MCNC: 0.87 MG/DL (ref 0.6–1.3)
CREAT UR-MCNC: 142.9 MG/DL
EST. AVERAGE GLUCOSE BLD GHB EST-MCNC: 126 MG/DL
GFR SERPL CREATININE-BSD FRML MDRD: 92 ML/MIN/1.73SQ M
GLUCOSE P FAST SERPL-MCNC: 94 MG/DL (ref 65–99)
HBA1C MFR BLD: 6 %
MICROALBUMIN UR-MCNC: <7 MG/L
MICROALBUMIN/CREAT 24H UR: <5 MG/G CREATININE (ref 0–30)
POTASSIUM SERPL-SCNC: 3.7 MMOL/L (ref 3.5–5.3)
PSA SERPL-MCNC: 0.46 NG/ML (ref 0–4)
SODIUM SERPL-SCNC: 135 MMOL/L (ref 135–147)
TESTOST SERPL-MSCNC: 114 NG/DL
TSH SERPL DL<=0.05 MIU/L-ACNC: 4.11 UIU/ML (ref 0.45–4.5)

## 2024-05-30 PROCEDURE — 84443 ASSAY THYROID STIM HORMONE: CPT

## 2024-05-30 PROCEDURE — 80048 BASIC METABOLIC PNL TOTAL CA: CPT

## 2024-05-30 PROCEDURE — 36415 COLL VENOUS BLD VENIPUNCTURE: CPT

## 2024-05-30 PROCEDURE — 82570 ASSAY OF URINE CREATININE: CPT

## 2024-05-30 PROCEDURE — G0103 PSA SCREENING: HCPCS

## 2024-05-30 PROCEDURE — 84403 ASSAY OF TOTAL TESTOSTERONE: CPT

## 2024-05-30 PROCEDURE — 82043 UR ALBUMIN QUANTITATIVE: CPT

## 2024-05-30 PROCEDURE — 83036 HEMOGLOBIN GLYCOSYLATED A1C: CPT

## 2024-05-31 ENCOUNTER — RA CDI HCC (OUTPATIENT)
Dept: OTHER | Facility: HOSPITAL | Age: 64
End: 2024-05-31

## 2024-06-07 ENCOUNTER — OFFICE VISIT (OUTPATIENT)
Dept: FAMILY MEDICINE CLINIC | Facility: CLINIC | Age: 64
End: 2024-06-07
Payer: COMMERCIAL

## 2024-06-07 VITALS
HEART RATE: 69 BPM | TEMPERATURE: 96.4 F | OXYGEN SATURATION: 98 % | HEIGHT: 75 IN | DIASTOLIC BLOOD PRESSURE: 77 MMHG | SYSTOLIC BLOOD PRESSURE: 139 MMHG | BODY MASS INDEX: 45.44 KG/M2

## 2024-06-07 DIAGNOSIS — E11.621 TYPE 2 DIABETES MELLITUS WITH FOOT ULCER, WITHOUT LONG-TERM CURRENT USE OF INSULIN (HCC): ICD-10-CM

## 2024-06-07 DIAGNOSIS — I10 ESSENTIAL HYPERTENSION: ICD-10-CM

## 2024-06-07 DIAGNOSIS — L03.115 CELLULITIS OF RIGHT LOWER EXTREMITY: Primary | ICD-10-CM

## 2024-06-07 DIAGNOSIS — R79.89 LOW TESTOSTERONE IN MALE: ICD-10-CM

## 2024-06-07 DIAGNOSIS — D69.6 PLATELETS DECREASED (HCC): ICD-10-CM

## 2024-06-07 DIAGNOSIS — L97.509 TYPE 2 DIABETES MELLITUS WITH FOOT ULCER, WITHOUT LONG-TERM CURRENT USE OF INSULIN (HCC): ICD-10-CM

## 2024-06-07 DIAGNOSIS — L97.511 ULCER OF RIGHT FOOT, LIMITED TO BREAKDOWN OF SKIN (HCC): ICD-10-CM

## 2024-06-07 DIAGNOSIS — I87.2 CHRONIC VENOUS INSUFFICIENCY: ICD-10-CM

## 2024-06-07 DIAGNOSIS — S39.012A STRAIN OF LUMBAR REGION, INITIAL ENCOUNTER: ICD-10-CM

## 2024-06-07 PROCEDURE — 99213 OFFICE O/P EST LOW 20 MIN: CPT | Performed by: PHYSICIAN ASSISTANT

## 2024-06-07 PROCEDURE — 99495 TRANSJ CARE MGMT MOD F2F 14D: CPT | Performed by: PHYSICIAN ASSISTANT

## 2024-06-07 NOTE — PROGRESS NOTES
Transition of Care Visit  Name: Vamshi Robbins      : 1960      MRN: [id number]  Encounter Provider: Cristina Lopez PA-C  Encounter Date: 2024   Encounter department: [facility]    Assessment & Plan   1. Cellulitis of right lower extremity  2. Ulcer of right foot, limited to breakdown of skin ([facility])  3. Strain of lumbar region, initial encounter  4. Platelets decreased ([facility])  5. Essential hypertension  6. Low testosterone in male  7. Type 2 diabetes mellitus with foot ulcer, without long-term current use of insulin (HCC)  -     Albumin / creatinine urine ratio; Future; Expected date: 2024  -     Basic metabolic panel; Future; Expected date: 2024  -     Hemoglobin A1C; Future; Expected date: 2024  8. Chronic venous insufficiency       This 63-year-old male sees me for his primary care services.  Patient was most recently admitted and discharged from [facility] from -.  He had previously been in the hospital until May 1 and he was continued on Keflex until .  He had been seeing a podiatrist Dr. Slater.  Within 48 hours of him stopping or completing treatment with the cephalexin, he once again developed overt cellulitis.    Patient states that his right leg became red hot and he had fever and chills.  He met the sepsis criteria in the emergency department and he was started on cefazolin and metronidazole.  Infectious disease consultation was performed.  He was placed on 7 days of outpatient Keflex therapy followed by twice daily penicillin VK for prophylaxis.    Patient states that during his stay at home, he got up quickly from a chair and injured his back.  He initially felt pain going down into the right thigh but this turned into more of a muscle spasm than radicular pain.  He states that when he was hospitalized that the bed was very uncomfortable in his back remains very sore.  Patient purchased a lumbar  support brace and he feels that this helps his posture and also helps with the pain.    Patient is not having any radicular pain.  He is adherent with his antibiotics.  He continues to follow with infectious disease and his podiatrist who is managing a diabetic foot ulcer on his right foot patient states that the ulcer is diminishing in size.  He states that he will be continuing his antibiotics as long as the ulcer remains.  His podiatrist believes that the ulcer is the nidus of infection for his cellulitis.  In the hospital, and offloading of his heel.  He also follows with another podiatrist Dr. Andi Wild who is also performing wound care.    Patient is not having any fever or chills.  He denies any active drainage.  He does have longstanding chronic venous insufficiency which probably puts him at risk for ongoing infections with cellulitis.  He has a lot of brawny edema due to the chronic ongoing venous insufficiency.    As part of today's visit, we also went over his diabetic lab findings.  His hemoglobin A1c is 6.0 which shows excellent control.  He has no microalbuminuria.  Electrolytes are normal.  His GFR is 92.    We also reviewed his recent testosterone level which was found to be low.  As part of shared decision making, patient has decided to not pursue testosterone replacement at this time so that there is no potential for seeding and feeding any small amount of prostate cancer that might be microscopically present.  His PSA level was normal.    He remains adherent with his medications.  Blood pressure remains under good control.    Patient chronically has low platelets the platelet count while not normal is sufficient.    A total of 55 minutes was spent rendering care for this patient.  This time included review of the patient's electronic medical record, performing the history and physical, reviewing appropriate labs and/or images, developing a treatment and assessment plan, answering patient's  questions and concerns, and documenting the patient visit.  I will see the patient in 3 months and he will have his hemoglobin A1c, BMP, and urine for microalbumin performed prior to the visit.  History of Present Illness   {Disappearing Hyperlinks I Encounters * My Last Note * Since Last Visit * History :44701}  Transitional Care Management Review:   Vamshi Robbins is a 63 y.o. male here for TCM follow up.     During the TCM phone call patient stated:  TCM Call     Date and time call was made  5/29/2024  9:27 AM    Hospital care reviewed  Discussed with Inpatient Physician    Patient was hospitialized at  [facility]    Date of Admission  05/23/24    Date of discharge  05/26/24    Diagnosis  cellulitis    Disposition  Home    Were the patients medications reviewed and updated  Yes    Current Symptoms  None      TCM Call     Post hospital issues  None    Should patient be enrolled in anticoag monitoring?  No    Scheduled for follow up?  Yes    Referrals needed  podiatrist    Did you obtain your prescribed medications  Yes    Do you need help managing your prescriptions or medications  No    Is transportation to your appointment needed  No    I have advised the patient to call PCP with any new or worsening symptoms  Nava Blanco MA    Living Arrangements  Spouse or Significiant other    Support System  Family; Friends    The type of support provided  Emotional; Physical    Do you have social support  Yes, as much as I need    Are you recieving any outpatient services  No    Are you recieving home care services  No    Are you using any community resources  No    Current waiver services  No    Have you fallen in the last 12 months  No    Interperter language line needed  No    Counseling  Patient    Counseling topics  Diagnostic results        HPI: 63-year-old pleasant male who is alert oriented and cooperative with the exam.  He is back at home.  He went to a local [place] park with his grandson  "children yesterday and had a wonderful trip.  He was using an electronic scooter throughout the day so he was able to participate and stay with his granddaughters.    He does not need any home nursing PT or OT services.  He continues to follow for his diabetic wound care.    He denies pain in his chest heart palpitations dizziness or lightheadedness.  His back is improving.  He is not having any more muscle spasm.  He does not have radicular pain.    He continues to use a cane for ambulation and this is working well for him.  His gait is steadier using the cane at this time.  Review of Systems: Recent hospitalizations x 2 within the last 1 month..  No fever or chills.  No recent URI or viral syndrome.  Objective   {Disappearing Hyperlinks   Review Vitals * Enter New Vitals * Results Review * Labs * Imaging * Cardiology * Procedures * Lung Cancer Screening :17132}  /77 (BP Location: Left arm, Patient Position: Sitting, Cuff Size: Large)   Pulse 69   Temp (!) 96.4 °F (35.8 °C) (Tympanic)   Ht 6' 3\" (1.905 m)   SpO2 98%   BMI 45.44 kg/m²     Physical Exam well-developed well-nourished 63-year-old male who has a nontoxic appearance.  Patient is well-adjusted happy and cooperative with the exam.    Heart is regular rate without murmur rub or gallops.  Lungs are clear to auscultation.  Medications have been reviewed by provider in current encounter    Administrative Statements {Disappearing Hyperlinks I  Level of Service * PCMH/PCSP:88189}        "

## 2024-06-10 DIAGNOSIS — F41.1 GAD (GENERALIZED ANXIETY DISORDER): ICD-10-CM

## 2024-06-10 RX ORDER — LORAZEPAM 0.5 MG/1
0.5 TABLET ORAL DAILY PRN
Qty: 60 TABLET | Refills: 1 | Status: SHIPPED | OUTPATIENT
Start: 2024-06-10

## 2024-06-24 ENCOUNTER — APPOINTMENT (OUTPATIENT)
Dept: LAB | Facility: CLINIC | Age: 64
End: 2024-06-24

## 2024-06-24 DIAGNOSIS — L03.115 CELLULITIS OF RIGHT LEG: ICD-10-CM

## 2024-06-24 PROBLEM — A41.9 SEPSIS (HCC): Status: RESOLVED | Noted: 2024-04-27 | Resolved: 2024-06-24

## 2024-06-24 LAB
ANION GAP SERPL CALCULATED.3IONS-SCNC: 13 MMOL/L (ref 4–13)
BASOPHILS # BLD AUTO: 0.06 THOUSANDS/ÂΜL (ref 0–0.1)
BASOPHILS NFR BLD AUTO: 1 % (ref 0–1)
BUN SERPL-MCNC: 11 MG/DL (ref 5–25)
CALCIUM SERPL-MCNC: 9.2 MG/DL (ref 8.4–10.2)
CHLORIDE SERPL-SCNC: 104 MMOL/L (ref 96–108)
CO2 SERPL-SCNC: 22 MMOL/L (ref 21–32)
CREAT SERPL-MCNC: 0.94 MG/DL (ref 0.6–1.3)
EOSINOPHIL # BLD AUTO: 0.11 THOUSAND/ÂΜL (ref 0–0.61)
EOSINOPHIL NFR BLD AUTO: 1 % (ref 0–6)
ERYTHROCYTE [DISTWIDTH] IN BLOOD BY AUTOMATED COUNT: 14.6 % (ref 11.6–15.1)
GFR SERPL CREATININE-BSD FRML MDRD: 86 ML/MIN/1.73SQ M
GLUCOSE SERPL-MCNC: 145 MG/DL (ref 65–140)
HCT VFR BLD AUTO: 46.1 % (ref 36.5–49.3)
HGB BLD-MCNC: 15.5 G/DL (ref 12–17)
IMM GRANULOCYTES # BLD AUTO: 0.12 THOUSAND/UL (ref 0–0.2)
IMM GRANULOCYTES NFR BLD AUTO: 1 % (ref 0–2)
LYMPHOCYTES # BLD AUTO: 2.19 THOUSANDS/ÂΜL (ref 0.6–4.47)
LYMPHOCYTES NFR BLD AUTO: 24 % (ref 14–44)
MCH RBC QN AUTO: 30.8 PG (ref 26.8–34.3)
MCHC RBC AUTO-ENTMCNC: 33.6 G/DL (ref 31.4–37.4)
MCV RBC AUTO: 92 FL (ref 82–98)
MONOCYTES # BLD AUTO: 0.62 THOUSAND/ÂΜL (ref 0.17–1.22)
MONOCYTES NFR BLD AUTO: 7 % (ref 4–12)
NEUTROPHILS # BLD AUTO: 5.91 THOUSANDS/ÂΜL (ref 1.85–7.62)
NEUTS SEG NFR BLD AUTO: 66 % (ref 43–75)
NRBC BLD AUTO-RTO: 0 /100 WBCS
PLATELET # BLD AUTO: 152 THOUSANDS/UL (ref 149–390)
PMV BLD AUTO: 10.1 FL (ref 8.9–12.7)
POTASSIUM SERPL-SCNC: 3.9 MMOL/L (ref 3.5–5.3)
RBC # BLD AUTO: 5.04 MILLION/UL (ref 3.88–5.62)
SODIUM SERPL-SCNC: 139 MMOL/L (ref 135–147)
WBC # BLD AUTO: 9.01 THOUSAND/UL (ref 4.31–10.16)

## 2024-06-24 RX ORDER — PENICILLIN V POTASSIUM 250 MG/1
250 TABLET ORAL 2 TIMES DAILY
Qty: 60 TABLET | Refills: 0 | Status: SHIPPED | OUTPATIENT
Start: 2024-06-24 | End: 2024-07-24

## 2024-06-24 NOTE — TELEPHONE ENCOUNTER
Reason for call:   [x] Refill   [] Prior Auth  [] Other:     Office:   [x] PCP/Provider -   [] Specialty/Provider -     PLEASE ADVISE IF OK TO REFILL, PATIENT WAS GIVEN IN THE HOSPITAL    penicillin V potassium (VEETID) 250 mg tablet     Pharmacy:   [id number] - [place], [place] - [address] [facility]  [address], [facility] [address]  Phone: [phone]  Fax: [phone]  DO #: [id number]     Does the patient have enough for 3 days?   [] Yes   [x] No - Send as HP to POD

## 2024-07-01 ENCOUNTER — TELEMEDICINE (OUTPATIENT)
Age: 64
End: 2024-07-01
Payer: COMMERCIAL

## 2024-07-01 DIAGNOSIS — L03.115 CELLULITIS OF RIGHT LOWER EXTREMITY: Primary | ICD-10-CM

## 2024-07-01 DIAGNOSIS — E66.01 MORBID OBESITY (HCC): ICD-10-CM

## 2024-07-01 PROCEDURE — 99214 OFFICE O/P EST MOD 30 MIN: CPT | Performed by: PHYSICIAN ASSISTANT

## 2024-07-01 PROCEDURE — G2211 COMPLEX E/M VISIT ADD ON: HCPCS | Performed by: PHYSICIAN ASSISTANT

## 2024-07-01 NOTE — PROGRESS NOTES
Virtual Outpatient Progress Note - [facility] Infectious Disease   Vamshi Robbins 63 y.o. male MRN: [id number]  Encounter: [id number]      Virtual Regular Visit  [place] of [place] location: [place]  Patient is located in the following [facility] in which I hold an active license [place]    Problem List Items Addressed This Visit          Other    Cellulitis of right lower extremity - Primary    Relevant Orders    CBC and differential    Basic metabolic panel    Morbid obesity (HCC)            Encounter provider Lee Tolbert PA-C    Provider located at INFECTIOUS DISEASE [facility] [address] [address] [phone]    The patient was identified by name and date of birth. Vamshi Robbins was informed that this is a telemedicine visit and that the visit is being conducted through the [employer] platform. He agrees to proceed..  My office door was closed. No one else was in the room.  He acknowledged consent and understanding of privacy and security of the video platform. The patient has agreed to participate and understands they can discontinue the visit at any time.    Patient is aware this is a billable service.       Assessment/Plan:  1. Recurrent right lower extremity cellulitis. Recent admissions to [facility] with Patient presented systemic complaints and right lower extremity erythema. This is in the setting of a chronic right heel wound and suspected uncontrolled venous stasis. No wound cultures available however suspect a streptococcal etiology. He was started on prophylactic pen VK until #2 resolves. He is tolerating the abx without recurrence in cellulitis. Wound continues to improve. Reviewed recent labs from 6/24 and stable.  -Continue prophylactic penicillin  mg twice daily - declined refills  -Encourage lower extremity elevation and compression with follow-up in lymphedema clinic  -Outpatient follow-up with podiatry  -Anticipate  "prophylaxis antibiotics until heel ulceration resolves  -Outpatient ID follow-up in 4 weeks   -Repeat CBC differential and BMP prior to ID follow-up     2.  Chronic right heel diabetic foot ulceration. Likely portal of entry for #2.  This is complicated by venous stasis.  Recent MRI was negative and ABIs within normal range for healing. Ulceration continues to improve though not resolved.   -Abx as above   -Close follow-up with podiatry upon discharge  -Local wound care and serial skin exams     3.  Type 2 diabetes mellitus, A1c 6.1 with obesity, BMI 45.44.  Remains a risk factor for venous stasis, poor wound healing and infection.  -Lifestyle modifications encouraged     Above assessment and plan discussed in detail with patient during examination.     Antibiotics:  PO Pen VK    Subjective:  Vamshi Robbins is a 63 y.o. male patient who presents to outpatient ID office for ongoing management of recurrent cellulitis. Patient is tolerating the pen VK. He denies any pain or recurrent erythema of the LE. No additional abx required recently. Has successfully stayed out of the hospital since his last admission in may. He continues to follow podiatry Dr. Wild. States wound continues to improve though not resolved. He states he recently had a \"Water blister\" that was unroofed but this is also healing. He denies fevers or chills.     Past Medical History:   Diagnosis Date    Anxiety     Arthritis     Chronic pain disorder     mid back region    Colon polyp     COVID-19     CPAP (continuous positive airway pressure) dependence     Pt uses nightly    Diabetes mellitus (HCC)     Diverticulitis of colon 3 years ago    No issues    Diverticulosis     History of recent hospitalization 06/16/2021    Pt was admitted to [facility] after syncopal episode. Pt saw cardiology- all tests negative. Pt had nl EEG. pt states is was a medication interaction.    Hyperlipidemia     Hypertension     Obesity     Sleep apnea     Spinal stenosis     " Wears hearing aid        Past Surgical History:   Procedure Laterality Date    ACHILLES TENDON REPAIR      Pt had a rupture in right and left 2 years apart    COLONOSCOPY      EPIDURAL BLOCK INJECTION      Pt had in lumbar region.    EPIDURAL BLOCK INJECTION N/A 04/06/2021    Procedure: T-11-T-12 INTERLAMINAR THORACIC EPIDURAL STEROID INJECTION;  Surgeon: Guzman Harrison MD;  [facility];  Service: Pain Management     EPIDURAL BLOCK INJECTION Right 07/20/2021    Procedure: L5 and S1 TRANSFORAMINAL EPIDURAL STEROID INJECTION;  Surgeon: Guzamn Harrison MD;  [facility];  Service: Pain Management     FL GUIDED NEEDLE PLAC BX/ASP/INJ  07/14/2022    FL GUIDED NEEDLE PLAC BX/ASP/INJ  08/18/2022    FL GUIDED NEEDLE PLAC BX/ASP/INJ  09/15/2022    FOOT SURGERY Left     Left great toe- had a hammertoe.    HIP SURGERY      Replaced    JOINT REPLACEMENT Left     Total hip, knee    JOINT REPLACEMENT Right     Total hip, knee    KNEE ARTHROSCOPY Bilateral     NERVE BLOCK Bilateral 07/14/2022    Procedure: BLOCK MEDIAL BRANCH T12, L1, L2;  Surgeon: Guzman Harrison MD;  [facility];  Service: Pain Management     NERVE BLOCK Bilateral 08/18/2022    Procedure: BLOCK MEDIAL BRANCH T12, L1, L2 #2;  Surgeon: Guzman Harrison MD;  [facility];  Service: Pain Management     NERVE BLOCK Bilateral 01/26/2023    Procedure: BLOCK MEDIAL BRANCH L3, L4, L5 #1;  Surgeon: Guzman Harrison MD;  [facility];  Service: Pain Management     NERVE BLOCK Bilateral 02/16/2023    Procedure: BLOCK MEDIAL BRANCH L3, L4, L5 #2;  Surgeon: Guzman Harrison MD;  [facility];  Service: Pain Management     ME JOE IMPLTJ NSTIM ELTRDS PLATE/PADDLE EDRL Left 10/26/2021    Procedure: Thoracic laminectomies for placement of spinal cord stimulator and internal pulse generator, use of neuromonitoring, left sided pulse generator;  Surgeon: Rob Wong MD;  Location:  MAIN OR;  Service: Neurosurgery    ME PRQ IMPLTJ  NSTIM ELECTRODE ARRAY EPIDURAL Bilateral 09/30/2021    Procedure: NEVRO SCS TRIAL;  Surgeon: Guzman Harrison MD;  [facility];  Service: Pain Management     RHIZOTOMY Bilateral 09/15/2022    Procedure: RHIZOTOMY LUMBAR T12, L1, L2 medial branch nerves;  Surgeon: Guzman Harrison MD;  [facility];  Service: Pain Management     RHIZOTOMY Bilateral 03/02/2023    Procedure: RHIZOTOMY LUMBAR L3, L4, L5;  Surgeon: Guzman Harrison MD;  [facility];  Service: Pain Management     RHIZOTOMY Bilateral 03/16/2023    Procedure: RHIZOTOMY LUMBAR T10, T11, T12 medial branch nerves;  Surgeon: Guzman Harrison MD;  [facility];  Service: Pain Management     TOTAL KNEE ARTHROPLASTY Left        Current Outpatient Medications   Medication Sig Dispense Refill    atorvastatin (LIPITOR) 10 mg tablet Take 10 mg tablet every other day 45 tablet 3    atorvastatin (LIPITOR) 20 mg tablet Take 1 tablet every other day.  This should be alternating with the other prescription for 10 mg. 45 tablet 3    celecoxib (CeleBREX) 100 mg capsule TAKE 1 CAPSULE TWICE DAILY 180 capsule 1    Empagliflozin (Jardiance) 25 MG TABS Take 1 tablet (25 mg total) by mouth daily 90 tablet 3    LORazepam (ATIVAN) 0.5 mg tablet Take 1 tablet (0.5 mg total) by mouth daily as needed for anxiety 60 tablet 1    penicillin V potassium (VEETID) 250 mg tablet Take 1 tablet (250 mg total) by mouth 2 (two) times a day 60 tablet 0    semaglutide, 0.25 or 0.5 mg/dose, (Ozempic, 0.25 or 0.5 MG/DOSE,) 2 mg/3 mL injection pen Inject 0.75 mL (0.5 mg total) under the skin every 7 days 12 mL 3    sertraline (ZOLOFT) 50 mg tablet Take 1.5 tablets (75 mg total) by mouth daily at bedtime 135 tablet 3    tadalafil (CIALIS) 10 MG tablet Take one tablet as needed for ED. 30 tablet 3    valsartan (DIOVAN) 80 mg tablet Take 1 tablet (80 mg total) by mouth daily 90 tablet 3     No current facility-administered medications for this visit.        Allergies   Allergen  Reactions    Doxycycline Photosensitivity       Video Exam  Exam limited due to virtual visit     There were no vitals filed for this visit.    Physical Exam  Constitutional:       Appearance: Normal appearance. He is obese. He is not ill-appearing.   HENT:      Head: Normocephalic and atraumatic.      Mouth/Throat:      Mouth: Mucous membranes are moist.   Eyes:      Extraocular Movements: Extraocular movements intact.   Musculoskeletal:      Cervical back: Normal range of motion and neck supple.   Skin:     Comments: Chronic LE lymphedema without cellulitis    Neurological:      Mental Status: He is alert.   Psychiatric:         Mood and Affect: Mood normal.         Behavior: Behavior normal.          Labs, Imaging, & Other studies:   All pertinent labs and imaging studies were personally reviewed by me from 6/24.  The following portions of the patient's history were reviewed and updated as appropriate: allergies, current medications, past family history, past medical history, past social history, past surgical history and problem list.    I spent 30 minutes with patient today in which greater than 50% of the time was spent in counseling/coordination of care regarding ongoing management of pen abx suppression for recurrent cellulitis with foot ulceration,review of recent labs and hospital records, review of outpatient PCP recs, answered all questions to the best of my ability.

## 2024-07-24 DIAGNOSIS — L03.115 CELLULITIS OF RIGHT LEG: ICD-10-CM

## 2024-07-24 RX ORDER — PENICILLIN V POTASSIUM 250 MG/1
250 TABLET ORAL 2 TIMES DAILY
Qty: 60 TABLET | Refills: 0 | Status: SHIPPED | OUTPATIENT
Start: 2024-07-24 | End: 2024-08-23

## 2024-07-25 ENCOUNTER — TELEPHONE (OUTPATIENT)
Dept: INFECTIOUS DISEASES | Facility: CLINIC | Age: 64
End: 2024-07-25

## 2024-07-25 NOTE — TELEPHONE ENCOUNTER
Called and spoke patient today.   Reminded patient of upcoming virtual appointment and to have labs done prior so results can be gone over at the appointment. Patient states he will have them done tomorrow 7/26/24.

## 2024-07-26 ENCOUNTER — APPOINTMENT (OUTPATIENT)
Dept: LAB | Facility: CLINIC | Age: 64
End: 2024-07-26
Payer: COMMERCIAL

## 2024-07-26 DIAGNOSIS — L03.115 CELLULITIS OF RIGHT LOWER EXTREMITY: ICD-10-CM

## 2024-07-26 LAB
ANION GAP SERPL CALCULATED.3IONS-SCNC: 11 MMOL/L (ref 4–13)
BASOPHILS # BLD AUTO: 0.06 THOUSANDS/ÂΜL (ref 0–0.1)
BASOPHILS NFR BLD AUTO: 1 % (ref 0–1)
BUN SERPL-MCNC: 11 MG/DL (ref 5–25)
CALCIUM SERPL-MCNC: 9.1 MG/DL (ref 8.4–10.2)
CHLORIDE SERPL-SCNC: 104 MMOL/L (ref 96–108)
CO2 SERPL-SCNC: 24 MMOL/L (ref 21–32)
CREAT SERPL-MCNC: 0.98 MG/DL (ref 0.6–1.3)
EOSINOPHIL # BLD AUTO: 0.18 THOUSAND/ÂΜL (ref 0–0.61)
EOSINOPHIL NFR BLD AUTO: 2 % (ref 0–6)
ERYTHROCYTE [DISTWIDTH] IN BLOOD BY AUTOMATED COUNT: 14.4 % (ref 11.6–15.1)
GFR SERPL CREATININE-BSD FRML MDRD: 82 ML/MIN/1.73SQ M
GLUCOSE P FAST SERPL-MCNC: 96 MG/DL (ref 65–99)
HCT VFR BLD AUTO: 44.7 % (ref 36.5–49.3)
HGB BLD-MCNC: 15.3 G/DL (ref 12–17)
IMM GRANULOCYTES # BLD AUTO: 0.11 THOUSAND/UL (ref 0–0.2)
IMM GRANULOCYTES NFR BLD AUTO: 1 % (ref 0–2)
LYMPHOCYTES # BLD AUTO: 2.38 THOUSANDS/ÂΜL (ref 0.6–4.47)
LYMPHOCYTES NFR BLD AUTO: 29 % (ref 14–44)
MCH RBC QN AUTO: 31.4 PG (ref 26.8–34.3)
MCHC RBC AUTO-ENTMCNC: 34.2 G/DL (ref 31.4–37.4)
MCV RBC AUTO: 92 FL (ref 82–98)
MONOCYTES # BLD AUTO: 0.55 THOUSAND/ÂΜL (ref 0.17–1.22)
MONOCYTES NFR BLD AUTO: 7 % (ref 4–12)
NEUTROPHILS # BLD AUTO: 5.01 THOUSANDS/ÂΜL (ref 1.85–7.62)
NEUTS SEG NFR BLD AUTO: 60 % (ref 43–75)
NRBC BLD AUTO-RTO: 0 /100 WBCS
PLATELET # BLD AUTO: 174 THOUSANDS/UL (ref 149–390)
PMV BLD AUTO: 10.3 FL (ref 8.9–12.7)
POTASSIUM SERPL-SCNC: 3.8 MMOL/L (ref 3.5–5.3)
RBC # BLD AUTO: 4.87 MILLION/UL (ref 3.88–5.62)
SODIUM SERPL-SCNC: 139 MMOL/L (ref 135–147)
WBC # BLD AUTO: 8.29 THOUSAND/UL (ref 4.31–10.16)

## 2024-07-26 PROCEDURE — 80048 BASIC METABOLIC PNL TOTAL CA: CPT

## 2024-07-26 PROCEDURE — 85025 COMPLETE CBC W/AUTO DIFF WBC: CPT

## 2024-07-26 PROCEDURE — 36415 COLL VENOUS BLD VENIPUNCTURE: CPT

## 2024-07-31 ENCOUNTER — TELEMEDICINE (OUTPATIENT)
Age: 64
End: 2024-07-31
Payer: COMMERCIAL

## 2024-07-31 DIAGNOSIS — E66.01 MORBID OBESITY (HCC): ICD-10-CM

## 2024-07-31 DIAGNOSIS — L97.509 TYPE 2 DIABETES MELLITUS WITH FOOT ULCER, WITHOUT LONG-TERM CURRENT USE OF INSULIN (HCC): ICD-10-CM

## 2024-07-31 DIAGNOSIS — E11.621 TYPE 2 DIABETES MELLITUS WITH FOOT ULCER, WITHOUT LONG-TERM CURRENT USE OF INSULIN (HCC): ICD-10-CM

## 2024-07-31 DIAGNOSIS — L03.115 CELLULITIS OF RIGHT LOWER EXTREMITY: Primary | ICD-10-CM

## 2024-07-31 PROCEDURE — 99213 OFFICE O/P EST LOW 20 MIN: CPT | Performed by: PHYSICIAN ASSISTANT

## 2024-07-31 PROCEDURE — G2211 COMPLEX E/M VISIT ADD ON: HCPCS | Performed by: PHYSICIAN ASSISTANT

## 2024-07-31 NOTE — PROGRESS NOTES
Virtual Outpatient Progress Note - [facility] Infectious Disease   Vamshi Robbins 63 y.o. male MRN: [id number]  Encounter: [id number]      Virtual Regular Visit  [place] of [place] location: [place]  Patient is located in the following state in which I hold an active license [place]    Problem List Items Addressed This Visit          Endocrine    Type 2 diabetes mellitus with skin complication, without long-term current use of insulin (HCC)       Other    Cellulitis of right lower extremity - Primary    Morbid obesity (HCC)            Encounter provider Lee Tolbert PA-C    Provider [facility] [address] [address] [phone]    The patient was identified by name and date of birth. Vamshi Robbins was informed that this is a telemedicine visit and that the visit is being conducted through the Epic Embedded platform. He agrees to proceed..  My office door was closed. No one else was in the room.  He acknowledged consent and understanding of privacy and security of the video platform. The patient has agreed to participate and understands they can discontinue the visit at any time.    Patient is aware this is a billable service.     Assessment/Plan:  1. Recurrent right lower extremity cellulitis. Recent admissions to [facility] with systemic complaints and right lower extremity erythema. This is in the setting of a chronic right heel wound and suspected uncontrolled venous stasis. No wound cultures available however suspect a streptococcal etiology. He was started on prophylactic pen VK until #2 resolves. He is tolerating the abx without recurrence in cellulitis. Wound continues to improve. Reviewed recent labs from 7/26 and stable.  -Continue prophylactic penicillin  mg twice daily - declined refills  -Encourage lower extremity elevation and compression with follow-up in lymphedema clinic  -Outpatient  follow-up with podiatry  -Anticipate prophylaxis antibiotics until heel ulceration resolves  -Outpatient ID follow-up in 4 weeks   -Repeat CBC differential and BMP prior to ID follow-up     2.  Chronic right heel diabetic foot ulceration. Likely portal of entry for #2.  This is complicated by venous stasis.  Recent MRI was negative and ABIs within normal range for healing. Ulceration continues to improve though not resolved.   -Abx as above   -Close follow-up with podiatry   -Local wound care and serial skin exams     3.  Type 2 diabetes mellitus, A1c 6.1 with obesity, BMI 45.44.  Remains a risk factor for venous stasis, poor wound healing and infection.  -Lifestyle modifications encouraged    Above assessment and plan discussed in detail with patient during examination.     Antibiotics:  PO pen VK    Subjective:  Vamshi Robbins is a 63 y.o. male patient who presents to outpatient ID office virtually for ongoing management of recurrent right lower extremity cellulitis due to nonhealing diabetic foot ulceration.  Per patient and his wife, the wound is finally showing some evidence of improvement with healthy surrounding tissue.  He continues to follow podiatry with podiatrist coming to his home on a weekly basis.  No fevers or chills.  No lower extremity edema.  No pain in his foot or lower extremity.  Compliant with antibiotic.  No recent admissions.    Past Medical History:   Diagnosis Date    Anxiety     Arthritis     Chronic pain disorder     mid back region    Colon polyp     COVID-19     CPAP (continuous positive airway pressure) dependence     Pt uses nightly    Diabetes mellitus (HCC)     Diverticulitis of colon 3 years ago    No issues    Diverticulosis     History of recent hospitalization 06/16/2021    Pt was admitted to [facility] after syncopal episode. Pt saw cardiology- all tests negative. Pt had nl EEG. pt states is was a medication interaction.    Hyperlipidemia     Hypertension     Obesity     Sleep apnea      Spinal stenosis     Wears hearing aid        Past Surgical History:   Procedure Laterality Date    ACHILLES TENDON REPAIR      Pt had a rupture in right and left 2 years apart    COLONOSCOPY      EPIDURAL BLOCK INJECTION      Pt had in lumbar region.    EPIDURAL BLOCK INJECTION N/A 04/06/2021    Procedure: T-11-T-12 INTERLAMINAR THORACIC EPIDURAL STEROID INJECTION;  Surgeon: Guzman Harrison MD;  [facility];  Service: Pain Management     EPIDURAL BLOCK INJECTION Right 07/20/2021    Procedure: L5 and S1 TRANSFORAMINAL EPIDURAL STEROID INJECTION;  Surgeon: Guzman Harrison MD;  [facility];  Service: Pain Management     FL GUIDED NEEDLE PLAC BX/ASP/INJ  07/14/2022    FL GUIDED NEEDLE PLAC BX/ASP/INJ  08/18/2022    FL GUIDED NEEDLE PLAC BX/ASP/INJ  09/15/2022    FOOT SURGERY Left     Left great toe- had a hammertoe.    HIP SURGERY      Replaced    JOINT REPLACEMENT Left     Total hip, knee    JOINT REPLACEMENT Right     Total hip, knee    KNEE ARTHROSCOPY Bilateral     NERVE BLOCK Bilateral 07/14/2022    Procedure: BLOCK MEDIAL BRANCH T12, L1, L2;  Surgeon: Guzman Harrison MD;  [facility]: [facility];  Service: Pain Management     NERVE BLOCK Bilateral 08/18/2022    Procedure: BLOCK MEDIAL BRANCH T12, L1, L2 #2;  Surgeon: Guzman Harrison MD;  [facility]: [facility];  Service: Pain Management     NERVE BLOCK Bilateral 01/26/2023    Procedure: BLOCK MEDIAL BRANCH L3, L4, L5 #1;  Surgeon: Guzman Harrison MD;  [facility]: [facility];  Service: Pain Management     NERVE BLOCK Bilateral 02/16/2023    Procedure: BLOCK MEDIAL BRANCH L3, L4, L5 #2;  Surgeon: Guzman Harrison MD;  [facility]: [facility];  Service: Pain Management     [place] JOE IMPLTJ NSTIM ELTRDS PLATE/PADDLE EDRL Left 10/26/2021    Procedure: Thoracic laminectomies for placement of spinal cord stimulator and internal pulse generator, use of neuromonitoring, left sided pulse generator;  Surgeon: Rob Wong MD;  Location:  MAIN OR;  Service:  Neurosurgery    CA PRQ IMPLTJ NSTIM ELECTRODE ARRAY EPIDURAL Bilateral 09/30/2021    Procedure: NEVRO SCS TRIAL;  Surgeon: Guzman Harrison MD;  [facility];  Service: Pain Management     RHIZOTOMY Bilateral 09/15/2022    Procedure: RHIZOTOMY LUMBAR T12, L1, L2 medial branch nerves;  Surgeon: Guzman Harrison MD;  [facility];  Service: Pain Management     RHIZOTOMY Bilateral 03/02/2023    Procedure: RHIZOTOMY LUMBAR L3, L4, L5;  Surgeon: Guzman Harrison MD;  [facility];  Service: Pain Management     RHIZOTOMY Bilateral 03/16/2023    Procedure: RHIZOTOMY LUMBAR T10, T11, T12 medial branch nerves;  Surgeon: Guzman Harrison MD;  [facility];  Service: Pain Management     TOTAL KNEE ARTHROPLASTY Left        Current Outpatient Medications   Medication Sig Dispense Refill    atorvastatin (LIPITOR) 10 mg tablet Take 10 mg tablet every other day 45 tablet 3    atorvastatin (LIPITOR) 20 mg tablet Take 1 tablet every other day.  This should be alternating with the other prescription for 10 mg. 45 tablet 3    celecoxib (CeleBREX) 100 mg capsule TAKE 1 CAPSULE TWICE DAILY 180 capsule 1    Empagliflozin (Jardiance) 25 MG TABS Take 1 tablet (25 mg total) by mouth daily 90 tablet 3    LORazepam (ATIVAN) 0.5 mg tablet Take 1 tablet (0.5 mg total) by mouth daily as needed for anxiety 60 tablet 1    penicillin V potassium (VEETID) 250 mg tablet Take 1 tablet (250 mg total) by mouth 2 (two) times a day 60 tablet 0    semaglutide, 0.25 or 0.5 mg/dose, (Ozempic, 0.25 or 0.5 MG/DOSE,) 2 mg/3 mL injection pen Inject 0.75 mL (0.5 mg total) under the skin every 7 days 12 mL 3    sertraline (ZOLOFT) 50 mg tablet Take 1.5 tablets (75 mg total) by mouth daily at bedtime 135 tablet 3    tadalafil (CIALIS) 10 MG tablet Take one tablet as needed for ED. 30 tablet 3    valsartan (DIOVAN) 80 mg tablet Take 1 tablet (80 mg total) by mouth daily 90 tablet 3     No current facility-administered medications for this  visit.        Allergies   Allergen Reactions    Doxycycline Photosensitivity       Video Exam  Exam limited due to virtual visit     There were no vitals filed for this visit.    Physical Exam  Constitutional:       Appearance: Normal appearance. He is obese. He is not ill-appearing.   HENT:      Head: Normocephalic and atraumatic.   Eyes:      Extraocular Movements: Extraocular movements intact.      Pupils: Pupils are equal, round, and reactive to light.   Musculoskeletal:      Cervical back: Normal range of motion and neck supple.   Skin:     Comments: Difficult to evaluate heel wound as it is currently dressed but was able to see a picture of the wound which appears to have clean base with granulation tissue without surrounding erythema and evidence of new epidermis at wound edges   Neurological:      General: No focal deficit present.      Mental Status: He is alert.   Psychiatric:         Mood and Affect: Mood normal.         Behavior: Behavior normal.        Labs, Imaging, & Other studies:   All pertinent labs and imaging studies were personally reviewed by me from 7/26.    Results from last 7 days   Lab Units 07/26/24  0812   WBC Thousand/uL 8.29   HEMOGLOBIN g/dL 15.3   PLATELETS Thousands/uL 174     Results from last 7 days   Lab Units 07/26/24  0812   SODIUM mmol/L 139   POTASSIUM mmol/L 3.8   CHLORIDE mmol/L 104   CO2 mmol/L 24   BUN mg/dL 11   CREATININE mg/dL 0.98   EGFR ml/min/1.73sq m 82   CALCIUM mg/dL 9.1                       The following portions of the patient's history were reviewed and updated as appropriate: allergies, current medications, past family history, past medical history, past social history, past surgical history and problem list.    I spent 27 minutes with patient today in which greater than 50% of the time was spent in counseling/coordination of care regarding plan to continue antibiotics until heel wound has resolved/closed, plan to continue antibiotics and follow-up with  infectious disease, answered all questions to the best my ability

## 2024-08-23 DIAGNOSIS — L03.115 CELLULITIS OF RIGHT LEG: ICD-10-CM

## 2024-08-23 RX ORDER — PENICILLIN V POTASSIUM 250 MG/1
250 TABLET, FILM COATED ORAL 2 TIMES DAILY
Qty: 60 TABLET | Refills: 0 | Status: SHIPPED | OUTPATIENT
Start: 2024-08-23 | End: 2024-09-22

## 2024-08-28 DIAGNOSIS — F41.1 GAD (GENERALIZED ANXIETY DISORDER): ICD-10-CM

## 2024-08-29 ENCOUNTER — RA CDI HCC (OUTPATIENT)
Dept: OTHER | Facility: HOSPITAL | Age: 64
End: 2024-08-29

## 2024-08-29 RX ORDER — LORAZEPAM 0.5 MG/1
0.5 TABLET ORAL DAILY PRN
Qty: 60 TABLET | Refills: 0 | Status: SHIPPED | OUTPATIENT
Start: 2024-08-29

## 2024-09-03 ENCOUNTER — APPOINTMENT (OUTPATIENT)
Dept: LAB | Facility: CLINIC | Age: 64
End: 2024-09-03
Payer: COMMERCIAL

## 2024-09-03 ENCOUNTER — OFFICE VISIT (OUTPATIENT)
Dept: URGENT CARE | Facility: CLINIC | Age: 64
End: 2024-09-03
Payer: COMMERCIAL

## 2024-09-03 VITALS
DIASTOLIC BLOOD PRESSURE: 90 MMHG | OXYGEN SATURATION: 93 % | TEMPERATURE: 97 F | HEIGHT: 76 IN | HEART RATE: 113 BPM | SYSTOLIC BLOOD PRESSURE: 152 MMHG | BODY MASS INDEX: 38.36 KG/M2 | RESPIRATION RATE: 16 BRPM | WEIGHT: 315 LBS

## 2024-09-03 DIAGNOSIS — L08.9 LOCAL INFECTION OF THE SKIN AND SUBCUTANEOUS TISSUE, UNSPECIFIED: ICD-10-CM

## 2024-09-03 DIAGNOSIS — L98.491 SKIN ULCER, LIMITED TO BREAKDOWN OF SKIN (HCC): Primary | ICD-10-CM

## 2024-09-03 DIAGNOSIS — E11.621 TYPE 2 DIABETES MELLITUS WITH FOOT ULCER, WITHOUT LONG-TERM CURRENT USE OF INSULIN (HCC): ICD-10-CM

## 2024-09-03 DIAGNOSIS — L03.115 CELLULITIS OF RIGHT LOWER EXTREMITY: ICD-10-CM

## 2024-09-03 DIAGNOSIS — L97.509 TYPE 2 DIABETES MELLITUS WITH FOOT ULCER, WITHOUT LONG-TERM CURRENT USE OF INSULIN (HCC): ICD-10-CM

## 2024-09-03 LAB
ANION GAP SERPL CALCULATED.3IONS-SCNC: 12 MMOL/L (ref 4–13)
BASOPHILS # BLD AUTO: 0.07 THOUSANDS/ÂΜL (ref 0–0.1)
BASOPHILS NFR BLD AUTO: 1 % (ref 0–1)
BUN SERPL-MCNC: 11 MG/DL (ref 5–25)
CALCIUM SERPL-MCNC: 9.5 MG/DL (ref 8.4–10.2)
CHLORIDE SERPL-SCNC: 103 MMOL/L (ref 96–108)
CO2 SERPL-SCNC: 24 MMOL/L (ref 21–32)
CREAT SERPL-MCNC: 0.89 MG/DL (ref 0.6–1.3)
CREAT UR-MCNC: 81.7 MG/DL
EOSINOPHIL # BLD AUTO: 0.15 THOUSAND/ÂΜL (ref 0–0.61)
EOSINOPHIL NFR BLD AUTO: 1 % (ref 0–6)
ERYTHROCYTE [DISTWIDTH] IN BLOOD BY AUTOMATED COUNT: 14.3 % (ref 11.6–15.1)
EST. AVERAGE GLUCOSE BLD GHB EST-MCNC: 117 MG/DL
GFR SERPL CREATININE-BSD FRML MDRD: 91 ML/MIN/1.73SQ M
GLUCOSE P FAST SERPL-MCNC: 101 MG/DL (ref 65–99)
HBA1C MFR BLD: 5.7 %
HCT VFR BLD AUTO: 48.1 % (ref 36.5–49.3)
HGB BLD-MCNC: 16 G/DL (ref 12–17)
IMM GRANULOCYTES # BLD AUTO: 0.11 THOUSAND/UL (ref 0–0.2)
IMM GRANULOCYTES NFR BLD AUTO: 1 % (ref 0–2)
LYMPHOCYTES # BLD AUTO: 2.58 THOUSANDS/ÂΜL (ref 0.6–4.47)
LYMPHOCYTES NFR BLD AUTO: 23 % (ref 14–44)
MCH RBC QN AUTO: 31.1 PG (ref 26.8–34.3)
MCHC RBC AUTO-ENTMCNC: 33.3 G/DL (ref 31.4–37.4)
MCV RBC AUTO: 94 FL (ref 82–98)
MICROALBUMIN UR-MCNC: 7.8 MG/L
MICROALBUMIN/CREAT 24H UR: 10 MG/G CREATININE (ref 0–30)
MONOCYTES # BLD AUTO: 0.78 THOUSAND/ÂΜL (ref 0.17–1.22)
MONOCYTES NFR BLD AUTO: 7 % (ref 4–12)
NEUTROPHILS # BLD AUTO: 7.49 THOUSANDS/ÂΜL (ref 1.85–7.62)
NEUTS SEG NFR BLD AUTO: 67 % (ref 43–75)
NRBC BLD AUTO-RTO: 0 /100 WBCS
PLATELET # BLD AUTO: 170 THOUSANDS/UL (ref 149–390)
PMV BLD AUTO: 10.7 FL (ref 8.9–12.7)
POTASSIUM SERPL-SCNC: 3.9 MMOL/L (ref 3.5–5.3)
RBC # BLD AUTO: 5.14 MILLION/UL (ref 3.88–5.62)
SODIUM SERPL-SCNC: 139 MMOL/L (ref 135–147)
WBC # BLD AUTO: 11.18 THOUSAND/UL (ref 4.31–10.16)

## 2024-09-03 PROCEDURE — 82570 ASSAY OF URINE CREATININE: CPT

## 2024-09-03 PROCEDURE — 3044F HG A1C LEVEL LT 7.0%: CPT | Performed by: PHYSICIAN ASSISTANT

## 2024-09-03 PROCEDURE — 82043 UR ALBUMIN QUANTITATIVE: CPT

## 2024-09-03 PROCEDURE — 99213 OFFICE O/P EST LOW 20 MIN: CPT | Performed by: PHYSICIAN ASSISTANT

## 2024-09-03 PROCEDURE — 36415 COLL VENOUS BLD VENIPUNCTURE: CPT

## 2024-09-03 PROCEDURE — 80048 BASIC METABOLIC PNL TOTAL CA: CPT

## 2024-09-03 PROCEDURE — S9083 URGENT CARE CENTER GLOBAL: HCPCS | Performed by: PHYSICIAN ASSISTANT

## 2024-09-03 PROCEDURE — 83036 HEMOGLOBIN GLYCOSYLATED A1C: CPT

## 2024-09-03 PROCEDURE — 85025 COMPLETE CBC W/AUTO DIFF WBC: CPT

## 2024-09-03 RX ORDER — CEPHALEXIN 500 MG/1
500 CAPSULE ORAL EVERY 8 HOURS SCHEDULED
Qty: 21 CAPSULE | Refills: 0 | Status: SHIPPED | OUTPATIENT
Start: 2024-09-03 | End: 2024-09-10

## 2024-09-03 NOTE — PATIENT INSTRUCTIONS
Keflex as prescribed  Keep affected area clean with soap/water, apply topical antibiotic ointment and change bandage twice per day  Monitor for signs of worsening infection  Follow up with wound care  Follow up with PCP in 3-5 days.  Proceed to  ER if symptoms worsen.    If tests have been performed at [facility], our office will contact you with results if changes need to be made to the care plan discussed with you at the visit.  You can review your full results on [facility] MyChart.    Eat yogurt with live and active cultures and/or take a probiotic at least 3 hours before or after antibiotic dose. Monitor stool for diarrhea and/or blood. If this occurs, contact primary care doctor ASAP.

## 2024-09-03 NOTE — OP NOTE
OPERATIVE REPORT  PATIENT NAME: Alexis Berger    :  1960  MRN: [id number]  Pt Location:  GI ROOM 01    SURGERY DATE: 2022    Surgeon(s) and Role: Olena Isbell MD - Primary    Preop Diagnosis:  Thoracic spondylosis [F54 413]  Lumbar spondylosis [M47 816]    Post-Op Diagnosis Codes: * Thoracic spondylosis [N57 065]     * Lumbar spondylosis [M47 816]    Procedure(s) (LRB):  BLOCK MEDIAL BRANCH T12, L1, L2 #2 (Bilateral)    Specimen(s):  * No specimens in log *    Estimated Blood Loss:   Minimal    Drains:  * No LDAs found *    Anesthesia Type:   Local    Operative Indications:  Thoracic spondylosis [M47 814]  Lumbar spondylosis [M47 816]    Operative Findings:  Bilateral T12, L1, L2 medial branch nerve regions identified under fluoroscopic guidance   Appropriate motor testing performed prior to radiofrequency lesioning of medial branch nerve regions      Complications:   None    Procedure and Technique:  Please see detailed procedure note     I was present for the entire procedure    Patient Disposition:  PACU       SIGNATURE: Deborah Garcia MD  DATE: 2022  TIME: 11:04 AM Admission

## 2024-09-03 NOTE — PROGRESS NOTES
[facility]        NAME: Vamshi Robbins is a 63 y.o. male  : 1960    MRN: [id number]  DATE: September 3, 2024  TIME: 8:38 AM    Assessment and Plan   Skin ulcer, limited to breakdown of skin (HCC) [L98.491]  1. Skin ulcer, limited to breakdown of skin (HCC)  Ambulatory Referral to Wound Care      2. Local infection of the skin and subcutaneous tissue, unspecified  cephalexin (KEFLEX) 500 mg capsule            Patient Instructions     Keflex as prescribed  Keep affected area clean with soap/water, apply topical antibiotic ointment and change bandage twice per day  Monitor for signs of worsening infection  Follow up with wound care  Follow up with PCP in 3-5 days.  Proceed to  ER if symptoms worsen.    If tests have been performed at [facility], our office will contact you with results if changes need to be made to the care plan discussed with you at the visit.  [facility]    Eat yogurt with live and active cultures and/or take a probiotic at least 3 hours before or after antibiotic dose. Monitor stool for diarrhea and/or blood. If this occurs, contact primary care doctor ASAP.       Chief Complaint     Chief Complaint   Patient presents with    Hip Pain     Got stuck in a w/c 5 days ago at a baseball game. Sustained a hematoma to right hip and now center of area is white and getting infected. Worried because he had hip replaced         History of Present Illness       64 y/o M with PMH of DM presents for ulceration to R hip. Patient noticed ulcer last night. States he was trying to get in and out of a small wheelchair 4-5 days ago, resulting in b/l hip abrasions. Denies drainage, fevers or chills. Cleaned affected area last night upon noticing with soap/water and applied gauze with paper tape.         Review of Systems   Review of Systems   Constitutional:  Negative for chills and fever.   Skin:  Positive for color change.         Current Medications        Current Outpatient Medications:     atorvastatin (LIPITOR) 10 mg tablet, Take 10 mg tablet every other day, Disp: 45 tablet, Rfl: 3    atorvastatin (LIPITOR) 20 mg tablet, Take 1 tablet every other day.  This should be alternating with the other prescription for 10 mg., Disp: 45 tablet, Rfl: 3    celecoxib (CeleBREX) 100 mg capsule, TAKE 1 CAPSULE TWICE DAILY, Disp: 180 capsule, Rfl: 1    cephalexin (KEFLEX) 500 mg capsule, Take 1 capsule (500 mg total) by mouth every 8 (eight) hours for 7 days, Disp: 21 capsule, Rfl: 0    Empagliflozin (Jardiance) 25 MG TABS, Take 1 tablet (25 mg total) by mouth daily, Disp: 90 tablet, Rfl: 3    LORazepam (ATIVAN) 0.5 mg tablet, Take 1 tablet (0.5 mg total) by mouth daily as needed for anxiety, Disp: 60 tablet, Rfl: 0    penicillin V potassium (VEETID) 250 mg tablet, Take 1 tablet (250 mg total) by mouth 2 (two) times a day, Disp: 60 tablet, Rfl: 0    semaglutide, 0.25 or 0.5 mg/dose, (Ozempic, 0.25 or 0.5 MG/DOSE,) 2 mg/3 mL injection pen, Inject 0.75 mL (0.5 mg total) under the skin every 7 days, Disp: 12 mL, Rfl: 3    sertraline (ZOLOFT) 50 mg tablet, Take 1.5 tablets (75 mg total) by mouth daily at bedtime, Disp: 135 tablet, Rfl: 3    tadalafil (CIALIS) 10 MG tablet, Take one tablet as needed for ED., Disp: 30 tablet, Rfl: 3    valsartan (DIOVAN) 80 mg tablet, Take 1 tablet (80 mg total) by mouth daily, Disp: 90 tablet, Rfl: 3    Current Allergies     Allergies as of 09/03/2024 - Reviewed 09/03/2024   Allergen Reaction Noted    Doxycycline Photosensitivity 04/27/2024            The following portions of the patient's history were reviewed and updated as appropriate: allergies, current medications, past family history, past medical history, past social history, past surgical history and problem list.     Past Medical History:   Diagnosis Date    Anxiety     Arthritis     Chronic pain disorder     mid back region    Colon polyp     COVID-19     CPAP (continuous positive  airway pressure) dependence     Pt uses nightly    Diabetes mellitus (HCC)     Diverticulitis of colon 3 years ago    No issues    Diverticulosis     History of recent hospitalization 06/16/2021    Pt was admitted to [facility] after syncopal episode. Pt saw cardiology- all tests negative. Pt had nl EEG. pt states is was a medication interaction.    Hyperlipidemia     Hypertension     Obesity     Sleep apnea     Spinal stenosis     Wears hearing aid        Past Surgical History:   Procedure Laterality Date    ACHILLES TENDON REPAIR      Pt had a rupture in right and left 2 years apart    COLONOSCOPY      EPIDURAL BLOCK INJECTION      Pt had in lumbar region.    EPIDURAL BLOCK INJECTION N/A 04/06/2021    Procedure: T-11-T-12 INTERLAMINAR THORACIC EPIDURAL STEROID INJECTION;  Surgeon: Guzman Harrison MD;  [facility];  Service: Pain Management     EPIDURAL BLOCK INJECTION Right 07/20/2021    Procedure: L5 and S1 TRANSFORAMINAL EPIDURAL STEROID INJECTION;  Surgeon: Guzman Harrison MD;  [facility];  Service: Pain Management     FL GUIDED NEEDLE PLAC BX/ASP/INJ  07/14/2022    FL GUIDED NEEDLE PLAC BX/ASP/INJ  08/18/2022    FL GUIDED NEEDLE PLAC BX/ASP/INJ  09/15/2022    FOOT SURGERY Left     Left great toe- had a hammertoe.    HIP SURGERY      Replaced    JOINT REPLACEMENT Left     Total hip, knee    JOINT REPLACEMENT Right     Total hip, knee    KNEE ARTHROSCOPY Bilateral     NERVE BLOCK Bilateral 07/14/2022    Procedure: BLOCK MEDIAL BRANCH T12, L1, L2;  Surgeon: Guzman Harrison MD;  [facility];  Service: Pain Management     NERVE BLOCK Bilateral 08/18/2022    Procedure: BLOCK MEDIAL BRANCH T12, L1, L2 #2;  Surgeon: Guzman Harrison MD;  [facility];  Service: Pain Management     NERVE BLOCK Bilateral 01/26/2023    Procedure: BLOCK MEDIAL BRANCH L3, L4, L5 #1;  Surgeon: Guzman Harrison MD;  [facility];  Service: Pain Management     NERVE BLOCK Bilateral 02/16/2023    Procedure: BLOCK  "MEDIAL BRANCH L3, L4, L5 #2;  Surgeon: Guzman Harrison MD;  [facility];  Service: Pain Management     MT JOE IMPLTJ NSTIM ELTRDS PLATE/PADDLE EDRL Left 10/26/2021    Procedure: Thoracic laminectomies for placement of spinal cord stimulator and internal pulse generator, use of neuromonitoring, left sided pulse generator;  Surgeon: Rob Wong MD;  [facility];  Service: Neurosurgery    MT PRQ IMPLTJ NSTIM ELECTRODE ARRAY EPIDURAL Bilateral 09/30/2021    Procedure: NEVRO SCS TRIAL;  Surgeon: Guzman Harrison MD;  [facility];  Service: Pain Management     RHIZOTOMY Bilateral 09/15/2022    Procedure: RHIZOTOMY LUMBAR T12, L1, L2 medial branch nerves;  Surgeon: Guzman Harrison MD;  [facility];  Service: Pain Management     RHIZOTOMY Bilateral 03/02/2023    Procedure: RHIZOTOMY LUMBAR L3, L4, L5;  Surgeon: Guzman Harrison MD;  [facility];  Service: Pain Management     RHIZOTOMY Bilateral 03/16/2023    Procedure: RHIZOTOMY LUMBAR T10, T11, T12 medial branch nerves;  Surgeon: Guzman Harrison MD;  [facility];  Service: Pain Management     TOTAL KNEE ARTHROPLASTY Left        Family History   Problem Relation Age of Onset    Arthritis Mother     Depression Mother     Hypertension Father     Alcohol abuse Father     Diabetes Son         Aged 14         Medications have been verified.        Objective   /90   Pulse (!) 113   Temp (!) 97 °F (36.1 °C)   Resp 16   Ht 6' 4\" (1.93 m)   Wt (!) 161 kg (355 lb)   SpO2 93%   BMI 43.21 kg/m²   No LMP for male patient.       Physical Exam     Physical Exam  Constitutional:       General: He is not in acute distress.     Appearance: He is well-developed. He is not diaphoretic.   HENT:      Head: Normocephalic and atraumatic.   Cardiovascular:      Rate and Rhythm: Normal rate and regular rhythm.      Heart sounds: Normal heart sounds.   Pulmonary:      Effort: Pulmonary effort is normal. No respiratory distress.      " Breath sounds: Normal breath sounds.   Skin:     Findings: Erythema present.      Comments: Approximately 7cm x 4cm pictured below   Neurological:      Mental Status: He is alert and oriented to person, place, and time.         Affected area cleaned with wound cleanser spray. Topical abx ointment applied with non-adherent pad, coban and paper tape.

## 2024-09-06 ENCOUNTER — OFFICE VISIT (OUTPATIENT)
Facility: CLINIC | Age: 64
End: 2024-09-06
Payer: COMMERCIAL

## 2024-09-06 VITALS
DIASTOLIC BLOOD PRESSURE: 88 MMHG | WEIGHT: 315 LBS | BODY MASS INDEX: 38.36 KG/M2 | HEIGHT: 76 IN | RESPIRATION RATE: 18 BRPM | TEMPERATURE: 98.5 F | HEART RATE: 70 BPM | SYSTOLIC BLOOD PRESSURE: 149 MMHG

## 2024-09-06 DIAGNOSIS — S71.001A OPEN WOUND OF RIGHT HIP, INITIAL ENCOUNTER: Primary | ICD-10-CM

## 2024-09-06 DIAGNOSIS — E11.9 TYPE 2 DIABETES MELLITUS WITHOUT COMPLICATION, WITHOUT LONG-TERM CURRENT USE OF INSULIN (HCC): ICD-10-CM

## 2024-09-06 PROCEDURE — 11045 DBRDMT SUBQ TISS EACH ADDL: CPT | Performed by: FAMILY MEDICINE

## 2024-09-06 PROCEDURE — 11042 DBRDMT SUBQ TIS 1ST 20SQCM/<: CPT | Performed by: FAMILY MEDICINE

## 2024-09-06 PROCEDURE — 99204 OFFICE O/P NEW MOD 45 MIN: CPT | Performed by: FAMILY MEDICINE

## 2024-09-06 PROCEDURE — 99213 OFFICE O/P EST LOW 20 MIN: CPT | Performed by: FAMILY MEDICINE

## 2024-09-06 RX ORDER — LIDOCAINE 40 MG/G
CREAM TOPICAL ONCE
Status: COMPLETED | OUTPATIENT
Start: 2024-09-06 | End: 2024-09-06

## 2024-09-06 RX ADMIN — LIDOCAINE 1 APPLICATION: 40 CREAM TOPICAL at 09:05

## 2024-09-06 NOTE — PROGRESS NOTES
Wound Procedure Treatment Traumatic Right Hip    Performed by: Maryuri aCbrera RN  Authorized by: Rosalind Sunshine MD    Associated wounds:   Wound 09/06/24 Traumatic Hip Right  Wound cleansed with:  NSS  Applied Topical: Medihoney gel    Applied secondary dressing:  ABD  Dressing secured with:  Tape

## 2024-09-06 NOTE — PROGRESS NOTES
"Patient ID: Vamshi Robbins is a 63 y.o. male Date of Birth 1960       Chief Complaint   Patient presents with   • New Patient Visit     Patient presents for evaluation of right hip wound that he states developed after he sat in a wheelchair that was too small on August 30th. He presented to the urgent care in [place] who stated that it was a pressure injury and referred him to wound center. [place] urgent care recommend to clean and keep it covered. They prescribed Keflex for the wound which he is still taking.       Allergies:  Doxycycline    Diagnosis:      Diagnosis ICD-10-CM Associated Orders   1. Open traumatic wound of right hip, initial encounter  S71.001A Wound cleansing and dressings Traumatic Right Hip     lidocaine (LMX) 4 % cream     Debridement Traumatic Right Hip     Wound Procedure Treatment Traumatic Right Hip      2. Type 2 diabetes mellitus without complication, without long-term current use of insulin ([facility])  E11.9                 Assessment & Plan:  Open traumatic wound right hip: Very fibrous wound bed with slough requiring surgical debridement.  Periwound pink without acute erythema or lymphangitic streaking.  No purulence or malodor.  No deep probe.  No induration, fluctuance, crepitus.  Surgical debridement  Apply Medihoney followed by ABD and Medfix tape  Keep pressure off of this area to avoid complication/factor which can delay wound healing  Can complete antibiotic prescription sent by urgent care  T2DM:  A1C results reviewed with the patient today. 5.7 as of 9/3/24  ER precautions reviewed patient and wife, they expressed understanding  See below wound care orders for full details  Follow-up in 2 weeks or sooner if needed    Portions of the record may have been created with voice recognition software. Occasional wrong word or \"sound alike\" substitutions may have occurred due to the inherent limitations of voice recognition software. Read the chart carefully and recognize, using " context, where substitutions have occurred.    Subjective:   Mr. Robbins is a 63-year-old male who presents to wound center today along with his wife for evaluation of traumatic R hip wound.  He reports that on August 30 he went to a baseball game and sat in a wheelchair which was too small that cause traumatic injury to this hip.  He reports that he is normally ambulatory however lately he had been limiting ambulation due to right foot ulcer (following with podiatry) so wanted to use a wheelchair to avoid walking a long distance at the game.  He went to urgent care on 9/3 and was prescribed Keflex and sent to wound care.  Of note, he states that he is on chronic suppressive therapy with penicillin VK for recurrent cellulitis related to the previous foot ulcer right foot. He reports that he does have seat cushions which he uses regularly when seated.  He also has wedges for repositioning.  He states his diabetes is controlled and attempts to eat healthy diet.  His wife has been completing wound care and using bacitracin and keeping area covered.  He denies fever, chills.  See full ROS below.      The following portions of the patient's history were reviewed and updated as appropriate:   Patient Active Problem List   Diagnosis   • Herniation of intervertebral disc of thoracic region   • Diabetic peripheral neuropathy (HCC)   • Morbid obesity ([facility])   • Cervical spinal cord compression (HCC)   • Mild depression   • Major depressive disorder, single episode, mild ([facility])   • Thoracic spondylosis   • Lumbar spondylosis   • Spinal muscular atrophy, unspecified ([facility])   • Neurogenic claudication due to lumbar spinal stenosis   • Muscular atrophy   • Other spondylosis with myelopathy, cervical region   • Radiculopathy   • Myelomalacia of cervical cord ([facility])   • CPAP (continuous positive airway pressure) dependence   • Type 2 diabetes mellitus with skin complication, without long-term current use of insulin ([facility])   • HLD  (hyperlipidemia)   • Cellulitis of right lower extremity   • Diabetic right foot ulcer   • Platelets decreased (HCC)     Past Medical History:   Diagnosis Date   • Anxiety    • Arthritis    • Chronic pain disorder     mid back region   • Colon polyp    • COVID-19    • CPAP (continuous positive airway pressure) dependence     Pt uses nightly   • Diabetes mellitus (HCC)    • Diverticulitis of colon 3 years ago    No issues   • Diverticulosis    • History of recent hospitalization 06/16/2021    Pt was admitted to [facility] after syncopal episode. Pt saw cardiology- all tests negative. Pt had nl EEG. pt states is was a medication interaction.   • Hyperlipidemia    • Hypertension    • Obesity    • Sleep apnea    • Spinal stenosis    • Wears hearing aid      Past Surgical History:   Procedure Laterality Date   • ACHILLES TENDON REPAIR      Pt had a rupture in right and left 2 years apart   • COLONOSCOPY     • EPIDURAL BLOCK INJECTION      Pt had in lumbar region.   • EPIDURAL BLOCK INJECTION N/A 04/06/2021    Procedure: T-11-T-12 INTERLAMINAR THORACIC EPIDURAL STEROID INJECTION;  Surgeon: Guzman Harrison MD;  [facility];  Service: Pain Management    • EPIDURAL BLOCK INJECTION Right 07/20/2021    Procedure: L5 and S1 TRANSFORAMINAL EPIDURAL STEROID INJECTION;  Surgeon: Guzman Harrison MD;  [facility];  Service: Pain Management    • FL GUIDED NEEDLE PLAC BX/ASP/INJ  07/14/2022   • FL GUIDED NEEDLE PLAC BX/ASP/INJ  08/18/2022   • FL GUIDED NEEDLE PLAC BX/ASP/INJ  09/15/2022   • FOOT SURGERY Left     Left great toe- had a hammertoe.   • HIP SURGERY      Replaced   • JOINT REPLACEMENT Left     Total hip, knee   • JOINT REPLACEMENT Right     Total hip, knee   • KNEE ARTHROSCOPY Bilateral    • NERVE BLOCK Bilateral 07/14/2022    Procedure: BLOCK MEDIAL BRANCH T12, L1, L2;  Surgeon: Guzman Harrison MD;  [facility];  Service: Pain Management    • NERVE BLOCK Bilateral 08/18/2022    Procedure: BLOCK MEDIAL  BRANCH T12, L1, L2 #2;  Surgeon: Guzman Harrison MD;  [facility];  Service: Pain Management    • NERVE BLOCK Bilateral 01/26/2023    Procedure: BLOCK MEDIAL BRANCH L3, L4, L5 #1;  Surgeon: Guzman Harrison MD;  [facility];  Service: Pain Management    • NERVE BLOCK Bilateral 02/16/2023    Procedure: BLOCK MEDIAL BRANCH L3, L4, L5 #2;  Surgeon: Guzman Harrison MD;  [facility];  Service: Pain Management    • UT JOE IMPLTJ NSTIM ELTRDS PLATE/PADDLE EDRL Left 10/26/2021    Procedure: Thoracic laminectomies for placement of spinal cord stimulator and internal pulse generator, use of neuromonitoring, left sided pulse generator;  Surgeon: Rob Wong MD;  Location[facility];  Service: Neurosurgery   • UT PRQ IMPLTJ NSTIM ELECTRODE ARRAY EPIDURAL Bilateral 09/30/2021    Procedure: NEVRO SCS TRIAL;  Surgeon: Guzman Harrison MD;  [facility];  Service: Pain Management    • RHIZOTOMY Bilateral 09/15/2022    Procedure: RHIZOTOMY LUMBAR T12, L1, L2 medial branch nerves;  Surgeon: Guzman Harrison MD;  [facility];  Service: Pain Management    • RHIZOTOMY Bilateral 03/02/2023    Procedure: RHIZOTOMY LUMBAR L3, L4, L5;  Surgeon: Guzman Harrison MD;  [facility];  Service: Pain Management    • RHIZOTOMY Bilateral 03/16/2023    Procedure: RHIZOTOMY LUMBAR T10, T11, T12 medial branch nerves;  Surgeon: Guzman Harrison MD;  [facility];  Service: Pain Management    • TOTAL KNEE ARTHROPLASTY Left      Family History   Problem Relation Age of Onset   • Arthritis Mother    • Depression Mother    • Hypertension Father    • Alcohol abuse Father    • Diabetes Son         Aged 14      Social History     Socioeconomic History   • Marital status: /Civil Union     Spouse name: Not on file   • Number of children: Not on file   • Years of education: Not on file   • Highest education level: Not on file   Occupational History   • Not on file   Tobacco Use   • Smoking status:  Never   • Smokeless tobacco: Current     Types: Snuff   • Tobacco comments:     still using snuff   Vaping Use   • Vaping status: Never Used   Substance and Sexual Activity   • Alcohol use: Yes     Alcohol/week: 2.0 standard drinks of alcohol     Types: 2 Cans of beer per week     Comment: social, weekends   • Drug use: No   • Sexual activity: Yes     Partners: Female     Birth control/protection: Male Sterilization   Other Topics Concern   • Not on file   Social History Narrative   • Not on file     Social Determinants of Health     Financial Resource Strain: Low Risk  (10/3/2023)    Overall Financial Resource Strain (CARDIA)    • Difficulty of Paying Living Expenses: Not hard at all   Food Insecurity: No Food Insecurity (5/24/2024)    Hunger Vital Sign    • Worried About Running Out of Food in the Last Year: Never true    • Ran Out of Food in the Last Year: Never true   Transportation Needs: No Transportation Needs (5/24/2024)    PRAPARE - Transportation    • Lack of Transportation (Medical): No    • Lack of Transportation (Non-Medical): No   Physical Activity: Not on file   Stress: Not on file   Social Connections: Not on file   Intimate Partner Violence: Not on file   Housing Stability: Low Risk  (5/24/2024)    Housing Stability Vital Sign    • Unable to Pay for Housing in the Last Year: No    • Number of Times Moved in the Last Year: 1    • Homeless in the Last Year: No        Current Outpatient Medications:   •  atorvastatin (LIPITOR) 10 mg tablet, Take 10 mg tablet every other day, Disp: 45 tablet, Rfl: 3  •  atorvastatin (LIPITOR) 20 mg tablet, Take 1 tablet every other day.  This should be alternating with the other prescription for 10 mg., Disp: 45 tablet, Rfl: 3  •  celecoxib (CeleBREX) 100 mg capsule, TAKE 1 CAPSULE TWICE DAILY, Disp: 180 capsule, Rfl: 1  •  cephalexin (KEFLEX) 500 mg capsule, Take 1 capsule (500 mg total) by mouth every 8 (eight) hours for 7 days, Disp: 21 capsule, Rfl: 0  •   "Empagliflozin (Jardiance) 25 MG TABS, Take 1 tablet (25 mg total) by mouth daily, Disp: 90 tablet, Rfl: 3  •  LORazepam (ATIVAN) 0.5 mg tablet, Take 1 tablet (0.5 mg total) by mouth daily as needed for anxiety, Disp: 60 tablet, Rfl: 0  •  penicillin V potassium (VEETID) 250 mg tablet, Take 1 tablet (250 mg total) by mouth 2 (two) times a day, Disp: 60 tablet, Rfl: 0  •  semaglutide, 0.25 or 0.5 mg/dose, (Ozempic, 0.25 or 0.5 MG/DOSE,) 2 mg/3 mL injection pen, Inject 0.75 mL (0.5 mg total) under the skin every 7 days, Disp: 12 mL, Rfl: 3  •  sertraline (ZOLOFT) 50 mg tablet, Take 1.5 tablets (75 mg total) by mouth daily at bedtime, Disp: 135 tablet, Rfl: 3  •  tadalafil (CIALIS) 10 MG tablet, Take one tablet as needed for ED., Disp: 30 tablet, Rfl: 3  •  valsartan (DIOVAN) 80 mg tablet, Take 1 tablet (80 mg total) by mouth daily, Disp: 90 tablet, Rfl: 3  No current facility-administered medications for this visit.    Review of Systems   Constitutional:  Negative for chills and fever.   Respiratory:  Negative for shortness of breath.    Cardiovascular:  Negative for chest pain and palpitations.   Gastrointestinal:  Negative for vomiting.   Genitourinary:  Negative for difficulty urinating.   Musculoskeletal:  Positive for gait problem.   Skin:  Positive for wound (R hip).   Psychiatric/Behavioral:  The patient is not nervous/anxious.        Objective:  /88   Pulse 70   Temp 98.5 °F (36.9 °C)   Resp 18   Ht 6' 4\" (1.93 m)   Wt (!) 161 kg (355 lb)   BMI 43.21 kg/m²         Physical Exam  Vitals reviewed.   Constitutional:       General: He is not in acute distress.     Appearance: He is not ill-appearing or diaphoretic.      Comments: Very pleasant, no acute distress.  Wife at bedside   HENT:      Head: Normocephalic and atraumatic.   Eyes:      General: No scleral icterus.  Cardiovascular:      Rate and Rhythm: Normal rate.   Pulmonary:      Effort: Pulmonary effort is normal. No respiratory distress. " "  Skin:     General: Skin is warm.      Findings: Wound (R hip) present.             Comments: 1- Very fibrous wound bed with slough requiring surgical debridement.  Periwound pink without acute erythema or lymphangitic streaking.  No purulence or malodor.  No deep probe.  No induration, fluctuance, crepitus.   Neurological:      Mental Status: He is alert and oriented to person, place, and time.   Psychiatric:         Mood and Affect: Mood normal.         Behavior: Behavior normal.       Wound 09/06/24 Buttocks Right (Active)   Wound Image   09/06/24 0839   Wound Description Pink;Yellow;Slough 09/06/24 0841   Arely-wound Assessment Dry;Intact 09/06/24 0841   Wound Length (cm) 8.5 cm 09/06/24 0841   Wound Width (cm) 3 cm 09/06/24 0841   Wound Depth (cm) 0.1 cm 09/06/24 0841   Wound Surface Area (cm^2) 25.5 cm^2 09/06/24 0841   Wound Volume (cm^3) 2.55 cm^3 09/06/24 0841   Calculated Wound Volume (cm^3) 2.55 cm^3 09/06/24 0841   Drainage Amount Moderate 09/06/24 0841   Drainage Description Serosanguineous 09/06/24 0841   Non-staged Wound Description Full thickness 09/06/24 0841     Debridement   Wound 09/06/24 Traumatic Hip Right    Universal Protocol:  Consent: Verbal consent obtained. Written consent obtained.  Risks and benefits: risks, benefits and alternatives were discussed  Consent given by: patient  Time out: Immediately prior to procedure a \"time out\" was called to verify the correct patient, procedure, equipment, support staff and site/side marked as required.  Patient understanding: patient states understanding of the procedure being performed  Patient identity confirmed: verbally with patient    Debridement Details  Performed by: physician  Debridement type: surgical  Level of debridement: subcutaneous tissue  Pain control: lidocaine 4%      Post-debridement measurements  Length (cm): 8.5  Width (cm): 3  Depth (cm): 0.2  Percent debrided: 85%  Surface Area (cm^2): 25.5  Area Debrided (cm^2): 21.68  Volume " (cm^3): 5.1    Tissue and other material debrided: subcutaneous tissue  Devitalized tissue debrided: fibrin, necrotic debris and slough  Instrument(s) utilized: curette  Bleeding: small  Hemostasis obtained with: pressure  Procedural pain (0-10): 0  Post-procedural pain: 0   Response to treatment: procedure was tolerated well                 Lab Results   Component Value Date    HGBA1C 5.7 (H) 09/03/2024       Wound Instructions:  Orders Placed This Encounter   Procedures   • Wound cleansing and dressings Traumatic Right Hip     Wash your hands with soap and water.  Remove old dressing, discard into plastic bag and place in trash.  Cleanse the wound with saline prior to applying a clean dressing. Do not use tissue or cotton balls. Do not scrub the wound. Pat dry using gauze.    Shower yes   Apply skin prep to periwound  Apply medihoney to the right hip wound.  Cover with ABD  Secure with tape  Change dressing daily      Off-loading Instructions:    Keep weight and pressure off wound at all times  Turn every 2 hours. Avoid position directing pressure to Wound site. Limit side lying to 30 degree tilt. Limit the head of bed elevation to 30 degrees  Do Not Sit for Long Periods of Time  Use gel foam cushion at all times     Standing Status:   Future     Standing Expiration Date:   9/13/2024   • Debridement Traumatic Right Hip     This order was created via procedure documentation   • Wound Procedure Treatment Traumatic Right Hip     This order was created via procedure documentation       Rosalind Sunshine MD

## 2024-09-06 NOTE — PATIENT INSTRUCTIONS
Orders Placed This Encounter   Procedures    Wound cleansing and dressings Traumatic Right Hip     Wash your hands with soap and water.  Remove old dressing, discard into plastic bag and place in trash.  Cleanse the wound with saline prior to applying a clean dressing. Do not use tissue or cotton balls. Do not scrub the wound. Pat dry using gauze.    Shower yes   Apply skin prep to periwound  Apply medihoney to the right hip wound.  Cover with ABD  Secure with tape  Change dressing daily      Off-loading Instructions:    Keep weight and pressure off wound at all times  Turn every 2 hours. Avoid position directing pressure to Wound site. Limit side lying to 30 degree tilt. Limit the head of bed elevation to 30 degrees  Do Not Sit for Long Periods of Time  Use gel foam cushion at all times     Standing Status:   Future     Standing Expiration Date:   9/13/2024    Debridement     This order was created via procedure documentation

## 2024-09-09 ENCOUNTER — OFFICE VISIT (OUTPATIENT)
Dept: FAMILY MEDICINE CLINIC | Facility: CLINIC | Age: 64
End: 2024-09-09
Payer: COMMERCIAL

## 2024-09-09 ENCOUNTER — TELEMEDICINE (OUTPATIENT)
Age: 64
End: 2024-09-09
Payer: COMMERCIAL

## 2024-09-09 VITALS — BODY MASS INDEX: 42.4 KG/M2 | WEIGHT: 315 LBS | SYSTOLIC BLOOD PRESSURE: 120 MMHG | DIASTOLIC BLOOD PRESSURE: 72 MMHG

## 2024-09-09 DIAGNOSIS — E78.2 MIXED HYPERLIPIDEMIA: ICD-10-CM

## 2024-09-09 DIAGNOSIS — L89.212: ICD-10-CM

## 2024-09-09 DIAGNOSIS — E11.42 DIABETIC PERIPHERAL NEUROPATHY (HCC): ICD-10-CM

## 2024-09-09 DIAGNOSIS — F32.5 DEPRESSION, MAJOR, IN REMISSION (HCC): ICD-10-CM

## 2024-09-09 DIAGNOSIS — I10 ESSENTIAL HYPERTENSION: ICD-10-CM

## 2024-09-09 DIAGNOSIS — L03.115 CELLULITIS OF RIGHT LOWER EXTREMITY: Primary | ICD-10-CM

## 2024-09-09 DIAGNOSIS — L97.511 ULCER OF RIGHT FOOT, LIMITED TO BREAKDOWN OF SKIN (HCC): ICD-10-CM

## 2024-09-09 DIAGNOSIS — I87.2 CHRONIC VENOUS INSUFFICIENCY OF LOWER EXTREMITY: ICD-10-CM

## 2024-09-09 DIAGNOSIS — E11.628 TYPE 2 DIABETES MELLITUS WITH OTHER SKIN COMPLICATION, WITHOUT LONG-TERM CURRENT USE OF INSULIN (HCC): Primary | ICD-10-CM

## 2024-09-09 PROCEDURE — 99214 OFFICE O/P EST MOD 30 MIN: CPT | Performed by: PHYSICIAN ASSISTANT

## 2024-09-09 PROCEDURE — G2211 COMPLEX E/M VISIT ADD ON: HCPCS | Performed by: PHYSICIAN ASSISTANT

## 2024-09-09 PROCEDURE — 99213 OFFICE O/P EST LOW 20 MIN: CPT | Performed by: PHYSICIAN ASSISTANT

## 2024-09-09 NOTE — PROGRESS NOTES
Virtual Outpatient Progress Note - [facility] Infectious Disease   Vamshi Robbins 63 y.o. male MRN: [id number]  Encounter: [id number]      Virtual Regular Visit  Verification of patient location: [place]  Patient is located in the following state in which I hold an active license [place]    Problem List Items Addressed This Visit          Other    Cellulitis of right lower extremity - Primary            Encounter provider Lee Tolbert PA-C    Provider located at [facility] [address] [place] [address] [phone]    The patient was identified by name and date of birth. Vamshi Robbins was informed that this is a telemedicine visit and that the visit is being conducted through the [employer] platform. He agrees to proceed..  My office door was closed. No one else was in the room.  He acknowledged consent and understanding of privacy and security of the video platform. The patient has agreed to participate and understands they can discontinue the visit at any time.    Patient is aware this is a billable service.     Assessment/Plan:  1. Recurrent right lower extremity cellulitis. Recent admissions to [facility] with systemic complaints and right lower extremity erythema. This is in the setting of a chronic right heel wound and suspected uncontrolled venous stasis. No wound cultures available however suspect a streptococcal etiology. Planned to continue on prophylactic pen VK until #2 resolves. He is tolerating the abx without recurrence in cellulitis. Wound has now resolved. Reviewed recent labs from 9/3 and stable.  -Since wound has healed, can now stop penicillin VK   -Encourage lower extremity elevation and compression   -Outpatient follow-up with podiatry  -No additional outpatient ID follow up required      2.  Chronic right heel diabetic foot ulceration. Likely portal of entry for #1.  This is complicated by venous stasis.  Recent  MRI was negative and ABIs within normal range for healing. Ulceration has now resolved.   -Stop abx as above   -Close follow-up with podiatry      3. R hip pressure injury. New since last visit and due to local trauma. Now following with wound care. No concern for active infection.     4.  Type 2 diabetes mellitus, A1c 5.7 with obesity, BMI 42.4.  Remains a risk factor for venous stasis, poor wound healing and infection.  -Lifestyle modifications encouraged    Above assessment and plan discussed in detail with patient during examination.     Antibiotics:  PO penicillin    Subjective:  Vamshi Robbins is a 63 y.o. male patient who presents to outpatient ID office virtually for ongoing management of R diabetic foot ulceration and recurrent cellulitis. He has been on the PO pen vk without difficulty. No recent flare ups of cellulitis. His heel wound has resolved. He developed a blood blister on the opposite foot recently but that resolved as well. Since our last visit he also developed a right hip pressure injury after sitting in an ill-fitting wheelchair. He is following wound care now. He had labs done 9/3 prior to going to urgent care after he first noticed this wound. He denies pain. No fevers or chills. Agrees with plan to come off abx now that right heel wound has resolved.     Past Medical History:   Diagnosis Date    Anxiety     Arthritis     Chronic pain disorder     mid back region    Colon polyp     COVID-19     CPAP (continuous positive airway pressure) dependence     Pt uses nightly    Diabetes mellitus (HCC)     Diverticulitis of colon 3 years ago    No issues    Diverticulosis     History of recent hospitalization 06/16/2021    Pt was admitted to [facility] after syncopal episode. Pt saw cardiology- all tests negative. Pt had nl EEG. pt states is was a medication interaction.    Hyperlipidemia     Hypertension     Obesity     Sleep apnea     Spinal stenosis     Wears hearing aid        Past Surgical History:    Procedure Laterality Date    ACHILLES TENDON REPAIR      Pt had a rupture in right and left 2 years apart    COLONOSCOPY      EPIDURAL BLOCK INJECTION      Pt had in lumbar region.    EPIDURAL BLOCK INJECTION N/A 04/06/2021    Procedure: T-11-T-12 INTERLAMINAR THORACIC EPIDURAL STEROID INJECTION;  Surgeon: Guzman Harrison MD;  Location: [facility];  Service: Pain Management     EPIDURAL BLOCK INJECTION Right 07/20/2021    Procedure: L5 and S1 TRANSFORAMINAL EPIDURAL STEROID INJECTION;  Surgeon: Guzman Harrison MD;  Location: [facility];  Service: Pain Management     FL GUIDED NEEDLE PLAC BX/ASP/INJ  07/14/2022    FL GUIDED NEEDLE PLAC BX/ASP/INJ  08/18/2022    FL GUIDED NEEDLE PLAC BX/ASP/INJ  09/15/2022    FOOT SURGERY Left     Left great toe- had a hammertoe.    HIP SURGERY      Replaced    JOINT REPLACEMENT Left     Total hip, knee    JOINT REPLACEMENT Right     Total hip, knee    KNEE ARTHROSCOPY Bilateral     NERVE BLOCK Bilateral 07/14/2022    Procedure: BLOCK MEDIAL BRANCH T12, L1, L2;  Surgeon: Guzman Harrison MD;  Location: [facility];  Service: Pain Management     NERVE BLOCK Bilateral 08/18/2022    Procedure: BLOCK MEDIAL BRANCH T12, L1, L2 #2;  Surgeon: Guzman Harrison MD;  [facility]: [facility];  Service: Pain Management     NERVE BLOCK Bilateral 01/26/2023    Procedure: BLOCK MEDIAL BRANCH L3, L4, L5 #1;  Surgeon: Guzman Harrison MD;  [facility]: [facility];  Service: Pain Management     NERVE BLOCK Bilateral 02/16/2023    Procedure: BLOCK MEDIAL BRANCH L3, L4, L5 #2;  Surgeon: Guzman Harrison MD;  [facility]: [facility];  Service: Pain Management     NM JOE IMPLTJ NSTIM ELTRDS PLATE/PADDLE EDRL Left 10/26/2021    Procedure: Thoracic laminectomies for placement of spinal cord stimulator and internal pulse generator, use of neuromonitoring, left sided pulse generator;  Surgeon: Rob Wong MD;  Location:  [facility];  Service: Neurosurgery    NM PRQ IMPLTJ NSTIM ELECTRODE ARRAY EPIDURAL Bilateral  09/30/2021    Procedure: NEVRO SCS TRIAL;  Surgeon: Guzman Harrison MD;  [facility]: [facility];  Service: Pain Management     RHIZOTOMY Bilateral 09/15/2022    Procedure: RHIZOTOMY LUMBAR T12, L1, L2 medial branch nerves;  Surgeon: Guzman Harrison MD;  [facility]: [facility];  Service: Pain Management     RHIZOTOMY Bilateral 03/02/2023    Procedure: RHIZOTOMY LUMBAR L3, L4, L5;  Surgeon: Guzman Harrison MD;  [facility]: [facility];  Service: Pain Management     RHIZOTOMY Bilateral 03/16/2023    Procedure: RHIZOTOMY LUMBAR T10, T11, T12 medial branch nerves;  Surgeon: Guzman Harrison MD;  [facility]: [facility];  Service: Pain Management     TOTAL KNEE ARTHROPLASTY Left        Current Outpatient Medications   Medication Sig Dispense Refill    atorvastatin (LIPITOR) 10 mg tablet Take 10 mg tablet every other day 45 tablet 3    atorvastatin (LIPITOR) 20 mg tablet Take 1 tablet every other day.  This should be alternating with the other prescription for 10 mg. 45 tablet 3    celecoxib (CeleBREX) 100 mg capsule TAKE 1 CAPSULE TWICE DAILY 180 capsule 1    cephalexin (KEFLEX) 500 mg capsule Take 1 capsule (500 mg total) by mouth every 8 (eight) hours for 7 days 21 capsule 0    Empagliflozin (Jardiance) 25 MG TABS Take 1 tablet (25 mg total) by mouth daily 90 tablet 3    LORazepam (ATIVAN) 0.5 mg tablet Take 1 tablet (0.5 mg total) by mouth daily as needed for anxiety 60 tablet 0    penicillin V potassium (VEETID) 250 mg tablet Take 1 tablet (250 mg total) by mouth 2 (two) times a day 60 tablet 0    semaglutide, 0.25 or 0.5 mg/dose, (Ozempic, 0.25 or 0.5 MG/DOSE,) 2 mg/3 mL injection pen Inject 0.75 mL (0.5 mg total) under the skin every 7 days 12 mL 3    sertraline (ZOLOFT) 50 mg tablet Take 1.5 tablets (75 mg total) by mouth daily at bedtime 135 tablet 3    tadalafil (CIALIS) 10 MG tablet Take one tablet as needed for ED. 30 tablet 3    valsartan (DIOVAN) 80 mg tablet Take 1 tablet (80 mg total) by mouth daily 90 tablet 3     No  current facility-administered medications for this visit.        Allergies   Allergen Reactions    Doxycycline Photosensitivity       Video Exam  Exam limited due to virtual visit     There were no vitals filed for this visit.    Physical Exam  Constitutional:       Appearance: Normal appearance. He is not ill-appearing.   HENT:      Head: Normocephalic and atraumatic.      Mouth/Throat:      Mouth: Mucous membranes are moist.   Musculoskeletal:         General: Normal range of motion.      Cervical back: Normal range of motion and neck supple.   Neurological:      Mental Status: He is alert.   Psychiatric:         Mood and Affect: Mood normal.         Behavior: Behavior normal.          Labs, Imaging, & Other studies:   All pertinent labs and imaging studies were personally reviewed by me from 9/3.    Results from last 7 days   Lab Units 09/03/24  0808   WBC Thousand/uL 11.18*   HEMOGLOBIN g/dL 16.0   PLATELETS Thousands/uL 170     Results from last 7 days   Lab Units 09/03/24  0808   SODIUM mmol/L 139   POTASSIUM mmol/L 3.9   CHLORIDE mmol/L 103   CO2 mmol/L 24   BUN mg/dL 11   CREATININE mg/dL 0.89   EGFR ml/min/1.73sq m 91   CALCIUM mg/dL 9.5                       The following portions of the patient's history were reviewed and updated as appropriate: allergies, current medications, past family history, past medical history, past social history, past surgical history and problem list.    I spent 30 minutes with patient today in which greater than 50% of the time was spent in counseling/coordination of care regarding plan to continue abx until R heel ulceration resolved, review of recent labs, review of recent PCP visit, answered questions to the best of my ability

## 2024-09-09 NOTE — PROGRESS NOTES
Assessment/Plan:       1. Type 2 diabetes mellitus with other skin complication, without long-term current use of insulin ([facility])  2. Ulcer of right foot, limited to breakdown of skin ([facility])  3. Pressure injury of trochanteric region of right hip, stage 2 ([facility])  4. Depression, major, in remission ([facility])  5. Essential hypertension  6. Mixed hyperlipidemia  7. Diabetic peripheral neuropathy ([facility])  8. Chronic venous insufficiency of lower extremity      This 63-year-old male sees me for his primary care services.  He is here for his diabetic follow-up.  He has a normal BMP, his hemoglobin A1c is well-controlled at 5.7%.  He is adherent with his Jardiance and Ozempic.  He has lost 2 pounds since his last visit.    Patient was at a sporting event and was sitting in a wheelchair that was too small for him.  He noted that he could not get out of the wheelchair and it took 3 people to get him off the wheelchair.  He had what he thought was a bruise on his right hip.  He later examined the area and found that he had a large ulcerated area.  He was seen in urgent care and placed on cephalexin which she will finish today.  He also saw a wound care specialist who performed a debridement and advised finishing the antibiotic.  He has a healed ulcer on the bottom of his right foot for which she was taking long-term penicillin.  He sees Dr. Andi Wild for his podiatry care.    He was transition from a walking shoe to regular shoe and then developed a blood blister on the heel of his right foot.  It took about 10 days for this to heal and now the area is doing quite well.  He has follow-up with the wound care specialist today and Dr. Wild in 1 week.    He continues to have chronic venous insufficiency in both lower extremities along with diabetic peripheral neuropathy.  He is not wearing a sock on his unaffected foot and I have encouraged him to be wearing socks as that his podiatrist.    Patient's wife is doing the dressing changes  and it appears that good granulation tissue is occurring in the right hip ulcerated area.  No active drainage is noted.    Blood pressure is well-controlled with valsartan.  He is adherent with his atorvastatin for his hyper lipidemia.    He has been trying to be more active now that his foot ulcer has healed.  He is still using a crutch to support his walking.    We went over his labs.  Kidney function is excellent as is the A1c.    We also discussed his complete blood count.  He has a mild increase in his white cell count at 11,000 with an active new ulcerated area on the right lateral hip which did cause increased stress for him.  There is no shift to the left with normal neutrophil count seen.    A total of 30 minutes was spent rendering care for this patient.  This time included review of the patient's electronic medical record, performing the history and physical, reviewing appropriate labs and/or images, developing a treatment and assessment plan, answering patient's questions and concerns, and documenting the patient visit.  He is to see me on or after October 4 for his Medicare wellness along with a diabetic recheck in 4 months which would be in January of next year.  Subjective:      Patient ID: Vamshi Robbins is a 63 y.o. male.    HPI: Well-developed well-nourished 63-year-old male who is accompanied by his wife who is also an excellent caregiver for him.  PHQ testing was done and is 0 meaning that the sertraline is keeping his depression in remission.    He denies pain in his chest or heart palpitations.  Ambulation is limited as he is back in his walking boot and using a cane.  He admits that he is not as active as he could be.  His anxiety is well-controlled with an occasional lorazepam at 0.5 mg.  He is currently finishing his Keflex and he is hoping that the penicillin can be stopped now that his foot ulceration has healed.    For the ulcerated area that was debridement on the right lateral hip, he  is using Medihoney which seems to be doing very well for him.    The following portions of the patient's history were reviewed and updated as appropriate: allergies, current medications, past family history, past medical history, past social history, past surgical history, and problem list.    Review of Systems no recent URI or viral syndrome.    Objective:      /72   Wt (!) 158 kg (348 lb 5.2 oz)   BMI 42.40 kg/m²          Physical Exam  Cardiovascular:      Pulses: Pulses are weak.           Dorsalis pedis pulses are 2+ on the right side and 2+ on the left side.        Posterior tibial pulses are 1+ on the right side and 1+ on the left side.   Musculoskeletal:        Feet:    Feet:      Right foot:      Skin integrity: Ulcer, skin breakdown and callus present. No erythema, warmth or dry skin.      Left foot:      Skin integrity: Callus present. No ulcer, skin breakdown, erythema, warmth or dry skin.      reviewed vital signs.  Weight is down 2 pounds since his last visit.  Blood pressure well-controlled.    Heart regular rate without murmur rub or gallops.  Lungs are clear to auscultation.    Ulcerated area on the right lateral hip shows excellent granulation tissue no active drainage and no odor.    Ulcerated area on the right foot has just very superficial excoriation from previous blood blister.    Diabetic Foot Exam    Patient's shoes and socks removed.    Right Foot/Ankle   Right Foot Inspection  Skin Exam: skin normal, skin intact, callus, maceration, ulcer and callus. No dry skin, no warmth, no erythema, no abnormal color and no pre-ulcer.     Toe Exam: ROM and strength within normal limits and swelling.     Sensory   Vibration: diminished  Monofilament testing: diminished    Vascular  Capillary refills: < 3 seconds  The right DP pulse is 2+. The right PT pulse is 1+.     Left Foot/Ankle  Left Foot Inspection  Skin Exam: skin normal, skin intact and callus. No dry skin, no warmth, no erythema, no  maceration, normal color, no pre-ulcer and no ulcer.     Toe Exam: ROM and strength within normal limits and swelling.     Sensory   Vibration: diminished  Monofilament testing: diminished    Vascular  Capillary refills: < 3 seconds  The left DP pulse is 2+. The left PT pulse is 1+.     Assign Risk Category  No deformity present  Loss of protective sensation  Weak pulses  Risk: 2

## 2024-09-17 ENCOUNTER — OFFICE VISIT (OUTPATIENT)
Facility: CLINIC | Age: 64
End: 2024-09-17
Payer: COMMERCIAL

## 2024-09-17 VITALS
TEMPERATURE: 96.2 F | RESPIRATION RATE: 18 BRPM | HEART RATE: 60 BPM | SYSTOLIC BLOOD PRESSURE: 154 MMHG | DIASTOLIC BLOOD PRESSURE: 91 MMHG

## 2024-09-17 DIAGNOSIS — S71.001A OPEN WOUND OF RIGHT HIP, INITIAL ENCOUNTER: Primary | ICD-10-CM

## 2024-09-17 PROCEDURE — 97597 DBRDMT OPN WND 1ST 20 CM/<: CPT | Performed by: FAMILY MEDICINE

## 2024-09-17 RX ORDER — LIDOCAINE 40 MG/G
CREAM TOPICAL ONCE
Status: COMPLETED | OUTPATIENT
Start: 2024-09-17 | End: 2024-09-17

## 2024-09-17 RX ORDER — LIDOCAINE HYDROCHLORIDE 40 MG/ML
5 SOLUTION TOPICAL ONCE
Status: COMPLETED | OUTPATIENT
Start: 2024-09-17 | End: 2024-09-17

## 2024-09-17 RX ADMIN — LIDOCAINE HYDROCHLORIDE 5 ML: 40 SOLUTION TOPICAL at 11:45

## 2024-09-17 RX ADMIN — LIDOCAINE: 40 CREAM TOPICAL at 12:14

## 2024-09-17 NOTE — PROGRESS NOTES
Wound Procedure Treatment Traumatic Right Hip    Performed by: Lee Galdamez RN  Authorized by: Rosalind Sunshine MD    Associated wounds:   Wound 09/06/24 Traumatic Hip Right  Applied Topical: Medihoney gel    Applied secondary dressing:  Gauze  Dressing secured with:  Tape

## 2024-09-17 NOTE — PATIENT INSTRUCTIONS
Orders Placed This Encounter   Procedures    Wound cleansing and dressings Traumatic Right Hip     Wash your hands with soap and water.  Remove old dressing, discard into plastic bag and place in trash.  Cleanse the wound with saline prior to applying a clean dressing. Do not use tissue or cotton balls. Do not scrub the wound. Pat dry using gauze.     Shower yes   Apply skin prep to periwound  Apply medihoney to the right hip wound.  Cover with ABD  Secure with tape  Change dressing daily        Off-loading Instructions:     Keep weight and pressure off wound at all times  Turn every 2 hours. Avoid position directing pressure to Wound site. Limit side lying to 30 degree tilt. Limit the head of bed elevation to 30 degrees  Do Not Sit for Long Periods of Time  Use gel foam cushion at all times     Standing Status:   Future     Standing Expiration Date:   9/24/2024

## 2024-09-17 NOTE — PROGRESS NOTES
"Patient ID: Vamshi Robbins is a 63 y.o. male Date of Birth 1960       Chief Complaint   Patient presents with    Follow Up Wound Care Visit     R hip       Allergies:  Doxycycline    Diagnosis:      Diagnosis ICD-10-CM Associated Orders   1. Open wound of right hip, initial encounter  S71.001A lidocaine (LMX) 4 % cream     Wound cleansing and dressings Traumatic Right Hip     lidocaine (XYLOCAINE) 4 % topical solution 5 mL     Wound Procedure Treatment Traumatic Right Hip              Assessment & Plan:  Open traumatic wound right hip: Measurements slightly improved however wound bed remains with thick fibrous tissue and dry eschar.  Selective debridement completed.  Periwound with slight hyperpigmentation without acute erythema, lymphangitic streaking, increased warmth.  No purulence or malodor.  Selective debridement  Continue Medihoney but increase to twice daily followed by ABD and Medfix tape  Again reviewed that any pressure/friction to this area can be a complication/factor that could possibly delay wound healing.  Reviewed offloading strategies with patient and wife, they expressed understanding  ER precautions again reviewed, they expressed understanding  See below wound care orders for full details  Follow-up in 1 week or sooner if needed       Portions of the record may have been created with voice recognition software. Occasional wrong word or \"sound alike\" substitutions may have occurred due to the inherent limitations of voice recognition software. Read the chart carefully and recognize, using context, where substitutions have occurred.    Subjective:   9/6/2024: Mr. Robbins is a 63-year-old male who presents to wound center today along with his wife for evaluation of traumatic R hip wound.  He reports that on August 30 he went to a baseball game and sat in a wheelchair which was too small that cause traumatic injury to this hip.  He reports that he is normally ambulatory however lately he had been " limiting ambulation due to right foot ulcer (following with podiatry) so wanted to use a wheelchair to avoid walking a long distance at the game.  He went to urgent care on 9/3 and was prescribed Keflex and sent to wound care.  Of note, he states that he is on chronic suppressive therapy with penicillin VK for recurrent cellulitis related to the previous foot ulcer right foot. He reports that he does have seat cushions which he uses regularly when seated.  He also has wedges for repositioning.  He states his diabetes is controlled and attempts to eat healthy diet.  His wife has been completing wound care and using bacitracin and keeping area covered.  He denies fever, chills.  See full ROS below.    9/17/2024: [place]on presents today for follow-up of right hip wound.  He reports that since his last visit he has had no new acute issues.  He denies fever, chills.  They have been using Medihoney daily.      The following portions of the patient's history were reviewed and updated as appropriate:   Patient Active Problem List   Diagnosis    Herniation of intervertebral disc of thoracic region    Diabetic peripheral neuropathy (HCC)    Morbid obesity ([facility])    Cervical spinal cord compression ([facility])    Mild depression    Major depressive disorder, single episode, mild (HCC)    Thoracic spondylosis    Lumbar spondylosis    Spinal muscular atrophy, unspecified ([facility])    Neurogenic claudication due to lumbar spinal stenosis    Muscular atrophy    Other spondylosis with myelopathy, cervical region    Radiculopathy    Myelomalacia of cervical cord ([facility])    CPAP (continuous positive airway pressure) dependence    Type 2 diabetes mellitus with skin complication, without long-term current use of insulin (HCC)    HLD (hyperlipidemia)    Cellulitis of right lower extremity    Diabetic right foot ulcer    Platelets decreased ([facility])    Depression, major, in remission ([facility])     Past Medical History:   Diagnosis Date    Anxiety     Arthritis      Chronic pain disorder     mid back region    Colon polyp     COVID-19     CPAP (continuous positive airway pressure) dependence     Pt uses nightly    Diabetes mellitus (HCC)     Diverticulitis of colon 3 years ago    No issues    Diverticulosis     History of recent hospitalization 06/16/2021    Pt was admitted to [facility] after syncopal episode. Pt saw cardiology- all tests negative. Pt had nl EEG. pt states is was a medication interaction.    Hyperlipidemia     Hypertension     Obesity     Sleep apnea     Spinal stenosis     Wears hearing aid      Past Surgical History:   Procedure Laterality Date    ACHILLES TENDON REPAIR      Pt had a rupture in right and left 2 years apart    COLONOSCOPY      EPIDURAL BLOCK INJECTION      Pt had in lumbar region.    EPIDURAL BLOCK INJECTION N/A 04/06/2021    Procedure: T-11-T-12 INTERLAMINAR THORACIC EPIDURAL STEROID INJECTION;  Surgeon: Guzman Harrison MD;  [facility]: [facility];  Service: Pain Management     EPIDURAL BLOCK INJECTION Right 07/20/2021    Procedure: L5 and S1 TRANSFORAMINAL EPIDURAL STEROID INJECTION;  Surgeon: Guzman Harrison MD;  [facility]: [facility];  Service: Pain Management     FL GUIDED NEEDLE PLAC BX/ASP/INJ  07/14/2022    FL GUIDED NEEDLE PLAC BX/ASP/INJ  08/18/2022    FL GUIDED NEEDLE PLAC BX/ASP/INJ  09/15/2022    FOOT SURGERY Left     Left great toe- had a hammertoe.    HIP SURGERY      Replaced    JOINT REPLACEMENT Left     Total hip, knee    JOINT REPLACEMENT Right     Total hip, knee    KNEE ARTHROSCOPY Bilateral     NERVE BLOCK Bilateral 07/14/2022    Procedure: BLOCK MEDIAL BRANCH T12, L1, L2;  Surgeon: Guzman Harrison MD;  [facility]: [facility];  Service: Pain Management     NERVE BLOCK Bilateral 08/18/2022    Procedure: BLOCK MEDIAL BRANCH T12, L1, L2 #2;  Surgeon: Guzman Harrison MD;  [facility];  Service: Pain Management     NERVE BLOCK Bilateral 01/26/2023    Procedure: BLOCK MEDIAL BRANCH L3, L4, L5 #1;  Surgeon: Guzman Harrison MD;   [facility]: [facility];  Service: Pain Management     NERVE BLOCK Bilateral 02/16/2023    Procedure: BLOCK MEDIAL BRANCH L3, L4, L5 #2;  Surgeon: Guzman Harrison MD;  [facility]: [facility];  Service: Pain Management     KY JOE IMPLTJ NSTIM ELTRDS PLATE/PADDLE EDRL Left 10/26/2021    Procedure: Thoracic laminectomies for placement of spinal cord stimulator and internal pulse generator, use of neuromonitoring, left sided pulse generator;  Surgeon: Rob Wong MD;  Location: [facility];  Service: Neurosurgery    KY PRQ IMPLTJ NSTIM ELECTRODE ARRAY EPIDURAL Bilateral 09/30/2021    Procedure: NEVRO SCS TRIAL;  Surgeon: Guzman Harrison MD;  [facility]: [facility];  Service: Pain Management     RHIZOTOMY Bilateral 09/15/2022    Procedure: RHIZOTOMY LUMBAR T12, L1, L2 medial branch nerves;  Surgeon: Guzman Harrison MD;  [facility];  Service: Pain Management     RHIZOTOMY Bilateral 03/02/2023    Procedure: RHIZOTOMY LUMBAR L3, L4, L5;  Surgeon: Guzman Harrison MD;  [facility];  Service: Pain Management     RHIZOTOMY Bilateral 03/16/2023    Procedure: RHIZOTOMY LUMBAR T10, T11, T12 medial branch nerves;  Surgeon: Guzman Harrison MD;  [facility];  Service: Pain Management     TOTAL KNEE ARTHROPLASTY Left      Family History   Problem Relation Age of Onset    Arthritis Mother     Depression Mother     Hypertension Father     Alcohol abuse Father     Diabetes Son         Aged 14      Social History     Socioeconomic History    Marital status: /Civil Union     Spouse name: None    Number of children: None    Years of education: None    Highest education level: None   Occupational History    None   Tobacco Use    Smoking status: Never    Smokeless tobacco: Current     Types: Snuff    Tobacco comments:     still using snuff   Vaping Use    Vaping status: Never Used   Substance and Sexual Activity    Alcohol use: Yes     Alcohol/week: 2.0 standard drinks of alcohol     Types: 2 Cans of beer per week      Comment: social, weekends    Drug use: No    Sexual activity: Yes     Partners: Female     Birth control/protection: Male Sterilization   Other Topics Concern    None   Social History Narrative    None     Social Determinants of Health     Financial Resource Strain: Low Risk  (10/3/2023)    Overall Financial Resource Strain (CARDIA)     Difficulty of Paying Living Expenses: Not hard at all   Food Insecurity: No Food Insecurity (5/24/2024)    Hunger Vital Sign     Worried About Running Out of Food in the Last Year: Never true     Ran Out of Food in the Last Year: Never true   Transportation Needs: No Transportation Needs (5/24/2024)    PRAPARE - Transportation     Lack of Transportation (Medical): No     Lack of Transportation (Non-Medical): No   Physical Activity: Not on file   Stress: Not on file   Social Connections: Not on file   Intimate Partner Violence: Not on file   Housing Stability: Low Risk  (5/24/2024)    Housing Stability Vital Sign     Unable to Pay for Housing in the Last Year: No     Number of Times Moved in the Last Year: 1     Homeless in the Last Year: No        Current Outpatient Medications:     atorvastatin (LIPITOR) 10 mg tablet, Take 10 mg tablet every other day, Disp: 45 tablet, Rfl: 3    atorvastatin (LIPITOR) 20 mg tablet, Take 1 tablet every other day.  This should be alternating with the other prescription for 10 mg., Disp: 45 tablet, Rfl: 3    celecoxib (CeleBREX) 100 mg capsule, TAKE 1 CAPSULE TWICE DAILY, Disp: 180 capsule, Rfl: 1    Empagliflozin (Jardiance) 25 MG TABS, Take 1 tablet (25 mg total) by mouth daily, Disp: 90 tablet, Rfl: 3    LORazepam (ATIVAN) 0.5 mg tablet, Take 1 tablet (0.5 mg total) by mouth daily as needed for anxiety, Disp: 60 tablet, Rfl: 0    penicillin V potassium (VEETID) 250 mg tablet, Take 1 tablet (250 mg total) by mouth 2 (two) times a day, Disp: 60 tablet, Rfl: 0    semaglutide, 0.25 or 0.5 mg/dose, (Ozempic, 0.25 or 0.5 MG/DOSE,) 2 mg/3 mL injection  pen, Inject 0.75 mL (0.5 mg total) under the skin every 7 days, Disp: 12 mL, Rfl: 3    sertraline (ZOLOFT) 50 mg tablet, Take 1.5 tablets (75 mg total) by mouth daily at bedtime, Disp: 135 tablet, Rfl: 3    tadalafil (CIALIS) 10 MG tablet, Take one tablet as needed for ED., Disp: 30 tablet, Rfl: 3    valsartan (DIOVAN) 80 mg tablet, Take 1 tablet (80 mg total) by mouth daily, Disp: 90 tablet, Rfl: 3  No current facility-administered medications for this visit.    Review of Systems   Constitutional:  Negative for chills and fever.   Respiratory:  Negative for shortness of breath.    Musculoskeletal:  Positive for gait problem.   Skin:  Positive for wound (R hip).   Psychiatric/Behavioral:  The patient is not nervous/anxious.        Objective:  /91   Pulse 60   Temp (!) 96.2 °F (35.7 °C)   Resp 18         Physical Exam  Vitals reviewed.   Constitutional:       General: He is not in acute distress.     Appearance: He is not ill-appearing or diaphoretic.      Comments: Very pleasant, no acute distress.  Wife at bedside   HENT:      Head: Normocephalic and atraumatic.   Eyes:      General: No scleral icterus.  Pulmonary:      Effort: Pulmonary effort is normal. No respiratory distress.   Skin:     General: Skin is warm.      Findings: Wound (R hip) present.             Comments: 1- Measurements slightly improved however wound bed remains with thick fibrous tissue and dry eschar.  Selective debridement completed.  Periwound with slight hyperpigmentation without acute erythema, lymphangitic streaking, increased warmth.  No purulence or malodor.   Neurological:      Mental Status: He is alert and oriented to person, place, and time.   Psychiatric:         Mood and Affect: Mood normal.         Behavior: Behavior normal.       Wound 09/06/24 Traumatic Hip Right (Active)   Wound Image   09/17/24 1118   Wound Description Slough;White;Yellow;Eschar 09/17/24 1118   Arely-wound Assessment Dry;Pink 09/17/24 1118   Wound  "Length (cm) 8.5 cm 09/17/24 1118   Wound Width (cm) 2.5 cm 09/17/24 1118   Wound Depth (cm) 0.1 cm 09/17/24 1118   Wound Surface Area (cm^2) 21.25 cm^2 09/17/24 1118   Wound Volume (cm^3) 2.125 cm^3 09/17/24 1118   Calculated Wound Volume (cm^3) 2.13 cm^3 09/17/24 1118   Change in Wound Size % 16.47 09/17/24 1118   Drainage Amount Moderate 09/17/24 1118   Drainage Description Serosanguineous 09/17/24 1118   Non-staged Wound Description Full thickness 09/17/24 1118     Debridement   Wound 09/06/24 Traumatic Hip Right    Universal Protocol:  procedure performed by consultantConsent: Verbal consent obtained. Written consent obtained.  Consent given by: patient  Time out: Immediately prior to procedure a \"time out\" was called to verify the correct patient, procedure, equipment, support staff and site/side marked as required.  Patient understanding: patient states understanding of the procedure being performed  Patient identity confirmed: verbally with patient    Debridement Details  Performed by: physician  Debridement type: selective  Pain control: lidocaine 4%      Post-debridement measurements  Length (cm): 8.5  Width (cm): 2.5  Depth (cm): 0.1  Percent debrided: 85%  Surface Area (cm^2): 21.25  Area Debrided (cm^2): 18.06  Volume (cm^3): 2.13    Tissue and other material debrided: dermis  Devitalized tissue debrided: fibrin, slough and eschar  Instrument(s) utilized: blade and forceps  Bleeding: small  Hemostasis obtained with: pressure  Procedural pain (0-10): 0  Post-procedural pain: 0   Response to treatment: procedure was tolerated well               Lab Results   Component Value Date    HGBA1C 5.7 (H) 09/03/2024       Wound Instructions:  Orders Placed This Encounter   Procedures    Wound cleansing and dressings Traumatic Right Hip     Wash your hands with soap and water.  Remove old dressing, discard into plastic bag and place in trash.  Cleanse the wound with saline prior to applying a clean dressing. Do not " use tissue or cotton balls. Do not scrub the wound. Pat dry using gauze.     Shower yes   Apply skin prep to periwound  Apply medihoney to the right hip wound.  Cover with ABD  Secure with tape  Change dressing daily        Off-loading Instructions:     Keep weight and pressure off wound at all times  Turn every 2 hours. Avoid position directing pressure to Wound site. Limit side lying to 30 degree tilt. Limit the head of bed elevation to 30 degrees  Do Not Sit for Long Periods of Time  Use gel foam cushion at all times     Standing Status:   Future     Standing Expiration Date:   9/24/2024    Debridement     This order was created via procedure documentation    Wound Procedure Treatment Traumatic Right Hip     This order was created via procedure documentation       Rosalind Sunshine MD

## 2024-09-24 ENCOUNTER — OFFICE VISIT (OUTPATIENT)
Facility: CLINIC | Age: 64
End: 2024-09-24
Payer: COMMERCIAL

## 2024-09-24 VITALS
SYSTOLIC BLOOD PRESSURE: 131 MMHG | TEMPERATURE: 98.5 F | RESPIRATION RATE: 18 BRPM | HEART RATE: 74 BPM | DIASTOLIC BLOOD PRESSURE: 85 MMHG

## 2024-09-24 DIAGNOSIS — S71.001A OPEN WOUND OF RIGHT HIP, INITIAL ENCOUNTER: Primary | ICD-10-CM

## 2024-09-24 PROCEDURE — 11042 DBRDMT SUBQ TIS 1ST 20SQCM/<: CPT | Performed by: FAMILY MEDICINE

## 2024-09-24 RX ORDER — CEPHALEXIN 750 MG/1
CAPSULE ORAL
COMMUNITY
Start: 2024-09-20

## 2024-09-24 RX ORDER — LIDOCAINE 40 MG/G
CREAM TOPICAL ONCE
Status: COMPLETED | OUTPATIENT
Start: 2024-09-24 | End: 2024-09-24

## 2024-09-24 RX ADMIN — LIDOCAINE: 40 CREAM TOPICAL at 09:10

## 2024-09-24 NOTE — PROGRESS NOTES
"Patient ID: Vamshi Robbins is a 63 y.o. male Date of Birth 1960       Chief Complaint   Patient presents with    Follow Up Wound Care Visit     R hip wound       Allergies:  Doxycycline    Diagnosis:      Diagnosis ICD-10-CM Associated Orders   1. Open wound of right hip, initial encounter  S71.001A Wound cleansing and dressings Traumatic Right Hip     lidocaine (LMX) 4 % cream     Wound Procedure Treatment Traumatic Right Hip              Assessment & Plan:  Open traumatic wound right hip: Measurements are improved.  Wound bed remains very dry despite increasing Medihoney to twice daily at last visit.  Periwound continues with slight hyperpigmentation.  No acute erythema, increased warmth, lymphangitic streaking.  No purulence or malodor.  No induration, fluctuance.  Surgical debridement   D/C Medihoney, START Woun'Dres daily  Continue efforts at offloading as pressure/prolonged seated positions without frequent repositioning can delay wound healing  Review importance of maintaining adequate protein and diabetic control  ER precautions reviewed, he expresses understanding  Follow-up in 2 weeks or sooner if needed       Portions of the record may have been created with voice recognition software. Occasional wrong word or \"sound alike\" substitutions may have occurred due to the inherent limitations of voice recognition software. Read the chart carefully and recognize, using context, where substitutions have occurred.    Subjective:   9/6/2024: Mr. Robbins is a 63-year-old male who presents to wound center today along with his wife for evaluation of traumatic R hip wound.  He reports that on August 30 he went to a baseball game and sat in a wheelchair which was too small that cause traumatic injury to this hip.  He reports that he is normally ambulatory however lately he had been limiting ambulation due to right foot ulcer (following with podiatry) so wanted to use a wheelchair to avoid walking a long distance at " the game.  He went to urgent care on 9/3 and was prescribed Keflex and sent to wound care.  Of note, he states that he is on chronic suppressive therapy with penicillin VK for recurrent cellulitis related to the previous foot ulcer right foot. He reports that he does have seat cushions which he uses regularly when seated.  He also has wedges for repositioning.  He states his diabetes is controlled and attempts to eat healthy diet.  His wife has been completing wound care and using bacitracin and keeping area covered.  He denies fever, chills.  See full ROS below.    9/17/2024: Easton presents today for follow-up of right hip wound.  He reports that since his last visit he has had no new acute issues.  He denies fever, chills.  They have been using Medihoney daily.    9/24/2024: Easton presents today along with his wife for follow-up of right hip wound.  He states he has a gel cushion for offloading when seated. He reports that sometimes when he is seated he notices a discomfort at the distalmost area, a pinching feeling but otherwise no significant pain.  No purulence or malodor.  No other new acute complaints.  Denies fever, chills.      The following portions of the patient's history were reviewed and updated as appropriate:   Patient Active Problem List   Diagnosis    Herniation of intervertebral disc of thoracic region    Diabetic peripheral neuropathy (HCC)    Morbid obesity (HCC)    Cervical spinal cord compression (HCC)    Mild depression    Major depressive disorder, single episode, mild (HCC)    Thoracic spondylosis    Lumbar spondylosis    Spinal muscular atrophy, unspecified (HCC)    Neurogenic claudication due to lumbar spinal stenosis    Muscular atrophy    Other spondylosis with myelopathy, cervical region    Radiculopathy    Myelomalacia of cervical cord (HCC)    CPAP (continuous positive airway pressure) dependence    Type 2 diabetes mellitus with skin complication, without long-term current use of insulin  (HCC)    HLD (hyperlipidemia)    Cellulitis of right lower extremity    Diabetic right foot ulcer    Platelets decreased (HCC)    Depression, major, in remission (HCC)     Past Medical History:   Diagnosis Date    Anxiety     Arthritis     Chronic pain disorder     mid back region    Colon polyp     COVID-19     CPAP (continuous positive airway pressure) dependence     Pt uses nightly    Diabetes mellitus (HCC)     Diverticulitis of colon 3 years ago    No issues    Diverticulosis     History of recent hospitalization 06/16/2021    Pt was admitted to [facility] after syncopal episode. Pt saw cardiology- all tests negative. Pt had nl EEG. pt states is was a medication interaction.    Hyperlipidemia     Hypertension     Obesity     Sleep apnea     Spinal stenosis     Wears hearing aid      Past Surgical History:   Procedure Laterality Date    ACHILLES TENDON REPAIR      Pt had a rupture in right and left 2 years apart    COLONOSCOPY      EPIDURAL BLOCK INJECTION      Pt had in lumbar region.    EPIDURAL BLOCK INJECTION N/A 04/06/2021    Procedure: T-11-T-12 INTERLAMINAR THORACIC EPIDURAL STEROID INJECTION;  Surgeon: Guzman Harrison MD;  [facility]: [facility];  Service: Pain Management     EPIDURAL BLOCK INJECTION Right 07/20/2021    Procedure: L5 and S1 TRANSFORAMINAL EPIDURAL STEROID INJECTION;  Surgeon: Guzman Harrison MD;  Location: [facility];  Service: Pain Management     FL GUIDED NEEDLE PLAC BX/ASP/INJ  07/14/2022    FL GUIDED NEEDLE PLAC BX/ASP/INJ  08/18/2022    FL GUIDED NEEDLE PLAC BX/ASP/INJ  09/15/2022    FOOT SURGERY Left     Left great toe- had a hammertoe.    HIP SURGERY      Replaced    JOINT REPLACEMENT Left     Total hip, knee    JOINT REPLACEMENT Right     Total hip, knee    KNEE ARTHROSCOPY Bilateral     NERVE BLOCK Bilateral 07/14/2022    Procedure: BLOCK MEDIAL BRANCH T12, L1, L2;  Surgeon: Guzman Harrison MD;  [facility]: [facility];  Service: Pain Management     NERVE BLOCK Bilateral 08/18/2022     Procedure: BLOCK MEDIAL BRANCH T12, L1, L2 #2;  Surgeon: Guzman Harrison MD;  [facility];  Service: Pain Management     NERVE BLOCK Bilateral 01/26/2023    Procedure: BLOCK MEDIAL BRANCH L3, L4, L5 #1;  Surgeon: Guzman Harrison MD;  [facility]: [facility];  Service: Pain Management     NERVE BLOCK Bilateral 02/16/2023    Procedure: BLOCK MEDIAL BRANCH L3, L4, L5 #2;  Surgeon: Guzman Harrison MD;  [facility]: [facility];  Service: Pain Management     RI JOE IMPLTJ NSTIM ELTRDS PLATE/PADDLE EDRL Left 10/26/2021    Procedure: Thoracic laminectomies for placement of spinal cord stimulator and internal pulse generator, use of neuromonitoring, left sided pulse generator;  Surgeon: Rob Wogn MD;  Location: [facility];  Service: Neurosurgery    RI PRQ IMPLTJ NSTIM ELECTRODE ARRAY EPIDURAL Bilateral 09/30/2021    Procedure: NEVRO SCS TRIAL;  Surgeon: Guzman Harrison MD;  [facility]: [facility];  Service: Pain Management     RHIZOTOMY Bilateral 09/15/2022    Procedure: RHIZOTOMY LUMBAR T12, L1, L2 medial branch nerves;  Surgeon: Guzman Harrison MD;  [facility];  Service: Pain Management     RHIZOTOMY Bilateral 03/02/2023    Procedure: RHIZOTOMY LUMBAR L3, L4, L5;  Surgeon: Guzman Harrison MD;  [facility];  Service: Pain Management     RHIZOTOMY Bilateral 03/16/2023    Procedure: RHIZOTOMY LUMBAR T10, T11, T12 medial branch nerves;  Surgeon: Guzman Harrison MD;  [facility];  Service: Pain Management     TOTAL KNEE ARTHROPLASTY Left      Family History   Problem Relation Age of Onset    Arthritis Mother     Depression Mother     Hypertension Father     Alcohol abuse Father     Diabetes Son         Aged 14      Social History     Socioeconomic History    Marital status: /Civil Union     Spouse name: None    Number of children: None    Years of education: None    Highest education level: None   Occupational History    None   Tobacco Use    Smoking status: Never    Smokeless tobacco:  Current     Types: Snuff    Tobacco comments:     still using snuff   Vaping Use    Vaping status: Never Used   Substance and Sexual Activity    Alcohol use: Yes     Alcohol/week: 2.0 standard drinks of alcohol     Types: 2 Cans of beer per week     Comment: social, weekends    Drug use: No    Sexual activity: Yes     Partners: Female     Birth control/protection: Male Sterilization   Other Topics Concern    None   Social History Narrative    None     Social Determinants of Health     Financial Resource Strain: Low Risk  (10/3/2023)    Overall Financial Resource Strain (CARDIA)     Difficulty of Paying Living Expenses: Not hard at all   Food Insecurity: No Food Insecurity (5/24/2024)    Hunger Vital Sign     Worried About Running Out of Food in the Last Year: Never true     Ran Out of Food in the Last Year: Never true   Transportation Needs: No Transportation Needs (5/24/2024)    PRAPARE - Transportation     Lack of Transportation (Medical): No     Lack of Transportation (Non-Medical): No   Physical Activity: Not on file   Stress: Not on file   Social Connections: Not on file   Intimate Partner Violence: Not on file   Housing Stability: Low Risk  (5/24/2024)    Housing Stability Vital Sign     Unable to Pay for Housing in the Last Year: No     Number of Times Moved in the Last Year: 1     Homeless in the Last Year: No        Current Outpatient Medications:     cephalexin (KEFLEX) 750 MG capsule, TAKE ONE BY MOUTH EVERY 12 HOURS FOR 2 WEEKS, Disp: , Rfl:     atorvastatin (LIPITOR) 10 mg tablet, Take 10 mg tablet every other day, Disp: 45 tablet, Rfl: 3    atorvastatin (LIPITOR) 20 mg tablet, Take 1 tablet every other day.  This should be alternating with the other prescription for 10 mg., Disp: 45 tablet, Rfl: 3    celecoxib (CeleBREX) 100 mg capsule, TAKE 1 CAPSULE TWICE DAILY, Disp: 180 capsule, Rfl: 1    Empagliflozin (Jardiance) 25 MG TABS, Take 1 tablet (25 mg total) by mouth daily, Disp: 90 tablet, Rfl: 3     LORazepam (ATIVAN) 0.5 mg tablet, Take 1 tablet (0.5 mg total) by mouth daily as needed for anxiety, Disp: 60 tablet, Rfl: 0    semaglutide, 0.25 or 0.5 mg/dose, (Ozempic, 0.25 or 0.5 MG/DOSE,) 2 mg/3 mL injection pen, Inject 0.75 mL (0.5 mg total) under the skin every 7 days, Disp: 12 mL, Rfl: 3    sertraline (ZOLOFT) 50 mg tablet, Take 1.5 tablets (75 mg total) by mouth daily at bedtime, Disp: 135 tablet, Rfl: 3    tadalafil (CIALIS) 10 MG tablet, Take one tablet as needed for ED., Disp: 30 tablet, Rfl: 3    valsartan (DIOVAN) 80 mg tablet, Take 1 tablet (80 mg total) by mouth daily, Disp: 90 tablet, Rfl: 3    Current Facility-Administered Medications:     lidocaine (LMX) 4 % cream, , Topical, Once, Rosalind Sunshine MD    Review of Systems   Constitutional:  Negative for chills and fever.   Respiratory:  Negative for shortness of breath.    Musculoskeletal:  Negative for gait problem (No longer with cam boot/surgical shoe for podiatric issue, gait returned to normal).   Skin:  Positive for wound (R hip).   Psychiatric/Behavioral:  The patient is not nervous/anxious.        Objective:  /85   Pulse 74   Temp 98.5 °F (36.9 °C)   Resp 18         Physical Exam  Vitals reviewed.   Constitutional:       General: He is not in acute distress.     Appearance: He is not ill-appearing or diaphoretic.      Comments: Very pleasant, no acute distress.  Wife at bedside   HENT:      Head: Normocephalic and atraumatic.   Eyes:      General: No scleral icterus.  Cardiovascular:      Rate and Rhythm: Normal rate.   Pulmonary:      Effort: Pulmonary effort is normal. No respiratory distress.   Skin:     General: Skin is warm.      Findings: Wound (R hip) present.             Comments: 1- Measurements are improved.  Wound bed remains very dry despite increasing Medihoney to twice daily at last visit.  Periwound continues with slight hyperpigmentation.  No acute erythema, increased warmth, lymphangitic streaking.  No purulence or  "malodor.  No induration, fluctuance.   Neurological:      Mental Status: He is alert and oriented to person, place, and time.   Psychiatric:         Mood and Affect: Mood normal.         Behavior: Behavior normal.         Wound 09/06/24 Traumatic Hip Right (Active)   Wound Image   09/24/24 0831   Wound Description Slough;White;Yellow;Brown 09/24/24 0836   Arely-wound Assessment Dry;Pink 09/24/24 0836   Wound Length (cm) 7 cm 09/24/24 0836   Wound Width (cm) 2.5 cm 09/24/24 0836   Wound Depth (cm) 0.1 cm 09/24/24 0836   Wound Surface Area (cm^2) 17.5 cm^2 09/24/24 0836   Wound Volume (cm^3) 1.75 cm^3 09/24/24 0836   Calculated Wound Volume (cm^3) 1.75 cm^3 09/24/24 0836   Change in Wound Size % 31.37 09/24/24 0836   Drainage Amount Moderate 09/24/24 0836   Drainage Description Serosanguineous 09/24/24 0836   Non-staged Wound Description Full thickness 09/17/24 1118       Debridement   Wound 09/06/24 Traumatic Hip Right    Universal Protocol:  Consent: Verbal consent obtained. Written consent obtained.  Risks and benefits: risks, benefits and alternatives were discussed  Consent given by: patient  Time out: Immediately prior to procedure a \"time out\" was called to verify the correct patient, procedure, equipment, support staff and site/side marked as required.  Patient understanding: patient states understanding of the procedure being performed  Patient identity confirmed: verbally with patient    Debridement Details  Performed by: physician  Debridement type: surgical  Level of debridement: subcutaneous tissue  Pain control: lidocaine 4%      Post-debridement measurements  Length (cm): 7  Width (cm): 2.5  Depth (cm): 0.2  Percent debrided: 100%  Surface Area (cm^2): 17.5  Area Debrided (cm^2): 17.5  Volume (cm^3): 3.5    Tissue and other material debrided: subcutaneous tissue  Devitalized tissue debrided: fibrin, slough and eschar  Instrument(s) utilized: curette, blade and forceps  Bleeding: medium  Hemostasis " obtained with: pressure  Procedural pain (0-10): 0  Post-procedural pain: 0   Response to treatment: procedure was tolerated well                     Lab Results   Component Value Date    HGBA1C 5.7 (H) 09/03/2024       Wound Instructions:  Orders Placed This Encounter   Procedures    Wound cleansing and dressings Traumatic Right Hip     Wash your hands with soap and water.  Remove old dressing, discard into plastic bag and place in trash.  Cleanse the wound with saline prior to applying a clean dressing. Do not use tissue or cotton balls. Do not scrub the wound. Pat dry using gauze.     Shower yes   Apply skin prep to periwound  Apply wound dres to the right hip wound.    Cover with ABD  Secure with tape  Change dressing daily      Off-loading Instructions:     Keep weight and pressure off wound at all times  Turn every 2 hours. Avoid position directing pressure to Wound site. Limit side lying to 30 degree tilt. Limit the head of bed elevation to 30 degrees  Do Not Sit for Long Periods of Time  Use gel foam cushion at all times     Standing Status:   Future     Standing Expiration Date:   10/1/2024    Debridement     This order was created via procedure documentation    Wound Procedure Treatment Traumatic Right Hip     This order was created via procedure documentation     Rosalind Sunshine MD

## 2024-09-24 NOTE — PROGRESS NOTES
Wound Procedure Treatment Traumatic Right Hip    Performed by: Lee Galdamez RN  Authorized by: Rosalind Sunshine MD    Associated wounds:   Wound 09/06/24 Traumatic Hip Right  Applied Topical: Woun'Dres    Applied secondary dressing:  Gauze and ABD  Dressing secured with:  Tape

## 2024-09-24 NOTE — PATIENT INSTRUCTIONS
Orders Placed This Encounter   Procedures    Wound cleansing and dressings Traumatic Right Hip     Wash your hands with soap and water.  Remove old dressing, discard into plastic bag and place in trash.  Cleanse the wound with saline prior to applying a clean dressing. Do not use tissue or cotton balls. Do not scrub the wound. Pat dry using gauze.     Shower yes   Apply skin prep to periwound  Apply wound dres to the right hip wound.    Cover with ABD  Secure with tape  Change dressing daily      Off-loading Instructions:     Keep weight and pressure off wound at all times  Turn every 2 hours. Avoid position directing pressure to Wound site. Limit side lying to 30 degree tilt. Limit the head of bed elevation to 30 degrees  Do Not Sit for Long Periods of Time  Use gel foam cushion at all times     Standing Status:   Future     Standing Expiration Date:   10/1/2024

## 2024-09-27 ENCOUNTER — RA CDI HCC (OUTPATIENT)
Dept: OTHER | Facility: HOSPITAL | Age: 64
End: 2024-09-27

## 2024-10-04 ENCOUNTER — OFFICE VISIT (OUTPATIENT)
Dept: FAMILY MEDICINE CLINIC | Facility: CLINIC | Age: 64
End: 2024-10-04
Payer: COMMERCIAL

## 2024-10-04 VITALS
SYSTOLIC BLOOD PRESSURE: 129 MMHG | WEIGHT: 315 LBS | TEMPERATURE: 97.1 F | HEART RATE: 73 BPM | OXYGEN SATURATION: 97 % | BODY MASS INDEX: 42.94 KG/M2 | DIASTOLIC BLOOD PRESSURE: 83 MMHG

## 2024-10-04 DIAGNOSIS — E78.2 MIXED HYPERLIPIDEMIA: ICD-10-CM

## 2024-10-04 DIAGNOSIS — E11.620 TYPE 2 DIABETES MELLITUS WITH DIABETIC DERMATITIS, WITHOUT LONG-TERM CURRENT USE OF INSULIN (HCC): ICD-10-CM

## 2024-10-04 DIAGNOSIS — S71.001D UNSPECIFIED OPEN WOUND, RIGHT HIP, SUBSEQUENT ENCOUNTER: ICD-10-CM

## 2024-10-04 DIAGNOSIS — E66.01 MORBID OBESITY (HCC): ICD-10-CM

## 2024-10-04 DIAGNOSIS — E11.42 DIABETIC PERIPHERAL NEUROPATHY (HCC): ICD-10-CM

## 2024-10-04 DIAGNOSIS — Z00.00 MEDICARE ANNUAL WELLNESS VISIT, SUBSEQUENT: Primary | ICD-10-CM

## 2024-10-04 PROCEDURE — G0439 PPPS, SUBSEQ VISIT: HCPCS | Performed by: PHYSICIAN ASSISTANT

## 2024-10-04 NOTE — ASSESSMENT & PLAN NOTE
Patient continues to have poor perception in his feet.  Has been battling a right foot wound managed by podiatry and wound care and he is no longer in the boot.  Lab Results   Component Value Date    HGBA1C 5.7 (H) 09/03/2024

## 2024-10-04 NOTE — PROGRESS NOTES
Ambulatory Visit  Name: Vamshi Robbins      : 1960      MRN: [id number]  Encounter Provider: Cristina Lopez PA-C  Encounter Date: 10/4/2024   Encounter department: [facility] [address] PRIMARY [facility]    Assessment & Plan  Medicare annual wellness visit, subsequent  Patient last had his Medicare wellness on 10/3/2024 and had his new updated Medicare wellness today.  We reviewed the answers to his questions.       Type 2 diabetes mellitus with diabetic dermatitis, without long-term current use of insulin (HCC)    Lab Results   Component Value Date    HGBA1C 5.7 (H) 2024     Patient's A1c is under excellent control.  Orders:    Albumin / creatinine urine ratio; Future    Basic metabolic panel; Future    Hemoglobin A1C; Future    Diabetic peripheral neuropathy (HCC)  Patient continues to have poor perception in his feet.  Has been battling a right foot wound managed by podiatry and wound care and he is no longer in the boot.  Lab Results   Component Value Date    HGBA1C 5.7 (H) 2024            Mixed hyperlipidemia  Patient continues on his long-term statin       Morbid obesity (HCC)  Patient has gained 4 pounds since his last visit.  Admits some dietary indiscretion.       Unspecified open wound, right hip, subsequent encounter  Wound management continues to see him for his right lateral hip open wound.  He is gone from Medihoney to dressing with a gel and healing is occurring.          Preventive health issues were discussed with patient, and age appropriate screening tests were ordered as noted in patient's After Visit Summary. Personalized health advice and appropriate referrals for health education or preventive services given if needed, as noted in patient's After Visit Summary.    A total of 35 minutes was spent rendering care for this patient.  This time included review of the patient's electronic medical record, performing the history and physical, reviewing appropriate  labs and/or images, developing a treatment and assessment plan, answering patient's questions and concerns, and documenting the patient visit.  I will see him on January 9.  He will have hemoglobin A1c, microalbuminuria, and BMP done prior to the visit.    History of Present Illness     HPI: 64-year-old pleasant male who is alert oriented and cooperative with the exam.  He was accompanied in his visit with his wife.  Patient states that he is finally pain-free in his right hip area.  He continues to receive wound care and seems to be doing quite well with his wound care regimen.  His right foot wound is healing and he continues to be visited by his podiatrist Dr. Wild.  No longer requiring the boot.    Admits occasional dietary indiscretion but is adherent with his medications.    No pain in his chest heart palpitations dizziness or lightheadedness.  Patient Care Team:  Cristina Lopez PA-C as PCP - General (Physician Assistant)    Review of Systems: No recent URI or viral syndrome.  Medical History Reviewed by provider this encounter:  Tobacco  Allergies  Meds  Problems  Med Hx  Surg Hx  Fam Hx       Annual Wellness Visit Questionnaire       Health Risk Assessment:   Patient rates overall health as good. Patient feels that their physical health rating is slightly better. Patient is satisfied with their life. Eyesight was rated as same. Hearing was rated as same. Patient feels that their emotional and mental health rating is same. Patients states they are never, rarely angry. Patient states they are never, rarely unusually tired/fatigued. Pain experienced in the last 7 days has been some. Patient's pain rating has been 2/10. Patient states that he has experienced no weight loss or gain in last 6 months.     Fall Risk Screening:   In the past year, patient has experienced: no history of falling in past year      Home Safety:  Patient does not have trouble with stairs inside or outside of their home.  Patient has working smoke alarms and has working carbon monoxide detector. Home safety hazards include: none.     Nutrition:   Current diet is Regular.     Medications:   Patient is currently taking over-the-counter supplements. OTC medications include: see medication list. Patient is able to manage medications.     Activities of Daily Living (ADLs)/Instrumental Activities of Daily Living (IADLs):   Walk and transfer into and out of bed and chair?: Yes  Dress and groom yourself?: Yes    Bathe or shower yourself?: Yes    Feed yourself? Yes  Do your laundry/housekeeping?: Yes  Manage your money, pay your bills and track your expenses?: Yes  Make your own meals?: Yes    Do your own shopping?: Yes    Previous Hospitalizations:   Any hospitalizations or ED visits within the last 12 months?: Yes      Hospitalization Comments: Cellulitis in foot    Advance Care Planning:   Living will: Yes    Durable POA for healthcare: Yes    Advanced directive: Yes      PREVENTIVE SCREENINGS      Cardiovascular Screening:    General: Screening Not Indicated and History Lipid Disorder      Diabetes Screening:     General: Screening Not Indicated and History Diabetes      Colorectal Cancer Screening:     General: Screening Current      Prostate Cancer Screening:    General: Screening Current      Lung Cancer Screening:     General: Screening Not Indicated      Hepatitis C Screening:    General: Screening Current    Screening, Brief Intervention, and Referral to Treatment (SBIRT)    Screening  Typical number of drinks in a day: 0  Typical number of drinks in a week: 0  Interpretation: Low risk drinking behavior.    Social Determinants of Health     Financial Resource Strain: Low Risk  (10/3/2023)    Overall Financial Resource Strain (CARDIA)     Difficulty of Paying Living Expenses: Not hard at all   Food Insecurity: No Food Insecurity (10/4/2024)    Hunger Vital Sign     Worried About Running Out of Food in the Last Year: Never true      Ran Out of Food in the Last Year: Never true   Transportation Needs: No Transportation Needs (10/4/2024)    PRAPARE - Transportation     Lack of Transportation (Medical): No     Lack of Transportation (Non-Medical): No   Housing Stability: Low Risk  (10/4/2024)    Housing Stability Vital Sign     Unable to Pay for Housing in the Last Year: No     Number of Times Moved in the Last Year: 1     Homeless in the Last Year: No   Utilities: Not At Risk (10/4/2024)    [facility] Utilities     Threatened with loss of utilities: No     No results found.    Objective     /83 (BP Location: Right arm, Patient Position: Sitting, Cuff Size: Large)   Pulse 73   Temp (!) 97.1 °F (36.2 °C) (Tympanic)   Wt (!) 160 kg (352 lb 11.8 oz)   SpO2 97%   BMI 42.94 kg/m²     Physical Exam: Reviewed vital signs.  He is normotensive and afebrile.    Heart is regular rate without murmur rub or gallops.  Lungs are clear to auscultation.    Dressing was removed for the right hip.  Excellent granulation tissue noted and redressing applied.

## 2024-10-04 NOTE — ASSESSMENT & PLAN NOTE
Lab Results   Component Value Date    HGBA1C 5.7 (H) 09/03/2024     Patient's A1c is under excellent control.  Orders:    Albumin / creatinine urine ratio; Future    Basic metabolic panel; Future    Hemoglobin A1C; Future

## 2024-10-08 ENCOUNTER — OFFICE VISIT (OUTPATIENT)
Facility: CLINIC | Age: 64
End: 2024-10-08
Payer: COMMERCIAL

## 2024-10-08 VITALS
HEART RATE: 68 BPM | DIASTOLIC BLOOD PRESSURE: 84 MMHG | RESPIRATION RATE: 18 BRPM | SYSTOLIC BLOOD PRESSURE: 132 MMHG | TEMPERATURE: 98.7 F

## 2024-10-08 DIAGNOSIS — E11.9 TYPE 2 DIABETES MELLITUS WITHOUT COMPLICATION, WITHOUT LONG-TERM CURRENT USE OF INSULIN (HCC): ICD-10-CM

## 2024-10-08 DIAGNOSIS — S71.001A OPEN WOUND OF RIGHT HIP, INITIAL ENCOUNTER: Primary | ICD-10-CM

## 2024-10-08 PROCEDURE — 11042 DBRDMT SUBQ TIS 1ST 20SQCM/<: CPT | Performed by: FAMILY MEDICINE

## 2024-10-08 PROCEDURE — 99212 OFFICE O/P EST SF 10 MIN: CPT | Performed by: FAMILY MEDICINE

## 2024-10-08 RX ORDER — LIDOCAINE 40 MG/G
CREAM TOPICAL ONCE
Status: COMPLETED | OUTPATIENT
Start: 2024-10-08 | End: 2024-10-08

## 2024-10-08 RX ADMIN — LIDOCAINE 1 APPLICATION: 40 CREAM TOPICAL at 08:53

## 2024-10-08 NOTE — PROGRESS NOTES
Wound Procedure Treatment Traumatic Right Hip    Performed by: Maryuri Cabrera RN  Authorized by: Rosalind Sunshine MD    Associated wounds:   Wound 09/06/24 Traumatic Hip Right  Applied Topical: Woun'Dres    Applied secondary dressing:  ABD  Dressing secured with:  Tape

## 2024-10-08 NOTE — PROGRESS NOTES
"Patient ID: Vamshi Robbins is a 64 y.o. male Date of Birth 1960       Chief Complaint   Patient presents with    Follow Up Wound Care Visit     Right hip wound       Allergies:  Doxycycline    Diagnosis:      Diagnosis ICD-10-CM Associated Orders   1. Open wound of right hip, initial encounter  S71.001A Wound cleansing and dressings Traumatic Right Hip     lidocaine (LMX) 4 % cream     Wound Procedure Treatment Traumatic Right Hip      2. Type 2 diabetes mellitus without complication, without long-term current use of insulin ([facility])  E11.9               Assessment & Plan:  Open traumatic wound right hip: Wound continues to slowly improve.  Wound bed with granulation, hypergranulation and fibrinous debris requiring surgical debridement.  Periwound with some hyperpigmented scar tissue but no acute erythema, increased warmth, lymphangitic streaking.  No purulence or malodor.  No induration, fluctuance, crepitus.  Surgical debridement  Continue Woun'Dres  Continue to recommend offloading efforts and avoiding any friction/pressure to this area as this can delay wound healing  T2DM: Continue to recommend maintaining adequate nutrition and diabetic control. Recent A1c at goal.  Of note patient does use chewing tobacco.  Discussed with him that this can contribute to delayed wound healing as this causes vasoconstriction which limits blood flow to area such as his wound.  He expresses understanding.  Recommend cessation.  ER precautions reviewed, he expressed understanding  Follow-up in 2 weeks or sooner if needed    Portions of the record may have been created with voice recognition software. Occasional wrong word or \"sound alike\" substitutions may have occurred due to the inherent limitations of voice recognition software. Read the chart carefully and recognize, using context, where substitutions have occurred.    Subjective:   9/6/2024: Mr. Robbins is a 63-year-old male who presents to wound center today along with his " wife for evaluation of traumatic R hip wound.  He reports that on August 30 he went to a baseball game and sat in a wheelchair which was too small that cause traumatic injury to this hip.  He reports that he is normally ambulatory however lately he had been limiting ambulation due to right foot ulcer (following with podiatry) so wanted to use a wheelchair to avoid walking a long distance at the game.  He went to urgent care on 9/3 and was prescribed Keflex and sent to wound care.  Of note, he states that he is on chronic suppressive therapy with penicillin VK for recurrent cellulitis related to the previous foot ulcer right foot. He reports that he does have seat cushions which he uses regularly when seated.  He also has wedges for repositioning.  He states his diabetes is controlled and attempts to eat healthy diet.  His wife has been completing wound care and using bacitracin and keeping area covered.  He denies fever, chills.  See full ROS below.    9/17/2024: Easton presents today for follow-up of right hip wound.  He reports that since his last visit he has had no new acute issues.  He denies fever, chills.  They have been using Medihoney daily.    9/24/2024: Easton presents today along with his wife for follow-up of right hip wound.  He states he has a gel cushion for offloading when seated. He reports that sometimes when he is seated he notices a discomfort at the distalmost area, a pinching feeling but otherwise no significant pain.  No purulence or malodor.  No other new acute complaints.  Denies fever, chills.    10/8/2024: Easton presents today along with his wife for follow-up.  At last visit, management switched to Woun'Dres.  He reports he no longer has any pain to this area.  Of note, initial BP noted to be elevated above baseline.  He reports he has slight headache but he has not eaten anything today (headache feels similar to past headaches when he has not eaten).  Palpitations, shortness breath, chest pain,  dizziness, lightheadedness, acute visual changes, weakness.  Repeat /84.        The following portions of the patient's history were reviewed and updated as appropriate:   Patient Active Problem List   Diagnosis    Herniation of intervertebral disc of thoracic region    Diabetic peripheral neuropathy (HCC)    Morbid obesity (HCC)    Cervical spinal cord compression (HCC)    Mild depression    Major depressive disorder, single episode, mild (HCC)    Thoracic spondylosis    Lumbar spondylosis    Spinal muscular atrophy, unspecified (HCC)    Neurogenic claudication due to lumbar spinal stenosis    Muscular atrophy    Other spondylosis with myelopathy, cervical region    Radiculopathy    Myelomalacia of cervical cord ([facility])    CPAP (continuous positive airway pressure) dependence    Type 2 diabetes mellitus with skin complication, without long-term current use of insulin ([facility])    HLD (hyperlipidemia)    Cellulitis of right lower extremity    Diabetic right foot ulcer    Platelets decreased ([facility])    Depression, major, in remission ([facility])     Past Medical History:   Diagnosis Date    Anxiety     Arthritis     Chronic pain disorder     mid back region    Colon polyp     COVID-19     CPAP (continuous positive airway pressure) dependence     Pt uses nightly    Diabetes mellitus ([facility])     Diverticulitis of colon 3 years ago    No issues    Diverticulosis     History of recent hospitalization 06/16/2021    Pt was admitted to [facility] after syncopal episode. Pt saw cardiology- all tests negative. Pt had nl EEG. pt states is was a medication interaction.    Hyperlipidemia     Hypertension     Obesity     Sleep apnea     Spinal stenosis     Wears hearing aid      Past Surgical History:   Procedure Laterality Date    ACHILLES TENDON REPAIR      Pt had a rupture in right and left 2 years apart    COLONOSCOPY      EPIDURAL BLOCK INJECTION      Pt had in lumbar region.    EPIDURAL BLOCK INJECTION N/A 04/06/2021    Procedure: T-11-T-12  INTERLAMINAR THORACIC EPIDURAL STEROID INJECTION;  Surgeon: Guzman Harrison MD;  [facility]: [facility];  Service: Pain Management     EPIDURAL BLOCK INJECTION Right 07/20/2021    Procedure: L5 and S1 TRANSFORAMINAL EPIDURAL STEROID INJECTION;  Surgeon: Guzman Harrison MD;  [facility];  Service: Pain Management     FL GUIDED NEEDLE PLAC BX/ASP/INJ  07/14/2022    FL GUIDED NEEDLE PLAC BX/ASP/INJ  08/18/2022    FL GUIDED NEEDLE PLAC BX/ASP/INJ  09/15/2022    FOOT SURGERY Left     Left great toe- had a hammertoe.    HIP SURGERY      Replaced    JOINT REPLACEMENT Left     Total hip, knee    JOINT REPLACEMENT Right     Total hip, knee    KNEE ARTHROSCOPY Bilateral     NERVE BLOCK Bilateral 07/14/2022    Procedure: BLOCK MEDIAL BRANCH T12, L1, L2;  Surgeon: Guzman Harrison MD;  [facility]: [facility];  Service: Pain Management     NERVE BLOCK Bilateral 08/18/2022    Procedure: BLOCK MEDIAL BRANCH T12, L1, L2 #2;  Surgeon: Guzman Harrison MD;  [facility];  Service: Pain Management     NERVE BLOCK Bilateral 01/26/2023    Procedure: BLOCK MEDIAL BRANCH L3, L4, L5 #1;  Surgeon: Guzman Harrison MD;  [facility]: [facility];  Service: Pain Management     NERVE BLOCK Bilateral 02/16/2023    Procedure: BLOCK MEDIAL BRANCH L3, L4, L5 #2;  Surgeon: Guzman Harrison MD;  [facility];  Service: Pain Management     PA JOE IMPLTJ NSTIM ELTRDS PLATE/PADDLE EDRL Left 10/26/2021    Procedure: Thoracic laminectomies for placement of spinal cord stimulator and internal pulse generator, use of neuromonitoring, left sided pulse generator;  Surgeon: Rob Wong MD;  Location:  MAIN OR;  Service: Neurosurgery    PA PRQ IMPLTJ NSTIM ELECTRODE ARRAY EPIDURAL Bilateral 09/30/2021    Procedure: NEVRO SCS TRIAL;  Surgeon: Guzman Harrison MD;  [facility];  Service: Pain Management     RHIZOTOMY Bilateral 09/15/2022    Procedure: RHIZOTOMY LUMBAR T12, L1, L2 medial branch nerves;  Surgeon: Guzman Harrison MD;   [facility];  Service: Pain Management     RHIZOTOMY Bilateral 03/02/2023    Procedure: RHIZOTOMY LUMBAR L3, L4, L5;  Surgeon: Guzman Harrison MD;  [facility];  Service: Pain Management     RHIZOTOMY Bilateral 03/16/2023    Procedure: RHIZOTOMY LUMBAR T10, T11, T12 medial branch nerves;  Surgeon: Guzman Harrison MD;  [facility];  Service: Pain Management     TOTAL KNEE ARTHROPLASTY Left      Family History   Problem Relation Age of Onset    Arthritis Mother     Depression Mother     Hypertension Father     Alcohol abuse Father     Diabetes Son         Aged 14      Social History     Socioeconomic History    Marital status: /Civil Union     Spouse name: Not on file    Number of children: Not on file    Years of education: Not on file    Highest education level: Not on file   Occupational History    Not on file   Tobacco Use    Smoking status: Never    Smokeless tobacco: Current     Types: Snuff    Tobacco comments:     still using snuff   Vaping Use    Vaping status: Never Used   Substance and Sexual Activity    Alcohol use: Yes     Alcohol/week: 2.0 standard drinks of alcohol     Types: 2 Cans of beer per week     Comment: social, weekends    Drug use: No    Sexual activity: Yes     Partners: Female     Birth control/protection: Male Sterilization   Other Topics Concern    Not on file   Social History Narrative    Not on file     Social Determinants of Health     Financial Resource Strain: Low Risk  (10/3/2023)    Overall Financial Resource Strain (CARDIA)     Difficulty of Paying Living Expenses: Not hard at all   Food Insecurity: No Food Insecurity (10/4/2024)    Hunger Vital Sign     Worried About Running Out of Food in the Last Year: Never true     Ran Out of Food in the Last Year: Never true   Transportation Needs: No Transportation Needs (10/4/2024)    PRAPARE - Transportation     Lack of Transportation (Medical): No     Lack of Transportation (Non-Medical): No   Physical  Activity: Not on file   Stress: Not on file   Social Connections: Not on file   Intimate Partner Violence: Not on file   Housing Stability: Low Risk  (10/4/2024)    Housing Stability Vital Sign     Unable to Pay for Housing in the Last Year: No     Number of Times Moved in the Last Year: 1     Homeless in the Last Year: No        Current Outpatient Medications:     atorvastatin (LIPITOR) 10 mg tablet, Take 10 mg tablet every other day, Disp: 45 tablet, Rfl: 3    atorvastatin (LIPITOR) 20 mg tablet, Take 1 tablet every other day.  This should be alternating with the other prescription for 10 mg., Disp: 45 tablet, Rfl: 3    celecoxib (CeleBREX) 100 mg capsule, TAKE 1 CAPSULE TWICE DAILY, Disp: 180 capsule, Rfl: 1    cephalexin (KEFLEX) 750 MG capsule, TAKE ONE BY MOUTH EVERY 12 HOURS FOR 2 WEEKS, Disp: , Rfl:     Empagliflozin (Jardiance) 25 MG TABS, Take 1 tablet (25 mg total) by mouth daily, Disp: 90 tablet, Rfl: 3    LORazepam (ATIVAN) 0.5 mg tablet, Take 1 tablet (0.5 mg total) by mouth daily as needed for anxiety, Disp: 60 tablet, Rfl: 0    semaglutide, 0.25 or 0.5 mg/dose, (Ozempic, 0.25 or 0.5 MG/DOSE,) 2 mg/3 mL injection pen, Inject 0.75 mL (0.5 mg total) under the skin every 7 days, Disp: 12 mL, Rfl: 3    sertraline (ZOLOFT) 50 mg tablet, Take 1.5 tablets (75 mg total) by mouth daily at bedtime, Disp: 135 tablet, Rfl: 3    tadalafil (CIALIS) 10 MG tablet, Take one tablet as needed for ED., Disp: 30 tablet, Rfl: 3    valsartan (DIOVAN) 80 mg tablet, Take 1 tablet (80 mg total) by mouth daily, Disp: 90 tablet, Rfl: 3  No current facility-administered medications for this visit.    Review of Systems   Constitutional:  Negative for chills and fever.   Eyes:  Negative for visual disturbance.   Respiratory:  Negative for shortness of breath.    Cardiovascular:  Negative for chest pain and palpitations.   Skin:  Positive for wound (R hip).   Neurological:  Positive for headaches (slight headache - thinks it is  because he hasnt eaten). Negative for dizziness, weakness and light-headedness.   Psychiatric/Behavioral:  The patient is not nervous/anxious.        Objective:  /84   Pulse 68   Temp 98.7 °F (37.1 °C)   Resp 18   Pain Score: 0-No pain     Physical Exam  Vitals reviewed.   Constitutional:       General: He is not in acute distress.     Appearance: He is not ill-appearing, toxic-appearing or diaphoretic.      Comments: NAD, very pleasant.  Wife at bedside.   HENT:      Head: Normocephalic and atraumatic.   Eyes:      General: No scleral icterus.  Cardiovascular:      Rate and Rhythm: Normal rate.   Pulmonary:      Effort: Pulmonary effort is normal. No respiratory distress.   Skin:     General: Skin is warm.      Findings: Wound (R hip) present.             Comments: 1- Wound continues to slowly improve.  Wound bed with granulation, hypergranulation and fibrinous debris requiring surgical debridement.  Periwound with some hyperpigmented scar tissue but no acute erythema, increased warmth, lymphangitic streaking.  No purulence or malodor.  No induration, fluctuance, crepitus.   Neurological:      Mental Status: He is alert and oriented to person, place, and time.   Psychiatric:         Mood and Affect: Mood normal.         Behavior: Behavior normal.         Wound 09/06/24 Traumatic Hip Right (Active)   Wound Image   10/08/24 0831   Wound Description Granulation tissue;Beefy red;Yellow;Epithelialization 10/08/24 0833   Arely-wound Assessment Dry;Pink 10/08/24 0833   Wound Length (cm) 7.3 cm 10/08/24 0833   Wound Width (cm) 2.1 cm 10/08/24 0833   Wound Depth (cm) 0.1 cm 10/08/24 0833   Wound Surface Area (cm^2) 15.33 cm^2 10/08/24 0833   Wound Volume (cm^3) 1.533 cm^3 10/08/24 0833   Calculated Wound Volume (cm^3) 1.53 cm^3 10/08/24 0833   Change in Wound Size % 40 10/08/24 0833   Drainage Amount Moderate 10/08/24 0833   Drainage Description Serosanguineous 10/08/24 0833   Non-staged Wound Description Full  "thickness 10/08/24 0833       Debridement   Wound 09/06/24 Traumatic Hip Right    Universal Protocol:  Consent: Verbal consent obtained. Written consent obtained.  Risks and benefits: risks, benefits and alternatives were discussed  Consent given by: patient  Time out: Immediately prior to procedure a \"time out\" was called to verify the correct patient, procedure, equipment, support staff and site/side marked as required.  Patient understanding: patient states understanding of the procedure being performed  Patient identity confirmed: verbally with patient    Debridement Details  Performed by: physician  Debridement type: surgical  Level of debridement: subcutaneous tissue  Pain control: lidocaine 4%      Post-debridement measurements  Length (cm): 7.3  Width (cm): 2.1  Depth (cm): 0.2  Percent debrided: 100%  Surface Area (cm^2): 15.33  Area Debrided (cm^2): 15.33  Volume (cm^3): 3.07    Tissue and other material debrided: hypergranulation and subcutaneous tissue  Devitalized tissue debrided: biofilm and fibrin  Instrument(s) utilized: curette  Bleeding: medium  Hemostasis obtained with: pressure  Procedural pain (0-10): 0  Post-procedural pain: 0   Response to treatment: procedure was tolerated well                   Lab Results   Component Value Date    HGBA1C 5.7 (H) 09/03/2024       Wound Instructions:  Orders Placed This Encounter   Procedures    Wound cleansing and dressings Traumatic Right Hip     Wound cleansing and dressings Traumatic Right Hip   Wash your hands with soap and water.  Remove old dressing, discard into plastic bag and place in trash.  Cleanse the wound with saline prior to applying a clean dressing. Do not use tissue or cotton balls. Do not scrub the wound. Pat dry using gauze.     Shower yes   Apply skin prep to periwound  Apply wound dres to the right hip wound.    Cover with ABD  Secure with tape  Change dressing daily      Off-loading Instructions:     Keep weight and pressure off wound " at all times  Turn every 2 hours. Avoid position directing pressure to Wound site. Limit side lying to 30 degree tilt. Limit the head of bed elevation to 30 degrees  Do Not Sit for Long Periods of Time  Use gel foam cushion at all times     Standing Status:   Future     Standing Expiration Date:   10/15/2024    Debridement     This order was created via procedure documentation    Wound Procedure Treatment Traumatic Right Hip     This order was created via procedure documentation       Rosalind Sunshine MD

## 2024-10-08 NOTE — PATIENT INSTRUCTIONS
Orders Placed This Encounter   Procedures    Wound cleansing and dressings Traumatic Right Hip     Wound cleansing and dressings Traumatic Right Hip   Wash your hands with soap and water.  Remove old dressing, discard into plastic bag and place in trash.  Cleanse the wound with saline prior to applying a clean dressing. Do not use tissue or cotton balls. Do not scrub the wound. Pat dry using gauze.     Shower yes   Apply skin prep to periwound  Apply wound dres to the right hip wound.    Cover with ABD  Secure with tape  Change dressing daily      Off-loading Instructions:     Keep weight and pressure off wound at all times  Turn every 2 hours. Avoid position directing pressure to Wound site. Limit side lying to 30 degree tilt. Limit the head of bed elevation to 30 degrees  Do Not Sit for Long Periods of Time  Use gel foam cushion at all times     Standing Status:   Future     Standing Expiration Date:   10/15/2024    Debridement     This order was created via procedure documentation

## 2024-10-18 ENCOUNTER — TELEPHONE (OUTPATIENT)
Age: 64
End: 2024-10-18

## 2024-10-18 NOTE — TELEPHONE ENCOUNTER
Pt called in asking to speak with Cristina regarding elevated blood pressure. He stated when he checked this morning it was 141/83 and then most recently 160/91, denied any other symptoms at the moment. Scheduled appt for 10/21 with pcp. Pt is asking if at all possible if Cristina can call him back.   Thank you.

## 2024-10-21 ENCOUNTER — OFFICE VISIT (OUTPATIENT)
Dept: FAMILY MEDICINE CLINIC | Facility: CLINIC | Age: 64
End: 2024-10-21
Payer: COMMERCIAL

## 2024-10-21 VITALS — SYSTOLIC BLOOD PRESSURE: 130 MMHG | HEART RATE: 89 BPM | DIASTOLIC BLOOD PRESSURE: 82 MMHG | OXYGEN SATURATION: 98 %

## 2024-10-21 DIAGNOSIS — E78.2 MIXED HYPERLIPIDEMIA: ICD-10-CM

## 2024-10-21 DIAGNOSIS — I10 ESSENTIAL HYPERTENSION: Primary | ICD-10-CM

## 2024-10-21 PROCEDURE — 99214 OFFICE O/P EST MOD 30 MIN: CPT | Performed by: PHYSICIAN ASSISTANT

## 2024-10-21 PROCEDURE — G2211 COMPLEX E/M VISIT ADD ON: HCPCS | Performed by: PHYSICIAN ASSISTANT

## 2024-10-21 NOTE — PROGRESS NOTES
Assessment/Plan:       1. Essential hypertension  2. Mixed hyperlipidemia      This 64-year-old male sees me for his primary care services.  Patient was recently at wound care for an ongoing chronic wound of his hip after having an injury sitting in a wheelchair.  When his blood pressure was checked at the wound care specialist office, he was told that his pressure was elevated.  Blood pressure was actually 135/81 and repeated 132/84.  Patient became very nervous over being told that his blood pressure was concerning.  He has a longstanding history of hypertension.  He takes losartan 80 mg nightly in order to ensure a nocturnal dip.  New pressure was checked in the office today blood pressure was 105/55.  This was the lowest pressure he had.  Rechecking his blood pressure showed his pressure to be 130/82.    He did get another cuff at home and has been checking this.  He is become rather obsessive about his blood pressure checks and I am having him relax on taking his blood pressure multiple times per day.  He states that he gets very nervous and ruminates over things that he is told in office.  He is now reassured that his blood pressure is at goal and he will continue to take his losartan.    Patient has a history of mixed hyperlipidemia.  He is adherent with his statin medication and his ASCVD risk is lowered with the use of 20 mg of atorvastatin.    A total of 30 minutes was spent rendering care for this patient.  This time included review of the patient's electronic medical record, performing the history and physical, reviewing appropriate labs and/or images, developing a treatment and assessment plan, answering patient's questions and concerns, and documenting the patient visit.  I will see the patient for his diabetic follow-up on January 9, 2025.  He will have hemoglobin A1c, urine for microalbumin, and basic metabolic profile performed prior to that visit.    Subjective:      Patient ID: Vamshi Robbins is a  64 y.o. male.    HPI: Well-developed well-nourished 64-year-old male who is seen accompanied by his wife.  He has been extremely anxious over fearing elevated blood pressure.  His blood pressure has been consistently less than 140/90.  He is on a single use treatment of losartan on a nightly basis.    He is not having any pain in his chest heart palpitations dizziness or lightheadedness.  He denies any syncope or near syncope.  He admits to being very anxious over possibly having a blood pressure that is not as well-controlled as he would like it.  He was reassured as to the appropriateness of his blood pressure.    The following portions of the patient's history were reviewed and updated as appropriate: allergies, current medications, past family history, past medical history, past social history, past surgical history, and problem list.    Review of Systems no recent URI or viral syndrome.  Continues to receive wound care therapy.    Objective:      /82 (BP Location: Right arm, Patient Position: Sitting)   Pulse 89   SpO2 98%          Physical Exam reviewed vital signs.  His blood pressure is appropriate and higher than his original blood pressure when he came in but still well under goal.    Heart is regular rate without murmur rub or gallops.  Lungs are clear to auscultation.

## 2024-10-22 ENCOUNTER — OFFICE VISIT (OUTPATIENT)
Facility: CLINIC | Age: 64
End: 2024-10-22
Payer: COMMERCIAL

## 2024-10-22 VITALS
SYSTOLIC BLOOD PRESSURE: 137 MMHG | TEMPERATURE: 97.9 F | DIASTOLIC BLOOD PRESSURE: 87 MMHG | HEART RATE: 76 BPM | RESPIRATION RATE: 18 BRPM

## 2024-10-22 DIAGNOSIS — S71.001A OPEN WOUND OF RIGHT HIP, INITIAL ENCOUNTER: Primary | ICD-10-CM

## 2024-10-22 PROCEDURE — 11042 DBRDMT SUBQ TIS 1ST 20SQCM/<: CPT | Performed by: FAMILY MEDICINE

## 2024-10-22 RX ORDER — LIDOCAINE 40 MG/G
CREAM TOPICAL ONCE
Status: COMPLETED | OUTPATIENT
Start: 2024-10-22 | End: 2024-10-22

## 2024-10-22 RX ADMIN — LIDOCAINE: 40 CREAM TOPICAL at 08:37

## 2024-10-22 NOTE — PROGRESS NOTES
Wound Procedure Treatment Traumatic Right Hip    Performed by: Lee Galdamez RN  Authorized by: Rosalind Sunshine MD    Associated wounds:   Wound 09/06/24 Traumatic Hip Right  Applied to periwound:  Skin prep  Applied Topical: Woun'Dres    Applied secondary dressing:  ABD  Dressing secured with:  Tape

## 2024-10-22 NOTE — PROGRESS NOTES
"Patient ID: Vamshi Robbins is a 64 y.o. male Date of Birth 1960       Chief Complaint   Patient presents with    Follow Up Wound Care Visit     R hip wound       Allergies:  Doxycycline    Diagnosis:      Diagnosis ICD-10-CM Associated Orders   1. Open wound of right hip, initial encounter  S71.001A lidocaine (LMX) 4 % cream     Wound cleansing and dressings Traumatic Right Hip     Debridement Traumatic Right Hip     Wound Procedure Treatment Traumatic Right Hip              Assessment & Plan:  Open traumatic wound R hip: Significant improvement.  Wound bed with granulation, hypergranulation and fibrinous debris requiring surgical debridement. Periwound with hyperpigmented scar tissue without acute erythema, increased warmth, lymphangitic streaking.  No purulence or malodor.  No induration, fluctuance, crepitus.  Surgical debridement  Will continue Woun'Dres  Continue all offloading efforts and avoiding friction/pressure to this area; has a new gel cushion which he is using  ER precautions reviewed with patient and wife, they expressed understanding  Follow-up in 2 weeks or sooner if needed    Portions of the record may have been created with voice recognition software. Occasional wrong word or \"sound alike\" substitutions may have occurred due to the inherent limitations of voice recognition software. Read the chart carefully and recognize, using context, where substitutions have occurred.    Subjective:   9/6/2024: Mr. Robbins is a 63-year-old male who presents to wound center today along with his wife for evaluation of traumatic R hip wound.  He reports that on August 30 he went to a baseball game and sat in a wheelchair which was too small that cause traumatic injury to this hip.  He reports that he is normally ambulatory however lately he had been limiting ambulation due to right foot ulcer (following with podiatry) so wanted to use a wheelchair to avoid walking a long distance at the game.  He went to urgent " care on 9/3 and was prescribed Keflex and sent to wound care.  Of note, he states that he is on chronic suppressive therapy with penicillin VK for recurrent cellulitis related to the previous foot ulcer right foot. He reports that he does have seat cushions which he uses regularly when seated.  He also has wedges for repositioning.  He states his diabetes is controlled and attempts to eat healthy diet.  His wife has been completing wound care and using bacitracin and keeping area covered.  He denies fever, chills.  See full ROS below.    9/17/2024: Easton presents today for follow-up of right hip wound.  He reports that since his last visit he has had no new acute issues.  He denies fever, chills.  They have been using Medihoney daily.    9/24/2024: Easton presents today along with his wife for follow-up of right hip wound.  He states he has a gel cushion for offloading when seated. He reports that sometimes when he is seated he notices a discomfort at the distalmost area, a pinching feeling but otherwise no significant pain.  No purulence or malodor.  No other new acute complaints.  Denies fever, chills.    10/8/2024: Easton presents today along with his wife for follow-up.  At last visit, management switched to Woun'Dres.  He reports he no longer has any pain to this area.  Of note, initial BP noted to be elevated above baseline.  He reports he has slight headache but he has not eaten anything today (headache feels similar to past headaches when he has not eaten).  Palpitations, shortness breath, chest pain, dizziness, lightheadedness, acute visual changes, weakness.  Repeat /84.    10/22/2024: Easton presents today for follow-up.  His wife is accompanying him to today's visit.  He reports that since his last visit, he has been managing his blood pressure and following with PCP closely.  Denies headache, dizziness, lightheadedness, weakness, chest pain, palpitations, shortness of breath.  He denies fever, chills.   Obtained a gel cushion online and has been using this when he is seated.  No other new acute complaints today.        The following portions of the patient's history were reviewed and updated as appropriate:   Patient Active Problem List   Diagnosis    Herniation of intervertebral disc of thoracic region    Diabetic peripheral neuropathy (HCC)    Morbid obesity (HCC)    Cervical spinal cord compression (HCC)    Mild depression    Major depressive disorder, single episode, mild (HCC)    Thoracic spondylosis    Lumbar spondylosis    Spinal muscular atrophy, unspecified (HCC)    Neurogenic claudication due to lumbar spinal stenosis    Muscular atrophy    Other spondylosis with myelopathy, cervical region    Radiculopathy    Myelomalacia of cervical cord (HCC)    CPAP (continuous positive airway pressure) dependence    Type 2 diabetes mellitus with skin complication, without long-term current use of insulin ([facility])    HLD (hyperlipidemia)    Cellulitis of right lower extremity    Diabetic right foot ulcer    Platelets decreased ([facility])    Depression, major, in remission ([facility])     Past Medical History:   Diagnosis Date    Anxiety     Arthritis     Chronic pain disorder     mid back region    Colon polyp     COVID-19     CPAP (continuous positive airway pressure) dependence     Pt uses nightly    Diabetes mellitus ([facility])     Diverticulitis of colon 3 years ago    No issues    Diverticulosis     History of recent hospitalization 06/16/2021    Pt was admitted to [facility] after syncopal episode. Pt saw cardiology- all tests negative. Pt had nl EEG. pt states is was a medication interaction.    Hyperlipidemia     Hypertension     Obesity     Sleep apnea     Spinal stenosis     Wears hearing aid      Past Surgical History:   Procedure Laterality Date    ACHILLES TENDON REPAIR      Pt had a rupture in right and left 2 years apart    COLONOSCOPY      EPIDURAL BLOCK INJECTION      Pt had in lumbar region.    EPIDURAL BLOCK INJECTION N/A  04/06/2021    Procedure: T-11-T-12 INTERLAMINAR THORACIC EPIDURAL STEROID INJECTION;  Surgeon: Guzman Harrison MD;  Location: [facility];  Service: Pain Management     EPIDURAL BLOCK INJECTION Right 07/20/2021    Procedure: L5 and S1 TRANSFORAMINAL EPIDURAL STEROID INJECTION;  Surgeon: Guzman Harrison MD;  Location: [facility];  Service: Pain Management     FL GUIDED NEEDLE PLAC BX/ASP/INJ  07/14/2022    FL GUIDED NEEDLE PLAC BX/ASP/INJ  08/18/2022    FL GUIDED NEEDLE PLAC BX/ASP/INJ  09/15/2022    FOOT SURGERY Left     Left great toe- had a hammertoe.    HIP SURGERY      Replaced    JOINT REPLACEMENT Left     Total hip, knee    JOINT REPLACEMENT Right     Total hip, knee    KNEE ARTHROSCOPY Bilateral     NERVE BLOCK Bilateral 07/14/2022    Procedure: BLOCK MEDIAL BRANCH T12, L1, L2;  Surgeon: Guzman Harrison MD;  Location: [facility];  Service: Pain Management     NERVE BLOCK Bilateral 08/18/2022    Procedure: BLOCK MEDIAL BRANCH T12, L1, L2 #2;  Surgeon: Guzman Harrison MD;  [facility]: [facility];  Service: Pain Management     NERVE BLOCK Bilateral 01/26/2023    Procedure: BLOCK MEDIAL BRANCH L3, L4, L5 #1;  Surgeon: Guzman Harrison MD;  [facility]: [facility];  Service: Pain Management     NERVE BLOCK Bilateral 02/16/2023    Procedure: BLOCK MEDIAL BRANCH L3, L4, L5 #2;  Surgeon: Guzman Harrison MD;  [facility]: [facility];  Service: Pain Management     CA JOE IMPLTJ NSTIM ELTRDS PLATE/PADDLE EDRL Left 10/26/2021    Procedure: Thoracic laminectomies for placement of spinal cord stimulator and internal pulse generator, use of neuromonitoring, left sided pulse generator;  Surgeon: Rob Wong MD;  Location:  [facility];  Service: Neurosurgery    CA PRQ IMPLTJ NSTIM ELECTRODE ARRAY EPIDURAL Bilateral 09/30/2021    Procedure: NEVRO SCS TRIAL;  Surgeon: Guzman Harrison MD;  [facility]: [facility];  Service: Pain Management     RHIZOTOMY Bilateral 09/15/2022    Procedure: RHIZOTOMY LUMBAR T12, L1, L2 medial branch nerves;   Surgeon: Guzman Harrison MD;  [facility]: [facility];  Service: Pain Management     RHIZOTOMY Bilateral 03/02/2023    Procedure: RHIZOTOMY LUMBAR L3, L4, L5;  Surgeon: Guzman Harrison MD;  [facility];  Service: Pain Management     RHIZOTOMY Bilateral 03/16/2023    Procedure: RHIZOTOMY LUMBAR T10, T11, T12 medial branch nerves;  Surgeon: Guzman Harrison MD;  [facility];  Service: Pain Management     TOTAL KNEE ARTHROPLASTY Left      Family History   Problem Relation Age of Onset    Arthritis Mother     Depression Mother     Hypertension Father     Alcohol abuse Father     Diabetes Son         Aged 14      Social History     Socioeconomic History    Marital status: /Civil Union     Spouse name: None    Number of children: None    Years of education: None    Highest education level: None   Occupational History    None   Tobacco Use    Smoking status: Never    Smokeless tobacco: Current     Types: Snuff    Tobacco comments:     still using snuff   Vaping Use    Vaping status: Never Used   Substance and Sexual Activity    Alcohol use: Yes     Alcohol/week: 2.0 standard drinks of alcohol     Types: 2 Cans of beer per week     Comment: social, weekends    Drug use: No    Sexual activity: Yes     Partners: Female     Birth control/protection: Male Sterilization   Other Topics Concern    None   Social History Narrative    None     Social Determinants of Health     Financial Resource Strain: Low Risk  (10/3/2023)    Overall Financial Resource Strain (CARDIA)     Difficulty of Paying Living Expenses: Not hard at all   Food Insecurity: No Food Insecurity (10/4/2024)    Nursing - Inadequate Food Risk Classification     Worried About Running Out of Food in the Last Year: Never true     Ran Out of Food in the Last Year: Never true     Ran Out of Food in the Last Year: Not on file   Transportation Needs: No Transportation Needs (10/4/2024)    PRAPARE - Transportation     Lack of Transportation (Medical): No      Lack of Transportation (Non-Medical): No   Physical Activity: Not on file   Stress: Not on file   Social Connections: Not on file   Intimate Partner Violence: Not on file   Housing Stability: Low Risk  (10/4/2024)    Housing Stability Vital Sign     Unable to Pay for Housing in the Last Year: No     Number of Times Moved in the Last Year: 1     Homeless in the Last Year: No        Current Outpatient Medications:     atorvastatin (LIPITOR) 10 mg tablet, Take 10 mg tablet every other day, Disp: 45 tablet, Rfl: 3    atorvastatin (LIPITOR) 20 mg tablet, Take 1 tablet every other day.  This should be alternating with the other prescription for 10 mg., Disp: 45 tablet, Rfl: 3    celecoxib (CeleBREX) 100 mg capsule, TAKE 1 CAPSULE TWICE DAILY, Disp: 180 capsule, Rfl: 1    Empagliflozin (Jardiance) 25 MG TABS, Take 1 tablet (25 mg total) by mouth daily, Disp: 90 tablet, Rfl: 3    LORazepam (ATIVAN) 0.5 mg tablet, Take 1 tablet (0.5 mg total) by mouth daily as needed for anxiety, Disp: 60 tablet, Rfl: 0    semaglutide, 0.25 or 0.5 mg/dose, (Ozempic, 0.25 or 0.5 MG/DOSE,) 2 mg/3 mL injection pen, Inject 0.75 mL (0.5 mg total) under the skin every 7 days, Disp: 12 mL, Rfl: 3    sertraline (ZOLOFT) 50 mg tablet, Take 1.5 tablets (75 mg total) by mouth daily at bedtime, Disp: 135 tablet, Rfl: 3    valsartan (DIOVAN) 80 mg tablet, Take 1 tablet (80 mg total) by mouth daily, Disp: 90 tablet, Rfl: 3  No current facility-administered medications for this visit.    Review of Systems   Constitutional:  Negative for chills and fever.   Respiratory:  Negative for shortness of breath.    Skin:  Positive for wound (R hip).   Psychiatric/Behavioral:  The patient is not nervous/anxious.        Objective:  /87   Pulse 76   Temp 97.9 °F (36.6 °C)   Resp 18         Physical Exam  Vitals reviewed.   Constitutional:       General: He is not in acute distress.     Appearance: He is not ill-appearing, toxic-appearing or diaphoretic.       "Comments: NAD, very pleasant.  Wife at bedside.   HENT:      Head: Normocephalic and atraumatic.   Eyes:      General: No scleral icterus.  Cardiovascular:      Rate and Rhythm: Normal rate.   Pulmonary:      Effort: Pulmonary effort is normal. No respiratory distress.   Skin:     General: Skin is warm.      Findings: Wound (R hip) present.             Comments: 1- Significant improvement.  Wound bed with granulation, hypergranulation and fibrinous debris requiring surgical debridement. Periwound with hyperpigmented scar tissue without acute erythema, increased warmth, lymphangitic streaking.  No purulence or malodor.  No induration, fluctuance, crepitus.   Neurological:      Mental Status: He is alert and oriented to person, place, and time.   Psychiatric:         Mood and Affect: Mood normal.         Behavior: Behavior normal.       Wound 09/06/24 Traumatic Hip Right (Active)   Wound Image   10/22/24 0829   Wound Description Granulation tissue;Beefy red;Yellow;Epithelialization 10/22/24 0833   Arely-wound Assessment Dry;Pink 10/22/24 0833   Wound Length (cm) 5.6 cm 10/22/24 0833   Wound Width (cm) 1.5 cm 10/22/24 0833   Wound Depth (cm) 0.1 cm 10/22/24 0833   Wound Surface Area (cm^2) 8.4 cm^2 10/22/24 0833   Wound Volume (cm^3) 0.84 cm^3 10/22/24 0833   Calculated Wound Volume (cm^3) 0.84 cm^3 10/22/24 0833   Change in Wound Size % 67.06 10/22/24 0833   Drainage Amount Small 10/22/24 0833   Drainage Description Serosanguineous 10/22/24 0833   Non-staged Wound Description Full thickness 10/22/24 0833     Debridement   Wound 09/06/24 Traumatic Hip Right    Universal Protocol:  Consent: Verbal consent obtained. Written consent obtained.  Risks and benefits: risks, benefits and alternatives were discussed  Consent given by: patient  Time out: Immediately prior to procedure a \"time out\" was called to verify the correct patient, procedure, equipment, support staff and site/side marked as required.  Patient " understanding: patient states understanding of the procedure being performed  Patient identity confirmed: verbally with patient    Debridement Details  Performed by: physician  Debridement type: surgical  Level of debridement: subcutaneous tissue  Pain control: lidocaine 4%      Post-debridement measurements  Length (cm): 5.6  Width (cm): 1.5  Depth (cm): 0.2  Percent debrided: 100%  Surface Area (cm^2): 8.4  Area Debrided (cm^2): 8.4  Volume (cm^3): 1.68    Tissue and other material debrided: hypergranulation and subcutaneous tissue  Devitalized tissue debrided: fibrin  Instrument(s) utilized: curette  Bleeding: medium  Hemostasis obtained with: pressure  Procedural pain (0-10): 0  Post-procedural pain: 0   Response to treatment: procedure was tolerated well                   Lab Results   Component Value Date    HGBA1C 5.7 (H) 09/03/2024       Wound Instructions:  Orders Placed This Encounter   Procedures    Wound cleansing and dressings Traumatic Right Hip     Wash your hands with soap and water.  Remove old dressing, discard into plastic bag and place in trash.  Cleanse the wound with saline prior to applying a clean dressing. Do not use tissue or cotton balls. Do not scrub the wound. Pat dry using gauze.     Shower yes   Apply skin prep to periwound  Apply wound dres to the right hip wound.    Cover with ABD  Secure with tape  Change dressing daily      Off-loading Instructions:     Keep weight and pressure off wound at all times  Turn every 2 hours. Avoid position directing pressure to Wound site. Limit side lying to 30 degree tilt. Limit the head of bed elevation to 30 degrees  Do Not Sit for Long Periods of Time  Use gel foam cushion at all times     Standing Status:   Future     Standing Expiration Date:   10/29/2024    Debridement Traumatic Right Hip     This order was created via procedure documentation    Wound Procedure Treatment Traumatic Right Hip     This order was created via procedure documentation        Rosalind Sunshine MD

## 2024-10-22 NOTE — PATIENT INSTRUCTIONS
Orders Placed This Encounter   Procedures    Wound cleansing and dressings Traumatic Right Hip     Wash your hands with soap and water.  Remove old dressing, discard into plastic bag and place in trash.  Cleanse the wound with saline prior to applying a clean dressing. Do not use tissue or cotton balls. Do not scrub the wound. Pat dry using gauze.     Shower yes   Apply skin prep to periwound  Apply wound dres to the right hip wound.    Cover with ABD  Secure with tape  Change dressing daily      Off-loading Instructions:     Keep weight and pressure off wound at all times  Turn every 2 hours. Avoid position directing pressure to Wound site. Limit side lying to 30 degree tilt. Limit the head of bed elevation to 30 degrees  Do Not Sit for Long Periods of Time  Use gel foam cushion at all times     Standing Status:   Future     Standing Expiration Date:   10/29/2024

## 2024-10-29 ENCOUNTER — TELEPHONE (OUTPATIENT)
Dept: FAMILY MEDICINE CLINIC | Facility: CLINIC | Age: 64
End: 2024-10-29

## 2024-10-29 DIAGNOSIS — E11.42 DIABETIC PERIPHERAL NEUROPATHY (HCC): Primary | ICD-10-CM

## 2024-11-04 DIAGNOSIS — F41.1 GAD (GENERALIZED ANXIETY DISORDER): ICD-10-CM

## 2024-11-05 ENCOUNTER — OFFICE VISIT (OUTPATIENT)
Facility: CLINIC | Age: 64
End: 2024-11-05
Payer: COMMERCIAL

## 2024-11-05 VITALS
RESPIRATION RATE: 18 BRPM | TEMPERATURE: 98.2 F | DIASTOLIC BLOOD PRESSURE: 85 MMHG | HEART RATE: 76 BPM | SYSTOLIC BLOOD PRESSURE: 136 MMHG

## 2024-11-05 DIAGNOSIS — S71.001A OPEN WOUND OF RIGHT HIP, INITIAL ENCOUNTER: Primary | ICD-10-CM

## 2024-11-05 PROCEDURE — 11042 DBRDMT SUBQ TIS 1ST 20SQCM/<: CPT | Performed by: FAMILY MEDICINE

## 2024-11-05 RX ORDER — LIDOCAINE 40 MG/G
CREAM TOPICAL ONCE
Status: COMPLETED | OUTPATIENT
Start: 2024-11-05 | End: 2024-11-05

## 2024-11-05 RX ORDER — LORAZEPAM 0.5 MG/1
0.5 TABLET ORAL DAILY PRN
Qty: 60 TABLET | Refills: 0 | Status: SHIPPED | OUTPATIENT
Start: 2024-11-05

## 2024-11-05 RX ADMIN — LIDOCAINE: 40 CREAM TOPICAL at 08:18

## 2024-11-05 NOTE — PROGRESS NOTES
"Patient ID: Vamshi Robbins is a 64 y.o. male Date of Birth 1960       Chief Complaint   Patient presents with    Follow Up Wound Care Visit     R hip wound       Allergies:  Doxycycline    Diagnosis:      Diagnosis ICD-10-CM Associated Orders   1. Open wound of right hip, initial encounter  S71.001A lidocaine (LMX) 4 % cream     Wound Procedure Treatment Traumatic Right Hip     Debridement Traumatic Right Hip     Wound cleansing and dressings Traumatic Right Hip              Assessment & Plan:  Open wound right hip: Wound continues to improve, measuring smaller. Wound bed with friable hypergranulation tissue and thin biofilm layer.  Surgical debridement completed.  Periwound dry and w/ hyperpigmented scar tissue without acute erythema, increased warmth, lymphangitic streaking.  No purulence or malodor.  Surgical debridement  Discontinue Woun'Dres, start Dermagran  Continue all offloading efforts, avoiding friction/pressure to this area  ER precautions again reviewed, they expressed understanding  Follow-up in 2 weeks or sooner if needed    Portions of the record may have been created with voice recognition software. Occasional wrong word or \"sound alike\" substitutions may have occurred due to the inherent limitations of voice recognition software. Read the chart carefully and recognize, using context, where substitutions have occurred.    Subjective:   9/6/2024: Mr. Robbins is a 63-year-old male who presents to wound center today along with his wife for evaluation of traumatic R hip wound.  He reports that on August 30 he went to a baseball game and sat in a wheelchair which was too small that cause traumatic injury to this hip.  He reports that he is normally ambulatory however lately he had been limiting ambulation due to right foot ulcer (following with podiatry) so wanted to use a wheelchair to avoid walking a long distance at the game.  He went to urgent care on 9/3 and was prescribed Keflex and sent to " wound care.  Of note, he states that he is on chronic suppressive therapy with penicillin VK for recurrent cellulitis related to the previous foot ulcer right foot. He reports that he does have seat cushions which he uses regularly when seated.  He also has wedges for repositioning.  He states his diabetes is controlled and attempts to eat healthy diet.  His wife has been completing wound care and using bacitracin and keeping area covered.  He denies fever, chills.  See full ROS below.    9/17/2024: Easton presents today for follow-up of right hip wound.  He reports that since his last visit he has had no new acute issues.  He denies fever, chills.  They have been using Medihoney daily.    9/24/2024: Easton presents today along with his wife for follow-up of right hip wound.  He states he has a gel cushion for offloading when seated. He reports that sometimes when he is seated he notices a discomfort at the distalmost area, a pinching feeling but otherwise no significant pain.  No purulence or malodor.  No other new acute complaints.  Denies fever, chills.    10/8/2024: Easton presents today along with his wife for follow-up.  At last visit, management switched to Woun'Dres.  He reports he no longer has any pain to this area.  Of note, initial BP noted to be elevated above baseline.  He reports he has slight headache but he has not eaten anything today (headache feels similar to past headaches when he has not eaten).  Palpitations, shortness breath, chest pain, dizziness, lightheadedness, acute visual changes, weakness.  Repeat /84.    10/22/2024: Easton presents today for follow-up.  His wife is accompanying him to today's visit.  He reports that since his last visit, he has been managing his blood pressure and following with PCP closely.  Denies headache, dizziness, lightheadedness, weakness, chest pain, palpitations, shortness of breath.  He denies fever, chills.  Obtained a gel cushion online and has been using this  when he is seated.  No other new acute complaints today.    11/5/2024: Easton presents today for follow-up along with his wife.  He reports that other than the dressing sticking a little bit when they change it, he has no new acute issues or complaints. He denies fever, chills.       The following portions of the patient's history were reviewed and updated as appropriate:   Patient Active Problem List   Diagnosis    Herniation of intervertebral disc of thoracic region    Diabetic peripheral neuropathy (HCC)    Morbid obesity (HCC)    Cervical spinal cord compression (HCC)    Mild depression    Major depressive disorder, single episode, mild (HCC)    Thoracic spondylosis    Lumbar spondylosis    Spinal muscular atrophy, unspecified (HCC)    Neurogenic claudication due to lumbar spinal stenosis    Muscular atrophy    Other spondylosis with myelopathy, cervical region    Radiculopathy    Myelomalacia of cervical cord ([facility])    CPAP (continuous positive airway pressure) dependence    Type 2 diabetes mellitus with skin complication, without long-term current use of insulin ([facility])    HLD (hyperlipidemia)    Cellulitis of right lower extremity    Diabetic right foot ulcer    Platelets decreased ([facility])    Depression, major, in remission ([facility])     Past Medical History:   Diagnosis Date    Anxiety     Arthritis     Chronic pain disorder     mid back region    Colon polyp     COVID-19     CPAP (continuous positive airway pressure) dependence     Pt uses nightly    Diabetes mellitus (HCC)     Diverticulitis of colon 3 years ago    No issues    Diverticulosis     History of recent hospitalization 06/16/2021    Pt was admitted to [facility] after syncopal episode. Pt saw cardiology- all tests negative. Pt had nl EEG. pt states is was a medication interaction.    Hyperlipidemia     Hypertension     Obesity     Sleep apnea     Spinal stenosis     Wears hearing aid      Past Surgical History:   Procedure Laterality Date    ACHILLES TENDON REPAIR       Pt had a rupture in right and left 2 years apart    COLONOSCOPY      EPIDURAL BLOCK INJECTION      Pt had in lumbar region.    EPIDURAL BLOCK INJECTION N/A 04/06/2021    Procedure: T-11-T-12 INTERLAMINAR THORACIC EPIDURAL STEROID INJECTION;  Surgeon: Guzman Harrison MD;  [facility]: [facility];  Service: Pain Management     EPIDURAL BLOCK INJECTION Right 07/20/2021    Procedure: L5 and S1 TRANSFORAMINAL EPIDURAL STEROID INJECTION;  Surgeon: Guzman Harrison MD;  [facility]: [facility];  Service: Pain Management     FL GUIDED NEEDLE PLAC BX/ASP/INJ  07/14/2022    FL GUIDED NEEDLE PLAC BX/ASP/INJ  08/18/2022    FL GUIDED NEEDLE PLAC BX/ASP/INJ  09/15/2022    FOOT SURGERY Left     Left great toe- had a hammertoe.    HIP SURGERY      Replaced    JOINT REPLACEMENT Left     Total hip, knee    JOINT REPLACEMENT Right     Total hip, knee    KNEE ARTHROSCOPY Bilateral     NERVE BLOCK Bilateral 07/14/2022    Procedure: BLOCK MEDIAL BRANCH T12, L1, L2;  Surgeon: Guzman Harrison MD;  [facility]: [facility];  Service: Pain Management     NERVE BLOCK Bilateral 08/18/2022    Procedure: BLOCK MEDIAL BRANCH T12, L1, L2 #2;  Surgeon: Guzman Harrison MD;  [facility]: [facility];  Service: Pain Management     NERVE BLOCK Bilateral 01/26/2023    Procedure: BLOCK MEDIAL BRANCH L3, L4, L5 #1;  Surgeon: Guzman Harrison MD;  [facility]: [facility];  Service: Pain Management     NERVE BLOCK Bilateral 02/16/2023    Procedure: BLOCK MEDIAL BRANCH L3, L4, L5 #2;  Surgeon: Guzman Harrison MD;  [facility]: [facility];  Service: Pain Management     WI JOE IMPLTJ NSTIM ELTRDS PLATE/PADDLE EDRL Left 10/26/2021    Procedure: Thoracic laminectomies for placement of spinal cord stimulator and internal pulse generator, use of neuromonitoring, left sided pulse generator;  Surgeon: Rob Wong MD;  Location: [facility];  Service: Neurosurgery    WI PRQ IMPLTJ NSTIM ELECTRODE ARRAY EPIDURAL Bilateral 09/30/2021    Procedure: NEVRO SCS TRIAL;  Surgeon: Guzman  MD Christy;  [facility]: [facility];  Service: Pain Management     RHIZOTOMY Bilateral 09/15/2022    Procedure: RHIZOTOMY LUMBAR T12, L1, L2 medial branch nerves;  Surgeon: Guzman Harrison MD;  [facility];  Service: Pain Management     RHIZOTOMY Bilateral 03/02/2023    Procedure: RHIZOTOMY LUMBAR L3, L4, L5;  Surgeon: Guzman Harrison MD;  [facility];  Service: Pain Management     RHIZOTOMY Bilateral 03/16/2023    Procedure: RHIZOTOMY LUMBAR T10, T11, T12 medial branch nerves;  Surgeon: Guzman Harrison MD;  [facility];  Service: Pain Management     TOTAL KNEE ARTHROPLASTY Left      Family History   Problem Relation Age of Onset    Arthritis Mother     Depression Mother     Hypertension Father     Alcohol abuse Father     Diabetes Son         Aged 14      Social History     Socioeconomic History    Marital status: /Civil Union     Spouse name: Not on file    Number of children: Not on file    Years of education: Not on file    Highest education level: Not on file   Occupational History    Not on file   Tobacco Use    Smoking status: Never    Smokeless tobacco: Current     Types: Snuff    Tobacco comments:     still using snuff   Vaping Use    Vaping status: Never Used   Substance and Sexual Activity    Alcohol use: Yes     Alcohol/week: 2.0 standard drinks of alcohol     Types: 2 Cans of beer per week     Comment: social, weekends    Drug use: No    Sexual activity: Yes     Partners: Female     Birth control/protection: Male Sterilization   Other Topics Concern    Not on file   Social History Narrative    Not on file     Social Determinants of Health     Financial Resource Strain: Low Risk  (10/3/2023)    Overall Financial Resource Strain (CARDIA)     Difficulty of Paying Living Expenses: Not hard at all   Food Insecurity: No Food Insecurity (10/4/2024)    Nursing - Inadequate Food Risk Classification     Worried About Running Out of Food in the Last Year: Never true     Ran Out of  Food in the Last Year: Never true     Ran Out of Food in the Last Year: Not on file   Transportation Needs: No Transportation Needs (10/4/2024)    PRAPARE - Transportation     Lack of Transportation (Medical): No     Lack of Transportation (Non-Medical): No   Physical Activity: Not on file   Stress: Not on file   Social Connections: Not on file   Intimate Partner Violence: Not on file   Housing Stability: Low Risk  (10/4/2024)    Housing Stability Vital Sign     Unable to Pay for Housing in the Last Year: No     Number of Times Moved in the Last Year: 1     Homeless in the Last Year: No        Current Outpatient Medications:     atorvastatin (LIPITOR) 10 mg tablet, Take 10 mg tablet every other day, Disp: 45 tablet, Rfl: 3    atorvastatin (LIPITOR) 20 mg tablet, Take 1 tablet every other day.  This should be alternating with the other prescription for 10 mg., Disp: 45 tablet, Rfl: 3    celecoxib (CeleBREX) 100 mg capsule, TAKE 1 CAPSULE TWICE DAILY, Disp: 180 capsule, Rfl: 1    Empagliflozin (Jardiance) 25 MG TABS, Take 1 tablet (25 mg total) by mouth daily, Disp: 90 tablet, Rfl: 3    LORazepam (ATIVAN) 0.5 mg tablet, Take 1 tablet (0.5 mg total) by mouth daily as needed for anxiety, Disp: 60 tablet, Rfl: 0    semaglutide, 0.25 or 0.5 mg/dose, (Ozempic, 0.25 or 0.5 MG/DOSE,) 2 mg/3 mL injection pen, Inject 0.75 mL (0.5 mg total) under the skin every 7 days, Disp: 12 mL, Rfl: 3    sertraline (ZOLOFT) 50 mg tablet, Take 1.5 tablets (75 mg total) by mouth daily at bedtime, Disp: 135 tablet, Rfl: 3    valsartan (DIOVAN) 80 mg tablet, Take 1 tablet (80 mg total) by mouth daily, Disp: 90 tablet, Rfl: 3  No current facility-administered medications for this visit.    Review of Systems   Constitutional:  Negative for chills and fever.   Respiratory:  Negative for shortness of breath.    Skin:  Positive for wound (R hip).   Neurological:  Negative for headaches.   Psychiatric/Behavioral:  The patient is not nervous/anxious.         Objective:  /85   Pulse 76   Temp 98.2 °F (36.8 °C)   Resp 18         Physical Exam  Vitals reviewed.   Constitutional:       General: He is not in acute distress.     Appearance: He is not ill-appearing, toxic-appearing or diaphoretic.      Comments: Very pleasant, no acute distress.  His wife is at bedside   HENT:      Head: Normocephalic and atraumatic.   Eyes:      General: No scleral icterus.  Cardiovascular:      Rate and Rhythm: Normal rate.   Pulmonary:      Effort: Pulmonary effort is normal. No respiratory distress.   Skin:     General: Skin is warm.      Findings: Wound (R hip) present.             Comments: 1- Wound continues to improve, measuring smaller. Wound bed with friable hypergranulation tissue and thin biofilm layer.  Surgical debridement completed.  Periwound dry and w/ hyperpigmented scar tissue without acute erythema, increased warmth, lymphangitic streaking.  No purulence or malodor.   Neurological:      Mental Status: He is alert and oriented to person, place, and time.   Psychiatric:         Mood and Affect: Mood normal.         Behavior: Behavior normal.             Wound 09/06/24 Traumatic Hip Right (Active)   Wound Image   11/05/24 0813   Wound Description Pink;Bleeding 11/05/24 0810   Arely-wound Assessment Pink 11/05/24 0810   Wound Length (cm) 2 cm 11/05/24 0810   Wound Width (cm) 0.1 cm 11/05/24 0810   Wound Depth (cm) 0.1 cm 11/05/24 0810   Wound Surface Area (cm^2) 0.2 cm^2 11/05/24 0810   Wound Volume (cm^3) 0.02 cm^3 11/05/24 0810   Calculated Wound Volume (cm^3) 0.02 cm^3 11/05/24 0810   Change in Wound Size % 99.22 11/05/24 0810   Drainage Amount Scant 11/05/24 0810   Drainage Description Serosanguineous 11/05/24 0810   Non-staged Wound Description Full thickness 11/05/24 0810         Debridement   Wound 09/06/24 Traumatic Hip Right    Universal Protocol:  Consent: Verbal consent obtained. Written consent obtained.  Risks and benefits: risks, benefits and  "alternatives were discussed  Consent given by: patient  Time out: Immediately prior to procedure a \"time out\" was called to verify the correct patient, procedure, equipment, support staff and site/side marked as required.  Patient understanding: patient states understanding of the procedure being performed  Patient identity confirmed: verbally with patient    Debridement Details  Performed by: physician  Debridement type: surgical  Level of debridement: subcutaneous tissue  Pain control: lidocaine 4%      Post-debridement measurements  Length (cm): 2  Width (cm): 0.2  Depth (cm): 0.2  Percent debrided: 100%  Surface Area (cm^2): 0.4  Area Debrided (cm^2): 0.4  Volume (cm^3): 0.08    Tissue and other material debrided: hypergranulation and subcutaneous tissue  Devitalized tissue debrided: biofilm  Instrument(s) utilized: blade  Bleeding: medium  Hemostasis obtained with: pressure  Procedural pain (0-10): 0  Post-procedural pain: 0   Response to treatment: procedure was tolerated well                 Lab Results   Component Value Date    HGBA1C 5.7 (H) 09/03/2024       Wound Instructions:  Orders Placed This Encounter   Procedures    Wound Procedure Treatment Traumatic Right Hip     This order was created via procedure documentation    Debridement Traumatic Right Hip     This order was created via procedure documentation    Wound cleansing and dressings Traumatic Right Hip     Wash your hands with soap and water.  Remove old dressing, discard into plastic bag and place in trash.  Cleanse the wound with saline prior to applying a clean dressing. Do not use tissue or cotton balls. Do not scrub the wound. Pat dry using gauze.     Shower yes   Apply skin prep to periwound  Apply dermagran to the right hip wound.    Cover with ABD  Secure with tape  Change dressing every other day     Off-loading Instructions:     Keep weight and pressure off wound at all times  Turn every 2 hours. Avoid position directing pressure to " Wound site. Limit side lying to 30 degree tilt. Limit the head of bed elevation to 30 degrees  Do Not Sit for Long Periods of Time  Use gel foam cushion at all times     Standing Status:   Future     Standing Expiration Date:   11/12/2024     Rosalind Sunshine MD

## 2024-11-05 NOTE — PATIENT INSTRUCTIONS
Orders Placed This Encounter   Procedures    Wound Procedure Treatment Traumatic Right Hip     This order was created via procedure documentation    Debridement     This order was created via procedure documentation    Wound cleansing and dressings Traumatic Right Hip     Wash your hands with soap and water.  Remove old dressing, discard into plastic bag and place in trash.  Cleanse the wound with saline prior to applying a clean dressing. Do not use tissue or cotton balls. Do not scrub the wound. Pat dry using gauze.     Shower yes   Apply skin prep to periwound  Apply dermagran to the right hip wound.    Cover with ABD  Secure with tape  Change dressing every other day     Off-loading Instructions:     Keep weight and pressure off wound at all times  Turn every 2 hours. Avoid position directing pressure to Wound site. Limit side lying to 30 degree tilt. Limit the head of bed elevation to 30 degrees  Do Not Sit for Long Periods of Time  Use gel foam cushion at all times     Standing Status:   Future     Standing Expiration Date:   11/12/2024

## 2024-11-05 NOTE — PROGRESS NOTES
Wound Procedure Treatment Traumatic Right Hip    Performed by: Lee Galdamez RN  Authorized by: Rosalind Sunshine MD    Associated wounds:   Wound 09/06/24 Traumatic Hip Right  Applied primary dressing:  Dermagran  Applied secondary dressing:  ABD and Gauze  Dressing secured with:  Tape

## 2024-11-15 ENCOUNTER — TELEPHONE (OUTPATIENT)
Age: 64
End: 2024-11-15

## 2024-11-15 DIAGNOSIS — R19.7 DIARRHEA, UNSPECIFIED TYPE: Primary | ICD-10-CM

## 2024-11-15 NOTE — TELEPHONE ENCOUNTER
Patient called the office requesting for a Referral for [facility] gastrologist in [place] for Gi issues. Patient states the provider knows about this issue and doesn't wish to make a appointment. Please review if a referral placed for pt.

## 2024-11-15 NOTE — TELEPHONE ENCOUNTER
I have reviewed several notes and do not have a GI diagnosis.  I need to know whether he wants something about his stomach or his bowels so that I know what kind of referral to give.  Please call the patient to find out what he would like GI to discuss.    [place]

## 2024-11-19 ENCOUNTER — OFFICE VISIT (OUTPATIENT)
Facility: CLINIC | Age: 64
End: 2024-11-19
Payer: COMMERCIAL

## 2024-11-19 VITALS
HEART RATE: 69 BPM | RESPIRATION RATE: 18 BRPM | SYSTOLIC BLOOD PRESSURE: 137 MMHG | TEMPERATURE: 98.4 F | DIASTOLIC BLOOD PRESSURE: 92 MMHG

## 2024-11-19 DIAGNOSIS — S71.001A OPEN WOUND OF RIGHT HIP, INITIAL ENCOUNTER: Primary | ICD-10-CM

## 2024-11-19 PROCEDURE — 99212 OFFICE O/P EST SF 10 MIN: CPT | Performed by: FAMILY MEDICINE

## 2024-11-19 NOTE — PATIENT INSTRUCTIONS
Orders Placed This Encounter   Procedures    Wound cleansing and dressings Traumatic Right Hip     You are healed !    Continue to do good skin care!  Keep the area clean, dry, and moisturize with a gentle unscented moisturizer of your choice such as Eucerin or Cerave.     Standing Status:   Future     Expected Date:   11/19/2024     Expiration Date:   11/19/2024

## 2024-11-19 NOTE — PROGRESS NOTES
"Patient ID: Vamshi Robbins is a 64 y.o. male Date of Birth 1960       Chief Complaint   Patient presents with    Follow Up Wound Care Visit       Allergies:  Doxycycline    Diagnosis:      Diagnosis ICD-10-CM Associated Orders   1. Open wound of right hip, initial encounter  S71.001A Wound cleansing and dressings Traumatic Right Hip              Assessment & Plan:  Wound is closed completely epithelialized or healed.   Recommend continuing to avoid any friction/shearing/irritating forces while newly healed skin is fragile  Moisturize skin daily with skin nourishing cream.   Follow up as needed or call with questions or concerns    Portions of the record may have been created with voice recognition software. Occasional wrong word or \"sound alike\" substitutions may have occurred due to the inherent limitations of voice recognition software. Read the chart carefully and recognize, using context, where substitutions have occurred.    Subjective:   9/6/2024: Mr. Robbins is a 63-year-old male who presents to wound center today along with his wife for evaluation of traumatic R hip wound.  He reports that on August 30 he went to a baseball game and sat in a wheelchair which was too small that cause traumatic injury to this hip.  He reports that he is normally ambulatory however lately he had been limiting ambulation due to right foot ulcer (following with podiatry) so wanted to use a wheelchair to avoid walking a long distance at the game.  He went to urgent care on 9/3 and was prescribed Keflex and sent to wound care.  Of note, he states that he is on chronic suppressive therapy with penicillin VK for recurrent cellulitis related to the previous foot ulcer right foot. He reports that he does have seat cushions which he uses regularly when seated.  He also has wedges for repositioning.  He states his diabetes is controlled and attempts to eat healthy diet.  His wife has been completing wound care and using bacitracin and " keeping area covered.  He denies fever, chills.  See full ROS below.    9/17/2024: Easton presents today for follow-up of right hip wound.  He reports that since his last visit he has had no new acute issues.  He denies fever, chills.  They have been using Medihoney daily.    9/24/2024: Easton presents today along with his wife for follow-up of right hip wound.  He states he has a gel cushion for offloading when seated. He reports that sometimes when he is seated he notices a discomfort at the distalmost area, a pinching feeling but otherwise no significant pain.  No purulence or malodor.  No other new acute complaints.  Denies fever, chills.    10/8/2024: Easton presents today along with his wife for follow-up.  At last visit, management switched to [place].  He reports he no longer has any pain to this area.  Of note, initial BP noted to be elevated above baseline.  He reports he has slight headache but he has not eaten anything today (headache feels similar to past headaches when he has not eaten).  Palpitations, shortness breath, chest pain, dizziness, lightheadedness, acute visual changes, weakness.  Repeat /84.    10/22/2024: Easton presents today for follow-up.  His wife is accompanying him to today's visit.  He reports that since his last visit, he has been managing his blood pressure and following with PCP closely.  Denies headache, dizziness, lightheadedness, weakness, chest pain, palpitations, shortness of breath.  He denies fever, chills.  Obtained a gel cushion online and has been using this when he is seated.  No other new acute complaints today.    11/5/2024: Easton presents today for follow-up along with his wife.  He reports that other than the dressing sticking a little bit when they change it, he has no new acute issues or complaints. He denies fever, chills.     11/19/2024: Easton presents today along with his wife for follow-up.  He reports that he believes his wound may be healed!  Denies new acute  complaints today.        The following portions of the patient's history were reviewed and updated as appropriate:   Patient Active Problem List   Diagnosis    Herniation of intervertebral disc of thoracic region    Diabetic peripheral neuropathy (HCC)    Morbid obesity (HCC)    Cervical spinal cord compression (HCC)    Mild depression    Major depressive disorder, single episode, mild (HCC)    Thoracic spondylosis    Lumbar spondylosis    Spinal muscular atrophy, unspecified (HCC)    Neurogenic claudication due to lumbar spinal stenosis    Muscular atrophy    Other spondylosis with myelopathy, cervical region    Radiculopathy    Myelomalacia of cervical cord (HCC)    CPAP (continuous positive airway pressure) dependence    Type 2 diabetes mellitus with skin complication, without long-term current use of insulin ([facility])    HLD (hyperlipidemia)    Cellulitis of right lower extremity    Diabetic right foot ulcer    Platelets decreased (HCC)    Depression, major, in remission ([facility])     Past Medical History:   Diagnosis Date    Anxiety     Arthritis     Chronic pain disorder     mid back region    Colon polyp     COVID-19     CPAP (continuous positive airway pressure) dependence     Pt uses nightly    Diabetes mellitus (HCC)     Diverticulitis of colon 3 years ago    No issues    Diverticulosis     History of recent hospitalization 06/16/2021    Pt was admitted to [facility] after syncopal episode. Pt saw cardiology- all tests negative. Pt had nl EEG. pt states is was a medication interaction.    Hyperlipidemia     Hypertension     Obesity     Sleep apnea     Spinal stenosis     Wears hearing aid      Past Surgical History:   Procedure Laterality Date    ACHILLES TENDON REPAIR      Pt had a rupture in right and left 2 years apart    COLONOSCOPY      EPIDURAL BLOCK INJECTION      Pt had in lumbar region.    EPIDURAL BLOCK INJECTION N/A 04/06/2021    Procedure: T-11-T-12 INTERLAMINAR THORACIC EPIDURAL STEROID INJECTION;  Surgeon:  Guzman Harrison MD;  Location: [facility];  Service: Pain Management     EPIDURAL BLOCK INJECTION Right 07/20/2021    Procedure: L5 and S1 TRANSFORAMINAL EPIDURAL STEROID INJECTION;  Surgeon: Guzman Harrison MD;  Location: [facility];  Service: Pain Management     FL GUIDED NEEDLE PLAC BX/ASP/INJ  07/14/2022    FL GUIDED NEEDLE PLAC BX/ASP/INJ  08/18/2022    FL GUIDED NEEDLE PLAC BX/ASP/INJ  09/15/2022    FOOT SURGERY Left     Left great toe- had a hammertoe.    HIP SURGERY      Replaced    JOINT REPLACEMENT Left     Total hip, knee    JOINT REPLACEMENT Right     Total hip, knee    KNEE ARTHROSCOPY Bilateral     NERVE BLOCK Bilateral 07/14/2022    Procedure: BLOCK MEDIAL BRANCH T12, L1, L2;  Surgeon: Guzman Harrison MD;  Location: [facility];  Service: Pain Management     NERVE BLOCK Bilateral 08/18/2022    Procedure: BLOCK MEDIAL BRANCH T12, L1, L2 #2;  Surgeon: Guzman Harrison MD;  [facility]: [facility];  Service: Pain Management     NERVE BLOCK Bilateral 01/26/2023    Procedure: BLOCK MEDIAL BRANCH L3, L4, L5 #1;  Surgeon: Guzman Harrison MD;  [facility]: [facility];  Service: Pain Management     NERVE BLOCK Bilateral 02/16/2023    Procedure: BLOCK MEDIAL BRANCH L3, L4, L5 #2;  Surgeon: Guzman Harrison MD;  [facility]: [facility];  Service: Pain Management     HI JOE IMPLTJ NSTIM ELTRDS PLATE/PADDLE EDRL Left 10/26/2021    Procedure: Thoracic laminectomies for placement of spinal cord stimulator and internal pulse generator, use of neuromonitoring, left sided pulse generator;  Surgeon: Rob Wong MD;  Location:  MAIN OR;  Service: Neurosurgery    HI PRQ IMPLTJ NSTIM ELECTRODE ARRAY EPIDURAL Bilateral 09/30/2021    Procedure: NEVRO SCS TRIAL;  Surgeon: Guzman Harrison MD;  [facility]: [facility];  Service: Pain Management     RHIZOTOMY Bilateral 09/15/2022    Procedure: RHIZOTOMY LUMBAR T12, L1, L2 medial branch nerves;  Surgeon: Guzman Harrison MD;  [facility]: [facility];  Service: Pain Management     RHIZOTOMY  Bilateral 03/02/2023    Procedure: RHIZOTOMY LUMBAR L3, L4, L5;  Surgeon: Guzman Harrison MD;  [facility]: [facility];  Service: Pain Management     RHIZOTOMY Bilateral 03/16/2023    Procedure: RHIZOTOMY LUMBAR T10, T11, T12 medial branch nerves;  Surgeon: Guzman Harrison MD;  [facility]: [facility];  Service: Pain Management     TOTAL KNEE ARTHROPLASTY Left      Family History   Problem Relation Age of Onset    Arthritis Mother     Depression Mother     Hypertension Father     Alcohol abuse Father     Diabetes Son         Aged 14      Social History     Socioeconomic History    Marital status: /Civil Union     Spouse name: None    Number of children: None    Years of education: None    Highest education level: None   Occupational History    None   Tobacco Use    Smoking status: Never    Smokeless tobacco: Current     Types: Snuff    Tobacco comments:     still using snuff   Vaping Use    Vaping status: Never Used   Substance and Sexual Activity    Alcohol use: Yes     Alcohol/week: 2.0 standard drinks of alcohol     Types: 2 Cans of beer per week     Comment: social, weekends    Drug use: No    Sexual activity: Yes     Partners: Female     Birth control/protection: Male Sterilization   Other Topics Concern    None   Social History Narrative    None     Social Drivers of Health     Financial Resource Strain: Low Risk  (10/3/2023)    Overall Financial Resource Strain (CARDIA)     Difficulty of Paying Living Expenses: Not hard at all   Food Insecurity: No Food Insecurity (10/4/2024)    Nursing - Inadequate Food Risk Classification     Worried About Running Out of Food in the Last Year: Never true     Ran Out of Food in the Last Year: Never true     Ran Out of Food in the Last Year: Not on file   Transportation Needs: No Transportation Needs (10/4/2024)    PRAPARE - Transportation     Lack of Transportation (Medical): No     Lack of Transportation (Non-Medical): No   Physical Activity: Not on file   Stress: Not on  file   Social Connections: Unknown (6/18/2024)    Received from [employer]     How often do you feel lonely or isolated from those around you? (Adult - for ages 18 years and over): Not on file   Intimate Partner Violence: Not on file   Housing Stability: Low Risk  (10/4/2024)    Housing Stability Vital Sign     Unable to Pay for Housing in the Last Year: No     Number of Times Moved in the Last Year: 1     Homeless in the Last Year: No        Current Outpatient Medications:     atorvastatin (LIPITOR) 10 mg tablet, Take 10 mg tablet every other day, Disp: 45 tablet, Rfl: 3    atorvastatin (LIPITOR) 20 mg tablet, Take 1 tablet every other day.  This should be alternating with the other prescription for 10 mg., Disp: 45 tablet, Rfl: 3    celecoxib (CeleBREX) 100 mg capsule, TAKE 1 CAPSULE TWICE DAILY, Disp: 180 capsule, Rfl: 1    Empagliflozin (Jardiance) 25 MG TABS, Take 1 tablet (25 mg total) by mouth daily, Disp: 90 tablet, Rfl: 3    LORazepam (ATIVAN) 0.5 mg tablet, TAKE 1 TABLET DAILY AS     NEEDED FOR ANXIETY, Disp: 60 tablet, Rfl: 0    semaglutide, 0.25 or 0.5 mg/dose, (Ozempic, 0.25 or 0.5 MG/DOSE,) 2 mg/3 mL injection pen, Inject 0.75 mL (0.5 mg total) under the skin every 7 days, Disp: 12 mL, Rfl: 3    sertraline (ZOLOFT) 50 mg tablet, Take 1.5 tablets (75 mg total) by mouth daily at bedtime, Disp: 135 tablet, Rfl: 3    valsartan (DIOVAN) 80 mg tablet, Take 1 tablet (80 mg total) by mouth daily, Disp: 90 tablet, Rfl: 3    Review of Systems   Constitutional:  Negative for chills and fever.   Respiratory:  Negative for shortness of breath.    Skin:  Positive for wound (R hip -may be healed).   Neurological:  Negative for headaches.   Psychiatric/Behavioral:  The patient is not nervous/anxious.        Objective:  /92   Pulse 69   Temp 98.4 °F (36.9 °C)   Resp 18         Physical Exam  Vitals reviewed.   Constitutional:       General: He is not in acute distress.     Appearance: He  is not ill-appearing, toxic-appearing or diaphoretic.      Comments: Very pleasant, no acute distress.  His wife is at bedside   HENT:      Head: Normocephalic and atraumatic.   Eyes:      General: No scleral icterus.  Cardiovascular:      Rate and Rhythm: Normal rate.   Pulmonary:      Effort: Pulmonary effort is normal. No respiratory distress.   Skin:     General: Skin is warm.             Comments: 1-wound has completely healed.  Hyperpigmented scar tissue present without acute erythema, increased warmth, lymphangitic streaking.  No induration, fluctuance.   Neurological:      Mental Status: He is alert and oriented to person, place, and time.   Psychiatric:         Mood and Affect: Mood normal.         Behavior: Behavior normal.             Wound 09/06/24 Traumatic Hip Right (Active)   Wound Image   11/19/24 0810   Wound Description Pink;Intact 11/19/24 0810   Arely-wound Assessment Scar Tissue;Intact;Pink 11/19/24 0810   Wound Length (cm) 0 cm 11/19/24 0810   Wound Width (cm) 0 cm 11/19/24 0810   Wound Depth (cm) 0 cm 11/19/24 0810   Wound Surface Area (cm^2) 0 cm^2 11/19/24 0810   Wound Volume (cm^3) 0 cm^3 11/19/24 0810   Calculated Wound Volume (cm^3) 0 cm^3 11/19/24 0810   Change in Wound Size % 100 11/19/24 0810   Drainage Amount None 11/19/24 0810   Drainage Description Serosanguineous 11/05/24 0810   Non-staged Wound Description Full thickness 11/05/24 0810           Lab Results   Component Value Date    HGBA1C 5.7 (H) 09/03/2024       Wound Instructions:  Orders Placed This Encounter   Procedures    Wound cleansing and dressings Traumatic Right Hip     You are healed !    Continue to do good skin care!  Keep the area clean, dry, and moisturize with a gentle unscented moisturizer of your choice such as Eucerin or Cerave.     Standing Status:   Future     Expected Date:   11/19/2024     Expiration Date:   11/19/2024         Rosalind Sunshine MD

## 2024-12-23 DIAGNOSIS — F41.1 GAD (GENERALIZED ANXIETY DISORDER): ICD-10-CM

## 2024-12-27 ENCOUNTER — RA CDI HCC (OUTPATIENT)
Dept: OTHER | Facility: HOSPITAL | Age: 64
End: 2024-12-27

## 2024-12-27 ENCOUNTER — APPOINTMENT (OUTPATIENT)
Dept: LAB | Facility: CLINIC | Age: 64
End: 2024-12-27
Payer: COMMERCIAL

## 2024-12-27 DIAGNOSIS — E11.620 TYPE 2 DIABETES MELLITUS WITH DIABETIC DERMATITIS, WITHOUT LONG-TERM CURRENT USE OF INSULIN (HCC): ICD-10-CM

## 2024-12-27 LAB
ANION GAP SERPL CALCULATED.3IONS-SCNC: 10 MMOL/L (ref 4–13)
BUN SERPL-MCNC: 12 MG/DL (ref 5–25)
CALCIUM SERPL-MCNC: 9.4 MG/DL (ref 8.4–10.2)
CHLORIDE SERPL-SCNC: 105 MMOL/L (ref 96–108)
CO2 SERPL-SCNC: 23 MMOL/L (ref 21–32)
CREAT SERPL-MCNC: 0.97 MG/DL (ref 0.6–1.3)
CREAT UR-MCNC: 208 MG/DL
EST. AVERAGE GLUCOSE BLD GHB EST-MCNC: 128 MG/DL
GFR SERPL CREATININE-BSD FRML MDRD: 82 ML/MIN/1.73SQ M
GLUCOSE P FAST SERPL-MCNC: 115 MG/DL (ref 65–99)
HBA1C MFR BLD: 6.1 %
MICROALBUMIN UR-MCNC: 32.8 MG/L
MICROALBUMIN/CREAT 24H UR: 16 MG/G CREATININE (ref 0–30)
POTASSIUM SERPL-SCNC: 4 MMOL/L (ref 3.5–5.3)
SODIUM SERPL-SCNC: 138 MMOL/L (ref 135–147)

## 2024-12-27 PROCEDURE — 36415 COLL VENOUS BLD VENIPUNCTURE: CPT

## 2024-12-27 PROCEDURE — 83036 HEMOGLOBIN GLYCOSYLATED A1C: CPT

## 2024-12-27 PROCEDURE — 82043 UR ALBUMIN QUANTITATIVE: CPT

## 2024-12-27 PROCEDURE — 82570 ASSAY OF URINE CREATININE: CPT

## 2024-12-27 PROCEDURE — 80048 BASIC METABOLIC PNL TOTAL CA: CPT

## 2025-01-02 ENCOUNTER — OFFICE VISIT (OUTPATIENT)
Dept: FAMILY MEDICINE CLINIC | Facility: CLINIC | Age: 65
End: 2025-01-02
Payer: COMMERCIAL

## 2025-01-02 ENCOUNTER — APPOINTMENT (OUTPATIENT)
Dept: LAB | Facility: CLINIC | Age: 65
End: 2025-01-02
Payer: COMMERCIAL

## 2025-01-02 VITALS
HEIGHT: 76 IN | TEMPERATURE: 96.8 F | WEIGHT: 315 LBS | SYSTOLIC BLOOD PRESSURE: 133 MMHG | DIASTOLIC BLOOD PRESSURE: 68 MMHG | BODY MASS INDEX: 38.36 KG/M2 | HEART RATE: 85 BPM | OXYGEN SATURATION: 98 %

## 2025-01-02 DIAGNOSIS — K90.9 DIARRHEA DUE TO MALABSORPTION: ICD-10-CM

## 2025-01-02 DIAGNOSIS — E66.01 MORBID OBESITY (HCC): ICD-10-CM

## 2025-01-02 DIAGNOSIS — F32.5 DEPRESSION, MAJOR, IN REMISSION (HCC): ICD-10-CM

## 2025-01-02 DIAGNOSIS — I10 ESSENTIAL HYPERTENSION: Primary | ICD-10-CM

## 2025-01-02 DIAGNOSIS — R80.9 MICROALBUMINURIA: ICD-10-CM

## 2025-01-02 DIAGNOSIS — R19.7 DIARRHEA DUE TO MALABSORPTION: ICD-10-CM

## 2025-01-02 DIAGNOSIS — G95.20 CERVICAL SPINAL CORD COMPRESSION (HCC): ICD-10-CM

## 2025-01-02 DIAGNOSIS — E11.620 TYPE 2 DIABETES MELLITUS WITH DIABETIC DERMATITIS, WITHOUT LONG-TERM CURRENT USE OF INSULIN (HCC): ICD-10-CM

## 2025-01-02 DIAGNOSIS — D69.6 PLATELETS DECREASED (HCC): ICD-10-CM

## 2025-01-02 DIAGNOSIS — E78.2 MIXED HYPERLIPIDEMIA: ICD-10-CM

## 2025-01-02 DIAGNOSIS — G12.9 SPINAL MUSCULAR ATROPHY, UNSPECIFIED (HCC): ICD-10-CM

## 2025-01-02 DIAGNOSIS — E83.42 HYPOMAGNESEMIA: ICD-10-CM

## 2025-01-02 LAB
ALBUMIN SERPL BCG-MCNC: 4.2 G/DL (ref 3.5–5)
ALP SERPL-CCNC: 104 U/L (ref 34–104)
ALT SERPL W P-5'-P-CCNC: 16 U/L (ref 7–52)
ANION GAP SERPL CALCULATED.3IONS-SCNC: 12 MMOL/L (ref 4–13)
AST SERPL W P-5'-P-CCNC: 16 U/L (ref 13–39)
BILIRUB SERPL-MCNC: 0.63 MG/DL (ref 0.2–1)
BUN SERPL-MCNC: 13 MG/DL (ref 5–25)
CALCIUM SERPL-MCNC: 9.1 MG/DL (ref 8.4–10.2)
CHLORIDE SERPL-SCNC: 106 MMOL/L (ref 96–108)
CO2 SERPL-SCNC: 22 MMOL/L (ref 21–32)
CREAT SERPL-MCNC: 1.04 MG/DL (ref 0.6–1.3)
GFR SERPL CREATININE-BSD FRML MDRD: 76 ML/MIN/1.73SQ M
GLUCOSE P FAST SERPL-MCNC: 105 MG/DL (ref 65–99)
MAGNESIUM SERPL-MCNC: 2 MG/DL (ref 1.9–2.7)
POTASSIUM SERPL-SCNC: 4.2 MMOL/L (ref 3.5–5.3)
PROT SERPL-MCNC: 7.3 G/DL (ref 6.4–8.4)
SODIUM SERPL-SCNC: 140 MMOL/L (ref 135–147)

## 2025-01-02 PROCEDURE — 82705 FATS/LIPIDS FECES QUAL: CPT

## 2025-01-02 PROCEDURE — 83735 ASSAY OF MAGNESIUM: CPT

## 2025-01-02 PROCEDURE — 36415 COLL VENOUS BLD VENIPUNCTURE: CPT

## 2025-01-02 PROCEDURE — 99215 OFFICE O/P EST HI 40 MIN: CPT | Performed by: PHYSICIAN ASSISTANT

## 2025-01-02 PROCEDURE — 80053 COMPREHEN METABOLIC PANEL: CPT

## 2025-01-02 PROCEDURE — G2211 COMPLEX E/M VISIT ADD ON: HCPCS | Performed by: PHYSICIAN ASSISTANT

## 2025-01-02 NOTE — PROGRESS NOTES
Name: Vamshi Robbins      : 1960      MRN: [id number]  Encounter Provider: Cristina Lopez PA-C  Encounter Date: 2025   Encounter department: [facility]  :  Assessment & Plan  Type 2 diabetes mellitus with diabetic dermatitis, without long-term current use of insulin (HCC)  Patient's diabetes not as well-controlled with an increase in the A1c from 5.7-6.1.  Patient has been on Ozempic.  Weight is the same despite this being a holiday season and the patient not being as adherent with diet.  We talked about the level of control with diabetes on how there is important to control both blood pressure and blood sugar given the patient has microalbuminuria at this point.  Lab Results   Component Value Date    HGBA1C 6.1 (H) 2024       Orders:  •  Albumin / creatinine urine ratio; Future  •  Basic metabolic panel; Future  •  Hemoglobin A1C; Future    Essential hypertension  Patient's blood pressure appropriately controlled 133/68.  Patient takes valsartan at night which will help with both the microalbuminuria and the blood pressure control.       Mixed hyperlipidemia  Patient adherent with atorvastatin.  Most recent lipid profile shows a well-controlled LDL cholesterol.       Spinal muscular atrophy, unspecified (HCC)  Patient has longstanding spinal muscular atrophy leading to dependence on a specialized crutch for ambulation.  He has had back surgery in the past and is no longer interested in pursuing any additional surgery.       Cervical spinal cord compression (HCC)  Patient continues to have cervical spine cord abnormality with compression.  He is dealing with the complications of this condition and is not interested in pursuing any surgical remedy or any interventions such as epidural.       Depression, major, in remission (HCC)  Patient is no longer having depression symptoms.  He is adherent with the sertraline and he feels that sertraline has caused a  great improvement in his mood.         Platelets decreased (HCC)  Patient has longstanding history of thrombocytopenia.  We serially followed this.  The most recent labs show platelets of 170,000.  He is not having any spontaneous bleeding       Morbid obesity (HCC)  Patient's weight remains stable at 355.  He has obesity with a BMI of 43.  He is on Ozempic for diabetes and we are hoping that he can get some weight loss associated with the use of this agent.  He has limitations with his mobility and this impairs his ability to burn additional calories which could aid in weight loss.         Microalbuminuria  Most recent labs show microalbuminuria.  We discussed the need for blood pressure and blood sugar control along with the use of the losartan that he is already taking to slow the progression of loss of protein in his urine.       Diarrhea due to malabsorption  Patient very concerned about ongoing problems with diarrhea with floating stools.  The stools are not adherent to the toilet bowl and he does not need to flush more than once to have stools disappear.  He had this problem for some time and then it went away and it has recurred.  He is not having any weight loss.  We are going to assess for fecal fat along with looking at his nutritional state via comprehensive medical profile.    He has occasional lower abdominal cramping and occasional left lower quadrant cramping which is different and associated with his diverticulosis.  It does not matter what he eats, he is now having floating stools.  We will see if malabsorption is occurring on his fecal fat determination.  Orders:  •  Fecal fat, qualitative; Future  •  Comprehensive metabolic panel; Future    Hypomagnesemia  Patient states he has a history of low magnesium levels.  He is not taking magnesium supplementation at this time which could also produce more frequent stools.  We are checking the magnesium levels part of today's visit.  Orders:  •   "Magnesium; Future           History of Present Illness     HPI: 64-year-old male accompanied by his wife Cailin who is also a patient in this practice.  He is very concerned about his diarrhea with floating stools.  He is going to have a fecal fat determination and he will also have an assessment for protein loss via a complete metabolic profile.    He has occasional cramping.  The floating stools occur regardless of oral intake.  Every stool now is light and floating.  He has occasional abdominal cramping but this is not consistent.  He has had colonoscopies in the past by Dr. Carrillo and he is due for a colonoscopy next year and he would like to have Dr. Del Valle performed the next colonoscopy.    Has not had any changes in his urinary system.  He has not had any melena or hematochezia.  No fever or chills.  He did have a minor 24-hour GI bug that resolved but the floating stools continued.    Patient had been treated for a slow healing ulcer on his foot along with an open area on his hip when he had a wheelchair incident.  He was on multiple antibiotics and this could also change the kj.  He had been taking a probiotic throughout the course of his antibiotic therapy.  Review of Systems: No recent URI or viral syndrome.    Objective   /68 (BP Location: Right arm, Patient Position: Sitting, Cuff Size: Large)   Pulse 85   Temp (!) 96.8 °F (36 °C) (Tympanic)   Ht 6' 4\" (1.93 m)   Wt (!) 161 kg (355 lb 6.1 oz)   SpO2 98%   BMI 43.26 kg/m²      Physical Exam: Well-appearing pleasant 64-year-old male who is very nervous about his health and concerned not just about his diabetic control but also about his floating stools.  Reviewed vital signs.  He is normotensive and afebrile.    Heart is regular rate without murmur rub or gallops.  Lungs are clear to auscultation.  Abdomen obese and soft.  Administrative Statements   I have spent a total time of 50 minutes in caring for this patient on the day of the " visit/encounter including Diagnostic results, Prognosis, Risks and benefits of tx options, Instructions for management, Patient and family education, Importance of tx compliance, Risk factor reductions, Impressions, Counseling / Coordination of care, Documenting in the medical record, Reviewing / ordering tests, medicine, procedures  , and Obtaining or reviewing history  .    I will see him in 4 months.  He is going to have a complete metabolic profile and magnesium level done along with a fecal fat measurement in his stool as part of today's visit.  In 4 months, he will have urine for microalbumin, BMP, and hemoglobin A1c.  If fecal fat is present, I will be referring him to GI.  It does not appear that he is any associated weight loss and we will check his nutritional state

## 2025-01-02 NOTE — ASSESSMENT & PLAN NOTE
Patient's diabetes not as well-controlled with an increase in the A1c from 5.7-6.1.  Patient has been on Ozempic.  Weight is the same despite this being a holiday season and the patient not being as adherent with diet.  We talked about the level of control with diabetes on how there is important to control both blood pressure and blood sugar given the patient has microalbuminuria at this point.  Lab Results   Component Value Date    HGBA1C 6.1 (H) 12/27/2024       Orders:  •  Albumin / creatinine urine ratio; Future  •  Basic metabolic panel; Future  •  Hemoglobin A1C; Future

## 2025-01-02 NOTE — ASSESSMENT & PLAN NOTE
Patient has longstanding spinal muscular atrophy leading to dependence on a specialized crutch for ambulation.  He has had back surgery in the past and is no longer interested in pursuing any additional surgery.

## 2025-01-02 NOTE — ASSESSMENT & PLAN NOTE
Patient has longstanding history of thrombocytopenia.  We serially followed this.  The most recent labs show platelets of 170,000.  He is not having any spontaneous bleeding

## 2025-01-02 NOTE — ASSESSMENT & PLAN NOTE
Patient's weight remains stable at 355.  He has obesity with a BMI of 43.  He is on Ozempic for diabetes and we are hoping that he can get some weight loss associated with the use of this agent.  He has limitations with his mobility and this impairs his ability to burn additional calories which could aid in weight loss.

## 2025-01-02 NOTE — ASSESSMENT & PLAN NOTE
Patient continues to have cervical spine cord abnormality with compression.  He is dealing with the complications of this condition and is not interested in pursuing any surgical remedy or any interventions such as epidural.

## 2025-01-02 NOTE — ASSESSMENT & PLAN NOTE
Patient adherent with atorvastatin.  Most recent lipid profile shows a well-controlled LDL cholesterol.

## 2025-01-02 NOTE — ASSESSMENT & PLAN NOTE
Patient is no longer having depression symptoms.  He is adherent with the sertraline and he feels that sertraline has caused a great improvement in his mood.

## 2025-01-05 DIAGNOSIS — F41.1 GAD (GENERALIZED ANXIETY DISORDER): ICD-10-CM

## 2025-01-06 RX ORDER — LORAZEPAM 0.5 MG/1
0.5 TABLET ORAL DAILY PRN
Qty: 60 TABLET | Refills: 0 | Status: SHIPPED | OUTPATIENT
Start: 2025-01-06

## 2025-01-07 LAB
FAT STL QL: NORMAL
NEUTRAL FAT STL QL: NORMAL

## 2025-01-13 DIAGNOSIS — E11.9 DM TYPE 2, GOAL HBA1C 7%-8% (HCC): ICD-10-CM

## 2025-01-14 ENCOUNTER — TELEPHONE (OUTPATIENT)
Dept: FAMILY MEDICINE CLINIC | Facility: CLINIC | Age: 65
End: 2025-01-14

## 2025-01-14 RX ORDER — EMPAGLIFLOZIN 25 MG/1
25 TABLET, FILM COATED ORAL DAILY
Qty: 90 TABLET | Refills: 1 | Status: SHIPPED | OUTPATIENT
Start: 2025-01-14

## 2025-01-14 NOTE — TELEPHONE ENCOUNTER
Spoke with Easton and he will like us to re-fax the referral over to Dr. You office. I advised the patient that I will give him a call back today confirming that the fax went through, patient understood and disconnected the call.

## 2025-01-15 DIAGNOSIS — I10 ESSENTIAL HYPERTENSION: ICD-10-CM

## 2025-01-16 RX ORDER — VALSARTAN 80 MG/1
80 TABLET ORAL DAILY
Qty: 90 TABLET | Refills: 1 | Status: SHIPPED | OUTPATIENT
Start: 2025-01-16

## 2025-02-04 ENCOUNTER — APPOINTMENT (OUTPATIENT)
Dept: LAB | Facility: CLINIC | Age: 65
End: 2025-02-04
Payer: COMMERCIAL

## 2025-02-04 DIAGNOSIS — L97.419 DIABETIC ULCER OF RIGHT HEEL ASSOCIATED WITH TYPE 2 DIABETES MELLITUS, UNSPECIFIED ULCER STAGE (HCC): ICD-10-CM

## 2025-02-04 DIAGNOSIS — E11.621 DIABETIC ULCER OF RIGHT HEEL ASSOCIATED WITH TYPE 2 DIABETES MELLITUS, UNSPECIFIED ULCER STAGE (HCC): ICD-10-CM

## 2025-02-04 LAB
ANION GAP SERPL CALCULATED.3IONS-SCNC: 12 MMOL/L (ref 4–13)
BASOPHILS # BLD AUTO: 0.06 THOUSANDS/ΜL (ref 0–0.1)
BASOPHILS NFR BLD AUTO: 1 % (ref 0–1)
BUN SERPL-MCNC: 22 MG/DL (ref 5–25)
CALCIUM SERPL-MCNC: 9.5 MG/DL (ref 8.4–10.2)
CHLORIDE SERPL-SCNC: 98 MMOL/L (ref 96–108)
CO2 SERPL-SCNC: 28 MMOL/L (ref 21–32)
CREAT SERPL-MCNC: 1.08 MG/DL (ref 0.6–1.3)
EOSINOPHIL # BLD AUTO: 0.16 THOUSAND/ΜL (ref 0–0.61)
EOSINOPHIL NFR BLD AUTO: 1 % (ref 0–6)
ERYTHROCYTE [DISTWIDTH] IN BLOOD BY AUTOMATED COUNT: 13.2 % (ref 11.6–15.1)
GFR SERPL CREATININE-BSD FRML MDRD: 72 ML/MIN/1.73SQ M
GLUCOSE P FAST SERPL-MCNC: 109 MG/DL (ref 65–99)
HCT VFR BLD AUTO: 45.6 % (ref 36.5–49.3)
HGB BLD-MCNC: 15.2 G/DL (ref 12–17)
IMM GRANULOCYTES # BLD AUTO: 0.12 THOUSAND/UL (ref 0–0.2)
IMM GRANULOCYTES NFR BLD AUTO: 1 % (ref 0–2)
LYMPHOCYTES # BLD AUTO: 2.7 THOUSANDS/ΜL (ref 0.6–4.47)
LYMPHOCYTES NFR BLD AUTO: 24 % (ref 14–44)
MCH RBC QN AUTO: 30.5 PG (ref 26.8–34.3)
MCHC RBC AUTO-ENTMCNC: 33.3 G/DL (ref 31.4–37.4)
MCV RBC AUTO: 91 FL (ref 82–98)
MONOCYTES # BLD AUTO: 0.66 THOUSAND/ΜL (ref 0.17–1.22)
MONOCYTES NFR BLD AUTO: 6 % (ref 4–12)
NEUTROPHILS # BLD AUTO: 7.55 THOUSANDS/ΜL (ref 1.85–7.62)
NEUTS SEG NFR BLD AUTO: 67 % (ref 43–75)
NRBC BLD AUTO-RTO: 0 /100 WBCS
PLATELET # BLD AUTO: 220 THOUSANDS/UL (ref 149–390)
PMV BLD AUTO: 10.2 FL (ref 8.9–12.7)
POTASSIUM SERPL-SCNC: 3.7 MMOL/L (ref 3.5–5.3)
RBC # BLD AUTO: 4.99 MILLION/UL (ref 3.88–5.62)
SODIUM SERPL-SCNC: 138 MMOL/L (ref 135–147)
WBC # BLD AUTO: 11.25 THOUSAND/UL (ref 4.31–10.16)

## 2025-02-04 PROCEDURE — 85025 COMPLETE CBC W/AUTO DIFF WBC: CPT

## 2025-02-04 PROCEDURE — 36415 COLL VENOUS BLD VENIPUNCTURE: CPT

## 2025-02-04 PROCEDURE — 80048 BASIC METABOLIC PNL TOTAL CA: CPT

## 2025-02-05 ENCOUNTER — RA CDI HCC (OUTPATIENT)
Dept: OTHER | Facility: HOSPITAL | Age: 65
End: 2025-02-05

## 2025-02-11 RX ORDER — CEFADROXIL 500 MG/1
CAPSULE ORAL
COMMUNITY
Start: 2025-01-30

## 2025-02-11 RX ORDER — TORSEMIDE 20 MG/1
40 TABLET ORAL DAILY
COMMUNITY
Start: 2025-01-30 | End: 2025-03-01

## 2025-02-11 RX ORDER — POLYETHYLENE GLYCOL 200
17 LIQUID (ML) MISCELLANEOUS DAILY
COMMUNITY
Start: 2025-01-31 | End: 2025-03-02

## 2025-02-12 ENCOUNTER — OFFICE VISIT (OUTPATIENT)
Dept: FAMILY MEDICINE CLINIC | Facility: CLINIC | Age: 65
End: 2025-02-12
Payer: COMMERCIAL

## 2025-02-12 VITALS
HEART RATE: 74 BPM | SYSTOLIC BLOOD PRESSURE: 137 MMHG | WEIGHT: 315 LBS | HEIGHT: 76 IN | OXYGEN SATURATION: 99 % | TEMPERATURE: 97.6 F | BODY MASS INDEX: 38.36 KG/M2 | DIASTOLIC BLOOD PRESSURE: 72 MMHG

## 2025-02-12 DIAGNOSIS — L97.509 TYPE 2 DIABETES MELLITUS WITH FOOT ULCER, WITHOUT LONG-TERM CURRENT USE OF INSULIN (HCC): ICD-10-CM

## 2025-02-12 DIAGNOSIS — E11.621 TYPE 2 DIABETES MELLITUS WITH FOOT ULCER, WITHOUT LONG-TERM CURRENT USE OF INSULIN (HCC): ICD-10-CM

## 2025-02-12 DIAGNOSIS — L97.511 ULCER OF RIGHT FOOT, LIMITED TO BREAKDOWN OF SKIN (HCC): Primary | ICD-10-CM

## 2025-02-12 PROCEDURE — 99495 TRANSJ CARE MGMT MOD F2F 14D: CPT | Performed by: PHYSICIAN ASSISTANT

## 2025-02-12 NOTE — ASSESSMENT & PLAN NOTE
Patient has been adherent with his diabetic medications.  Blood sugars have been under reasonable control even despite his wound infection.  Lab Results   Component Value Date    HGBA1C 6.1 (H) 12/27/2024

## 2025-02-12 NOTE — PROGRESS NOTES
Name: Vamshi Robbins      : 1960      MRN: [id number]  Encounter Provider: Cristina Lopez PA-C  Encounter Date: 2025   Encounter department: [facility] PRIMARY CARE  :  Assessment & Plan  Ulcer of right foot, limited to breakdown of skin (HCC)  Patient was admitted to [facility] from - with a nonhealing ulcer right foot with the development of sepsis requiring IV antibiotics followed by outpatient antibiotics and wound care through his podiatrist.       Type 2 diabetes mellitus with foot ulcer, without long-term current use of insulin (HCC)  Patient has been adherent with his diabetic medications.  Blood sugars have been under reasonable control even despite his wound infection.  Lab Results   Component Value Date    HGBA1C 6.1 (H) 2024                   History of Present Illness   HPI: 64-year-old male well-known to me as I am his PCP.  Patient has had multiple episodes of ulcerations on his right foot and also right hip.  He went ice fishing on 2025 wearing a pair of boots that was 7 years old.  When he took the boot off, a blister had formed that became infected on the back of his heel.  He also had another open wound on the bottom of his right foot.    The next day, patient started having fever and chills and feared that he was getting another bout of sepsis related to his infected wounds.  Ambulance was called he was transported and admitted to [facility].  Wound care specialist saw the patient.  Patient was given specialized dressings along with IV antibiotics.  He was discharged home with a week of Duricef therapy followed by ongoing Pen-Vee K treatment twice daily.    He had labs that we went over on .  His fasting blood sugar was 109 and his white count has decreased till 11.25.  He is continuing to feel well.  He is having [facility] nursing to the wound care and his podiatrist Dr. Wild is seeing him at his  "home.    He has a walking boot in place.  Wound was examined today as part of the visit.  No active drainage.  Wounds appear to be healing nicely.  He is afebrile and normotensive.  Review of Systems: No further GI complaints.  Stools are not floating like they did in the past.  He is eating probiotic active yogurt culture yogurt.  No diarrhea despite long-term antibiotic therapy.  He has lost 3 pounds since hospitalization but he did have a lot of diuresis as a result of aggressive therapy with diuretics.  He is taking his diuretics every other day which seems to be working best for him.  No active drainage from his wounds.    Objective   /72 (BP Location: Right arm, Patient Position: Sitting, Cuff Size: Large)   Pulse 74   Temp 97.6 °F (36.4 °C) (Tympanic)   Ht 6' 4\" (1.93 m)   Wt (!) 160 kg (352 lb 1.2 oz)   SpO2 99%   BMI 42.86 kg/m²      Physical Exam: Reviewed vital signs.  He is normotensive and afebrile.    Heart fairly regular rate without murmur rub or gallop.  Lungs are clear to auscultation.    Dressing was removed to the right foot.  He has superficial erosion on the bottom of the foot and a healing ulcer on the back heel of his right foot.  No active drainage.  No smell noted.  Pulses are palpable in the right foot.  New dressing applied.  Administrative Statements   I have spent a total time of 40 minutes in caring for this patient on the day of the visit/encounter including Diagnostic results, Prognosis, Risks and benefits of tx options, Instructions for management, Patient and family education, Importance of tx compliance, Risk factor reductions, Impressions, Counseling / Coordination of care, Documenting in the medical record, Reviewing / ordering tests, medicine, procedures  , and Obtaining or reviewing history  .    Home nursing continues.  He is continuing on now penicillin VK.  His podiatrist is doing a home visit next week.  Wound care will continue through home nursing.  I will " see the patient on May 1 for his follow-up exam that was previously scheduled.

## 2025-02-12 NOTE — ASSESSMENT & PLAN NOTE
Patient was admitted to [facility] from 1 27-1 30 with a nonhealing ulcer right foot with the development of sepsis requiring IV antibiotics followed by outpatient antibiotics and wound care through his podiatrist.

## 2025-02-21 ENCOUNTER — PATIENT MESSAGE (OUTPATIENT)
Dept: FAMILY MEDICINE CLINIC | Facility: CLINIC | Age: 65
End: 2025-02-21

## 2025-02-24 ENCOUNTER — TELEPHONE (OUTPATIENT)
Age: 65
End: 2025-02-24

## 2025-02-24 NOTE — TELEPHONE ENCOUNTER
Pt called stated he needs medical clearance for his podiatry surgery on 3/4.  Heal spur and possible plantar fascitis release.    Podiatry stated since seen 2/12 if pcp wants to addend notes they can.  Podiatry also states they faxed paperwork.    Dr. Wild is doing the procedure.    Any questions call Anjali [phone] (podiatry)    No available Pre op appts available.    Please advise [phone]

## 2025-02-24 NOTE — PATIENT COMMUNICATION
Pt called to schedule. Pt surgery is on March 4th. PCP does not have any availability prior to that date. Please advise if there is any way for pt to be double booked somewhere.

## 2025-02-24 NOTE — TELEPHONE ENCOUNTER
I should be able to do a 20-minute clearance on him if you cannot find a 20-minute spot for him.    [place]

## 2025-02-25 RX ORDER — LORATADINE 10 MG
TABLET ORAL
COMMUNITY

## 2025-02-27 ENCOUNTER — CONSULT (OUTPATIENT)
Dept: FAMILY MEDICINE CLINIC | Facility: CLINIC | Age: 65
End: 2025-02-27
Payer: COMMERCIAL

## 2025-02-27 VITALS
OXYGEN SATURATION: 98 % | WEIGHT: 315 LBS | SYSTOLIC BLOOD PRESSURE: 134 MMHG | DIASTOLIC BLOOD PRESSURE: 66 MMHG | HEART RATE: 85 BPM | BODY MASS INDEX: 42.85 KG/M2

## 2025-02-27 DIAGNOSIS — M77.31 CALCANEAL SPUR OF RIGHT FOOT: ICD-10-CM

## 2025-02-27 DIAGNOSIS — Z01.818 PRE-OP EXAMINATION: Primary | ICD-10-CM

## 2025-02-27 PROCEDURE — 99214 OFFICE O/P EST MOD 30 MIN: CPT | Performed by: PHYSICIAN ASSISTANT

## 2025-02-27 NOTE — PROGRESS NOTES
Name: Vamshi Robbins      : 1960      MRN: [id number]  Encounter Provider: Cristina Lopez PA-C  Encounter Date: 2025   Encounter department: [facility] PRIMARY CARE  :  Assessment & Plan  Pre-op examination  Patient scheduled to have surgery under monitored anesthesia care at [facility] on 3/4/2025 by podiatrist Dr. Wild.  He is going to have a calcaneal enthesophyte removed with debridement of an ulcer of his right foot and possible lysis of plantar fascia muscles.  Patient has longstanding open areas on his feet and he receives home nursing care for the wound care.  The ulcer on his foot is totally closed and the ulcer on the back of his heel is healing by secondary intention.  He is not having any fever or chills and no antibiotics are currently needed.       Calcaneal spur of right foot  Patient is being maintained in a walking boot.  This has limited his ability to ambulate.  He is currently not having any pain associated with his ulcerated areas.  He continues having wound care by home nursing along with dressing changes by them.  He has had pain related to the spur of his right foot and he believes that the spur itself is adding additional pressure to his foot and causing the ulceration.              History of Present Illness   HPI: 64-year-old male sees me for his primary care services.  He is here for preoperative clearance.  He is going to have the above-mentioned surgery done under sedation and monitored anesthesia care in a hospital setting.  He has had general anesthesia in the past without complication.    We reviewed his labs as part of today's visit.  His hemoglobin A1c is well-controlled at 6.1.  His BNP and CBC are without significant abnormalities.  These were done on .  He has no open drainage at this time and he continues receiving wound care at home.    No pain in his chest heart palpitations dizziness or  lightheadedness.  Review of Systems: No recent URI or viral syndrome.  Several family members recently had a viral gastroenteritis but this last the less than a day and everyone including the patient is fully recovered.    Objective   /66 (BP Location: Left arm, Patient Position: Sitting, Cuff Size: Standard)   Pulse 85   Wt (!) 160 kg (352 lb)   SpO2 98%   BMI 42.85 kg/m²      Physical Exam: Pleasant 64-year-old male seen in the office with his wife and granddaughter who are both patients in the practice.  Reviewed vital signs he is normotensive and afebrile.  He is adherent with his medications.    Heart is regular rate without murmur rub or gallops.  Lungs are clear to auscultation.    Patient just had his wound dressing redone by home nursing and did not bring additional wound dressings for redressing.  I therefore did not recheck his wound.  He was adherent with wearing his walking shoe.  Administrative Statements   I have spent a total time of 40 minutes in caring for this patient on the day of the visit/encounter including Diagnostic results, Prognosis, Instructions for management, Patient and family education, Importance of tx compliance, Impressions, Counseling / Coordination of care, Documenting in the medical record, Reviewing/placing orders in the medical record (including tests, medications, and/or procedures), and Obtaining or reviewing history      Paperwork provided by his podiatrist completed as part of today's visit.  This was scanned on the chart and faxed to the office with the form returned to the patient.  At this time, patient's medical care is optimized and there are no contraindications for performing the proposed surgery.    I will see the patient for his previously scheduled diabetic follow-up on May 1..  He will have his diabetic hemoglobin A1c urine for microalbumin and basic metabolic profile 1 week prior to the visit.  Patient sees Dr. Mynor Clement for his diabetic eye exams  and he has an upcoming appointment scheduled in April.

## 2025-03-03 ENCOUNTER — TELEPHONE (OUTPATIENT)
Age: 65
End: 2025-03-03

## 2025-03-03 NOTE — TELEPHONE ENCOUNTER
Patients surgical clearance form needs cosigned by a MD as well as his H&P due to this all being done under [facility].   His surgery is for tomorrow.   Fax- [phone]  Phone- [phone]

## 2025-03-06 DIAGNOSIS — F41.1 GAD (GENERALIZED ANXIETY DISORDER): ICD-10-CM

## 2025-03-07 RX ORDER — LORAZEPAM 0.5 MG/1
0.5 TABLET ORAL DAILY PRN
Qty: 60 TABLET | Refills: 0 | Status: SHIPPED | OUTPATIENT
Start: 2025-03-07

## 2025-03-26 ENCOUNTER — TELEPHONE (OUTPATIENT)
Age: 65
End: 2025-03-26

## 2025-03-26 NOTE — TELEPHONE ENCOUNTER
[facility]   Wound care orders needing provider signature - will be faxing order #[id number]

## 2025-03-31 DIAGNOSIS — R60.0 PERIPHERAL EDEMA: Primary | ICD-10-CM

## 2025-04-01 RX ORDER — TORSEMIDE 20 MG/1
40 TABLET ORAL DAILY
Qty: 60 TABLET | Refills: 0 | Status: SHIPPED | OUTPATIENT
Start: 2025-04-01 | End: 2025-05-01

## 2025-04-18 ENCOUNTER — TELEPHONE (OUTPATIENT)
Age: 65
End: 2025-04-18

## 2025-04-18 NOTE — TELEPHONE ENCOUNTER
Received call from [facility]   Requesting status on order number [id number] and order number [id number]    I advised they were signed on 4/10 and 4/15   Under media encounters 4/15 and 4/11   If completed please fax to [phone]    Please review

## 2025-04-23 ENCOUNTER — APPOINTMENT (OUTPATIENT)
Dept: LAB | Facility: CLINIC | Age: 65
End: 2025-04-23
Payer: COMMERCIAL

## 2025-04-23 DIAGNOSIS — E11.620 TYPE 2 DIABETES MELLITUS WITH DIABETIC DERMATITIS, WITHOUT LONG-TERM CURRENT USE OF INSULIN (HCC): ICD-10-CM

## 2025-04-23 LAB
ANION GAP SERPL CALCULATED.3IONS-SCNC: 11 MMOL/L (ref 4–13)
BUN SERPL-MCNC: 14 MG/DL (ref 5–25)
CALCIUM SERPL-MCNC: 9.4 MG/DL (ref 8.4–10.2)
CHLORIDE SERPL-SCNC: 102 MMOL/L (ref 96–108)
CO2 SERPL-SCNC: 24 MMOL/L (ref 21–32)
CREAT SERPL-MCNC: 0.87 MG/DL (ref 0.6–1.3)
CREAT UR-MCNC: 97.9 MG/DL
EST. AVERAGE GLUCOSE BLD GHB EST-MCNC: 128 MG/DL
GFR SERPL CREATININE-BSD FRML MDRD: 91 ML/MIN/1.73SQ M
GLUCOSE P FAST SERPL-MCNC: 108 MG/DL (ref 65–99)
HBA1C MFR BLD: 6.1 %
MICROALBUMIN UR-MCNC: 11.7 MG/L
MICROALBUMIN/CREAT 24H UR: 12 MG/G CREATININE (ref 0–30)
POTASSIUM SERPL-SCNC: 4.2 MMOL/L (ref 3.5–5.3)
SODIUM SERPL-SCNC: 137 MMOL/L (ref 135–147)

## 2025-04-23 PROCEDURE — 83036 HEMOGLOBIN GLYCOSYLATED A1C: CPT

## 2025-04-23 PROCEDURE — 82043 UR ALBUMIN QUANTITATIVE: CPT

## 2025-04-23 PROCEDURE — 82570 ASSAY OF URINE CREATININE: CPT

## 2025-04-23 PROCEDURE — 80048 BASIC METABOLIC PNL TOTAL CA: CPT

## 2025-04-23 PROCEDURE — 36415 COLL VENOUS BLD VENIPUNCTURE: CPT

## 2025-05-01 ENCOUNTER — OFFICE VISIT (OUTPATIENT)
Dept: FAMILY MEDICINE CLINIC | Facility: CLINIC | Age: 65
End: 2025-05-01
Payer: COMMERCIAL

## 2025-05-01 VITALS
HEART RATE: 96 BPM | OXYGEN SATURATION: 98 % | TEMPERATURE: 98.2 F | DIASTOLIC BLOOD PRESSURE: 78 MMHG | BODY MASS INDEX: 38.36 KG/M2 | WEIGHT: 315 LBS | HEIGHT: 76 IN | SYSTOLIC BLOOD PRESSURE: 136 MMHG

## 2025-05-01 DIAGNOSIS — L97.511 ULCER OF RIGHT FOOT, LIMITED TO BREAKDOWN OF SKIN (HCC): ICD-10-CM

## 2025-05-01 DIAGNOSIS — I10 ESSENTIAL HYPERTENSION: ICD-10-CM

## 2025-05-01 DIAGNOSIS — L97.509 TYPE 2 DIABETES MELLITUS WITH FOOT ULCER, WITHOUT LONG-TERM CURRENT USE OF INSULIN (HCC): Primary | ICD-10-CM

## 2025-05-01 DIAGNOSIS — E11.621 TYPE 2 DIABETES MELLITUS WITH FOOT ULCER, WITHOUT LONG-TERM CURRENT USE OF INSULIN (HCC): Primary | ICD-10-CM

## 2025-05-01 DIAGNOSIS — E66.813 CLASS 3 SEVERE OBESITY DUE TO EXCESS CALORIES WITH SERIOUS COMORBIDITY AND BODY MASS INDEX (BMI) OF 40.0 TO 44.9 IN ADULT: ICD-10-CM

## 2025-05-01 DIAGNOSIS — E78.6 LOW HDL (UNDER 40): ICD-10-CM

## 2025-05-01 DIAGNOSIS — R60.0 PERIPHERAL EDEMA: ICD-10-CM

## 2025-05-01 PROCEDURE — G2211 COMPLEX E/M VISIT ADD ON: HCPCS | Performed by: PHYSICIAN ASSISTANT

## 2025-05-01 PROCEDURE — 99215 OFFICE O/P EST HI 40 MIN: CPT | Performed by: PHYSICIAN ASSISTANT

## 2025-05-01 RX ORDER — TORSEMIDE 20 MG/1
40 TABLET ORAL DAILY
Qty: 180 TABLET | Refills: 0 | Status: SHIPPED | OUTPATIENT
Start: 2025-05-01 | End: 2025-07-30

## 2025-05-01 NOTE — PROGRESS NOTES
EXAMINATION: Chest radiograph, portable AP view.



DATE: December 19, 2018 at 1935 hours.



INDICATION: 26-year-old female, dizziness and chest pain.



COMPARISON: January 9, 2017.



FINDINGS: Stable overall appearance of the cardiomediastinal

silhouette. There is no identified pneumothorax. There is no

large pleural effusion. There is no identified focal airspace

consolidation. There is no identified displaced rib fracture.



IMPRESSION: No identified acute cardiopulmonary abnormality.



Dictated by: 



  Dictated on workstation # [id number] Name: Vamshi Robbins      : 1960      MRN: [id number]  Encounter Provider: Cristina Lopez PA-C  Encounter Date: 2025   Encounter department: [facility] PRIMARY CARE  :  Assessment & Plan  Type 2 diabetes mellitus with foot ulcer, without long-term current use of insulin ([facility])  Patient had temporarily stopped his Ozempic due to stomach upset.  He wishes now to go back on it but at the lowest dose possible.  His hemoglobin A1c is doing a great job at 6.1.  He continues with Jardiance.  He is going to have his eye exam performed by Dr. Cabrera at [facility].  He will send the results to us.  Lab Results   Component Value Date    HGBA1C 6.1 (H) 2025       Orders:    semaglutide, 0.25 or 0.5 mg/dose, (Ozempic, 0.25 or 0.5 MG/DOSE,) 2 mg/3 mL injection pen; 0.25 mg under the skin every 7 days for 4 doses (28 days), THEN 0.5 mg under the skin every 7 days    Albumin / creatinine urine ratio; Future    Basic metabolic panel; Future    Hemoglobin A1C; Future    Peripheral edema  Patient is taking Demadex with excellent results for his peripheral edema.  He has lost 5 pounds of water weight.  He tries to elevate his legs is much as possible.  Orders:    torsemide (DEMADEX) 20 mg tablet; Take 2 tablets (40 mg total) by mouth daily    Class 3 severe obesity due to excess calories with serious comorbidity and body mass index (BMI) of 40.0 to 44.9 in adult  Patient lost 5 pounds but this is probably due to peripheral edema being cleared.         Low HDL (under 40)  HDL is only 36.  We talked about the role of exercise and improving his HDL.       Essential hypertension  Patient is adherent with his losartan and torsemide.  Blood pressure controlled.       Ulcer of right foot, limited to breakdown of skin (HCC)  Patient sees Dr. Wild who performed a home visit yesterday and redressed the wound on his right foot.  He has a small blister that is being kept intact.  It is felt that his  "boot has been causing rubbing on his foot leading to the skin breakdown.  Because of his peripheral neuropathy, he is not aware of the skin rubbing so he continues to have further damage without being aware.  His podiatrist believes that the healing will take place within 2 weeks and he will be able to increase his activity at that time.              History of Present Illness   HPI: 64-year-old male sees me for his primary care services.  He was seen with his wife who typically accompanies him on his visits.  He continues to have Dr. Wild come to his home along with home nursing to take care of his wound.  It has been a long process but healing is taking place.  His blood sugars remain under excellent control and his most recent A1c is 6.1.    He has been told to take it easy for 2 more weeks to not be on the foot to allow further healing to occur.  He is to let the blister remain intact.  After 2 weeks, he will increase his activity from his sedentary lifestyle.    He is not having pain in his chest heart palpitations.  He had a recent mild bronchitis that responded quite well to Mucinex.    We reviewed his most recent labs.  We talked about his low HDL and exercise is the only thing that will bring up the HDL as medications are not as effective.  He will continue on his atorvastatin alternating 10 and 20 mg every other day.  I reminded him that he will be taking his atorvastatin the rest of his life as it will significantly drop his cardiovascular risk in his setting of having diabetes.  Patient is no longer having any albuminuria and this reflects his excellent blood sugar control.  Review of Systems: Breathing is without difficulty.  No changes in bowel or bladder habits.    Objective   /78   Pulse 96   Temp 98.2 °F (36.8 °C) (Tympanic)   Ht 6' 4\" (1.93 m)   Wt (!) 157 kg (347 lb 3.6 oz)   SpO2 98%   BMI 42.27 kg/m²      Physical Exam: Reviewed vital signs.  Is normotensive and afebrile.    Heart " is regular rate without murmur rub or gallop.  Lungs are clear to auscultation.    Right foot dressing not removed as podiatrist did a home visit and wanted the dressing remaining intact at this time.  Blisters not to be disturbed.  Administrative Statements   I have spent a total time of 40 minutes in caring for this patient on the day of the visit/encounter including Diagnostic results, Prognosis, Risks and benefits of tx options, Instructions for management, Patient and family education, Importance of tx compliance, Risk factor reductions, Impressions, Counseling / Coordination of care, Documenting in the medical record, Reviewing/placing orders in the medical record (including tests, medications, and/or procedures), and Obtaining or reviewing history      I will see him October 5 or later for his annual exam Medicare and also to do a diabetic recheck.  He will get his blood and urine diabetic test done 1 week prior to that visit..

## 2025-05-01 NOTE — ASSESSMENT & PLAN NOTE
Patient had temporarily stopped his Ozempic due to stomach upset.  He wishes now to go back on it but at the lowest dose possible.  His hemoglobin A1c is doing a great job at 6.1.  He continues with Jardiance.  He is going to have his eye exam performed by Dr. Cabrera at sight [facility].  He will send the results to us.  Lab Results   Component Value Date    HGBA1C 6.1 (H) 04/23/2025       Orders:    semaglutide, 0.25 or 0.5 mg/dose, (Ozempic, 0.25 or 0.5 MG/DOSE,) 2 mg/3 mL injection pen; 0.25 mg under the skin every 7 days for 4 doses (28 days), THEN 0.5 mg under the skin every 7 days    Albumin / creatinine urine ratio; Future    Basic metabolic panel; Future    Hemoglobin A1C; Future

## 2025-05-01 NOTE — ASSESSMENT & PLAN NOTE
Patient is taking Demadex with excellent results for his peripheral edema.  He has lost 5 pounds of water weight.  He tries to elevate his legs is much as possible.  Orders:    torsemide (DEMADEX) 20 mg tablet; Take 2 tablets (40 mg total) by mouth daily

## 2025-05-01 NOTE — ASSESSMENT & PLAN NOTE
Patient sees Dr. Wild who performed a home visit yesterday and redressed the wound on his right foot.  He has a small blister that is being kept intact.  It is felt that his boot has been causing rubbing on his foot leading to the skin breakdown.  Because of his peripheral neuropathy, he is not aware of the skin rubbing so he continues to have further damage without being aware.  His podiatrist believes that the healing will take place within 2 weeks and he will be able to increase his activity at that time.

## 2025-05-11 DIAGNOSIS — F41.1 GAD (GENERALIZED ANXIETY DISORDER): ICD-10-CM

## 2025-05-12 RX ORDER — LORAZEPAM 0.5 MG/1
0.5 TABLET ORAL DAILY PRN
Qty: 60 TABLET | Refills: 0 | Status: SHIPPED | OUTPATIENT
Start: 2025-05-12

## 2025-06-17 DIAGNOSIS — I10 ESSENTIAL HYPERTENSION: ICD-10-CM

## 2025-06-17 RX ORDER — VALSARTAN 80 MG/1
80 TABLET ORAL DAILY
Qty: 90 TABLET | Refills: 1 | Status: SHIPPED | OUTPATIENT
Start: 2025-06-17

## 2025-07-01 DIAGNOSIS — F41.1 GAD (GENERALIZED ANXIETY DISORDER): ICD-10-CM

## 2025-07-02 DIAGNOSIS — F41.1 GAD (GENERALIZED ANXIETY DISORDER): ICD-10-CM

## 2025-07-02 RX ORDER — LORAZEPAM 0.5 MG/1
0.5 TABLET ORAL DAILY PRN
Qty: 60 TABLET | Refills: 0 | Status: SHIPPED | OUTPATIENT
Start: 2025-07-02

## 2025-07-03 RX ORDER — LORAZEPAM 0.5 MG/1
0.5 TABLET ORAL DAILY PRN
Qty: 60 TABLET | Refills: 0 | OUTPATIENT
Start: 2025-07-03

## 2025-08-06 ENCOUNTER — TELEPHONE (OUTPATIENT)
Age: 65
End: 2025-08-06

## 2025-08-06 DIAGNOSIS — L97.509 TYPE 2 DIABETES MELLITUS WITH FOOT ULCER, WITHOUT LONG-TERM CURRENT USE OF INSULIN (HCC): Primary | ICD-10-CM

## 2025-08-06 DIAGNOSIS — E11.621 TYPE 2 DIABETES MELLITUS WITH FOOT ULCER, WITHOUT LONG-TERM CURRENT USE OF INSULIN (HCC): Primary | ICD-10-CM

## 2025-08-07 ENCOUNTER — TELEPHONE (OUTPATIENT)
Age: 65
End: 2025-08-07

## 2025-08-07 DIAGNOSIS — E11.621 TYPE 2 DIABETES MELLITUS WITH FOOT ULCER, WITHOUT LONG-TERM CURRENT USE OF INSULIN (HCC): Primary | ICD-10-CM

## 2025-08-07 DIAGNOSIS — L97.509 TYPE 2 DIABETES MELLITUS WITH FOOT ULCER, WITHOUT LONG-TERM CURRENT USE OF INSULIN (HCC): Primary | ICD-10-CM

## 2025-08-07 PROBLEM — E11.628 TYPE 2 DIABETES MELLITUS WITH SKIN COMPLICATION, WITHOUT LONG-TERM CURRENT USE OF INSULIN (HCC): Status: RESOLVED | Noted: 2024-04-27 | Resolved: 2025-08-07

## 2025-08-07 RX ORDER — LANCETS
EACH MISCELLANEOUS
Qty: 100 EACH | Refills: 3 | Status: SHIPPED | OUTPATIENT
Start: 2025-08-07

## (undated) DEVICE — FLEXIBLE ADHESIVE BANDAGE,X-LARGE: Brand: CURITY

## (undated) DEVICE — GLOVE SRG LF STRL BGL SKNSNS 7.5 PF

## (undated) DEVICE — SPONGE SCRUB 4 PCT CHLORHEXIDINE

## (undated) DEVICE — IV EXTENSION TUBING SMALL BORE

## (undated) DEVICE — PLASTIC ADHESIVE BANDAGE: Brand: CURITY

## (undated) DEVICE — DRAPE C-ARM X-RAY

## (undated) DEVICE — GLOVE SRG BIOGEL 7

## (undated) DEVICE — GAUZE SPONGES,USP TYPE VII GAUZE, 12 PLY: Brand: CURITY

## (undated) DEVICE — SUT STRATAFIX SPIRAL MONOCRYL PLUS 4-0 PS-2 45CM SXMP1B118

## (undated) DEVICE — NEEDLE BLUNT 18 G X 1 1/2IN

## (undated) DEVICE — CHLORAPREP HI-LITE 26ML ORANGE

## (undated) DEVICE — DRAPE SHEET THREE QUARTER

## (undated) DEVICE — GAUZE SPONGES,16 PLY: Brand: CURITY

## (undated) DEVICE — NEEDLE SPINAL 22G X 3.5IN  QUINCKE

## (undated) DEVICE — INTENDED FOR TISSUE SEPARATION, AND OTHER PROCEDURES THAT REQUIRE A SHARP SURGICAL BLADE TO PUNCTURE OR CUT.: Brand: BARD-PARKER SAFETY BLADES SIZE 10, STERILE

## (undated) DEVICE — RX COUDÉ® EPIDURAL NEEDLE 14G TW X 4.0": Brand: EPIMED

## (undated) DEVICE — UTILITY MARKER,BLACK WITH LABELS: Brand: DEVON

## (undated) DEVICE — TELFA ADHESIVE ISLAND DRESSING: Brand: TELFA

## (undated) DEVICE — SYRINGE 10ML LL

## (undated) DEVICE — GLOVE INDICATOR PI UNDERGLOVE SZ 7.5 BLUE

## (undated) DEVICE — 3M™ TEGADERM™ TRANSPARENT FILM DRESSING FRAME STYLE, 1624W, 2-3/8 IN X 2-3/4 IN (6 CM X 7 CM), 100/CT 4CT/CASE: Brand: 3M™ TEGADERM™

## (undated) DEVICE — 3M™ STERI-STRIP™ REINFORCED ADHESIVE SKIN CLOSURES, R1547, 1/2 IN X 4 IN (12 MM X 100 MM), 6 STRIPS/ENVELOPE: Brand: 3M™ STERI-STRIP™

## (undated) DEVICE — TOOL 14MH30 LEGEND 14CM 3MM: Brand: MIDAS REX ™

## (undated) DEVICE — SYRINGE 5ML LL

## (undated) DEVICE — PAD GROUNDING IONIC RF DISP

## (undated) DEVICE — GLOVE SRG BIOGEL 8

## (undated) DEVICE — DRAPE TOWEL: Brand: CONVERTORS

## (undated) DEVICE — INTENDED FOR TISSUE SEPARATION, AND OTHER PROCEDURES THAT REQUIRE A SHARP SURGICAL BLADE TO PUNCTURE OR CUT.: Brand: BARD-PARKER ® CARBON RIB-BACK BLADES

## (undated) DEVICE — 3M™ IOBAN™ 2 ANTIMICROBIAL INCISE DRAPE 6650EZ: Brand: IOBAN™ 2

## (undated) DEVICE — ELECTRODE RF 10CM

## (undated) DEVICE — PATIENT REMOTE 2500 KIT: Brand: OMNIA

## (undated) DEVICE — GLOVE INDICATOR PI UNDERGLOVE SZ 7 BLUE

## (undated) DEVICE — SKIN MARKER DUAL TIP WITH RULER CAP, FLEXIBLE RULER AND LABELS: Brand: DEVON

## (undated) DEVICE — BULB SYRINGE,IRRIGATION WITH PROTECTIVE CAP: Brand: DOVER

## (undated) DEVICE — BIPOLAR SEALER 23-301-1 AQM MBS: Brand: AQUAMANTYS™

## (undated) DEVICE — INSERTION NEEDLE KIT, 6": Brand: NEVRO®

## (undated) DEVICE — NEEDLE 18 G X 1 1/2 SAFETY

## (undated) DEVICE — ELECTRODE BLADE MOD E-Z CLEAN 2.5IN 6.4CM -0012M

## (undated) DEVICE — PASSING ELEVATOR ACCESSORY TOOL: Brand: NEVRO

## (undated) DEVICE — TUNNELING TOOL KIT, 35CM: Brand: NEVRO®

## (undated) DEVICE — IV SET VIAL ADAPTOR

## (undated) DEVICE — PENCIL ELECTROSURG E-Z CLEAN -0035H

## (undated) DEVICE — DRESSING MEPILEX AG BORDER 4 X 4 IN

## (undated) DEVICE — SUT PROLENE 2-0 CT-2 30 IN 8411H

## (undated) DEVICE — ADHESIVE SKIN HIGH VISCOSITY EXOFIN 1ML

## (undated) DEVICE — ELECTRODE RF 15CM

## (undated) DEVICE — CHLORAPREP HI-LITE 10.5ML ORANGE

## (undated) DEVICE — NEEDLE 25G X 1 1/2

## (undated) DEVICE — SYRINGE 20ML LL

## (undated) DEVICE — SPONGE STICK WITH PVP-I: Brand: KENDALL

## (undated) DEVICE — MINOR PROCEDURE DRAPE: Brand: CONVERTORS

## (undated) DEVICE — GLOVE INDICATOR PI UNDERGLOVE SZ 8.5 BLUE

## (undated) DEVICE — GLOVE INDICATOR PI UNDERGLOVE SZ 8 BLUE

## (undated) DEVICE — SYRINGE EPI 8ML LUER SLIP LOSS OF RESISTANCE PLASTIC PERFIX

## (undated) DEVICE — 3M™ TEGADERM™ TRANSPARENT FILM DRESSING FRAME STYLE, 1628, 6 IN X 8 IN (15 CM X 20 CM), 10/CT 8CT/CASE: Brand: 3M™ TEGADERM™

## (undated) DEVICE — BLUETOOTH TRIAL STIMULATOR KIT: Brand: SENZA

## (undated) DEVICE — ANTIBACTERIAL VIOLET BRAIDED (POLYGLACTIN 910), SYNTHETIC ABSORBABLE SUTURE: Brand: COATED VICRYL

## (undated) DEVICE — STERILE LATEX POWDER-FREE SURGICAL GLOVESWITH NITRILE COATING: Brand: PROTEXIS

## (undated) DEVICE — Device

## (undated) DEVICE — CHARGER 2500 KIT: Brand: OMNIA

## (undated) DEVICE — SYRINGE LOR 8ML PLASTIC LL

## (undated) DEVICE — TRAY EPID CONT PERIFIX 18G X 3.5IN 10ML CLSD TIP

## (undated) DEVICE — SPECIMEN CONTAINER STERILE PEEL PACK

## (undated) DEVICE — GLOVE SRG BIOGEL 7.5

## (undated) DEVICE — HEMOSTATIC MATRIX SURGIFLO 8ML W/THROMBIN

## (undated) DEVICE — STAYFIX FIXATION DEVICE LRG 12-22FR 3.5 X 4 IN STERILE

## (undated) DEVICE — BETHLEHEM UNIVERSAL SPINE, KIT: Brand: CARDINAL HEALTH

## (undated) DEVICE — 3M™ STERI-STRIP™ COMPOUND BENZOIN TINCTURE 40 BAGS/CARTON 4 CARTONS/CASE C1544: Brand: 3M™ STERI-STRIP™

## (undated) DEVICE — TIBURON SPLIT SHEET: Brand: CONVERTORS

## (undated) DEVICE — ELECTRODE BLADE MOD E-Z CLEAN 4IN -0014AM

## (undated) DEVICE — IPG2000 TEMPLATE KIT: Brand: NEVRO

## (undated) DEVICE — STANDARD SURGICAL GOWN, L: Brand: CONVERTORS